# Patient Record
Sex: MALE | Race: WHITE | NOT HISPANIC OR LATINO | Employment: OTHER | URBAN - METROPOLITAN AREA
[De-identification: names, ages, dates, MRNs, and addresses within clinical notes are randomized per-mention and may not be internally consistent; named-entity substitution may affect disease eponyms.]

---

## 2017-02-17 ENCOUNTER — TELEPHONE (OUTPATIENT)
Dept: MEDICAL GROUP | Facility: MEDICAL CENTER | Age: 67
End: 2017-02-17

## 2017-02-17 NOTE — TELEPHONE ENCOUNTER
Insurance needs a prior auth on this medication but they said that Clindamycin or Erythromycin with a a prior auth. Please advise

## 2017-02-18 NOTE — TELEPHONE ENCOUNTER
MEDICATION PRIOR AUTHORIZATION NEEDED:    1. Name of Medication sulfacetamide sodium     2. Requested By (Name of Pharmacy): [facility]      3. Is insurance on file current? yes    4. What is the name & phone number of the 3rd party payor?      talked to [facility] about this medication and the suggested 2 other medications that would be covered and submitted them to Dr. Segura

## 2017-02-27 ENCOUNTER — OFFICE VISIT (OUTPATIENT)
Dept: MEDICAL GROUP | Facility: CLINIC | Age: 67
End: 2017-02-27
Payer: MEDICARE

## 2017-02-27 VITALS
OXYGEN SATURATION: 99 % | TEMPERATURE: 96 F | WEIGHT: 225 LBS | RESPIRATION RATE: 18 BRPM | HEART RATE: 89 BPM | BODY MASS INDEX: 35.31 KG/M2 | HEIGHT: 67 IN | SYSTOLIC BLOOD PRESSURE: 138 MMHG | DIASTOLIC BLOOD PRESSURE: 80 MMHG

## 2017-02-27 DIAGNOSIS — L02.92 RECURRENT BOILS: ICD-10-CM

## 2017-02-27 DIAGNOSIS — R21 SKIN RASH: ICD-10-CM

## 2017-02-27 PROCEDURE — 99213 OFFICE O/P EST LOW 20 MIN: CPT | Mod: 25 | Performed by: INTERNAL MEDICINE

## 2017-02-27 PROCEDURE — G8482 FLU IMMUNIZE ORDER/ADMIN: HCPCS | Performed by: INTERNAL MEDICINE

## 2017-02-27 PROCEDURE — 3017F COLORECTAL CA SCREEN DOC REV: CPT | Mod: 8P | Performed by: INTERNAL MEDICINE

## 2017-02-27 PROCEDURE — G8419 CALC BMI OUT NRM PARAM NOF/U: HCPCS | Performed by: INTERNAL MEDICINE

## 2017-02-27 PROCEDURE — 4040F PNEUMOC VAC/ADMIN/RCVD: CPT | Performed by: INTERNAL MEDICINE

## 2017-02-27 PROCEDURE — 4004F PT TOBACCO SCREEN RCVD TLK: CPT | Performed by: INTERNAL MEDICINE

## 2017-02-27 PROCEDURE — 1101F PT FALLS ASSESS-DOCD LE1/YR: CPT | Performed by: INTERNAL MEDICINE

## 2017-02-27 RX ORDER — SULFACETAMIDE SODIUM 100 MG/ML
LOTION TOPICAL
Qty: 118 ML | Refills: 6 | Status: SHIPPED | OUTPATIENT
Start: 2017-02-27 | End: 2018-08-27 | Stop reason: SDUPTHER

## 2017-02-27 RX ORDER — TRIAMCINOLONE ACETONIDE 40 MG/ML
40 INJECTION, SUSPENSION INTRA-ARTICULAR; INTRAMUSCULAR ONCE
Status: COMPLETED | OUTPATIENT
Start: 2017-02-27 | End: 2017-02-27

## 2017-02-27 RX ADMIN — TRIAMCINOLONE ACETONIDE 40 MG: 40 INJECTION, SUSPENSION INTRA-ARTICULAR; INTRAMUSCULAR at 09:06

## 2017-02-27 ASSESSMENT — PATIENT HEALTH QUESTIONNAIRE - PHQ9: CLINICAL INTERPRETATION OF PHQ2 SCORE: 0

## 2017-02-27 NOTE — PROGRESS NOTES
"Subjective:  Micheal is a 66 y.o. male with the following   Past Medical History   Diagnosis Date   • Gout    • GERD (gastroesophageal reflux disease)    • Depression    • Hyperlipidemia    • Tobacco use 11/26/2014      Family History   Problem Relation Age of Onset   • Cancer Mother    • Cancer Father    • Stroke Father    • Diabetes Neg Hx    • Heart Disease Neg Hx    • Hypertension Neg Hx      The patient is on the following medications:   Current outpatient prescriptions:   •  Sulfacetamide Sodium, Acne, 10 % Lotion, Apply one daily, Disp: 118 mL, Rfl: 6  •  simvastatin (ZOCOR) 40 MG Tab, TAKE 1 TABLET EVERY EVENING, Disp: 90 Tab, Rfl: 3  •  duloxetine (CYMBALTA) 60 MG Cap DR Particles delayed-release capsule, TAKE 1 CAPSULE EVERY DAY, Disp: 90 Cap, Rfl: 3  •  omeprazole (PRILOSEC) 40 MG delayed-release capsule, Take 1 capsule every day, Disp: 90 Cap, Rfl: 0  •  varenicline (CHANTIX) 1 MG tablet, Take 1 Tab by mouth 2 times a day., Disp: 60 Tab, Rfl: 3  •  albuterol 108 (90 BASE) MCG/ACT Aero Soln inhalation aerosol, Inhale 2 Puffs by mouth every 6 hours as needed for Shortness of Breath., Disp: 8.5 g, Rfl: 3  •  Misc. Devices Misc, 1 Device by Does not apply route every bedtime., Disp: 1 Device, Rfl: 0  •  triamcinolone acetonide (KENALOG) 0.5 % Cream, Use q 12 hour, Disp: 60 g, Rfl: 3    HPI; patient is here today requesting subcutaneous injections of Kenalog to recurrent skin rash, he has failed creams and ointments given by dermatologist, only therapy that has worked is local injections. Also requesting prescription of Sulfacetamide sodium  topical lotion for recurrent groin boils, per patient he has tried multiple therapies in the past without success , this is only medication that has worked, but insurance company has denied his recent request for prescription.   ROS:  See HPI    Blood pressure 138/80, pulse 89, temperature 35.6 °C (96 °F), resp. rate 18, height 1.702 m (5' 7.01\"), weight 102.059 kg (225 " lb), SpO2 99 %.on RA  Objective:  Patient is well appearing and in no acute distress.  Pharynx is clear.  Neck is soft and supple with no cervical or supraclavicular lymphadenopathy, thyromegaly or masses, no JVD.  Lungs clear to auscultation bilaterally with normal respiratory effort. Abdomen soft, non-tender on palpation,not distended. Heart regular rate and rhythm without murmur. Extremities without any clubbing, cyanosis, or edema. Left forearm and lower extremity multiple circular erythematous rash.     Assessment and Plan:  1. Recurrent erythematous rash ; has not responded to topical antifungal and steroid medications except subcutaneous Kenalog injection to the lesions.     2. Recurrent groin boils; responds to sulfacetamide 10% lotion, patient decided the only medication that works, but his medical insurance has denied it.       Patient is advised on preventive and supportive care regarding current symptoms, Kenalog 40 mg 1.0 cc injection to multiple skin lesions, refill prescription and apply for preauthorization, also discussed alternative therapies for recurrent groin infection.      Please note that this dictation was created using voice recognition software. I have worked with consultants from the vendor as well as technical experts from [facility] to optimize the interface. I have made every reasonable attempt to correct obvious errors, but I expect that there are errors of grammar and possibly content that I did not discover before finalizing the note.

## 2017-02-27 NOTE — MR AVS SNAPSHOT
"        Micheal Carlnabor   2017 7:20 AM   Office Visit   MRN: [id number]    Department:  [facility]   Dept Phone:  [phone]    Description:  Male : 1950   Provider:  Manuel Segura M.D.           Allergies as of 2017     Allergen Noted Reactions    Zyban [Bupropion] 10/21/2014   Hives, Itching    Augmentin 2016   Hives      You were diagnosed with     Recurrent boils   [[id number]]         Vital Signs     Blood Pressure Pulse Temperature Respirations Height Weight    138/80 mmHg 89 35.6 °C (96 °F) 18 1.702 m (5' 7.01\") 102.059 kg (225 lb)    Body Mass Index Oxygen Saturation Smoking Status             35.23 kg/m2 99% Current Every Day Smoker         Basic Information     Date Of Birth Sex Race Ethnicity Preferred Language    1950 Male White Non- English      Your appointments     2017  3:00 PM   Established Patient with Manuel Segura M.D.   [facility] ([facility])    [address] [place] [id number]   [phone]           You will be receiving a confirmation call a few days before your appointment from our automated call confirmation system.   Please bring to your appointment; a photo ID, copies of your insurance card, all medication bottles and any co-pay that you are responsible for.  Please allow 45-60 minutes to complete your scheduled appointment.              Problem List              ICD-10-CM Priority Class Noted - Resolved    Obesity (BMI 30-39.9) E66.9   2014 - Present    Hyperlipidemia E78.5   2014 - Present    GERD (gastroesophageal reflux disease) K21.9   2014 - Present    Depression F32.9   2014 - Present    Tobacco use Z72.0   2014 - Present    Right wrist pain M25.531   2014 - Present    NAHUM on CPAP G47.33   2016 - Present      Health Maintenance        Date Due Completion Dates    IMM DTaP/Tdap/Td Vaccine (1 - Tdap) 1968 ---    COLONOSCOPY 1999 ---    IMM " ZOSTER VACCINE 9/30/2009 ---            Current Immunizations     13-VALENT PCV PREVNAR 10/17/2016    Influenza TIV (IM) 10/1/2015    Influenza Vaccine Adult HD 10/17/2016    Influenza Vaccine Quad Inj (Preserved) 11/26/2014  3:09 PM    Pneumococcal polysaccharide vaccine (PPSV-23) 11/26/2014  3:10 PM      Below and/or attached are the medications your provider expects you to take. Review all of your home medications and newly ordered medications with your provider and/or pharmacist. Follow medication instructions as directed by your provider and/or pharmacist. Please keep your medication list with you and share with your provider. Update the information when medications are discontinued, doses are changed, or new medications (including over-the-counter products) are added; and carry medication information at all times in the event of emergency situations     Allergies:  ZYBAN - Hives,Itching     AUGMENTIN - Hives               Medications  Valid as of: February 27, 2017 -  7:52 AM    Generic Name Brand Name Tablet Size Instructions for use    Albuterol Sulfate (Aero Soln) albuterol 108 (90 BASE) MCG/ACT Inhale 2 Puffs by mouth every 6 hours as needed for Shortness of Breath.        DULoxetine HCl (Cap DR Particles) CYMBALTA 60 MG TAKE 1 CAPSULE EVERY DAY        Misc. Devices (Misc) Misc. Devices  1 Device by Does not apply route every bedtime.        Omeprazole (CAPSULE DELAYED RELEASE) PRILOSEC 40 MG Take 1 capsule every day        Simvastatin (Tab) ZOCOR 40 MG TAKE 1 TABLET EVERY EVENING        Sulfacetamide Sodium (Acne) (Lotion) Sulfacetamide Sodium (Acne) 10 % Apply one daily        Triamcinolone Acetonide (Cream) KENALOG 0.5 % Use q 12 hour        Varenicline Tartrate (Tab) CHANTIX 1 MG Take 1 Tab by mouth 2 times a day.        .                 Medicines prescribed today were sent to:     [id number]  [place], [place] - [address] AT [facility] [employer] [employer] [facility] [place]    [address]  [place] [address]    Phone: [phone] Fax: [phone]    Open 24 Hours?: No      Medication refill instructions:       If your prescription bottle indicates you have medication refills left, it is not necessary to call your provider’s office. Please contact your pharmacy and they will refill your medication.    If your prescription bottle indicates you do not have any refills left, you may request refills at any time through one of the following ways: The online [employer] system (except Urgent Care), by calling your provider’s office, or by asking your pharmacy to contact your provider’s office with a refill request. Medication refills are processed only during regular business hours and may not be available until the next business day. Your provider may request additional information or to have a follow-up visit with you prior to refilling your medication.   *Please Note: Medication refills are assigned a new Rx number when refilled electronically. Your pharmacy may indicate that no refills were authorized even though a new prescription for the same medication is available at the pharmacy. Please request the medicine by name with the pharmacy before contacting your provider for a refill.           [employer] Access Code: Activation code not generated  Current [employer] Status: Active

## 2017-03-28 RX ORDER — DULOXETIN HYDROCHLORIDE 60 MG/1
60 CAPSULE, DELAYED RELEASE ORAL DAILY
Qty: 90 CAP | Refills: 3 | Status: SHIPPED | OUTPATIENT
Start: 2017-03-28 | End: 2018-02-08 | Stop reason: SDUPTHER

## 2017-03-28 RX ORDER — SIMVASTATIN 40 MG
40 TABLET ORAL EVERY EVENING
Qty: 90 TAB | Refills: 3 | Status: SHIPPED | OUTPATIENT
Start: 2017-03-28 | End: 2017-12-29

## 2017-03-28 RX ORDER — OMEPRAZOLE 40 MG/1
40 CAPSULE, DELAYED RELEASE ORAL DAILY
Qty: 90 CAP | Refills: 3 | Status: SHIPPED | OUTPATIENT
Start: 2017-03-28 | End: 2018-02-13 | Stop reason: SDUPTHER

## 2017-03-30 ENCOUNTER — TELEPHONE (OUTPATIENT)
Dept: URGENT CARE | Facility: CLINIC | Age: 67
End: 2017-03-30

## 2017-03-30 DIAGNOSIS — E55.9 VITAMIN D DEFICIENCY: ICD-10-CM

## 2017-03-30 DIAGNOSIS — E78.5 DYSLIPIDEMIA: ICD-10-CM

## 2017-03-30 NOTE — TELEPHONE ENCOUNTER
requested fasting test has been ordered, please notify  while you are in the laboratory for blood work before your next appointment.  Thanks

## 2017-03-30 NOTE — TELEPHONE ENCOUNTER
1. Caller Name: Micheal                      Call Back Number: [phone] (home)     2. Message: Patient called requesting lab orders for a complete blood work up, prior to his appt on 4/12/17.    Please call patient when orders are ready.  Please advise, thank you.    3. Patient approves office to leave a detailed voicemail/MyChart message: yes

## 2017-04-10 ENCOUNTER — HOSPITAL ENCOUNTER (OUTPATIENT)
Dept: LAB | Facility: MEDICAL CENTER | Age: 67
End: 2017-04-10
Attending: INTERNAL MEDICINE
Payer: MEDICARE

## 2017-04-10 DIAGNOSIS — E78.5 DYSLIPIDEMIA: ICD-10-CM

## 2017-04-10 DIAGNOSIS — E55.9 VITAMIN D DEFICIENCY: ICD-10-CM

## 2017-04-10 LAB
25(OH)D3 SERPL-MCNC: 54 NG/ML (ref 30–100)
ALT SERPL-CCNC: 28 U/L (ref 2–50)
CHOLEST SERPL-MCNC: 141 MG/DL (ref 100–199)
HDLC SERPL-MCNC: 42 MG/DL
LDLC SERPL CALC-MCNC: 81 MG/DL
TRIGL SERPL-MCNC: 89 MG/DL (ref 0–149)

## 2017-04-10 PROCEDURE — 84460 ALANINE AMINO (ALT) (SGPT): CPT

## 2017-04-10 PROCEDURE — 83695 ASSAY OF LIPOPROTEIN(A): CPT

## 2017-04-10 PROCEDURE — 36415 COLL VENOUS BLD VENIPUNCTURE: CPT

## 2017-04-10 PROCEDURE — 80061 LIPID PANEL: CPT

## 2017-04-10 PROCEDURE — 82306 VITAMIN D 25 HYDROXY: CPT

## 2017-04-12 ENCOUNTER — OFFICE VISIT (OUTPATIENT)
Dept: MEDICAL GROUP | Facility: CLINIC | Age: 67
End: 2017-04-12
Payer: MEDICARE

## 2017-04-12 VITALS
BODY MASS INDEX: 34.84 KG/M2 | OXYGEN SATURATION: 100 % | TEMPERATURE: 97.7 F | DIASTOLIC BLOOD PRESSURE: 80 MMHG | HEIGHT: 67 IN | RESPIRATION RATE: 18 BRPM | SYSTOLIC BLOOD PRESSURE: 130 MMHG | WEIGHT: 222 LBS | HEART RATE: 83 BPM

## 2017-04-12 DIAGNOSIS — Z72.0 TOBACCO USE: ICD-10-CM

## 2017-04-12 DIAGNOSIS — E78.5 DYSLIPIDEMIA: ICD-10-CM

## 2017-04-12 DIAGNOSIS — H57.9 EYE PROBLEM: ICD-10-CM

## 2017-04-12 DIAGNOSIS — L98.9 SKIN LESION: ICD-10-CM

## 2017-04-12 DIAGNOSIS — F41.9 ANXIETY AND DEPRESSION: ICD-10-CM

## 2017-04-12 DIAGNOSIS — F32.A ANXIETY AND DEPRESSION: ICD-10-CM

## 2017-04-12 DIAGNOSIS — Z00.00 ANNUAL PHYSICAL EXAM: ICD-10-CM

## 2017-04-12 LAB — LPA SERPL-MCNC: 2 MG/DL

## 2017-04-12 PROCEDURE — G8432 DEP SCR NOT DOC, RNG: HCPCS | Performed by: INTERNAL MEDICINE

## 2017-04-12 PROCEDURE — 4004F PT TOBACCO SCREEN RCVD TLK: CPT | Performed by: INTERNAL MEDICINE

## 2017-04-12 PROCEDURE — G0438 PPPS, INITIAL VISIT: HCPCS | Performed by: INTERNAL MEDICINE

## 2017-04-13 NOTE — PROGRESS NOTES
Subjective: Micheal Brothers is a 66 y.o. male      Past Medical History   Diagnosis Date   • Gout    • GERD (gastroesophageal reflux disease)    • Depression    • Hyperlipidemia    • Tobacco use 11/26/2014       Allergies: Zyban and Augmentin      Current outpatient prescriptions:   •  omeprazole (PRILOSEC) 40 MG delayed-release capsule, Take 1 Cap by mouth every day., Disp: 90 Cap, Rfl: 3  •  duloxetine (CYMBALTA) 60 MG Cap DR Particles delayed-release capsule, Take 1 Cap by mouth every day., Disp: 90 Cap, Rfl: 3  •  simvastatin (ZOCOR) 40 MG Tab, Take 1 Tab by mouth every evening., Disp: 90 Tab, Rfl: 3  •  Sulfacetamide Sodium, Acne, 10 % Lotion, Apply one daily, Disp: 118 mL, Rfl: 6  •  varenicline (CHANTIX) 1 MG tablet, Take 1 Tab by mouth 2 times a day., Disp: 60 Tab, Rfl: 3  •  albuterol 108 (90 BASE) MCG/ACT Aero Soln inhalation aerosol, Inhale 2 Puffs by mouth every 6 hours as needed for Shortness of Breath., Disp: 8.5 g, Rfl: 3  •  Misc. Devices Misc, 1 Device by Does not apply route every bedtime., Disp: 1 Device, Rfl: 0  •  triamcinolone acetonide (KENALOG) 0.5 % Cream, Use q 12 hour, Disp: 60 g, Rfl: 3    Social History     Social History   • Marital Status:      Spouse Name: N/A   • Number of Children: N/A   • Years of Education: N/A     Occupational History   • Not on file.     Social History Main Topics   • Smoking status: Current Every Day Smoker -- 1.00 packs/day for 40 years     Types: Cigarettes   • Smokeless tobacco: Never Used   • Alcohol Use: 0.0 oz/week     0 Standard drinks or equivalent per week      Comment: rare   • Drug Use: No   • Sexual Activity:     Partners: Female     Other Topics Concern   • Not on file     Social History Narrative       Family History   Problem Relation Age of Onset   • Cancer Mother    • Cancer Father    • Stroke Father    • Diabetes Neg Hx    • Heart Disease Neg Hx    • Hypertension Neg Hx        HPI: Patient is here today for annual physical exam,  "requesting referral to dermatology for recurrent lower extremity skin rash, previously responded to Kenalog injections to the area of skin rash, but now continues to persist without pruritus. Patient continues to smoke      Review of Systems   Constitutional: Negative for fever, chills, weight loss, malaise/fatigue and diaphoresis.   HENT: Negative for ear pain, congestion, sore throat and neck pain.   Eyes: Negative for blurred vision, double vision and pain.   Respiratory: Negative for cough, sputum production, shortness of breath and wheezing.   Cardiovascular: Negative for palpitations, orthopnea, claudication, leg swelling and PND.   Gastrointestinal: Negative for heartburn, nausea, vomiting, abdominal pain, diarrhea, constipation and blood in stool.   Genitourinary: Negative. Negative for dysuria, urgency and frequency.   Musculoskeletal: Negative for myalgias and back pain.   Skin: Negative for itching and rash.   Neurological: Negative for dizziness, tremors, loss of consciousness, weakness and headaches.   Endo/Heme/Allergies: Negative for polydipsia. Does not bruise/bleed easily.   Psychiatric/Behavioral: Negative for depression, hallucinations and memory loss. The patient does not have insomnia.      /80 mmHg  Pulse 83  Temp(Src) 36.5 °C (97.7 °F)  Resp 18  Ht 1.702 m (5' 7.01\")  Wt 100.699 kg (222 lb)  BMI 34.76 kg/m2  SpO2 100%    Objective:   Obese and Well-appearing male. In no acute distress.   HEENT: PERRLA, EOMI, Conjunctiva WNL, TMs are clear bilaterally. Nares and pharynx is clear. Neck is soft and supple with no cervical lymphadenopathy, thyromegaly or masses, no JVD. No supraclavicular adenopathy  Lungs are clear auscultation bilaterally. Normal respiratory effort. Cardiac regular rate and rhythm without murmur.   Abdomen is protuberant. Soft with positive bowel sounds. Nontender, nondistended with no hepatosplenomegaly or masses.  No Rebound    there is normal external male " genitalia. Testes are descended bilaterally. There is no inguinal hernias. Rectal with no masses and normal tone.  Prostate enlarged, smooth and nontender.  Stool Guiac negative.   Extremities no clubbing cyanosis or edema. 2+ distal pulses. Full Range of motion x4.Back no CVA tenderness.   Neuro: alert and oriented x3. Cranial nerves are intact. No focality. No gross motor or sensory deficits. 2+ DTRs. Negative Romberg and Pronator drift. Affect is normal.      Assessment and plan:  1. Recurrent lower symmetry skin rash; previously responded to Kenalog injection to the area of skin rash, this time has not worked. Requesting referral to dermatology.      2. Increased BMI      3. Dyslipidemia;on  simvastatin 40 mg daily at bedtime.   Ref. Range 8/7/2015 08:21 5/9/2016 08:29 4/10/2017 08:52   Cholesterol,Tot Latest Ref Range: 100-199 mg/dL 128 139 141   Triglycerides Latest Ref Range: 0-149 mg/dL 85 71 89   HDL Latest Ref Range: >=40 mg/dL 37 (A) 42 42   LDL Latest Ref Range: <100 mg/dL 74 83 81   Lipoprotein A Latest Ref Range: <=29 mg/dL   2         4. Tobacco use      5. Anxiety disorder; per patient has been on Cymbalta 60 mg daily for several years, would like to get off the medication.        6. Request of referral to ophthalmologist.        Patient is advised on preventive and supportive care, diet and lifestyle modification, daily walking ,exercise and weight loss, smoking cessation. CoQ10 -200 mg daily while on statin, referral to dermatology and ophthalmology. Gradual tapering off the Cymbalta and monitor.      Please note that this dictation was created using voice recognition software. I have worked with consultants from the vendor as well as technical experts from [employer] to optimize the interface. I have made every reasonable attempt to correct obvious errors, but I expect that there are errors of grammar and possibly content that I did not discover before finalizing the note.

## 2017-10-02 ENCOUNTER — OFFICE VISIT (OUTPATIENT)
Dept: URGENT CARE | Facility: PHYSICIAN GROUP | Age: 67
End: 2017-10-02
Payer: MEDICARE

## 2017-10-02 VITALS
HEART RATE: 94 BPM | HEIGHT: 67 IN | WEIGHT: 226 LBS | TEMPERATURE: 97.8 F | BODY MASS INDEX: 35.47 KG/M2 | RESPIRATION RATE: 24 BRPM | SYSTOLIC BLOOD PRESSURE: 138 MMHG | DIASTOLIC BLOOD PRESSURE: 78 MMHG | OXYGEN SATURATION: 97 %

## 2017-10-02 DIAGNOSIS — R05.9 COUGH: ICD-10-CM

## 2017-10-02 DIAGNOSIS — J01.01 ACUTE RECURRENT MAXILLARY SINUSITIS: ICD-10-CM

## 2017-10-02 DIAGNOSIS — J44.9 CHRONIC OBSTRUCTIVE PULMONARY DISEASE, UNSPECIFIED COPD TYPE (HCC): ICD-10-CM

## 2017-10-02 PROCEDURE — 99214 OFFICE O/P EST MOD 30 MIN: CPT | Performed by: FAMILY MEDICINE

## 2017-10-02 RX ORDER — DOXYCYCLINE HYCLATE 100 MG
100 TABLET ORAL 2 TIMES DAILY
Qty: 20 TAB | Refills: 0 | Status: SHIPPED | OUTPATIENT
Start: 2017-10-02 | End: 2017-10-12

## 2017-10-02 ASSESSMENT — ENCOUNTER SYMPTOMS
SORE THROAT: 1
WEIGHT LOSS: 0
EYE REDNESS: 0
MYALGIAS: 0
EYE DISCHARGE: 0

## 2017-10-02 NOTE — PROGRESS NOTES
"Subjective:      Micheal Brothers is a 67 y.o. male who presents with Cough (with sore throat and sinus congestion x 5 days )            5 days nasal congestion, sinus pressure, intermittently productive cough without blood in sputum. No SOB/wheeze. +PMH COPD. No PMH pneumonia. +PMH sinus infection. Using multiple OTC cough/cold without relief. Improved with laying on side. No other aggravating or alleviating factors.          Review of Systems   Constitutional: Positive for malaise/fatigue. Negative for weight loss.   HENT: Positive for sore throat. Negative for ear pain.    Eyes: Negative for discharge and redness.   Cardiovascular: Negative for leg swelling.   Musculoskeletal: Negative for joint pain and myalgias.   Skin: Negative for itching and rash.     .  Medications, Allergies, and current problem list reviewed today in Epic       Objective:     /78   Pulse 94   Temp 36.6 °C (97.8 °F)   Resp (!) 24   Ht 1.702 m (5' 7\")   Wt 102.5 kg (226 lb)   SpO2 97%   BMI 35.40 kg/m²      Physical Exam   Constitutional: He appears well-developed and well-nourished. No distress.   HENT:   Tender maxillary sinus bilateral, +PND   Eyes: Conjunctivae are normal.   Neck: Neck supple.   Cardiovascular: Normal rate, regular rhythm and normal heart sounds.    No murmur heard.  Pulmonary/Chest: Effort normal and breath sounds normal. He has no wheezes.   No rhonchi   Neurological:   Speech is clear. Patient is appropriate and cooperative.     Skin: Skin is warm and dry. No rash noted.               Assessment/Plan:     1. Acute recurrent maxillary sinusitis  doxycycline (VIBRAMYCIN) 100 MG Tab   2. Cough  doxycycline (VIBRAMYCIN) 100 MG Tab   3. Chronic obstructive pulmonary disease, unspecified COPD type (CMS-HCC)  doxycycline (VIBRAMYCIN) 100 MG Tab     Differential diagnosis, natural history, supportive care, and indications for immediate follow-up discussed at length.   Nasal saline, flonase      "

## 2017-11-07 ENCOUNTER — TELEPHONE (OUTPATIENT)
Dept: MEDICAL GROUP | Facility: PHYSICIAN GROUP | Age: 67
End: 2017-11-07

## 2017-11-07 NOTE — TELEPHONE ENCOUNTER
I called [employer] @ [phone] and they explained I would need to complete the PA appeal paperwork.

## 2017-11-07 NOTE — TELEPHONE ENCOUNTER
Appeal faxed to [employer] and confirmation received.  Appeal scanned to media.  Patient informed we will contact him with the insurance's final decision.

## 2017-11-10 NOTE — TELEPHONE ENCOUNTER
Pt rescheduled for later this month. Would you like to have his do any lab work?  Thank you Dr. Segura.

## 2017-11-11 NOTE — TELEPHONE ENCOUNTER
I spoke with patient and he explained he recently discontinued his statin.  Patient aware that this would take some time to show a difference in lab results and typically insurance does not cover testing more than once per year.   Patient will discuss need for labs during upcoming visit and possibly do them prior to appointment after this one.

## 2017-11-14 NOTE — TELEPHONE ENCOUNTER
I received the appeal back and they have approved the appeal from 1/1/18-12/31/18.    Phone Number Called: [phone] (home)     Message: LVM informing patient auth appeal has been approved from 1/1/18-12/31/18.  Appeal approval scanned to media.     Left Message for patient to call back: no

## 2017-11-27 ENCOUNTER — OFFICE VISIT (OUTPATIENT)
Dept: MEDICAL GROUP | Facility: PHYSICIAN GROUP | Age: 67
End: 2017-11-27
Payer: MEDICARE

## 2017-11-27 VITALS
BODY MASS INDEX: 35.41 KG/M2 | WEIGHT: 225.6 LBS | DIASTOLIC BLOOD PRESSURE: 78 MMHG | SYSTOLIC BLOOD PRESSURE: 122 MMHG | OXYGEN SATURATION: 98 % | RESPIRATION RATE: 16 BRPM | HEIGHT: 67 IN | TEMPERATURE: 97.7 F | HEART RATE: 92 BPM

## 2017-11-27 DIAGNOSIS — R21 SKIN RASH: ICD-10-CM

## 2017-11-27 DIAGNOSIS — B96.89 SKIN INFECTION, BACTERIAL: ICD-10-CM

## 2017-11-27 DIAGNOSIS — H61.91 SKIN LESION OF RIGHT EAR: ICD-10-CM

## 2017-11-27 DIAGNOSIS — E66.9 OBESITY (BMI 30-39.9): ICD-10-CM

## 2017-11-27 DIAGNOSIS — L08.9 SKIN INFECTION, BACTERIAL: ICD-10-CM

## 2017-11-27 DIAGNOSIS — Z72.0 TOBACCO USE: ICD-10-CM

## 2017-11-27 PROCEDURE — 99214 OFFICE O/P EST MOD 30 MIN: CPT | Performed by: INTERNAL MEDICINE

## 2017-11-27 RX ORDER — TRIAMCINOLONE ACETONIDE 40 MG/ML
40 INJECTION, SUSPENSION INTRA-ARTICULAR; INTRAMUSCULAR ONCE
Status: COMPLETED | OUTPATIENT
Start: 2017-11-27 | End: 2017-11-27

## 2017-11-27 RX ORDER — SULFACETAMIDE SODIUM 100 MG/G
OINTMENT OPHTHALMIC
Qty: 1 TUBE | Refills: 6 | Status: SHIPPED | OUTPATIENT
Start: 2017-11-27 | End: 2018-05-17

## 2017-11-27 RX ADMIN — TRIAMCINOLONE ACETONIDE 40 MG: 40 INJECTION, SUSPENSION INTRA-ARTICULAR; INTRAMUSCULAR at 07:43

## 2017-11-27 NOTE — PROGRESS NOTES
Subjective:  Micheal is a 67 y.o. male with the following   Past Medical History:   Diagnosis Date   • Depression    • GERD (gastroesophageal reflux disease)    • Gout    • Hyperlipidemia    • Tobacco use 11/26/2014      Family History   Problem Relation Age of Onset   • Cancer Mother    • Cancer Father    • Stroke Father    • Diabetes Neg Hx    • Heart Disease Neg Hx    • Hypertension Neg Hx      The patient is on the following medications:   Current Outpatient Prescriptions:   •  sulfacetamide (SULAMYD) 10 % Ointment, Apply once daily., Disp: 1 Tube, Rfl: 6  •  omeprazole (PRILOSEC) 40 MG delayed-release capsule, Take 1 Cap by mouth every day., Disp: 90 Cap, Rfl: 3  •  duloxetine (CYMBALTA) 60 MG Cap DR Particles delayed-release capsule, Take 1 Cap by mouth every day., Disp: 90 Cap, Rfl: 3  •  Sulfacetamide Sodium, Acne, 10 % Lotion, Apply one daily, Disp: 118 mL, Rfl: 6  •  albuterol 108 (90 BASE) MCG/ACT Aero Soln inhalation aerosol, Inhale 2 Puffs by mouth every 6 hours as needed for Shortness of Breath., Disp: 8.5 g, Rfl: 3  •  Misc. Devices Misc, 1 Device by Does not apply route every bedtime., Disp: 1 Device, Rfl: 0  •  triamcinolone acetonide (KENALOG) 0.5 % Cream, Use q 12 hour, Disp: 60 g, Rfl: 3  •  Phenylephrine-DM-GG-APAP (MUCINEX FAST-MAX COLD FLU PO), Take  by mouth., Disp: , Rfl:   •  simvastatin (ZOCOR) 40 MG Tab, Take 1 Tab by mouth every evening., Disp: 90 Tab, Rfl: 3  •  varenicline (CHANTIX) 1 MG tablet, Take 1 Tab by mouth 2 times a day., Disp: 60 Tab, Rfl: 3    Current Facility-Administered Medications:   •  triamcinolone acetonide (KENALOG-40) injection 40 mg, 40 mg, Intramuscular, Once, Manuel Segura M.D.    HPI; Patient is here today regarding recurrent upper and lower extremity skin rash that is resistant to topical steroids, usually requires interlesional injections of Kenalog every 4-6 months, also requesting referral to dermatology for persistent right earlobe tender skin lesion,  "refractory to medical therapy. Patient continues with tobacco use, occational morning cough denies having wheezing or shortness of breath . Patient is also requesting refill of sulfacetamide sodium ointment for recurrent chronic recurrent groin skin infection, stating that his insurance no longer covering the lotion, patient denies any fever, chills ,  open wounds or discharge .       ROS: All other systems reviewed and they are negative.    Blood pressure 122/78, pulse 92, temperature 36.5 °C (97.7 °F), resp. rate 16, height 1.702 m (5' 7\"), weight 102.3 kg (225 lb 9.6 oz), SpO2 98 %.on RA        Objective:      Patient is well appearing and in no acute distress.  Eyes; pupils are equally reactive to light and accommodation. Ears right earlobe keratotic  skin lesion, ear canals are clear, no tympanic membranes bulging. Pharynx is clear.  Neck is soft and supple, nontender,no thyromegaly or mass.  lymphatic;  no cervical or supraclavicular lymphadenopathy.  Respiratory;  Lungs clear to auscultation bilaterally with normal respiratory effort. Gastrointestinal; abdomen is soft, non-tender on palpation,not distended. Cardiovascular ; Heart regular rate and rhythm without murmur, no JVD.  Extremities without any clubbing, cyanosis, or edema. Neuro - psychiatric ;  alert and oriented to time, location and person, motor and sensory intact. Skin; right upper extremity and left lower extremity circular erythematous skin rash .  Musculoskeletal; no tenderness on palpation, no joint swelling or erythema    Assessment ;   1. Recurrent upper and lower extremity circular erythematous skin rash; refractory to topical antifungal and Kenalog therapy, usually responds to interlesional Kenalog injection. Per patient right lower extremity skin lesions have completely resolved after injections.     2. Persistent right earlobe skin lesion.      3. Tobacco abuse       4. Chronic recurrent groin skin infection; it has responded " sulfacetamide  Sodium lotion, shows no longer covering it requesting ointment as a preferred medication..       5. Obesity    Plan; Patient is advised on preventive and supportive care regarding skin rash, areas were antiseptically prepared, given epidermal Kenalog injections, procedure was well-tolerated.. Referral to dermatology for evaluation of persistent earlobe skin lesion. Also advised on tobacco cessation, diet, exercise and weight loss.. Refilled medication.     Please note that this dictation was created using voice recognition software. I have worked with consultants from the vendor as well as technical experts from [employer] to optimize the interface. I have made every reasonable attempt to correct obvious errors, but I expect that there are errors of grammar and possibly content that I did not discover before finalizing the note.

## 2017-12-27 ENCOUNTER — TELEPHONE (OUTPATIENT)
Dept: MEDICAL GROUP | Facility: PHYSICIAN GROUP | Age: 67
End: 2017-12-27

## 2017-12-27 NOTE — TELEPHONE ENCOUNTER
Phone Number Called: [phone] (home)     Message: Pt called and stated he was needing to order new supplies for his CPAP, per Medicare he needs an office note stating that he does need these supplies as he uses them and it helps pt. Pt was wanting to know if he needs to make an appt as he had just seen you last month.     Left Message for patient to call back: N\A

## 2017-12-29 ENCOUNTER — OFFICE VISIT (OUTPATIENT)
Dept: MEDICAL GROUP | Facility: PHYSICIAN GROUP | Age: 67
End: 2017-12-29
Payer: MEDICARE

## 2017-12-29 VITALS
SYSTOLIC BLOOD PRESSURE: 122 MMHG | WEIGHT: 230 LBS | DIASTOLIC BLOOD PRESSURE: 88 MMHG | HEIGHT: 67 IN | HEART RATE: 78 BPM | BODY MASS INDEX: 36.1 KG/M2 | TEMPERATURE: 97.7 F | OXYGEN SATURATION: 98 % | RESPIRATION RATE: 14 BRPM

## 2017-12-29 DIAGNOSIS — E66.9 OBESITY (BMI 30-39.9): ICD-10-CM

## 2017-12-29 DIAGNOSIS — Z72.0 TOBACCO USE: ICD-10-CM

## 2017-12-29 DIAGNOSIS — G47.33 OSA ON CPAP: ICD-10-CM

## 2017-12-29 PROCEDURE — 99213 OFFICE O/P EST LOW 20 MIN: CPT | Performed by: INTERNAL MEDICINE

## 2017-12-29 ASSESSMENT — PAIN SCALES - GENERAL: PAINLEVEL: NO PAIN

## 2017-12-29 NOTE — PROGRESS NOTES
Subjective:  Micheal is a 67 y.o. male with the following   Past Medical History:   Diagnosis Date   • Depression    • GERD (gastroesophageal reflux disease)    • Gout    • Hyperlipidemia    • Tobacco use 11/26/2014      Family History   Problem Relation Age of Onset   • Cancer Mother    • Cancer Father    • Stroke Father    • Diabetes Neg Hx    • Heart Disease Neg Hx    • Hypertension Neg Hx      The patient is on the following medications:   Current Outpatient Prescriptions:   •  sulfacetamide (SULAMYD) 10 % Ointment, Apply once daily., Disp: 1 Tube, Rfl: 6  •  varenicline (CHANTIX) 1 MG tablet, Take 1 Tab by mouth 2 times a day., Disp: 60 Tab, Rfl: 3  •  Phenylephrine-DM-GG-APAP (MUCINEX FAST-MAX COLD FLU PO), Take  by mouth., Disp: , Rfl:   •  omeprazole (PRILOSEC) 40 MG delayed-release capsule, Take 1 Cap by mouth every day., Disp: 90 Cap, Rfl: 3  •  duloxetine (CYMBALTA) 60 MG Cap DR Particles delayed-release capsule, Take 1 Cap by mouth every day., Disp: 90 Cap, Rfl: 3  •  Sulfacetamide Sodium, Acne, 10 % Lotion, Apply one daily, Disp: 118 mL, Rfl: 6  •  albuterol 108 (90 BASE) MCG/ACT Aero Soln inhalation aerosol, Inhale 2 Puffs by mouth every 6 hours as needed for Shortness of Breath., Disp: 8.5 g, Rfl: 3  •  Misc. Devices Misc, 1 Device by Does not apply route every bedtime., Disp: 1 Device, Rfl: 0  •  triamcinolone acetonide (KENALOG) 0.5 % Cream, Use q 12 hour, Disp: 60 g, Rfl: 3    HPI; Patient is here today regarding obstructive sleep apnea, he was told that would require new visit to the PCP to document the need for continuation of CPAP. Per patient he has been stable on CPAP given for obstructive sleep apnea, denies having snoring, daytime sleepiness or fatigue. Patient continues to smoke and noncompliant with diet and exercise , has been gaining weight denies having shortness of breath or chest pain., .        ROS: All other systems reviewed and they are negative.    Blood pressure 122/88, pulse  "78, temperature 36.5 °C (97.7 °F), resp. rate 14, height 1.702 m (5' 7\"), weight 104.3 kg (230 lb), SpO2 98 %.on RA        Objective:      Patient is well appearing and in no acute distress.  Eyes; pupils are equally reactive to light and accommodation. Ears are clear, no tympanic membranes bulging. Pharynx is clear.  Neck is soft and supple, nontender,no thyromegaly or mass.  lymphatic;  no cervical or supraclavicular lymphadenopathy.  Respiratory;  Lungs clear to auscultation bilaterally with normal respiratory effort. Gastrointestinal; abdomen is soft, non-tender on palpation,not distended. Cardiovascular ; Heart regular rate and rhythm without murmur, no JVD.  Extremities without any clubbing, cyanosis, or edema. Neuro - psychiatric ;  alert and oriented to time, location and person, motor and sensory intact. Skin; no rash or erythema. Musculoskeletal; no tenderness on palpation, no joint swelling or erythema. Central obesity    Assessment ;     1. Obstructive sleep apnea; clinically stable on nocturnal CPAP, denies having daytime sleepiness, snoring or fatigue.     2. Increased BMI    3. Tobacco use           Plan; Patient is advised on preventive and supportive care, diet and lifestyle modification, daily walking ,exercise and weight loss, smoking cessation. Continue with CPAP for sleep apnea.     Please note that this dictation was created using voice recognition software. I have worked with consultants from the vendor as well as technical experts from [facility] [employer] to optimize the interface. I have made every reasonable attempt to correct obvious errors, but I expect that there are errors of grammar and possibly content that I did not discover before finalizing the note.  "

## 2018-01-11 ENCOUNTER — HOSPITAL ENCOUNTER (OUTPATIENT)
Facility: MEDICAL CENTER | Age: 68
End: 2018-01-11
Attending: DERMATOLOGY
Payer: MEDICARE

## 2018-01-11 ENCOUNTER — OFFICE VISIT (OUTPATIENT)
Dept: DERMATOLOGY | Facility: IMAGING CENTER | Age: 68
End: 2018-01-11
Payer: MEDICARE

## 2018-01-11 VITALS — BODY MASS INDEX: 34.37 KG/M2 | HEIGHT: 67 IN | WEIGHT: 219 LBS

## 2018-01-11 DIAGNOSIS — H61.001 CHONDRODERMATITIS NODULARIS CHRONICA HELICIS, RIGHT: ICD-10-CM

## 2018-01-11 DIAGNOSIS — L57.8 FAVRE AND RACOUCHOT SYNDROME: ICD-10-CM

## 2018-01-11 DIAGNOSIS — L70.0 FAVRE AND RACOUCHOT SYNDROME: ICD-10-CM

## 2018-01-11 DIAGNOSIS — R20.9 DISTURBANCE OF SKIN SENSATION: ICD-10-CM

## 2018-01-11 DIAGNOSIS — L73.2 HIDRADENITIS: ICD-10-CM

## 2018-01-11 DIAGNOSIS — L30.9 ECZEMA, UNSPECIFIED TYPE: ICD-10-CM

## 2018-01-11 DIAGNOSIS — L91.8 ACROCHORDON: ICD-10-CM

## 2018-01-11 PROCEDURE — 88305 TISSUE EXAM BY PATHOLOGIST: CPT

## 2018-01-11 PROCEDURE — 99202 OFFICE O/P NEW SF 15 MIN: CPT | Mod: 25 | Performed by: DERMATOLOGY

## 2018-01-11 PROCEDURE — 11200 RMVL SKIN TAGS UP TO&INC 15: CPT | Performed by: DERMATOLOGY

## 2018-01-11 PROCEDURE — 11100 PR BIOPSY OF SKIN LESION: CPT | Mod: 59 | Performed by: DERMATOLOGY

## 2018-01-11 RX ORDER — TRIAMCINOLONE ACETONIDE 1 MG/G
1 OINTMENT TOPICAL 2 TIMES DAILY
Qty: 45 G | Refills: 1 | Status: SHIPPED | OUTPATIENT
Start: 2018-01-11 | End: 2018-05-17

## 2018-01-11 ASSESSMENT — ENCOUNTER SYMPTOMS
CHILLS: 0
WEIGHT LOSS: 0
FEVER: 0

## 2018-01-11 NOTE — PROGRESS NOTES
"Dermatology New Patient Visit    Chief Complaint   Patient presents with   • Skin Lesion       Subjective:     HPI:   Micheal Brothers is a 67 y.o. male presenting for    Lesion on the right ear helix  Present for years   Recently became more bothersome  Painful when he lies down on the right side  Has been treated with liquid nitrogen in the past, no improvement    Skin tags, neck, under arms  Has several, but recently 2-3 of them have become itchy and occasionally painful  One on his neck gets caught on his clothes, under right arm, become red, inflamed, and bothersome with arm movement  No treatments    Boils in the groin  Present for years  Has tried several RX creams (cannot remember the names)  Currently using Sodium Sulfacetamide 10% lotion daily - helps    Dry skin with rare \"red rashes\"  Red patches are not very itchy, but very bothersome  Was treated with kenalog injection from prior dermatologist in [place] (intralesional) - helped  Not regularly moisturizing    Acne on the forehead  Present for years  Bothersome in appearance  No treaments    History of skin cancer: No  History of biopsies:No  History of blistering/severe sunburns:No  Family history of skin cancer:No  Family history of atypical moles:No          Past Medical History:   Diagnosis Date   • Depression    • GERD (gastroesophageal reflux disease)    • Gout    • Hyperlipidemia    • Tobacco use 11/26/2014       Current Outpatient Prescriptions on File Prior to Visit   Medication Sig Dispense Refill   • sulfacetamide (SULAMYD) 10 % Ointment Apply once daily. 1 Tube 6   • Phenylephrine-DM-GG-APAP (MUCINEX FAST-MAX COLD FLU PO) Take  by mouth.     • omeprazole (PRILOSEC) 40 MG delayed-release capsule Take 1 Cap by mouth every day. 90 Cap 3   • duloxetine (CYMBALTA) 60 MG Cap DR Particles delayed-release capsule Take 1 Cap by mouth every day. 90 Cap 3   • Sulfacetamide Sodium, Acne, 10 % Lotion Apply one daily 118 mL 6   • varenicline (CHANTIX) 1 MG " "tablet Take 1 Tab by mouth 2 times a day. 60 Tab 3   • albuterol 108 (90 BASE) MCG/ACT Aero Soln inhalation aerosol Inhale 2 Puffs by mouth every 6 hours as needed for Shortness of Breath. 8.5 g 3   • Misc. Devices Misc 1 Device by Does not apply route every bedtime. 1 Device 0   • triamcinolone acetonide (KENALOG) 0.5 % Cream Use q 12 hour 60 g 3     No current facility-administered medications on file prior to visit.        Allergies   Allergen Reactions   • Zyban [Bupropion] Hives and Itching   • Augmentin Hives       Family History   Problem Relation Age of Onset   • Cancer Mother    • Cancer Father    • Stroke Father    • Diabetes Neg Hx    • Heart Disease Neg Hx    • Hypertension Neg Hx        Social History     Social History   • Marital status:      Spouse name: N/A   • Number of children: N/A   • Years of education: N/A     Occupational History   • Not on file.     Social History Main Topics   • Smoking status: Current Every Day Smoker     Packs/day: 0.50     Years: 40.00     Types: Cigarettes   • Smokeless tobacco: Never Used   • Alcohol use 0.0 oz/week      Comment: rare   • Drug use: No   • Sexual activity: Yes     Partners: Female     Other Topics Concern   • Not on file     Social History Narrative   • No narrative on file       Review of Systems   Constitutional: Negative for chills, fever and weight loss.   Skin: Positive for itching. Negative for rash.   All other systems reviewed and are negative.       Objective:     A focused cutaneous exam was completed including: hair, ears, face, eyelids, conjunctiva, lips, neck, chest,left and right upper extremities (including hands/digits and fingernails) with the following pertinent findings listed below. Remaining above-listed examined areas within normal limits / negative for rashes or lesions.    Height 1.702 m (5' 7\"), weight 99.3 kg (219 lb).    Physical Exam   Constitutional: He is oriented to person, place, and time and well-developed, " well-nourished, and in no distress.   HENT:   Head: Normocephalic and atraumatic.       Right Ear: External ear normal.   Left Ear: External ear normal.   Nose: Nose normal.   Eyes: Conjunctivae are normal.   Neck: Normal range of motion.   Pulmonary/Chest: Effort normal.   Neurological: He is alert and oriented to person, place, and time.   Skin: Skin is warm and dry.        Psychiatric: Mood and affect normal.   Vitals reviewed.      DATA: none applicable to review    Assessment and Plan:     1. Chondrodermatitis nodularis chronica helicis, right - no response to liquid nitrogen  - educated patient about diagnosis, management options, and expectations of treatment  Procedure Note   Procedure: Biopsy by shave technique  Location: as noted above  Size: as noted in exam  Preoperative diagnosis: as above  Risks, benefits and alternatives of procedure discussed and written informed consent obtained. Time out completed. Area of biopsy prepped with alcohol. Anesthesia with 1% lidocaine with epinephrine administered with 30 gauge needle. Shave biopsy of the site performed. Hemostasis achieved with pressure and aluminum chloride. Vaseline applied to wound with bandage. Patient tolerated procedure well and there were no complications. The specimen was sent to the pathology lab by the staff. Wound care was discussed.  - PATHOLOGY SPECIMEN; Future    2. Hidradenitis, mild - groin, under control today  - continue Sodium Sulfacetamide 10% lotion daily  - recommended grisi sulfur soap    3. Eczema, unspecified type  - start triamcinolone 0.1% ointment to affected active areas of the body twice daily. Patient instructed to avoid face, axilla, and groin area. Side effects discussed, including skin thinning, appearance of stretch marks (striae), easy bruising, telangiectasias, and possible increased hair growth   - extensively discussed importance of regular moisturization to the affected area during flares, and also for  maintenance therapy liberally, several times a day, to protect the skin barrier. OTC moisturizers recommended    4. Favre and Racouchot syndrome  - Benign-appearing nature of lesions discussed. Advised to return to clinic for any new or concerning changes.  - discussed importance of regular sun protection/sunscreen use, SPF 30 or greater with broad spectrum coverage, need for reapplication every  minutes    5. Acrochordons, +Disturbance of skin sensation; neck, right axilla  SIMPLE REMOVAL NOTE:  Discussed risks and benefits of removal of lesion, including scar, discoloration, minimal risk of infection. Patient verbally agreed. Areas cleaned with alcohol, iris scissors were use to remove the lesions (all <2-3 mm) on the above noted areas. Patient tolerated procedure well, no complications. Aftercare discussed.       Followup: Return for 3-4 months, f/u.    Brittni Bravo M.D.

## 2018-01-15 ENCOUNTER — TELEPHONE (OUTPATIENT)
Dept: DERMATOLOGY | Facility: IMAGING CENTER | Age: 68
End: 2018-01-15

## 2018-02-08 RX ORDER — DULOXETIN HYDROCHLORIDE 60 MG/1
60 CAPSULE, DELAYED RELEASE ORAL DAILY
Qty: 90 CAP | Refills: 0 | Status: SHIPPED | OUTPATIENT
Start: 2018-02-08 | End: 2018-02-13 | Stop reason: SDUPTHER

## 2018-02-13 RX ORDER — DULOXETIN HYDROCHLORIDE 60 MG/1
60 CAPSULE, DELAYED RELEASE ORAL DAILY
Qty: 90 CAP | Refills: 0 | Status: SHIPPED | OUTPATIENT
Start: 2018-02-13 | End: 2018-05-18 | Stop reason: SDUPTHER

## 2018-02-13 RX ORDER — OMEPRAZOLE 40 MG/1
40 CAPSULE, DELAYED RELEASE ORAL DAILY
Qty: 90 CAP | Refills: 0 | Status: SHIPPED | OUTPATIENT
Start: 2018-02-13 | End: 2018-05-09 | Stop reason: SDUPTHER

## 2018-04-12 ENCOUNTER — OFFICE VISIT (OUTPATIENT)
Dept: DERMATOLOGY | Facility: IMAGING CENTER | Age: 68
End: 2018-04-12
Payer: MEDICARE

## 2018-04-12 DIAGNOSIS — L98.9 DISORDER OF THE SKIN AND SUBCUTANEOUS TISSUE, UNSPECIFIED: ICD-10-CM

## 2018-04-12 DIAGNOSIS — H61.001 CHONDRODERMATITIS NODULARIS CHRONICA HELICIS, RIGHT: ICD-10-CM

## 2018-04-12 PROCEDURE — 99212 OFFICE O/P EST SF 10 MIN: CPT | Performed by: DERMATOLOGY

## 2018-04-12 ASSESSMENT — ENCOUNTER SYMPTOMS
FEVER: 0
CHILLS: 0
WEIGHT LOSS: 0

## 2018-04-12 NOTE — PROGRESS NOTES
Dermatology Return Patient Visit    Chief Complaint   Patient presents with   • Follow-Up       Subjective:     HPI:   Micheal Brothers is a 67 y.o. male presenting for    Follow-up, Chondrodermatitis nodularis chronica helicis, right - s/p bx/removal last week  Well-healed  No more pain  Still has small residual lesion, but not bothersome    Skin tags, neck, under arms - s/p removal last visit, well healed, but still itchy  Does not want any more removed today    Boils in the groin (not discussed)  Present for years  Has tried several RX creams previously (cannot remember the names)  Still using Sodium Sulfacetamide 10% lotion daily - helps    Previously suspected eczema - not treating  Has not used triamcinolone 0.1% ointment  Was treated with kenalog injection from prior dermatologist in [place] (intralesional) - (helped) on the legs (states they were big, red plaques); these have not recurred in years  Has ?slight activity on the right arm, minimal on left lower leg  Not itchy, not bothersome  Red patches are not very itchy, but very bothersome  Not regularly moisturizing    Favre and Racouchot syndrome - forehead (not discussed)  Present for years  Bothersome in appearance  No treaments    History of skin cancer: No  History of biopsies:No  History of blistering/severe sunburns:No  Family history of skin cancer:No  Family history of atypical moles:No          Past Medical History:   Diagnosis Date   • Depression    • GERD (gastroesophageal reflux disease)    • Gout    • Hyperlipidemia    • Tobacco use 11/26/2014       Current Outpatient Prescriptions on File Prior to Visit   Medication Sig Dispense Refill   • omeprazole (PRILOSEC) 40 MG delayed-release capsule Take 1 Cap by mouth every day. 90 Cap 0   • DULoxetine (CYMBALTA) 60 MG Cap DR Particles delayed-release capsule Take 1 Cap by mouth every day. 90 Cap 0   • Sulfacetamide Sodium 10 % Lotion 1 Application by Apply externally route every day. 1 Bottle 3   •  triamcinolone acetonide (KENALOG) 0.1 % Ointment Apply 1 Application to affected area(s) 2 times a day. 45 g 1   • sulfacetamide (SULAMYD) 10 % Ointment Apply once daily. 1 Tube 6   • Phenylephrine-DM-GG-APAP (MUCINEX FAST-MAX COLD FLU PO) Take  by mouth.     • Sulfacetamide Sodium, Acne, 10 % Lotion Apply one daily 118 mL 6   • varenicline (CHANTIX) 1 MG tablet Take 1 Tab by mouth 2 times a day. 60 Tab 3   • albuterol 108 (90 BASE) MCG/ACT Aero Soln inhalation aerosol Inhale 2 Puffs by mouth every 6 hours as needed for Shortness of Breath. 8.5 g 3   • Misc. Devices Misc 1 Device by Does not apply route every bedtime. 1 Device 0   • triamcinolone acetonide (KENALOG) 0.5 % Cream Use q 12 hour 60 g 3     No current facility-administered medications on file prior to visit.        Allergies   Allergen Reactions   • Zyban [Bupropion] Hives and Itching   • Augmentin Hives       Family History   Problem Relation Age of Onset   • Cancer Mother    • Cancer Father    • Stroke Father    • Diabetes Neg Hx    • Heart Disease Neg Hx    • Hypertension Neg Hx        Social History     Social History   • Marital status:      Spouse name: N/A   • Number of children: N/A   • Years of education: N/A     Occupational History   • Not on file.     Social History Main Topics   • Smoking status: Current Every Day Smoker     Packs/day: 0.50     Years: 40.00     Types: Cigarettes   • Smokeless tobacco: Never Used   • Alcohol use 0.0 oz/week      Comment: rare   • Drug use: No   • Sexual activity: Yes     Partners: Female     Other Topics Concern   • Not on file     Social History Narrative   • No narrative on file       Review of Systems   Constitutional: Negative for chills, fever and weight loss.   Skin: Positive for itching. Negative for rash.   All other systems reviewed and are negative.       Objective:     A focused cutaneous exam was completed including: hair, ears, face, eyelids, conjunctiva, lips, neck, chest,left and right  upper extremities (including hands/digits and fingernails) with the following pertinent findings listed below. Remaining above-listed examined areas within normal limits / negative for rashes or lesions.    There were no vitals taken for this visit.    Physical Exam   Constitutional: He is oriented to person, place, and time and well-developed, well-nourished, and in no distress.   HENT:   Head: Normocephalic and atraumatic.       Right Ear: External ear normal.   Left Ear: External ear normal.   Nose: Nose normal.   Eyes: Conjunctivae are normal.   Neck: Normal range of motion.   Pulmonary/Chest: Effort normal.   Neurological: He is alert and oriented to person, place, and time.   Skin: Skin is warm and dry.        Psychiatric: Mood and affect normal.       DATA: none applicable to review    Assessment and Plan:     1. Chondrodermatitis nodularis chronica helicis, right - improved s/p BX  - will monitor for now    2. Disorder of the skin and subcutaneous tissue, unspecified   Comment: Ddx eczema vs ?necrobiosis lipoidica - inactive today   - discussed importance of moisturizers  - rtc asap if flares/can discuss further treatment options    Following issues not discussed:  Hidradenitis, mild - groin, under control today  Favre and Racouchot syndrome  Acrochordons - neck, right axilla    Followup: Return in about 3 months (around 7/12/2018), or if symptoms worsen or fail to improve.    Brittni Bravo M.D.

## 2018-04-17 ENCOUNTER — APPOINTMENT (OUTPATIENT)
Dept: DERMATOLOGY | Facility: IMAGING CENTER | Age: 68
End: 2018-04-17

## 2018-05-09 RX ORDER — OMEPRAZOLE 40 MG/1
40 CAPSULE, DELAYED RELEASE ORAL DAILY
Qty: 90 CAP | Refills: 0 | Status: SHIPPED | OUTPATIENT
Start: 2018-05-09 | End: 2018-05-18 | Stop reason: SDUPTHER

## 2018-05-18 ENCOUNTER — OFFICE VISIT (OUTPATIENT)
Dept: MEDICAL GROUP | Facility: MEDICAL CENTER | Age: 68
End: 2018-05-18
Payer: MEDICARE

## 2018-05-18 VITALS
TEMPERATURE: 98.3 F | HEART RATE: 94 BPM | OXYGEN SATURATION: 96 % | BODY MASS INDEX: 34.06 KG/M2 | HEIGHT: 67 IN | DIASTOLIC BLOOD PRESSURE: 72 MMHG | WEIGHT: 217 LBS | SYSTOLIC BLOOD PRESSURE: 128 MMHG

## 2018-05-18 DIAGNOSIS — Z80.0 FAMILY HISTORY OF COLON CANCER: ICD-10-CM

## 2018-05-18 DIAGNOSIS — E66.9 OBESITY (BMI 30-39.9): ICD-10-CM

## 2018-05-18 DIAGNOSIS — Z12.5 SCREENING PSA (PROSTATE SPECIFIC ANTIGEN): ICD-10-CM

## 2018-05-18 DIAGNOSIS — F33.41 RECURRENT MAJOR DEPRESSIVE DISORDER, IN PARTIAL REMISSION (HCC): ICD-10-CM

## 2018-05-18 DIAGNOSIS — Z12.11 SCREEN FOR COLON CANCER: ICD-10-CM

## 2018-05-18 DIAGNOSIS — G47.33 OSA ON CPAP: ICD-10-CM

## 2018-05-18 DIAGNOSIS — E78.5 DYSLIPIDEMIA: ICD-10-CM

## 2018-05-18 DIAGNOSIS — K21.9 GASTROESOPHAGEAL REFLUX DISEASE WITHOUT ESOPHAGITIS: ICD-10-CM

## 2018-05-18 PROCEDURE — 99214 OFFICE O/P EST MOD 30 MIN: CPT | Performed by: INTERNAL MEDICINE

## 2018-05-18 RX ORDER — OMEPRAZOLE 40 MG/1
40 CAPSULE, DELAYED RELEASE ORAL DAILY
Qty: 90 CAP | Refills: 0 | Status: SHIPPED | OUTPATIENT
Start: 2018-05-18 | End: 2018-05-18 | Stop reason: SDUPTHER

## 2018-05-18 RX ORDER — OMEPRAZOLE 40 MG/1
40 CAPSULE, DELAYED RELEASE ORAL DAILY
Qty: 90 CAP | Refills: 3 | Status: SHIPPED | OUTPATIENT
Start: 2018-05-18 | End: 2019-07-02 | Stop reason: SDUPTHER

## 2018-05-18 RX ORDER — DULOXETIN HYDROCHLORIDE 60 MG/1
60 CAPSULE, DELAYED RELEASE ORAL DAILY
Qty: 90 CAP | Refills: 3 | Status: SHIPPED | OUTPATIENT
Start: 2018-05-18 | End: 2018-08-17 | Stop reason: SDUPTHER

## 2018-05-18 RX ORDER — DULOXETIN HYDROCHLORIDE 60 MG/1
60 CAPSULE, DELAYED RELEASE ORAL DAILY
Qty: 90 CAP | Refills: 0 | Status: SHIPPED | OUTPATIENT
Start: 2018-05-18 | End: 2018-05-18 | Stop reason: SDUPTHER

## 2018-05-18 ASSESSMENT — PAIN SCALES - GENERAL: PAINLEVEL: NO PAIN

## 2018-05-18 ASSESSMENT — PATIENT HEALTH QUESTIONNAIRE - PHQ9
CLINICAL INTERPRETATION OF PHQ2 SCORE: 2
5. POOR APPETITE OR OVEREATING: 0 - NOT AT ALL
SUM OF ALL RESPONSES TO PHQ QUESTIONS 1-9: 4

## 2018-05-18 NOTE — PROGRESS NOTES
"CC: Establishing care dyslipidemia, depression.    HPI:   Micheal presents today with the following.    1. Dyslipidemia  Was stopped on his statin approximately 8 months ago has not had repeat blood work.  He tolerated his medications well however his previous doctor was not a fan of statins.    2. NAHUM on CPAP  Does have a history of sleep apnea reports compliance to CPAP.    3. Recurrent major depressive disorder, in partial remission (HCC)  Reports his mood has suffered over the last month.  He has been on Cymbalta for several years.  He denies any suicidal ideation but does report he is a bit more short tempered than usual.    4. Obesity (BMI 30-39.9)  Persistently elevated he try to work with diet and exercise has come down slightly since last visit.      Patient Active Problem List    Diagnosis Date Noted   • NAHUM on CPAP 08/26/2016   • Obesity (BMI 30-39.9) 11/26/2014   • Dyslipidemia 11/26/2014   • GERD (gastroesophageal reflux disease) 11/26/2014   • Tobacco use 11/26/2014       Current Outpatient Prescriptions   Medication Sig Dispense Refill   • DULoxetine (CYMBALTA) 60 MG Cap DR Particles delayed-release capsule Take 1 Cap by mouth every day. 90 Cap 3   • omeprazole (PRILOSEC) 40 MG delayed-release capsule Take 1 Cap by mouth every day. 90 Cap 3   • Sulfacetamide Sodium, Acne, 10 % Lotion Apply one daily 118 mL 6   • albuterol 108 (90 BASE) MCG/ACT Aero Soln inhalation aerosol Inhale 2 Puffs by mouth every 6 hours as needed for Shortness of Breath. 8.5 g 3   • triamcinolone acetonide (KENALOG) 0.5 % Cream Use q 12 hour 60 g 3     No current facility-administered medications for this visit.          Allergies as of 05/18/2018 - Reviewed 05/18/2018   Allergen Reaction Noted   • Zyban [bupropion] Hives and Itching 10/21/2014   • Augmentin Hives 03/06/2016        ROS: Denies Chest pain, SOB, LE edema.    /72   Pulse 94   Temp 36.8 °C (98.3 °F)   Ht 1.702 m (5' 7\")   Wt 98.4 kg (217 lb)   SpO2 96%  "  BMI 33.99 kg/m²     Physical Exam:  Gen:         Alert and oriented, No apparent distress.  Neck:        No Lymphadenopathy or Bruits.  Lungs:     Clear to auscultation bilaterally  CV:          Regular rate and rhythm. No murmurs, rubs or gallops.               Ext:          No clubbing, cyanosis, edema.      Assessment and Plan.   67 y.o. male with the following issues.    1. Dyslipidemia  Rechecking cholesterol we will follow-up with abnormalities  - COMP METABOLIC PANEL; Future  - LIPID PROFILE; Future    2. NAHUM on CPAP  Continue CPAP    3. Recurrent major depressive disorder, in partial remission (HCC)  Refilled his Cymbalta have referred to psychology for mood not improving consider adding Wellbutrin  - REFERRAL TO PSYCHOLOGY  - DULoxetine (CYMBALTA) 60 MG Cap DR Particles delayed-release capsule; Take 1 Cap by mouth every day.  Dispense: 90 Cap; Refill: 3    4. Obesity (BMI 30-39.9)  Patient's body mass index is 33.99 kg/m². Exercise and nutrition counseling were performed at this visit.  Set goal of 15lbs in next 3 months.      5. Gastroesophageal reflux disease without esophagitis    - omeprazole (PRILOSEC) 40 MG delayed-release capsule; Take 1 Cap by mouth every day.  Dispense: 90 Cap; Refill: 3    6. Screening PSA (prostate specific antigen)    - PROSTATE SPECIFIC AG SCREENING; Future    7. Screen for colon cancer      8. Family history of colon cancer  - REFERRAL TO GASTROENTEROLOGY

## 2018-05-21 ENCOUNTER — HOSPITAL ENCOUNTER (OUTPATIENT)
Dept: LAB | Facility: MEDICAL CENTER | Age: 68
End: 2018-05-21
Attending: INTERNAL MEDICINE
Payer: MEDICARE

## 2018-05-21 DIAGNOSIS — Z12.5 SCREENING PSA (PROSTATE SPECIFIC ANTIGEN): ICD-10-CM

## 2018-05-21 DIAGNOSIS — E78.5 DYSLIPIDEMIA: ICD-10-CM

## 2018-05-21 LAB
ALBUMIN SERPL BCP-MCNC: 4.1 G/DL (ref 3.2–4.9)
ALBUMIN/GLOB SERPL: 1.3 G/DL
ALP SERPL-CCNC: 66 U/L (ref 30–99)
ALT SERPL-CCNC: 21 U/L (ref 2–50)
ANION GAP SERPL CALC-SCNC: 8 MMOL/L (ref 0–11.9)
AST SERPL-CCNC: 19 U/L (ref 12–45)
BILIRUB SERPL-MCNC: 0.6 MG/DL (ref 0.1–1.5)
BUN SERPL-MCNC: 19 MG/DL (ref 8–22)
CALCIUM SERPL-MCNC: 9.5 MG/DL (ref 8.5–10.5)
CHLORIDE SERPL-SCNC: 104 MMOL/L (ref 96–112)
CHOLEST SERPL-MCNC: 170 MG/DL (ref 100–199)
CO2 SERPL-SCNC: 23 MMOL/L (ref 20–33)
CREAT SERPL-MCNC: 1.07 MG/DL (ref 0.5–1.4)
GLOBULIN SER CALC-MCNC: 3.1 G/DL (ref 1.9–3.5)
GLUCOSE SERPL-MCNC: 115 MG/DL (ref 65–99)
HDLC SERPL-MCNC: 35 MG/DL
LDLC SERPL CALC-MCNC: 120 MG/DL
POTASSIUM SERPL-SCNC: 4.4 MMOL/L (ref 3.6–5.5)
PROT SERPL-MCNC: 7.2 G/DL (ref 6–8.2)
PSA SERPL-MCNC: 0.34 NG/ML (ref 0–4)
SODIUM SERPL-SCNC: 135 MMOL/L (ref 135–145)
TRIGL SERPL-MCNC: 75 MG/DL (ref 0–149)

## 2018-05-21 PROCEDURE — 80053 COMPREHEN METABOLIC PANEL: CPT

## 2018-05-21 PROCEDURE — 84153 ASSAY OF PSA TOTAL: CPT | Mod: GA

## 2018-05-21 PROCEDURE — 36415 COLL VENOUS BLD VENIPUNCTURE: CPT

## 2018-05-21 PROCEDURE — 80061 LIPID PANEL: CPT

## 2018-06-18 ENCOUNTER — OFFICE VISIT (OUTPATIENT)
Dept: BEHAVIORAL HEALTH | Facility: PHYSICIAN GROUP | Age: 68
End: 2018-06-18
Payer: MEDICARE

## 2018-06-18 DIAGNOSIS — F41.0 PANIC DISORDER: ICD-10-CM

## 2018-06-18 DIAGNOSIS — Z72.0 TOBACCO USE: ICD-10-CM

## 2018-06-18 PROCEDURE — 90791 PSYCH DIAGNOSTIC EVALUATION: CPT | Performed by: PSYCHOLOGIST

## 2018-06-18 NOTE — BH THERAPY
[facility]  INITIAL ASSESSMENT    Name: Micheal Brothers  MRN: [id number]  : 1950  Age: 67 y.o.  Date of assessment: 2018  PCP: Bereket Milan M.D.  Persons in attendance: Patient  Total session time: 50 minutes      CHIEF COMPLAINT AND HISTORY OF PRESENTING PROBLEM:  (as stated by Patient):  Micheal Brothers is a 67 y.o., White male referred for assessment by Bereket Milan M.D..  Primary presenting issue includes   Chief Complaint   Patient presents with   • Panic Attack   The patient ID/Chief Complaint:  The patient is a 67 year old male, , .  The patient was referred by PCP Dr. Milan and voluntarily presented for an individual intake to address history of panic disorder with current treatment with duloxetine since  with desire to discontinue the medication because of side effects.  Reviewed limits of confidentiality and [facility] policies; patient expressed understanding and agreed to voluntarily proceed with evaluation and treatment.     Review of Symptoms:  Depression and other mood disorders   Increase in sleep No  Decrease in sleep No  Interest (anhedonia) No  Guilt feeling No  Worthless No  Hopelessness No   Regret Not Reported/Not Observed  Energy deficit No  Concentration deficit No  Appetite deficit No  Psychomotor   Retardation No   Agitation No  Suicidality No  Distractibility No  Indiscretions No  Grandiosity No  Flight of ideas No  Activity level Increase No  Sleep deficit (decreased need for 3 days) No  Talkativeness No    Anxiety Symptoms   Breathing Difficulties No  Shallow Breathing No  Chest Pain No  Chest Pressure/ Chest Tightness No  Heart Pounding/Heart Palpitations No  Hyperventilation No  Sweating No  Muscle Tension/ Sore Muscles No  Concentration Problems No  Depersonalization/ Derealization No  Difficulty Speaking No  Digestion Issues No  Dizziness No  Headaches No  Lightheadedness No  Fear No  Feeling Ill  No  Worrying No  Nausea No  Feeling Overwhelmed No  Feeling Shaky No  Low Energy No  Shaking No    Restlessness No   Easily fatigued No    Sleep Disturbance Denies   Difficulty falling asleep No   Difficulty staying asleep No   Restless Yes   Unsatisfying sleep Yes   Nightmares No   Sleepwalking No   Restless legs No  Sleep Apnea Yes diagnosed and currently being treated    Substance abuse Denies    Obsessive Symptoms No  Compulsive Symptoms No    Body Dysmorphic Symptoms No    Somatic Symptoms No    Illness Anxiety No    Neurocognitive Disorder  Disturbance in attention No  Disturbance developed Not Reported/ Observed  Additional Disturbance None    Psychotic disorders Not Reported/ Observed   Delusions No  Hallucination No  Disorganized Speech No  Grossly Disorganized Behavior No  Negative Symptoms No    Relevant History:  The patient reports that he’s been treated with Cymbalta since 2003. He reports having a history of receiving psychotherapy at anxiety clinic in [facility] 1 to 2 times per month for over a year.  At the present time, the patient reports some side effects associated with medication and would like to discontinue the medication he was encouraged to consult with his primary care provider. The patient was also educated about mastering anxiety and panic in about a specific protocol of psychotherapy if  he chooses to discontinue his medication.    FAMILY/SOCIAL HISTORY  The patient is been  for 47 years he has three adult children who he has no contact with.   Current living situation/household members: with wife no contact with 3 adult children  Current family/social stressors: None  Quality/quantity of current family and/or social support: good  Does patient/parent report a family history of behavioral health issues, diagnoses, or treatment? No  Family History   Problem Relation Age of Onset   • Cancer Mother    • Cancer Father    • Stroke Father    • Diabetes Neg Hx    • Heart Disease  Neg Hx    • Hypertension Neg Hx         BEHAVIORAL HEALTH TREATMENT HISTORY    The patient denies past psychiatric hospitalization. Current prescribed psychotropic medication. The patient denies history of suicidal ideation and/or self-harm  MEDICAL HISTORY  Primary care behavioral health screenings: Patient Health Questionaire     The patient started using a CPAP in  has not been evaluated since the initial evaluation.  Past medical/surgical history:   Past Medical History:   Diagnosis Date   • Depression    • GERD (gastroesophageal reflux disease)    • Gout    • Hyperlipidemia    • Panic disorder    • Tobacco use 2014      Past Surgical History:   Procedure Laterality Date   • APPENDECTOMY     • ARTHROSCOPY, KNEE     • CARPAL TUNNEL ENDOSCOPIC      bilateral   • SHOULDER DECOMPRESSION ARTHROSCOPIC      right        Medication Allergies:  Zyban [bupropion] and Augmentin     Does patient/parent report any history of or current developmental concerns? No  Does patient/parent report nutritional concerns? No  Does patient/parent report change in appetite or weight loss/gain? No  Does patient/parent report history of eating disorder symptoms? No  Does patient/parent report dental problem? No  Does patient/parent report physical pain? Yes   Indicate if pain is acute or chronic, and location: lower back, knee, shoulder   Pain scale ratin     Does patient/parent report functional impact of medical, developmental, or pain issues?   yes    EDUCATIONAL/LEARNING HISTORY  Is patient currently enrolled in a school/educational program?   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 He denies any specific trauma is associated with this work in the fire service.  Is the patient currently  employed? No  Does the patient/parent report adequate financial resources? No  Does patient identify impact of presenting issue on work functioning? No  Work or income-related stressors:  none     HISTORY:  Does patient report current or past enlistment? Yes    [If yes, complete below items]  Does patient report history of exposure to combat? No  Does patient report history of  sexual trauma? No  Does patient report other -related stressors? No    SPIRITUAL/CULTURAL/IDENTITY:    Does the patient identify any spiritual/cultural/identity factors as relevant to the presenting issue? No    LEGAL HISTORY  Has the patient ever been involved with juvenile, adult, or family legal systems? No   [If yes, trigger section below:]  Does patient report ever being a victim of a crime?  No  Does patient report involvement in any current legal issues?  No  Does patient report ever being arrested or committing a crime? No  Does patient report any current agency (parole/probation/CPS/) involvement? No    ABUSE/NEGLECT/TRAUMA SCREENING  Does patient report feeling “unsafe” in his/her home, or afraid of anyone? Yes  Does patient report any history of physical, sexual, or emotional abuse? No  Does parent or significant other report any of the above? No  Is there evidence of neglect by self? No  Is there evidence of neglect by a caregiver? No  Does the patient/parent report any history of CPS/APS/police involvement related to suspected abuse/neglect or domestic violence? No  Does the patient/parent report any other history of potentially traumatic life events? No  Based on the information provided during the current assessment, is a mandated report of suspected abuse/neglect being made?  No     SAFETY ASSESSMENT - SELF  Risk Assessment:     The patient denied any suicide-related ideation and/or behaviors and intent/plan, denied thoughts about death and dying both in session and during the period  since last appointment, or past 2 weeks.    Risk Level: Not Currently at Clinically Significant Risk  Hospitalization is not deemed necessary at this time as the patient does not present a clear or imminent danger to self or others. No indication for pursuing higher level of care. Outpatient management is currently most appropriate and least restrictive level of care.      Current Suicide Risk: Low  Crisis Safety Plan completed and copy given to patient: No    SAFETY ASSESSMENT - OTHERS  Does paor past symptoms of aggressive behavior or risk to others? No  Does parent/significant othtient acknowledge current or past symptoms of aggressive behavior or risk to others? No  Does parent/significant other report patient has current or past symptoms of aggressive behavior or risk to others? No    Recent change in frequency/specificity/intensity of thoughts or threats to harm others? No  Current access to firearms/other identified means of harm? Yes  If yes, willing to restrict access to weapons/means of harm? No  Protective factors present: Well-developed sense of empathy    Current Homicide Risk:  Not applicable  Crisis Safety Plan completed and copy given to patient? No  Based on information provided during the current assessment, is a mandated “duty to warn” being exercised? No    SUBSTANCE USE/ADDICTION HISTORY  [] Not applicable - patient 10 years of age or younger    Is there a family history of substance use/addiction? No  Does patient acknowledge or parent/significant other report use of/dependence on substances? No  Last time patient used alcohol: few weeks ago  Within the past week? No  Last time patient used marijuana: denies  Within the past month? No  Any other street drugs ever tried even once? No  Any use of prescription medications/pills without a prescription, or for reasons others than originally prescribed?  No  Any other addictive behavior reported (gambling, shopping, sex)? No     Drug  History:  Amphetamine:  Amphetamine frequency: Never used      Cannibis:  Cannabis frequency: Never used      Cocaine:  Cocaine frequency: Never used      Ecstasy:  Ecstasy frequency: Never used      Hallucinogen:  Hallucinogen frequency: Never used      Inhalant:   Inhalant frequency: Never used      Opiate:  Cannabis frequency: Never used      Other:      Sedative:   Sedative frequency: Never used    What consequences does the patient associate with any of the above substance use and or addictive behaviors? None    [x] Patient denies use of any substance/addictive behaviors    STRENGTHS/ASSETS  Strengths Identified by interviewer: History of effective treatment  Strengths Identified by patient: Hard worker    MENTAL STATUS/OBSERVATIONS  Patient did not present in acute distress. Patient was appropriately groomed. Patient was alert and oriented x4. Eye contact was appropriate. No abnormalities in attention or concentration were noted. No abnormalities of movement present; psychomotor activity was normal. Speech was fluent and regular in rhythm, rate, volume, and tone. Thought processes were linear, logical, and goal-directed. There was no evidence of thought disorder. No auditory or visual hallucinations and denies hypnagogic (i.e., the drowsy state preceding sleep) and hypnopompic (i.e., the semiconscious state preceding awakening).Long and short term memory appeared to be intact. Insight, judgment, and impulse control were deemed to be within normal limits.  Reported mood was “neutral.” Affect was full-ranging and appropriate to thought content and conversation.  Patient denied past and current suicidal and homicidal ideation in plan, intent, and preparatory behavior.     RESULTS OF SCREENING MEASURES:  Psychometric Test Results:     BHM-20  Global Mental Health Within Normal Limits  Well Being Scale  Within Normal Limits  Symptoms Scale   Within Normal Limits  Anxiety Subscale  Within Normal Limits  Depression  Subscale  Within Normal Limits  Alcohol/Drug Subscale Within Normal Limits  Bipolar Subscale  Within Normal Limits  Eating Disorder Subscale Within Normal Limits  Harm to other Subscale Within Normal Limits  Suicide Monitoring Scale No Indication of Risk  Life Functioning Scale Within Normal Limits    CLINICAL FORMULATION: Patient had a long chronic history of panic disorder prior to starting medication and the time panic disorder he reported using alcohol. At the present time, he’s interested in discontinuing the medication  but has not had a panic attack since starting the Cymbalta and 2003.      DIAGNOSTIC IMPRESSION(S):  1. Tobacco use    2. Panic disorder          IDENTIFIED NEEDS/PLAN:  [If any of these marked, trigger DISPOSITION list]  Mood/anxiety  Refer to [facility]: Outpatient Therapy    Does patient express agreement with the above plan? Yes     Referral appointment(s) scheduled? No       1) The patient will return to the clinic 3 weeks.  2) Referrals/Consults:  N/A  3) Barriers to Learning:  No  4) Readiness to Learn:  Yes  5) Cultural Concerns:  No  6) Patient voiced understanding of, and agreement with, plan and goals as annotated above.  7) Declare these services are medically necessary and appropriate to the patient’s diagnosis and needs  8) The point of contact at the [facility] regarding this evaluation is Dr. Jaramillo, Psychologist.    Myron Jaramillo III, Ed.D.

## 2018-07-10 ENCOUNTER — OFFICE VISIT (OUTPATIENT)
Dept: BEHAVIORAL HEALTH | Facility: PHYSICIAN GROUP | Age: 68
End: 2018-07-10
Payer: MEDICARE

## 2018-07-10 DIAGNOSIS — F41.0 PANIC DISORDER: ICD-10-CM

## 2018-07-10 PROCEDURE — 90834 PSYTX W PT 45 MINUTES: CPT | Performed by: PSYCHOLOGIST

## 2018-07-10 NOTE — PATIENT INSTRUCTIONS
[employer] at the [facility] is a member-driven organization offering short-term educational experiences for older adults in [facility]. [facility] seeks to foster intellectual stimulation, new interests and personal development through academic pursuits, and to provide a community in which to gather, get acquainted and socialize. Most courses and events are held at these locations.  [employer] at the [facility] is a diverse community of independent thinkers who develop curriculum, teach classes, organize events, administer programs, establish friendships, guide excursions and much more. [facility] offers a distinctive array of courses and activities for seasoned adults 50 and older interested in learning for the vladimir of learning.  We hope you will embrace [facility]'s commitment to lifelong learning and continue to help us grow!  Complete the Online Membership Registration Form (click on the green button) or download and fill out a membership application. For more information, call the OL office at [phone], or email [email or web address].  Give the gift of learning. Giving an [employer] membership to a friend or relative provides a host of free courses and activities to any older adult interested in the vladimir of learning.    Member Benefits  All new and renewing members have access to as many [employer] events, discussion groups and classes as they wish to attend throughout the year. The membership year runs from September 1-August 31, with an annual fee of $55. Some classes may require a small additional fee as noted in class schedules. Scholarships are available for qualified applicants.  Your [employer] membership offers engaging activities and classes, and an invitation to bring a friend. Guests are welcome to sample one free class before choosing to become an [employer] member.  Membership benefits include:  · Scores of high-quality classes taught by University faculty, community leaders and  KIT members  · A dynamic community in which to explore new ideas and engage in stimulating discussions  · Special events, field trips and a host of other enriching activities  · Engaging short-term courses and discussion groups with no homework or exams required    How to find KIT  Our new location is [address], two blocks east of [facility]. The location offers many advantages:  · Centrally located in the [facility], easily accessed from [address]  · Plenty of FREE parking in front of the building and in a [facility] lot across the street - no parking permits required  · Single story building - fully handicapped accessible  · The [facility] bus stops near the building on [address]  You may call the KIT staff at [phone] during regular business hours (M-F 9 a.m.-4 p.m.) or send us email at [email or web address].

## 2018-07-10 NOTE — BH THERAPY
[facility]  Therapy Progress Note    Patient Name: Micheal Brothers  Patient MRN: [id number]  Today's Date: 7/10/2018     Type of session:Individual psychotherapy  Length of session: 50 minutes  Persons in attendance:Patient    Subjective/New Info: The patient ID/Chief Complaint:  The patient is a 67 year old male, , .  The patient was referred by PCP Dr. Milan and voluntarily presented for an individual intake to address history of panic disorder with current treatment with duloxetine since 2007 with desire to discontinue the medication because of side effects.  Reviewed limits of confidentiality and [facility] policies; patient expressed understanding and agreed to voluntarily proceed with evaluation and treatment.    Interval History:   Session Focus:Patient reports some mild anxiety symptoms when dealing with his wife when she doesn’t follow the way he thinks thing should be done he was educated about panic attacks and asked to complete daily mood records and records of any panic attacks he experiences.  Hey she would like to discontinue his 13 year use of Cymbalta. 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    Therapeutic Intervention: Cognitive Behavioral Therapy    Planned Intervention: Encouraged patient to complete records and panic attacks sheets prior to consulting with his primary care provider about discontinuation of medication. Will continue to provide psychoeducation about panic disorder.     Objective/Observations:  Patient did not present in acute distress. Patient was appropriately groomed. Patient was alert and oriented x4. Eye contact was appropriate. No abnormalities in attention or concentration were noted. No abnormalities of movement present; psychomotor activity was normal. Speech was fluent and regular in rhythm, rate, volume, and tone. Thought processes were linear, logical, and  goal-directed. Long and short term memory appeared to be intact. Insight, judgment, and impulse control were deemed to be within normal limits.  Reported mood was “anxious.” Affect was full-ranging and appropriate to thought content and conversation.  Patient denied past and current suicidal and homicidal ideation in plan, intent, and preparatory behavior.    Psychometric Test Results:       BHM-20  Global Mental Health  Within Normal Limits  Well Being Scale  Within Normal Limits  Symptoms Scale  Within Normal Limits  Anxiety Subscale  Within Normal Limits  Depression Subscale  Within Normal Limits  Alcohol/Drug Subscale Within Normal Limits  Bipolar Subscale  Within Normal Limits  Eating Disorder Subscale Within Normal Limits  Harm to other Subscale Within Normal Limits  Suicide Monitoring Scale No Indication of Risk  Life Functioning Scale   Within Normal Limits      Diagnoses:   1. Panic disorder         Current risk:   SUICIDE: Low   Homicide: Not applicable   Self-harm: Not applicable   Relapse: Not applicable   Other:    Safety Plan reviewed? No   If evidence of imminent risk is present, intervention/plan:       Treatment Goal(s)/Objective(s) addressed:   Anxiety   Goal: Develop strategies to reduce symptoms, or   Reduce anxiety and improve coping skills   Be free of panic episodes (100%)   Report feeling more positive about self and abilities during therapy sessions   Develop strategies for thought distraction when fixating on the future      Progress toward Treatment Goals: No change    Plan:    - Next appointment scheduled:  7/31/2018      1) The patient will return to the clinic 2-3 weeks.  2) Crisis Response Plan:  Reviewed emergency resources with the patient and the patient expressed understanding including:  If feeling suicidal, patient will call or present to the [facility] during duty hours or present to closest ED ([facility] or [facility],  call 911 or crisis hotline ([phone]-UIUQ) after duty hours.  3) Referrals/Consults:  N/A  4) Barriers to Learning:  No  5) Readiness to Learn:  Yes  6) Cultural Concerns:  No  7) Patient voiced understanding of, and agreement with, plan and goals as annotated above.  8) Declare these services are medically necessary and appropriate to the patient’s diagnosis and needs  9) The point of contact at the [facility] Health Clinic regarding this evaluation is Dr. Jaramillo, Psychologist.      Myron Jaramillo III, Ed.D.  7/10/2018

## 2018-07-12 ENCOUNTER — OFFICE VISIT (OUTPATIENT)
Dept: DERMATOLOGY | Facility: IMAGING CENTER | Age: 68
End: 2018-07-12
Payer: MEDICARE

## 2018-07-12 DIAGNOSIS — L92.0 GRANULOMA ANNULARE: ICD-10-CM

## 2018-07-12 PROCEDURE — 11900 INJECT SKIN LESIONS </W 7: CPT | Performed by: DERMATOLOGY

## 2018-07-12 ASSESSMENT — ENCOUNTER SYMPTOMS
WEIGHT LOSS: 0
CHILLS: 0
FEVER: 0

## 2018-07-12 NOTE — PROGRESS NOTES
Dermatology Return Patient Visit    Chief Complaint   Patient presents with   • Follow-Up       Subjective:     HPI:   Micheal Brothers is a 67 y.o. 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    Folliculitis in the groin (not discussed)  Present for years  Has tried several RX creams previously (cannot remember the names)  Still using Sodium Sulfacetamide 10% lotion daily - helps    Favre and Racouchot syndrome - forehead (not discussed)  Present for years  Bothersome in appearance  No treaments    History of skin cancer: No  History of biopsies:No  History of blistering/severe sunburns:No  Family history of skin cancer:No  Family history of atypical moles:No          Past Medical History:   Diagnosis Date   • Depression    • GERD (gastroesophageal reflux disease)    • Gout    • Hyperlipidemia    • Panic disorder    • Tobacco use 11/26/2014       Current Outpatient Prescriptions on File Prior to Visit   Medication Sig Dispense Refill   • DULoxetine (CYMBALTA) 60 MG Cap DR Particles delayed-release capsule Take 1 Cap by mouth every day. 90 Cap 3   • omeprazole (PRILOSEC) 40 MG delayed-release capsule Take 1 Cap by mouth every day. 90 Cap 3   • Sulfacetamide Sodium, Acne, 10 % Lotion Apply one daily 118 mL 6   • albuterol 108 (90 BASE) MCG/ACT Aero Soln inhalation aerosol Inhale 2 Puffs by mouth every 6 hours as needed for Shortness of Breath. 8.5 g 3   • triamcinolone acetonide (KENALOG) 0.5 % Cream Use q 12 hour 60 g 3     No current facility-administered medications on file prior to visit.        Allergies   Allergen Reactions   • Zyban [Bupropion] Hives and Itching   • Augmentin Hives       Family History   Problem Relation Age of Onset   • Cancer Mother    • Cancer Father    • Stroke Father    • Diabetes Neg Hx    • Heart Disease Neg Hx    •  Hypertension Neg Hx        Social History     Social History   • Marital status:      Spouse name: N/A   • Number of children: N/A   • Years of education: N/A     Occupational History   • Not on file.     Social History Main Topics   • Smoking status: Current Every Day Smoker     Packs/day: 0.50     Years: 40.00     Types: Cigarettes   • Smokeless tobacco: Never Used   • Alcohol use 0.0 oz/week      Comment: rare   • Drug use: No   • Sexual activity: Yes     Partners: Female     Other Topics Concern   • Not on file     Social History Narrative   • No narrative on file       Review of Systems   Constitutional: Negative for chills, fever and weight loss.   Skin: Positive for itching. Negative for rash.   All other systems reviewed and are negative.       Objective:     A focused cutaneous exam was completed including: hair, ears, face, eyelids, conjunctiva, lips, neck, chest,left and right upper extremities (including hands/digits and fingernails) with the following pertinent findings listed below. Remaining above-listed examined areas within normal limits / negative for rashes or lesions.    There were no vitals taken for this visit.    Physical Exam   Constitutional: He is oriented to person, place, and time and well-developed, well-nourished, and in no distress.   HENT:   Head: Normocephalic and atraumatic.   Right Ear: External ear normal.   Left Ear: External ear normal.   Nose: Nose normal.   Eyes: Conjunctivae are normal.   Neck: Normal range of motion.   Pulmonary/Chest: Effort normal.   Neurological: He is alert and oriented to person, place, and time.   Skin: Skin is warm and dry.        Psychiatric: Mood and affect normal.       DATA: none applicable to review    Assessment and Plan:     1. Granuloma annulare  - discussed continued management options, and expectations of treatment  INTRALESIONAL KENALOG:  Discussed risks and benefits of intralesional kenalog injections, including skin atrophy,  discoloration, minimal risk of infection. Patient verbally agreed. ILK 5mg/cc x 1cc total injected into the lesions on the right forearm, b/l lower legs. Patient tolerated procedure well, no complications. Aftercare discussed.       Following issues not discussed:  Hidradenitis, mild - groin, under control today  [facility]  Favre and Racouchot syndrome  Acrochordons - neck, right axilla    Followup: Return in about 3 months (around 10/12/2018), or if symptoms worsen or fail to improve.    Brittni Bravo M.D.

## 2018-07-25 NOTE — TELEPHONE ENCOUNTER
Left voice message and requested refill(s): PATIENT    Medications:ATORVASTATIN 80 MG DAILY (FIRST TIME FILLING THRU DR. ODEN)  Amount: 90 day supply  Pharmacy to be sent to: [facility]  Call back number: [phone]     1. Caller Name: Micheal            Call Back Number: [phone]      Patient approves a detailed voicemail message: yes    Micheal called stating he made an appointment.  He was wanting labs ordered.  He stated he was taken off his cholesterol mediation.    Please order whichever labs you feel appropriate for patient.    He would like a call once labs are ordered.

## 2018-07-31 ENCOUNTER — OFFICE VISIT (OUTPATIENT)
Dept: BEHAVIORAL HEALTH | Facility: PHYSICIAN GROUP | Age: 68
End: 2018-07-31
Payer: MEDICARE

## 2018-07-31 DIAGNOSIS — F41.0 PANIC DISORDER: ICD-10-CM

## 2018-07-31 PROCEDURE — 90834 PSYTX W PT 45 MINUTES: CPT | Performed by: PSYCHOLOGIST

## 2018-07-31 NOTE — BH THERAPY
[facility]  Therapy Progress Note    Patient Name: Micheal Brothers  Patient MRN: [id number]  Today's Date: 7/31/2018     Type of session:Individual psychotherapy  Length of session: 50 minutes  Persons in attendance:Patient    Subjective/New Info: The patient ID/Chief Complaint:  The patient is a 67 year old male, , .  The patient was referred by PCP Dr. Milan and voluntarily presented for an individual intake to address history of panic disorder with current treatment with duloxetine since 2007 with desire to discontinue the medication because of side effects.  Reviewed limits of confidentiality and [facility] policies; patient expressed understanding and agreed to voluntarily proceed with evaluation and treatment.    Interval History:   Session Focus:  Patient reports the use of psycho educational and self-monitoring materials about panic significantly reduced intensity and frequency a panic symptoms.  Patient would like to continue exploring ways to optimize functioning specifically decreasing complex with his wife. Educated patient about behavioral activation.     Therapeutic Intervention: Cognitive Behavioral Therapy    Planned Intervention: Continue to explore automatic thoughts that lead to anxiety and depressive symptoms especially in his interactions with his wife and others.     Objective/Observations:  Patient did not present in acute distress. Patient was appropriately groomed. Patient was alert and oriented x4. Eye contact was appropriate. No abnormalities in attention or concentration were noted. No abnormalities of movement present; psychomotor activity was normal. Speech was fluent and regular in rhythm, rate, volume, and tone. Thought processes were linear, logical, and goal-directed. Long and short term memory appeared to be intact. Insight, judgment, and impulse control were deemed to be within normal limits.  Reported mood was “neutral.” Affect  was full-ranging and appropriate to thought content and conversation.  Patient denied past and current suicidal and homicidal ideation in plan, intent, and preparatory behavior.    Psychometric Test Results:       BHM-20  Global Mental Health  Within Normal Limits  Well Being Scale  Within Normal Limits  Symptoms Scale  Within Normal Limits  Anxiety Subscale  Within Normal Limits  Depression Subscale  Within Normal Limits  Alcohol/Drug Subscale Within Normal Limits  Bipolar Subscale  Within Normal Limits  Eating Disorder Subscale Within Normal Limits  Harm to other Subscale Within Normal Limits  Suicide Monitoring Scale No Indication of Risk  Life Functioning Scale   Within Normal Limits      Diagnoses:   1. Panic disorder         Current risk:   SUICIDE: Low   Homicide: Not applicable   Self-harm: Not applicable   Relapse: Not applicable   Other:    Safety Plan reviewed? No   If evidence of imminent risk is present, intervention/plan:       Treatment Goal(s)/Objective(s) addressed:   Anxiety   Goal: Develop strategies to reduce symptoms, or   Reduce anxiety and improve coping skills   Be free of panic episodes (100%)   Report feeling more positive about self and abilities during therapy sessions   Develop strategies for thought distraction when fixating on the future      Progress toward Treatment Goals: Significant improvement    Plan:    - Next appointment scheduled:     1) The patient will return to the clinic 2-3 weeks.  2) Referrals/Consults:  N/A  3) Barriers to Learning:  No  4) Readiness to Learn:  Yes  5) Cultural Concerns:  No  6) Patient voiced understanding of, and agreement with, plan and goals as annotated above.  7) Declare these services are medically necessary and appropriate to the patient’s diagnosis and needs  8) The point of contact at the [facility] regarding this evaluation is Dr. Jaramillo, Psychologist.      Myron Jaramillo III, Ed.D.  7/31/2018

## 2018-08-15 ENCOUNTER — OFFICE VISIT (OUTPATIENT)
Dept: BEHAVIORAL HEALTH | Facility: PHYSICIAN GROUP | Age: 68
End: 2018-08-15
Payer: MEDICARE

## 2018-08-15 DIAGNOSIS — F41.0 PANIC DISORDER: ICD-10-CM

## 2018-08-15 PROCEDURE — 90834 PSYTX W PT 45 MINUTES: CPT | Performed by: PSYCHOLOGIST

## 2018-08-17 DIAGNOSIS — F33.41 RECURRENT MAJOR DEPRESSIVE DISORDER, IN PARTIAL REMISSION (HCC): ICD-10-CM

## 2018-08-17 RX ORDER — DULOXETIN HYDROCHLORIDE 20 MG/1
60 CAPSULE, DELAYED RELEASE ORAL DAILY
Qty: 90 CAP | Refills: 0 | Status: SHIPPED | OUTPATIENT
Start: 2018-08-17 | End: 2018-10-15

## 2018-08-17 NOTE — TELEPHONE ENCOUNTER
Patient is requesting cymbalta 20 mg 3 a day so when he drops the dose he already has the medication. He has an appt 8/28 to discuss and follow up

## 2018-08-21 NOTE — BH THERAPY
[facility]  Therapy Progress Note    Patient Name: Micheal Brothers  Patient MRN: [id number]  Today's Date: 8/15/2018     Type of session:Individual psychotherapy  Length of session: 50 minutes  Persons in attendance:Patient    Subjective/New Info: The patient ID/Chief Complaint:  The patient is a 67 year old male, , .  The patient was referred by PCP Dr. Milan and voluntarily presented for an individual intake to address history of panic disorder with current treatment with duloxetine since 2007 with desire to discontinue the medication because of side effects.  Reviewed limits of confidentiality and [facility] policies; patient expressed understanding and agreed to voluntarily proceed with evaluation and treatment.    Interval History:   Session Focus:  Patient reports the use of psycho educational and self-monitoring materials about panic significantly reduced intensity and frequency a panic symptoms.  Patient would like to continue exploring ways to optimize functioning specifically decreasing complex with his wife. Educated patient about behavioral activation. Patient continues to report only minor concerns related to panic would like to discontinue the use of Cymbalta consulted with the patient’s primary care provider and also educated patient about the process of discontinuation and the possibility of the returning of mild to moderate panic symptoms. He is also encouraged to continue using the psychoeducation it’s been provided related to panic in order to address those symptoms if they return. Patient also reports continued increase in behavioral activation.     Therapeutic Intervention: Cognitive Behavioral Therapy    Planned Intervention: Continue to explore automatic thoughts that lead to anxiety and depressive symptoms especially in his interactions with his wife and others.     Objective/Observations:  Patient did not present in acute distress. Patient  was appropriately groomed. Patient was alert and oriented x4. Eye contact was appropriate. No abnormalities in attention or concentration were noted. No abnormalities of movement present; psychomotor activity was normal. Speech was fluent and regular in rhythm, rate, volume, and tone. Thought processes were linear, logical, and goal-directed. Long and short term memory appeared to be intact. Insight, judgment, and impulse control were deemed to be within normal limits.  Reported mood was “happy.” Affect was full-ranging and appropriate to thought content and conversation.  Patient denied past and current suicidal and homicidal ideation in plan, intent, and preparatory behavior.    Psychometric Test Results:       BHM-20  Global Mental Health  Within Normal Limits  Well Being Scale  Within Normal Limits  Symptoms Scale  Within Normal Limits  Anxiety Subscale  Mild Distress  Depression Subscale  Within Normal Limits  Alcohol/Drug Subscale Within Normal Limits  Bipolar Subscale  Within Normal Limits  Eating Disorder Subscale Within Normal Limits  Harm to other Subscale Within Normal Limits  Suicide Monitoring Scale No Indication of Risk  Life Functioning Scale   Within Normal Limits      Diagnoses:   1. Panic disorder         Current risk:   SUICIDE: Low   Homicide: Not applicable   Self-harm: Not applicable   Relapse: Not applicable   Other:    Safety Plan reviewed? No   If evidence of imminent risk is present, intervention/plan:       Treatment Goal(s)/Objective(s) addressed:   Anxiety   Goal: Develop strategies to reduce symptoms, or   Reduce anxiety and improve coping skills   Be free of panic episodes (100%)   Report feeling more positive about self and abilities during therapy sessions   Develop strategies for thought distraction when fixating on the future      Progress toward Treatment Goals: Significant improvement    Plan:    - Next appointment scheduled:     1) The patient will return to the clinic 3-4  weeks.  2) Referrals/Consults:  N/A  3) Barriers to Learning:  No  4) Readiness to Learn:  Yes  5) Cultural Concerns:  No  6) Patient voiced understanding of, and agreement with, plan and goals as annotated above.  7) Declare these services are medically necessary and appropriate to the patient’s diagnosis and needs  8) The point of contact at the [facility] regarding this evaluation is Dr. Jaramillo, Psychologist.      Myron Jaramillo III, Ed.D.  8/15/2018

## 2018-08-27 RX ORDER — SULFACETAMIDE SODIUM 100 MG/ML
LOTION TOPICAL
Qty: 118 ML | Refills: 6 | Status: SHIPPED | OUTPATIENT
Start: 2018-08-27 | End: 2019-04-01 | Stop reason: SDUPTHER

## 2018-08-28 ENCOUNTER — OFFICE VISIT (OUTPATIENT)
Dept: MEDICAL GROUP | Facility: MEDICAL CENTER | Age: 68
End: 2018-08-28
Payer: MEDICARE

## 2018-08-28 VITALS
RESPIRATION RATE: 16 BRPM | HEIGHT: 67 IN | OXYGEN SATURATION: 97 % | BODY MASS INDEX: 34.84 KG/M2 | TEMPERATURE: 97.6 F | SYSTOLIC BLOOD PRESSURE: 134 MMHG | HEART RATE: 85 BPM | WEIGHT: 222 LBS | DIASTOLIC BLOOD PRESSURE: 82 MMHG

## 2018-08-28 DIAGNOSIS — E66.9 OBESITY (BMI 35.0-39.9 WITHOUT COMORBIDITY): ICD-10-CM

## 2018-08-28 DIAGNOSIS — G47.33 OSA ON CPAP: ICD-10-CM

## 2018-08-28 DIAGNOSIS — E78.5 DYSLIPIDEMIA: ICD-10-CM

## 2018-08-28 DIAGNOSIS — R73.02 IGT (IMPAIRED GLUCOSE TOLERANCE): ICD-10-CM

## 2018-08-28 DIAGNOSIS — K21.9 GASTROESOPHAGEAL REFLUX DISEASE WITHOUT ESOPHAGITIS: ICD-10-CM

## 2018-08-28 DIAGNOSIS — F41.0 PANIC DISORDER: ICD-10-CM

## 2018-08-28 DIAGNOSIS — Z00.00 MEDICARE ANNUAL WELLNESS VISIT, SUBSEQUENT: ICD-10-CM

## 2018-08-28 LAB
HBA1C MFR BLD: 6.3 % (ref ?–5.8)
INT CON NEG: NORMAL
INT CON POS: NORMAL

## 2018-08-28 PROCEDURE — 99213 OFFICE O/P EST LOW 20 MIN: CPT | Mod: 25 | Performed by: INTERNAL MEDICINE

## 2018-08-28 PROCEDURE — G0439 PPPS, SUBSEQ VISIT: HCPCS | Performed by: INTERNAL MEDICINE

## 2018-08-28 PROCEDURE — 83036 HEMOGLOBIN GLYCOSYLATED A1C: CPT | Performed by: INTERNAL MEDICINE

## 2018-08-28 RX ORDER — DULOXETIN HYDROCHLORIDE 20 MG/1
40 CAPSULE, DELAYED RELEASE ORAL DAILY
Qty: 60 CAP | Refills: 0 | Status: SHIPPED | OUTPATIENT
Start: 2018-08-28 | End: 2018-09-28 | Stop reason: SDUPTHER

## 2018-08-28 ASSESSMENT — ACTIVITIES OF DAILY LIVING (ADL): BATHING_REQUIRES_ASSISTANCE: 0

## 2018-08-28 ASSESSMENT — PATIENT HEALTH QUESTIONNAIRE - PHQ9: CLINICAL INTERPRETATION OF PHQ2 SCORE: 0

## 2018-08-28 ASSESSMENT — ENCOUNTER SYMPTOMS: GENERAL WELL-BEING: EXCELLENT

## 2018-08-28 NOTE — PROGRESS NOTES
CC: Follow-up panic disorder and elevated blood sugar.  Due for wellness exam.    HPI:   Micheal presents today with the following.      1. Medicare annual wellness visit, subsequent  Screenings performed below information given on advanced directives.    2. Panic disorder  Has been into psychology is doing quite well with his anxiety would like to come off medications.  He has been on Cymbalta for years and would like refills for lower dose we can wean off.    3. IGT (impaired glucose tolerance)  Blood work did reveal a fasting blood sugar 114 denies any personal history of diabetes but does have some distant family members with the disease.  Denies excessive thirst or urination.  A1c checked in office today at 6.3.        Information for advance directives given to patient or instructed to bring in advance directives into to office to put in chart.      Depression Screening    Little interest or pleasure in doing things?  0 - not at all  Feeling down, depressed , or hopeless? 0 - not at all  Patient Health Questionnaire Score: 0     If depressive symptoms identified deferred to follow up visit unless specifically addressed in assessment and plan.    Interpretation of PHQ-9 Total Score   Score Severity   1-4 No Depression   5-9 Mild Depression   10-14 Moderate Depression   15-19 Moderately Severe Depression   20-27 Severe Depression    Screening for Cognitive Impairment    Three Minute Recall (leader, season, table) 3/3    Arturo clock face with all 12 numbers and set the hands to show 10 past 11.  Yes 5/5  Cognitive concerns identified deferred for follow up unless specifically addressed in assessment and plan.    Fall Risk Assessment    Has the patient had two or more falls in the last year or any fall with injury in the last year?  No    Safety Assessment    Throw rugs on floor.  Yes  Handrails on all stairs.  Yes  Good lighting in all hallways.  Yes  Difficulty hearing.  No  Patient counseled about all safety  risks that were identified.    Functional Assessment ADLs    Are there any barriers preventing you from cooking for yourself or meeting nutritional needs?  No.    Are there any barriers preventing you from driving safely or obtaining transportation?  No.    Are there any barriers preventing you from using a telephone or calling for help?  No.    Are there any barriers preventing you from shopping?  No.    Are there any barriers preventing you from taking care of your own finances?  No.    Are there any barriers preventing you from managing your medications?  No.    Are there any barriers preventing you from showering, bathing or dressing yourself?  No.    Are you currently engaging in any exercise or physical activity?  Yes.     What is your perception of your health?  Excellent.      Health Maintenance Summary                Annual Wellness Visit Overdue 1950     IMM INFLUENZA Next Due 9/1/2018      Done 11/14/2017 Imm Admin: Influenza Vaccine Adult HD     Patient has more history with this topic...    IMM DTaP/Tdap/Td Vaccine Postponed 11/27/2018 Originally 9/30/1969. Patient Refused    IMM ZOSTER VACCINES Postponed 9/17/2025 Originally 9/30/1999. Insurance/Financial    COLONOSCOPY Next Due 5/15/2023      Done 5/15/2013           Patient Care Team:  Bereket Milan M.D. as PCP - General (Internal Medicine)  [facility] as Respiratory            Health Care Screening: Age-appropriate preventive services that Medicare covers were discussed today and ordered as indicated and agreed upon by the patient, and as recommended by USPTF and ACIP.     Patient Active Problem List    Diagnosis Date Noted   • Obesity (BMI 35.0-39.9 without comorbidity) ([facility]) 08/28/2018   • Panic disorder 06/18/2018   • NAHUM on CPAP 08/26/2016   • Obesity (BMI 30-39.9) 11/26/2014   • Dyslipidemia 11/26/2014   • GERD (gastroesophageal reflux disease) 11/26/2014   • Tobacco use 11/26/2014       Current Outpatient Prescriptions   Medication  "Sig Dispense Refill   • DULoxetine (CYMBALTA) 20 MG Cap DR Particles Take 2 Caps by mouth every day. 60 Cap 0   • Sulfacetamide Sodium, Acne, 10 % Lotion Apply one daily 118 mL 6   • DULoxetine (CYMBALTA) 20 MG Cap DR Particles Take 3 Caps by mouth every day. 90 Cap 0   • omeprazole (PRILOSEC) 40 MG delayed-release capsule Take 1 Cap by mouth every day. 90 Cap 3   • albuterol 108 (90 BASE) MCG/ACT Aero Soln inhalation aerosol Inhale 2 Puffs by mouth every 6 hours as needed for Shortness of Breath. 8.5 g 3   • triamcinolone acetonide (KENALOG) 0.5 % Cream Use q 12 hour 60 g 3     No current facility-administered medications for this visit.        Family History   Problem Relation Age of Onset   • Cancer Mother    • Cancer Father    • Stroke Father    • Diabetes Neg Hx    • Heart Disease Neg Hx    • Hypertension Neg Hx        Social History     Social History   • Marital status:      Spouse name: N/A   • Number of children: N/A   • Years of education: N/A     Occupational History   • Not on file.     Social History Main Topics   • Smoking status: Current Every Day Smoker     Packs/day: 0.50     Years: 40.00     Types: Cigarettes   • Smokeless tobacco: Never Used   • Alcohol use 0.0 oz/week      Comment: rare   • Drug use: No   • Sexual activity: Yes     Partners: Female     Other Topics Concern   • Not on file     Social History Narrative   • No narrative on file       Past Surgical History:   Procedure Laterality Date   • APPENDECTOMY     • ARTHROSCOPY, KNEE     • CARPAL TUNNEL ENDOSCOPIC      bilateral   • SHOULDER DECOMPRESSION ARTHROSCOPIC      right       Allergies as of 08/28/2018 - Reviewed 08/28/2018   Allergen Reaction Noted   • Zyban [bupropion] Hives and Itching 10/21/2014   • Augmentin Hives 03/06/2016        ROS: Denies Chest pain, SOB, LE edema.    /82   Pulse 85   Temp 36.4 °C (97.6 °F)   Resp 16   Ht 1.702 m (5' 7\")   Wt 100.7 kg (222 lb)   SpO2 97%   BMI 34.77 kg/m²     Physical " Exam:  Gen:         Alert and oriented, No apparent distress.  Neck:        No Lymphadenopathy or Bruits.  Lungs:     Clear to auscultation bilaterally  CV:          Regular rate and rhythm. No murmurs, rubs or gallops.  Abd:         Soft non tender, non distended. Normal active bowel sounds.  No  Hepatosplenomegaly, No pulsatile masses.                   Ext:          No clubbing, cyanosis, edema.      Assessment and Plan.   67 y.o. male with the following issues.    1. Medicare annual wellness visit, subsequent  Discussed healthy lifestyle habits as well as screening regimens.  - Annual Wellness Visit - Includes PPPS Subsequent ()    2. Panic disorder  Clinically doing well have given lower dose medications to slowly titrate off medication over the next month.  - DULoxetine (CYMBALTA) 20 MG Cap DR Particles; Take 2 Caps by mouth every day.  Dispense: 60 Cap; Refill: 0    3. IGT (impaired glucose tolerance)  Discussion about diet exercise in terms of preventing diabetes will recheck in 6 months.  - POCT  A1C    4. Obesity (BMI 35.0-39.9 without comorbidity)  Patient's body mass index is 34.77 kg/m². Exercise and nutrition counseling were performed at this visit.  Set goal of 15lbs in next 3 months.  - Patient identified as having weight management issue.  Appropriate orders and counseling given.  - Annual Wellness Visit - Includes PPPS Subsequent ()    5. Dyslipidemia  HDL still low again discussed diet exercise recheck next lab draw    - Annual Wellness Visit - Includes PPPS Subsequent ()    6. NAHUM on CPAP  Continue sleep apnea treatment referred to pulmonology.  - Annual Wellness Visit - Includes PPPS Subsequent ()  - REFERRAL TO PULMONOLOGY    7. Gastroesophageal reflux disease without esophagitis  Stable no change in therapy  - Annual Wellness Visit - Includes PPPS Subsequent ()        Referrals offered: Community-based lifestyle interventions to reduce health risks and promote  self-management and wellness, fall prevention, nutrition, physical activity, tobacco-use cessation, weight loss, and mental health services as per orders if indicated.    Discussion today about general wellness and lifestyle habits:    · Prevent falls and reduce trip hazards; Cautioned about securing or removing rugs.  · Have a working fire alarm and carbon monoxide detector;   · Engage in regular physical activity and social activities

## 2018-09-18 ENCOUNTER — OFFICE VISIT (OUTPATIENT)
Dept: BEHAVIORAL HEALTH | Facility: PHYSICIAN GROUP | Age: 68
End: 2018-09-18
Payer: MEDICARE

## 2018-09-18 DIAGNOSIS — F41.0 PANIC DISORDER: ICD-10-CM

## 2018-09-18 PROCEDURE — 90834 PSYTX W PT 45 MINUTES: CPT | Performed by: PSYCHOLOGIST

## 2018-09-18 NOTE — BH THERAPY
[facility]  Therapy Progress Note    Patient Name: Micheal Brothers  Patient MRN: [id number]  Today's Date: 9/18/2018     Type of session:Individual psychotherapy  Length of session: 50 minutes  Persons in attendance:Patient    Subjective/New Info: The patient ID/Chief Complaint:  The patient is a 67 year old male, , .  The patient was referred by PCP Dr. Milan and voluntarily presented for an individual intake to address history of panic disorder with current treatment with duloxetine since 2007 with desire to discontinue the medication because of side effects.  Reviewed limits of confidentiality and [facility] policies; patient expressed understanding and agreed to voluntarily proceed with evaluation and treatment.    Interval History:   Session Focus:   Patient reports that he has decreased  his Cymbalta to 40 mg with no anxiety or depressive symptoms he is consulting with his primary care provider about decreasing to 20 mg next week patient continue to be educated about  The possible return of anxiety symptoms. Patient also reports some difficulty with sleeping he was provided educational information about sleep hygiene.     Therapeutic Intervention: Cognitive Behavioral Therapy    Planned Intervention: prevention    Objective/Observations:  Patient did not present in acute distress. Patient was appropriately groomed. Patient was alert and oriented x4. Eye contact was appropriate. No abnormalities in attention or concentration were noted. No abnormalities of movement present; psychomotor activity was normal. Speech was fluent and regular in rhythm, rate, volume, and tone. Thought processes were linear, logical, and goal-directed. Long and short term memory appeared to be intact. Insight, judgment, and impulse control were deemed to be within normal limits.  Reported mood was “happy.” Affect was full-ranging and appropriate to thought content and conversation.   Patient denied past and current suicidal and homicidal ideation in plan, intent, and preparatory behavior.    Psychometric Test Results:       BHM-20  Global Mental Health  Within Normal Limits  Well Being Scale  Within Normal Limits  Symptoms Scale  Within Normal Limits  Anxiety Subscale  Within Normal Limits  Depression Subscale  Within Normal Limits  Alcohol/Drug Subscale Within Normal Limits  Bipolar Subscale  Within Normal Limits  Eating Disorder Subscale Within Normal Limits  Harm to other Subscale Within Normal Limits  Suicide Monitoring Scale No Indication of Risk  Life Functioning Scale   Within Normal Limits      Diagnoses:   1. Panic disorder         Current risk:   SUICIDE: Low   Homicide: Not applicable   Self-harm: Not applicable   Relapse: Not applicable   Other:    Safety Plan reviewed? No   If evidence of imminent risk is present, intervention/plan:       Treatment Goal(s)/Objective(s) addressed:   Anxiety   Goal: Develop strategies to reduce symptoms, or   Reduce anxiety and improve coping skills   Be free of panic episodes (100%)   Report feeling more positive about self and abilities during therapy sessions   Develop strategies for thought distraction when fixating on the future      Progress toward Treatment Goals: Significant improvement    Plan:    - Next appointment scheduled:     1) The patient will return to the clinic 3-4 weeks.  2) Referrals/Consults:  N/A  3) Barriers to Learning:  No  4) Readiness to Learn:  Yes  5) Cultural Concerns:  No  6) Patient voiced understanding of, and agreement with, plan and goals as annotated above.  7) Declare these services are medically necessary and appropriate to the patient’s diagnosis and needs  8) The point of contact at the [facility] regarding this evaluation is Dr. Jaramillo, Psychologist.      Myron Jaramillo III, Ed.D.  9/18/2018

## 2018-09-28 DIAGNOSIS — F41.0 PANIC DISORDER: ICD-10-CM

## 2018-09-29 RX ORDER — DULOXETIN HYDROCHLORIDE 20 MG/1
CAPSULE, DELAYED RELEASE ORAL
Qty: 90 CAP | Refills: 3 | Status: SHIPPED | OUTPATIENT
Start: 2018-09-29 | End: 2018-10-15

## 2018-10-15 ENCOUNTER — OFFICE VISIT (OUTPATIENT)
Dept: MEDICAL GROUP | Facility: MEDICAL CENTER | Age: 68
End: 2018-10-15
Payer: MEDICARE

## 2018-10-15 VITALS
OXYGEN SATURATION: 98 % | DIASTOLIC BLOOD PRESSURE: 78 MMHG | HEIGHT: 67 IN | TEMPERATURE: 97.1 F | BODY MASS INDEX: 35.19 KG/M2 | WEIGHT: 224.21 LBS | SYSTOLIC BLOOD PRESSURE: 134 MMHG | HEART RATE: 76 BPM

## 2018-10-15 DIAGNOSIS — F41.0 PANIC DISORDER: ICD-10-CM

## 2018-10-15 DIAGNOSIS — Z23 NEED FOR VACCINATION: ICD-10-CM

## 2018-10-15 DIAGNOSIS — M25.511 ACUTE PAIN OF RIGHT SHOULDER: ICD-10-CM

## 2018-10-15 PROCEDURE — 90662 IIV NO PRSV INCREASED AG IM: CPT | Performed by: INTERNAL MEDICINE

## 2018-10-15 PROCEDURE — 99214 OFFICE O/P EST MOD 30 MIN: CPT | Mod: 25 | Performed by: INTERNAL MEDICINE

## 2018-10-15 PROCEDURE — G0008 ADMIN INFLUENZA VIRUS VAC: HCPCS | Performed by: INTERNAL MEDICINE

## 2018-10-15 RX ORDER — DULOXETIN HYDROCHLORIDE 20 MG/1
20 CAPSULE, DELAYED RELEASE ORAL DAILY
Qty: 90 CAP | Refills: 3 | Status: SHIPPED | OUTPATIENT
Start: 2018-10-15 | End: 2018-10-29

## 2018-10-15 RX ORDER — HYDROXYZINE HYDROCHLORIDE 25 MG/1
25 TABLET, FILM COATED ORAL 3 TIMES DAILY PRN
Qty: 30 TAB | Refills: 0 | Status: SHIPPED | OUTPATIENT
Start: 2018-10-15 | End: 2021-01-20

## 2018-10-15 NOTE — PROGRESS NOTES
CC: Panic disorder, shoulder pain.    HPI:   Micheal presents today with the following.    1. Panic disorder  Presents after recently coming off of Cymbalta 60 mg slow titration off.  After being off of it for 2-3 weeks he reports significant increase in agitation as well as mood irritability.  He did not have any major side effects besides odd dreams.  He is here to discuss alternatives to treatment.  Denies any major depressive symptoms    2. Acute pain of right shoulder  Does complain of right shoulder pain.  He reports he was jerked backwards when he was carrying a door in the wind.  Reports pain is 4 out of 10 intensity mobility is significantly improved over the last 2 days and is now that full range of motion.    3. Need for vaccination        Patient Active Problem List    Diagnosis Date Noted   • Obesity (BMI 35.0-39.9 without comorbidity) ([facility]) 08/28/2018   • Panic disorder 06/18/2018   • NAHUM on CPAP 08/26/2016   • Obesity (BMI 30-39.9) 11/26/2014   • Dyslipidemia 11/26/2014   • GERD (gastroesophageal reflux disease) 11/26/2014   • Tobacco use 11/26/2014       Current Outpatient Prescriptions   Medication Sig Dispense Refill   • DULoxetine (CYMBALTA) 20 MG Cap DR Particles Take 1 Cap by mouth every day. 90 Cap 3   • hydrOXYzine HCl (ATARAX) 25 MG Tab Take 1 Tab by mouth 3 times a day as needed for Anxiety. 30 Tab 0   • Sulfacetamide Sodium, Acne, 10 % Lotion Apply one daily 118 mL 6   • omeprazole (PRILOSEC) 40 MG delayed-release capsule Take 1 Cap by mouth every day. 90 Cap 3   • albuterol 108 (90 BASE) MCG/ACT Aero Soln inhalation aerosol Inhale 2 Puffs by mouth every 6 hours as needed for Shortness of Breath. 8.5 g 3   • triamcinolone acetonide (KENALOG) 0.5 % Cream Use q 12 hour 60 g 3     No current facility-administered medications for this visit.          Allergies as of 10/15/2018 - Reviewed 10/15/2018   Allergen Reaction Noted   • Zyban [bupropion] Hives and Itching 10/21/2014   • Augmentin Hives  "03/06/2016        ROS: Denies Chest pain, SOB, LE edema.    /78 (BP Location: Left arm, Patient Position: Sitting, BP Cuff Size: Adult)   Pulse 76   Temp 36.2 °C (97.1 °F) (Temporal)   Ht 1.702 m (5' 7\")   Wt 101.7 kg (224 lb 3.3 oz)   SpO2 98%   BMI 35.12 kg/m²     Physical Exam:  Gen:         Alert and oriented, No apparent distress.  Neck:        No Lymphadenopathy or Bruits.  Lungs:     Clear to auscultation bilaterally  CV:          Regular rate and rhythm. No murmurs, rubs or gallops.               Ext:          No clubbing, cyanosis, edema.      Assessment and Plan.   68 y.o. male with the following issues.    1. Panic disorder  Given that he tolerated Cymbalta so well with minimal side effect starting back on lower dose.  Have given a prescription for Atarax to have on hand for panic attacks.  Discussion about going up to 40 mg if he is tolerating it well and would wish to do so we will send to the pharmacy.  - DULoxetine (CYMBALTA) 20 MG Cap DR Particles; Take 1 Cap by mouth every day.  Dispense: 90 Cap; Refill: 3  - hydrOXYzine HCl (ATARAX) 25 MG Tab; Take 1 Tab by mouth 3 times a day as needed for Anxiety.  Dispense: 30 Tab; Refill: 0    2. Acute pain of right shoulder  Full range of motion no obvious deformity is significantly improving just over 2 days continue rest ice and anti-inflammatories if not improving consider physical therapy.  He will email office for a referral.    3. Need for vaccination    - INFLUENZA VACCINE, HIGH DOSE (65+ ONLY)      "

## 2018-10-16 ENCOUNTER — OFFICE VISIT (OUTPATIENT)
Dept: DERMATOLOGY | Facility: IMAGING CENTER | Age: 68
End: 2018-10-16
Payer: MEDICARE

## 2018-10-16 DIAGNOSIS — L30.8 OTHER ECZEMA: ICD-10-CM

## 2018-10-16 DIAGNOSIS — L92.0 GRANULOMA ANNULARE: ICD-10-CM

## 2018-10-16 PROCEDURE — 99213 OFFICE O/P EST LOW 20 MIN: CPT | Mod: 25 | Performed by: DERMATOLOGY

## 2018-10-16 PROCEDURE — 11900 INJECT SKIN LESIONS </W 7: CPT | Performed by: DERMATOLOGY

## 2018-10-16 ASSESSMENT — ENCOUNTER SYMPTOMS
FEVER: 0
CHILLS: 0
WEIGHT LOSS: 0

## 2018-10-16 NOTE — PROGRESS NOTES
Dermatology Return Patient Visit    Chief Complaint   Patient presents with   • Rash       Subjective:     HPI:   Micheal Brothers is a 67 y.o. male presenting for    Follow up  granuloma annulare  Areas on the leg improved, area on the right arm still present  Has a new lesion as well  Minimal itching  Would like additional kenalog injection  Hx: previously dx [place] (2012, [place]) now active again, right arm, b/l lower legs    HPI:rash on neck   Onset: few weeks   Red, bumpy, itchy  Aggravating factors: none  Alleviating factors: none  New creams/topicals:  None   New medications (up to last 6 months): none   New travel: no  Other exposures: no  Treatments:  No   Has spent time outside, but does not believe more than usualy      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       Past Medical History:   Diagnosis Date   • Depression    • GERD (gastroesophageal reflux disease)    • Gout    • Hyperlipidemia    • Panic disorder    • Tobacco use 11/26/2014       Current Outpatient Prescriptions on File Prior to Visit   Medication Sig Dispense Refill   • DULoxetine (CYMBALTA) 20 MG Cap DR Particles Take 1 Cap by mouth every day. 90 Cap 3   • hydrOXYzine HCl (ATARAX) 25 MG Tab Take 1 Tab by mouth 3 times a day as needed for Anxiety. 30 Tab 0   • Sulfacetamide Sodium, Acne, 10 % Lotion Apply one daily 118 mL 6   • omeprazole (PRILOSEC) 40 MG delayed-release capsule Take 1 Cap by mouth every day. 90 Cap 3   • albuterol 108 (90 BASE) MCG/ACT  Aero Soln inhalation aerosol Inhale 2 Puffs by mouth every 6 hours as needed for Shortness of Breath. 8.5 g 3   • triamcinolone acetonide (KENALOG) 0.5 % Cream Use q 12 hour 60 g 3     No current facility-administered medications on file prior to visit.        Allergies   Allergen Reactions   • Zyban [Bupropion] Hives and Itching   • Augmentin Hives       Family History   Problem Relation Age of Onset   • Cancer Mother    • Cancer Father    • Stroke Father    • Diabetes Neg Hx    • Heart Disease Neg Hx    • Hypertension Neg Hx        Social History     Social History   • Marital status:      Spouse name: N/A   • Number of children: N/A   • Years of education: N/A     Occupational History   • Not on file.     Social History Main Topics   • Smoking status: Current Every Day Smoker     Packs/day: 0.75     Years: 53.00     Types: Cigarettes   • Smokeless tobacco: Never Used      Comment: started smoking at age 15   • Alcohol use 0.0 oz/week      Comment: rare   • Drug use: No   • Sexual activity: Yes     Partners: Female     Other Topics Concern   • Not on file     Social History Narrative   • No narrative on file       Review of Systems   Constitutional: Negative for chills, fever and weight loss.   Skin: Positive for itching and rash.   All other systems reviewed and are negative.       Objective:     A focused cutaneous exam was completed including: hair, ears, face, eyelids, conjunctiva, lips, neck, chest,left and right upper extremities (including hands/digits and fingernails) left and right lower extremities with the following pertinent findings listed below. Remaining above-listed examined areas within normal limits / negative for rashes or lesions.    There were no vitals taken for this visit.    Physical Exam   Constitutional: He is oriented to person, place, and time and well-developed, well-nourished, and in no distress.   HENT:   Head: Normocephalic and atraumatic.       Right Ear: External ear  normal.   Left Ear: External ear normal.   Nose: Nose normal.   Eyes: Conjunctivae are normal.   Neck: Normal range of motion.   Pulmonary/Chest: Effort normal.   Neurological: He is alert and oriented to person, place, and time.   Skin: Skin is warm and dry.        Psychiatric: Mood and affect normal.       DATA: none applicable to review    Assessment and Plan:     1. Eczema  - discussed continued management options, and expectations of treatment  INTRALESIONAL KENALOG:  Discussed risks and benefits of intralesional kenalog injections, including skin atrophy, discoloration, minimal risk of infection. Patient verbally agreed. ILK 5mg/cc x 1cc total injected into the lesions on the right forearm, b/l lower legs. Patient tolerated procedure well, no complications. Aftercare discussed.     2. Granuloma annulare - mild flaring today  - instructed to start lidex 0.05% cream to affected area of the neck until improved. Patient instructed to avoid face, axilla, and groin area. Side effects discussed, including skin thinning, appearance of stretch marks (striae), easy bruising, telangiectasias, and possible increased hair growth     Following issues not discussed:  Hidradenitis, mild - groin, under control today  [facility]  Favre and Racouchot syndrome  Acrochordons - neck, right axilla    Followup: Return in about 3 months (around 1/16/2019) for carlos.    Brittni Bravo M.D.

## 2018-10-24 ENCOUNTER — OFFICE VISIT (OUTPATIENT)
Dept: BEHAVIORAL HEALTH | Facility: CLINIC | Age: 68
End: 2018-10-24
Payer: MEDICARE

## 2018-10-24 DIAGNOSIS — F41.0 PANIC DISORDER: ICD-10-CM

## 2018-10-24 PROCEDURE — 90834 PSYTX W PT 45 MINUTES: CPT | Performed by: PSYCHOLOGIST

## 2018-10-24 NOTE — BH THERAPY
[facility]  Therapy Progress Note    Patient Name: Micheal Brothers  Patient MRN: [id number]  Today's Date: 10/24/2018     Type of session:Individual psychotherapy  Length of session: 50 minutes  Persons in attendance:Patient    Subjective/New Info: The patient ID/Chief Complaint:  The patient is a 67 year old male, , .  The patient was referred by PCP Dr. Milan and voluntarily presented for an individual intake to address history of panic disorder with current treatment with duloxetine since 2007 with desire to discontinue the medication because of side effects.  Reviewed limits of confidentiality and [facility] policies; patient expressed understanding and agreed to voluntarily proceed with evaluation and treatment.    Interval History:   Session Focus: The patient's estimated global assessment of functioning appeared reasonably good with only short lived and expectable reactions to everyday stressful events. The patient shows only slight difficulty in relational and/or work/school functioning. The patient's affective and emotional state appeared generally positive.  Patient was able to discontinue the use of Cymbalta for approximately a week but after that he started developing irritability symptoms that negatively impacted his relationship he consulted with his primary care provider Dr. Milan and reinitiated Cymbalta at 20 mg.  Discussed with the patient alternative strategies to ultimately discontinue the Cymbalta.  However, the patient was able to identify when he was not taking the Cymbalta he experienced more aches and pains he was educated about how Cymbalta can sometimes be used for treating pain symptoms.  The patient will follow up with his primary care provider to discuss pharmacological options.  Patient will return in 3 months or sooner for booster sessions as necessary.    Therapeutic Intervention: Cognitive Behavioral Therapy    Planned  Intervention: prevention/booster sessions as needed    Objective/Observations:  Patient did not present in acute distress. Patient was appropriately groomed. Patient was alert and oriented x4. Eye contact was appropriate. No abnormalities in attention or concentration were noted. No abnormalities of movement present; psychomotor activity was normal. Speech was fluent and regular in rhythm, rate, volume, and tone. Thought processes were linear, logical, and goal-directed. Long and short term memory appeared to be intact. Insight, judgment, and impulse control were deemed to be within normal limits.  Reported mood was “happy.” Affect was full-ranging and appropriate to thought content and conversation.  Patient denied past and current suicidal and homicidal ideation in plan, intent, and preparatory behavior.    Psychometric Test Results:       BHM-20  Global Mental Health  Within Normal Limits  Well Being Scale  Within Normal Limits  Symptoms Scale  Within Normal Limits  Anxiety Subscale  Within Normal Limits  Depression Subscale  Within Normal Limits  Alcohol/Drug Subscale Within Normal Limits  Bipolar Subscale  Within Normal Limits  Eating Disorder Subscale Within Normal Limits  Harm to other Subscale Within Normal Limits  Suicide Monitoring Scale No Indication of Risk  Life Functioning Scale   Within Normal Limits      Diagnoses:   1. Panic disorder         Current risk:   SUICIDE: Low   Homicide: Not applicable   Self-harm: Not applicable   Relapse: Not applicable   Other:    Safety Plan reviewed? No   If evidence of imminent risk is present, intervention/plan:       Treatment Goal(s)/Objective(s) addressed:   Anxiety   Goal: Develop strategies to reduce symptoms, or   Reduce anxiety and improve coping skills   Be free of panic episodes (100%)   Report feeling more positive about self and abilities during therapy sessions   Develop strategies for thought distraction when fixating on the future      Progress toward  Treatment Goals: Mild decline    Plan:      1) The patient will return to the clinic 3 months.  2) Referrals/Consults:  Consulted with Dr. Milan related to his prescription of Cymbalta and alternative choices.  3) Barriers to Learning:  No  4) Readiness to Learn:  Yes  5) Cultural Concerns:  No  6) Patient voiced understanding of, and agreement with, plan and goals as annotated above.  7) Declare these services are medically necessary and appropriate to the patient’s diagnosis and needs  8) The point of contact at the [facility] regarding this evaluation is Dr. Jaramillo, Psychologist.      Myron Jaramillo III, Ed.D.  10/24/2018

## 2018-10-31 ENCOUNTER — OFFICE VISIT (OUTPATIENT)
Dept: DERMATOLOGY | Facility: IMAGING CENTER | Age: 68
End: 2018-10-31
Payer: MEDICARE

## 2018-10-31 DIAGNOSIS — L92.0 GRANULOMA ANNULARE: ICD-10-CM

## 2018-10-31 PROCEDURE — 99213 OFFICE O/P EST LOW 20 MIN: CPT | Mod: 25 | Performed by: DERMATOLOGY

## 2018-10-31 PROCEDURE — 11900 INJECT SKIN LESIONS </W 7: CPT | Performed by: DERMATOLOGY

## 2018-10-31 RX ORDER — BETAMETHASONE DIPROPIONATE 0.05 %
OINTMENT (GRAM) TOPICAL
Qty: 45 G | Refills: 1 | Status: SHIPPED | OUTPATIENT
Start: 2018-10-31 | End: 2019-02-12

## 2018-10-31 ASSESSMENT — ENCOUNTER SYMPTOMS
FEVER: 0
WEIGHT LOSS: 0
CHILLS: 0

## 2018-10-31 NOTE — PROGRESS NOTES
Dermatology Return Patient Visit    Chief Complaint   Patient presents with   • Rash       Subjective:     HPI:   Micheal Brothers is a 67 y.o. male presenting for    Here for rash on hands, GA flare    HPI: rash back of hands  Onset: about 2 weeks ago (right after he left my office)  Symptoms: red semi-swollen large patches, denies itching and pain.  Aggravating factors: none  Alleviating factors: none  New creams/topicals: no  New medications (up to last 6 months): no  New travel: no  Other exposures: no  Treatments: Pt tried hydrocortisone cream, not much help.    History of skin cancer: No  History of biopsies:No  History of blistering/severe sunburns:No  Family history of skin cancer:No  Family history of atypical moles:No        Rash   Pertinent negatives include no fever.       Past Medical History:   Diagnosis Date   • Depression    • GERD (gastroesophageal reflux disease)    • Gout    • Hyperlipidemia    • Panic disorder    • Tobacco use 11/26/2014       Current Outpatient Prescriptions on File Prior to Visit   Medication Sig Dispense Refill   • DULoxetine 40 MG Cap DR Particles Take 40 mg by mouth every day. 90 Cap 3   • fluocinonide (LIDEX) 0.05 % Cream Apply 1 Application to affected area(s) 2 times a day. 30 g 1   • hydrOXYzine HCl (ATARAX) 25 MG Tab Take 1 Tab by mouth 3 times a day as needed for Anxiety. 30 Tab 0   • Sulfacetamide Sodium, Acne, 10 % Lotion Apply one daily 118 mL 6   • omeprazole (PRILOSEC) 40 MG delayed-release capsule Take 1 Cap by mouth every day. 90 Cap 3   • albuterol 108 (90 BASE) MCG/ACT Aero Soln inhalation aerosol Inhale 2 Puffs by mouth every 6 hours as needed for Shortness of Breath. 8.5 g 3     No current facility-administered medications on file prior to visit.        Allergies   Allergen Reactions   • Zyban [Bupropion] Hives and Itching   • Augmentin Hives       Family History   Problem Relation Age of Onset   • Cancer Mother    • Cancer Father    • Stroke Father    •  Diabetes Neg Hx    • Heart Disease Neg Hx    • Hypertension Neg Hx        Social History     Social History   • Marital status:      Spouse name: N/A   • Number of children: N/A   • Years of education: N/A     Occupational History   • Not on file.     Social History Main Topics   • Smoking status: Current Every Day Smoker     Packs/day: 0.75     Years: 53.00     Types: Cigarettes   • Smokeless tobacco: Never Used      Comment: started smoking at age 15   • Alcohol use 0.0 oz/week      Comment: rare   • Drug use: No   • Sexual activity: Yes     Partners: Female     Other Topics Concern   • Not on file     Social History Narrative   • No narrative on file       Review of Systems   Constitutional: Negative for chills, fever and weight loss.   Skin: Positive for rash. Negative for itching.   All other systems reviewed and are negative.       Objective:     A focused cutaneous exam was completed including: hair, ears, face, eyelids, conjunctiva, lips, left and right upper extremities (including hands/digits and fingernails) left and right lower extremities with the following pertinent findings listed below. Remaining above-listed examined areas within normal limits / negative for rashes or lesions.      Physical Exam   Constitutional: He is oriented to person, place, and time and well-developed, well-nourished, and in no distress.   HENT:   Head: Normocephalic and atraumatic.   Right Ear: External ear normal.   Left Ear: External ear normal.   Nose: Nose normal.   Eyes: Conjunctivae are normal.   Neck: Normal range of motion.   Pulmonary/Chest: Effort normal.   Neurological: He is alert and oriented to person, place, and time.   Skin: Skin is warm and dry.        Psychiatric: Mood and affect normal.       DATA: none applicable to review    Assessment and Plan:     1. Granuloma annulare - flaring today  - instructed to start betamethasone 0.05% ointment to affected area of the until improved. Patient instructed to  avoid face, axilla, and groin area. Side effects discussed, including skin thinning, appearance of stretch marks (striae), easy bruising, telangiectasias, and possible increased hair growth   INTRALESIONAL KENALOG:  Discussed risks and benefits of intralesional kenalog injections, including skin atrophy, discoloration, minimal risk of infection. Patient verbally agreed. ILK 10mg/cc x 0.6cc total injected into the areas on the dorsal hands, right forearm. Patient tolerated procedure well, no complications. Aftercare discussed.     Following issues not discussed:  Hidradenitis, mild - groin, under control today  [facility]  Favre and Racouchot syndrome  Acrochordons - neck, right axilla    Followup: No Follow-up on file.    Brittni Bravo M.D.

## 2018-11-07 RX ORDER — DULOXETIN HYDROCHLORIDE 20 MG/1
40 CAPSULE, DELAYED RELEASE ORAL DAILY
Qty: 180 CAP | Refills: 3 | Status: SHIPPED | OUTPATIENT
Start: 2018-11-07 | End: 2019-04-01

## 2018-11-19 ENCOUNTER — OFFICE VISIT (OUTPATIENT)
Dept: DERMATOLOGY | Facility: IMAGING CENTER | Age: 68
End: 2018-11-19
Payer: MEDICARE

## 2018-11-19 DIAGNOSIS — L92.0 GA (GRANULOMA ANNULARE): ICD-10-CM

## 2018-11-19 PROCEDURE — 11900 INJECT SKIN LESIONS </W 7: CPT | Performed by: DERMATOLOGY

## 2018-11-19 ASSESSMENT — ENCOUNTER SYMPTOMS
CHILLS: 0
WEIGHT LOSS: 0
FEVER: 0

## 2018-11-19 NOTE — PROGRESS NOTES
Dermatology Return Patient Visit    Chief Complaint   Patient presents with   • Follow-Up       Subjective:     HPI:   Micheal Brothers is a 67 y.o. male presenting for    Follow up Kenalog injection for GA. patient is responding well to Tx, but still has residual activity  Minimal itching  No treatments besides injections     See prior notes for additional history    History of skin cancer: No  History of biopsies:No  History of blistering/severe sunburns:No  Family history of skin cancer:No  Family history of atypical moles:No        Rash   Pertinent negatives include no fever.       Past Medical History:   Diagnosis Date   • Depression    • GERD (gastroesophageal reflux disease)    • Gout    • Hyperlipidemia    • Panic disorder    • Tobacco use 11/26/2014       Current Outpatient Prescriptions on File Prior to Visit   Medication Sig Dispense Refill   • DULoxetine (CYMBALTA) 20 MG Cap DR Particles Take 2 Caps by mouth every day. 180 Cap 3   • betamethasone dipropionate (DIPROLENE) 0.05 % Ointment Twice daily PRN 45 g 1   • fluocinonide (LIDEX) 0.05 % Cream Apply 1 Application to affected area(s) 2 times a day. 30 g 1   • hydrOXYzine HCl (ATARAX) 25 MG Tab Take 1 Tab by mouth 3 times a day as needed for Anxiety. 30 Tab 0   • Sulfacetamide Sodium, Acne, 10 % Lotion Apply one daily 118 mL 6   • omeprazole (PRILOSEC) 40 MG delayed-release capsule Take 1 Cap by mouth every day. 90 Cap 3   • albuterol 108 (90 BASE) MCG/ACT Aero Soln inhalation aerosol Inhale 2 Puffs by mouth every 6 hours as needed for Shortness of Breath. 8.5 g 3     No current facility-administered medications on file prior to visit.        Allergies   Allergen Reactions   • Zyban [Bupropion] Hives and Itching   • Augmentin Hives       Family History   Problem Relation Age of Onset   • Cancer Mother    • Cancer Father    • Stroke Father    • Diabetes Neg Hx    • Heart Disease Neg Hx    • Hypertension Neg Hx        Social History     Social History      • Marital status:      Spouse name: N/A   • Number of children: N/A   • Years of education: N/A     Occupational History   • Not on file.     Social History Main Topics   • Smoking status: Current Every Day Smoker     Packs/day: 0.75     Years: 53.00     Types: Cigarettes   • Smokeless tobacco: Never Used      Comment: started smoking at age 15   • Alcohol use 0.0 oz/week      Comment: rare   • Drug use: No   • Sexual activity: Yes     Partners: Female     Other Topics Concern   • Not on file     Social History Narrative   • No narrative on file       Review of Systems   Constitutional: Negative for chills, fever and weight loss.   Skin: Positive for itching and rash.   All other systems reviewed and are negative.       Objective:     A focused cutaneous exam was completed including: hair, ears, face, eyelids, conjunctiva, lips, left and right upper extremities (including hands/digits and fingernails) left and right lower extremities with the following pertinent findings listed below. Remaining above-listed examined areas within normal limits / negative for rashes or lesions.      Physical Exam   Constitutional: He is oriented to person, place, and time and well-developed, well-nourished, and in no distress.   HENT:   Head: Normocephalic and atraumatic.   Right Ear: External ear normal.   Left Ear: External ear normal.   Nose: Nose normal.   Eyes: Conjunctivae are normal.   Neck: Normal range of motion.   Pulmonary/Chest: Effort normal.   Neurological: He is alert and oriented to person, place, and time.   Skin: Skin is warm and dry.        Psychiatric: Mood and affect normal.       DATA: none applicable to review    Assessment and Plan:     1. 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 Patient instructed to avoid face, axilla, and groin area. Side effects discussed, including skin thinning, appearance of stretch marks  (striae), easy bruising, telangiectasias, and possible increased hair growth   INTRALESIONAL KENALOG:  Discussed risks and benefits of intralesional kenalog injections, including skin atrophy, discoloration, minimal risk of infection. Patient verbally agreed. ILK 5mg/cc x 0.7cc total injected into the areas on the dorsal hands, right forearm. Patient tolerated procedure well, no complications. Aftercare discussed.     Following issues not discussed:  Hidradenitis, mild - groin, under control today  CNH  Favre and Racouchot syndrome  Acrochordons - neck, right axilla    Followup: Return in about 6 weeks (around 12/31/2018).    Brittni Bravo M.D.

## 2019-01-02 ENCOUNTER — SLEEP CENTER VISIT (OUTPATIENT)
Dept: SLEEP MEDICINE | Facility: MEDICAL CENTER | Age: 69
End: 2019-01-02
Payer: MEDICARE

## 2019-01-02 VITALS
HEART RATE: 91 BPM | SYSTOLIC BLOOD PRESSURE: 128 MMHG | OXYGEN SATURATION: 97 % | DIASTOLIC BLOOD PRESSURE: 80 MMHG | HEIGHT: 67 IN | WEIGHT: 220 LBS | RESPIRATION RATE: 18 BRPM | BODY MASS INDEX: 34.53 KG/M2

## 2019-01-02 DIAGNOSIS — G47.33 OSA ON CPAP: ICD-10-CM

## 2019-01-02 DIAGNOSIS — E66.9 OBESITY (BMI 30-39.9): ICD-10-CM

## 2019-01-02 DIAGNOSIS — Z72.0 TOBACCO USE: ICD-10-CM

## 2019-01-02 PROCEDURE — 99213 OFFICE O/P EST LOW 20 MIN: CPT | Performed by: INTERNAL MEDICINE

## 2019-01-02 NOTE — PROGRESS NOTES
"Chief Complaint   Patient presents with   • New Patient     NAHUM, currently on CPAP       HPI: This patient is a 68 y.o. Male who presents for sleep apnea establishment of care.  He moved from [facility], where he was diagnosed with severe NAHUM in 2008.  He provides sleep study results with in- laboratory polysomnogram showing an AHI of 85/h, with desaturations to 68%.  He has successfully been using CPAP therapy since then.  He describes CPAP as a \"night and day difference\" in his sleep quality.  Compliance card confirms 100% usage on CPAP: 12 cm of water for 8.5 hours nightly.  He wakes up feeling rested.  He denies restless legs.  His average AHI is 4.7.  He uses a Respironics nasal mask which fits comfortably.  He has been obtaining his supplies through [place] however would like to switch to a local DME.  Sleep diary was reviewed with good sleep hygiene noted.  His [place] sleepiness score is 1.  His BMI is 34.    Past Medical History:   Diagnosis Date   • Back pain    • Chickenpox    • Depression    • GERD (gastroesophageal reflux disease)    • [place] measles    • Gout    • Hyperlipidemia    • Influenza    • Panic disorder    • Sleep apnea    • Tobacco use 11/26/2014       Social History     Social History   • Marital status:      Spouse name: N/A   • Number of children: N/A   • Years of education: N/A     Occupational History   • Not on file.     Social History Main Topics   • Smoking status: Current Every Day Smoker     Packs/day: 0.75     Years: 53.00     Types: Cigarettes   • Smokeless tobacco: Never Used      Comment: started smoking at age 15   • Alcohol use 0.6 oz/week     1 Cans of beer per week      Comment: rare   • Drug use: No   • Sexual activity: Yes     Partners: Female     Other Topics Concern   • Not on file     Social History Narrative   • No narrative on file       Family History   Problem Relation Age of Onset   • Cancer Mother    • Cancer Father    • Stroke Father    • Diabetes Neg Hx  " "  • Heart Disease Neg Hx    • Hypertension Neg Hx        Current Outpatient Prescriptions on File Prior to Visit   Medication Sig Dispense Refill   • DULoxetine (CYMBALTA) 20 MG Cap DR Particles Take 2 Caps by mouth every day. 180 Cap 3   • Sulfacetamide Sodium, Acne, 10 % Lotion Apply one daily 118 mL 6   • omeprazole (PRILOSEC) 40 MG delayed-release capsule Take 1 Cap by mouth every day. 90 Cap 3   • betamethasone dipropionate (DIPROLENE) 0.05 % Ointment Twice daily PRN (Patient not taking: Reported on 1/2/2019) 45 g 1   • fluocinonide (LIDEX) 0.05 % Cream Apply 1 Application to affected area(s) 2 times a day. (Patient not taking: Reported on 1/2/2019) 30 g 1   • hydrOXYzine HCl (ATARAX) 25 MG Tab Take 1 Tab by mouth 3 times a day as needed for Anxiety. (Patient not taking: Reported on 1/2/2019) 30 Tab 0   • albuterol 108 (90 BASE) MCG/ACT Aero Soln inhalation aerosol Inhale 2 Puffs by mouth every 6 hours as needed for Shortness of Breath. 8.5 g 3     No current facility-administered medications on file prior to visit.        Allergies: Zyban [bupropion] and Augmentin    ROS:   Constitutional: Denies fevers, chills, night sweats, fatigue or weight loss  Eyes: Denies vision loss, pain, drainage, double vision  Ears, Nose, Throat: Denies earache, difficulty hearing, tinnitus, nasal congestion, hoarseness  Cardiovascular: Denies chest pain, tightness, palpitations, orthopnea or edema  Respiratory: Denies shortness of breath, cough, wheezing, hemoptysis  Sleep: Denies daytime sleepiness, snoring, apneas, insomnia, morning headaches  GI: Denies heartburn, dysphagia, nausea, abdominal pain, diarrhea or constipation  : Denies frequent urination, hematuria, discharge or painful urination  Musculoskeletal: Denies back pain, painful joints, sore muscles  Neurological: Denies weakness or headaches  Skin: No rashes    Blood pressure 128/80, pulse 91, resp. rate 18, height 1.702 m (5' 7\"), weight 99.8 kg (220 lb), SpO2 97 " %.    Physical Exam:  Appearance: Well-nourished, well-developed, in no acute distress  HEENT: Normocephalic, atraumatic, white sclera, PERRLA, oropharynx clear, Mallampati 3  Neck: No adenopathy or masses  Respiratory: no intercostal retractions or accessory muscle use  Lungs auscultation: Clear to auscultation bilaterally  Cardiovascular: Regular rate rhythm. No murmurs, rubs or gallops.  No LE edema  Abdomen: soft, nondistended  Gait: Normal  Digits: No clubbing, cyanosis  Motor: No focal deficits  Orientation: Oriented to time, person and place    Diagnosis:  1. NAHUM on CPAP  DME Mask and Supplies   2. Obesity (BMI 30-39.9)  OBESITY COUNSELING (No Charge): Patient identified as having weight management issue.  Appropriate orders and counseling given.   3. Tobacco use         Plan:  The patient has severe NAHUM, AHI: 85/h, with excellent compliance and response to CPAP: 12 cm of water, and is benefiting from treatment.  He requires establishment with a local DME for CPAP supplies.  Continue CPAP: 12 cm of water.  Replace nasal CPAP mask every 3 months.  Patient's body mass index is 34.46 kg/m². Exercise and nutrition counseling were performed at this visit.  Return in about 1 year (around 1/2/2020).

## 2019-01-07 ENCOUNTER — HOSPITAL ENCOUNTER (OUTPATIENT)
Facility: MEDICAL CENTER | Age: 69
End: 2019-01-07
Attending: DERMATOLOGY
Payer: MEDICARE

## 2019-01-07 ENCOUNTER — OFFICE VISIT (OUTPATIENT)
Dept: DERMATOLOGY | Facility: IMAGING CENTER | Age: 69
End: 2019-01-07
Payer: MEDICARE

## 2019-01-07 DIAGNOSIS — L98.9 DISEASE OF SKIN AND SUBCUTANEOUS TISSUE: ICD-10-CM

## 2019-01-07 PROCEDURE — 88346 IMFLUOR 1ST 1ANTB STAIN PX: CPT

## 2019-01-07 PROCEDURE — 11104 PUNCH BX SKIN SINGLE LESION: CPT | Performed by: DERMATOLOGY

## 2019-01-07 PROCEDURE — 88305 TISSUE EXAM BY PATHOLOGIST: CPT

## 2019-01-07 ASSESSMENT — ENCOUNTER SYMPTOMS
FEVER: 0
CHILLS: 0
WEIGHT LOSS: 0

## 2019-01-07 NOTE — PROGRESS NOTES
Dermatology Return Patient Visit    Chief Complaint   Patient presents with   • Rash       Subjective:     HPI:   Micheal Brothers is a 67 y.o. male presenting for    Follow up - rash that is spreading on the neck/shoulders  Hands have improved with kenalog injections, but over the past few weeks, new spots on the neck shoulder  No symptoms  Patient does not believe it is the same issue as on the hands  No aggravating/alleviating factors (no sun exposure, etc)  Has used betamethasone 0.05% without any improvement  Has h/o bx proven GA on the hands/legs    History of skin cancer: No  History of biopsies:No  History of blistering/severe sunburns:No  Family history of skin cancer:No  Family history of atypical moles:No        Rash   Pertinent negatives include no fever.       Past Medical History:   Diagnosis Date   • Back pain    • Chickenpox    • Depression    • GERD (gastroesophageal reflux disease)    • [place] measles    • Gout    • Hyperlipidemia    • Influenza    • Panic disorder    • Sleep apnea    • Tobacco use 11/26/2014       Current Outpatient Prescriptions on File Prior to Visit   Medication Sig Dispense Refill   • DULoxetine (CYMBALTA) 20 MG Cap DR Particles Take 2 Caps by mouth every day. 180 Cap 3   • betamethasone dipropionate (DIPROLENE) 0.05 % Ointment Twice daily PRN (Patient not taking: Reported on 1/2/2019) 45 g 1   • fluocinonide (LIDEX) 0.05 % Cream Apply 1 Application to affected area(s) 2 times a day. (Patient not taking: Reported on 1/2/2019) 30 g 1   • hydrOXYzine HCl (ATARAX) 25 MG Tab Take 1 Tab by mouth 3 times a day as needed for Anxiety. (Patient not taking: Reported on 1/2/2019) 30 Tab 0   • Sulfacetamide Sodium, Acne, 10 % Lotion Apply one daily 118 mL 6   • omeprazole (PRILOSEC) 40 MG delayed-release capsule Take 1 Cap by mouth every day. 90 Cap 3   • albuterol 108 (90 BASE) MCG/ACT Aero Soln inhalation aerosol Inhale 2 Puffs by mouth every 6 hours as needed for Shortness of Breath.  8.5 g 3     No current facility-administered medications on file prior to visit.        Allergies   Allergen Reactions   • Zyban [Bupropion] Hives and Itching   • Augmentin Hives       Family History   Problem Relation Age of Onset   • Cancer Mother    • Cancer Father    • Stroke Father    • Diabetes Neg Hx    • Heart Disease Neg Hx    • Hypertension Neg Hx        Social History     Social History   • Marital status:      Spouse name: N/A   • Number of children: N/A   • Years of education: N/A     Occupational History   • Not on file.     Social History Main Topics   • Smoking status: Current Every Day Smoker     Packs/day: 0.75     Years: 53.00     Types: Cigarettes   • Smokeless tobacco: Never Used      Comment: started smoking at age 15   • Alcohol use 0.6 oz/week     1 Cans of beer per week      Comment: rare   • Drug use: No   • Sexual activity: Yes     Partners: Female     Other Topics Concern   • Not on file     Social History Narrative   • No narrative on file       Review of Systems   Constitutional: Negative for chills, fever and weight loss.   Skin: Positive for rash. Negative for itching.   All other systems reviewed and are negative.       Objective:     A focused cutaneous exam was completed including: hair, ears, face, eyelids, conjunctiva, lips, neck, left and right upper extremities (including hands/digits and fingernails) left and right lower extremities with the following pertinent findings listed below. Remaining above-listed examined areas within normal limits / negative for rashes or lesions.      Physical Exam   Constitutional: He is oriented to person, place, and time and well-developed, well-nourished, and in no distress.   HENT:   Head: Normocephalic and atraumatic.       Right Ear: External ear normal.   Left Ear: External ear normal.   Nose: Nose normal.   Eyes: Conjunctivae are normal.   Neck: Normal range of motion.   Pulmonary/Chest: Effort normal.   Neurological: He is alert  and oriented to person, place, and time.   Skin: Skin is warm and dry.        Psychiatric: Mood and affect normal.       DATA: none applicable to review    Assessment and Plan:     1. Disease of skin and subcutaneous tissue  Comment: ddx GA vs SCLE vs sarcoid vs less likely EPS vs LP vs interstitial granulomatous dermatitis   Procedure Note   Procedure: Biopsy by punch technique  Location: right shoulder x 2 - H&E & DIF  Preoperative diagnosis:above  Risks, benefits and alternatives of procedure discussed and written informed consent obtained. Time out completed. Area of biopsy prepped with alcohol. Anesthesia with 1% lidocaine with epinephrine administered with a 30 gauge needle. 4  mm punch biopsy of site performed. Hemostasis achieved with pressure and 4.0 prolene sutures. Vaseline applied to wound with bandage. Patient tolerated procedure well, and there were no complications.  The pathology specimen was sent to the lab via the staff.  Wound care was discussed with the patient.   - Pathology Specimen; Future    Following issues not discussed:  Hidradenitis, mild - groin, under control today  CNH  Favre and Racouchot syndrome  Acrochordons - neck, right axilla    Followup: Return in about 10 days (around 1/17/2019).    Brittni Bravo M.D.

## 2019-01-08 LAB — PATHOLOGY CONSULT NOTE: NORMAL

## 2019-01-16 ENCOUNTER — OFFICE VISIT (OUTPATIENT)
Dept: DERMATOLOGY | Facility: IMAGING CENTER | Age: 69
End: 2019-01-16
Payer: MEDICARE

## 2019-01-16 DIAGNOSIS — Z51.81 MEDICATION MONITORING ENCOUNTER: ICD-10-CM

## 2019-01-16 DIAGNOSIS — L92.0 GRANULOMA ANNULARE: ICD-10-CM

## 2019-01-16 PROCEDURE — 99213 OFFICE O/P EST LOW 20 MIN: CPT | Mod: 25 | Performed by: DERMATOLOGY

## 2019-01-16 PROCEDURE — 11900 INJECT SKIN LESIONS </W 7: CPT | Performed by: DERMATOLOGY

## 2019-01-16 RX ORDER — HYDROXYCHLOROQUINE SULFATE 200 MG/1
400 TABLET, FILM COATED ORAL DAILY
Qty: 60 TAB | Refills: 2 | Status: SHIPPED | OUTPATIENT
Start: 2019-01-16 | End: 2019-02-12

## 2019-01-16 ASSESSMENT — ENCOUNTER SYMPTOMS
FEVER: 0
WEIGHT LOSS: 0
CHILLS: 0

## 2019-01-16 NOTE — PROGRESS NOTES
Received a message from Dr. Salvador that OK to start plaquenil. Needs regular follow-up -- I informed patient of this, he will schedule appointment. Start 400mg daily. Check blood work 2 weeks later.

## 2019-01-16 NOTE — PROGRESS NOTES
Dermatology Return Patient Visit    Chief Complaint   Patient presents with   • Follow-Up       Subjective:     HPI:   Micheal Brothers is a 67 y.o. male presenting for    Follow up, rash on the upper body - bx done, c/w GA  It is worsening  Areas on the hands are coming back as well  No topical treatments help the area on the head/neck (betamethasone)  kenalog injections have been helping for focal areas on the hands  Has h/o bx proven GA on the hands/legs in the past as well (years ago)    History of skin cancer: No  History of biopsies:No  History of blistering/severe sunburns:No  Family history of skin cancer:No  Family history of atypical moles:No        Rash   Pertinent negatives include no fever.       Past Medical History:   Diagnosis Date   • Back pain    • Chickenpox    • Depression    • GERD (gastroesophageal reflux disease)    • [place] measles    • Gout    • Hyperlipidemia    • Influenza    • Panic disorder    • Sleep apnea    • Tobacco use 11/26/2014       Current Outpatient Prescriptions on File Prior to Visit   Medication Sig Dispense Refill   • DULoxetine (CYMBALTA) 20 MG Cap DR Particles Take 2 Caps by mouth every day. 180 Cap 3   • betamethasone dipropionate (DIPROLENE) 0.05 % Ointment Twice daily PRN (Patient not taking: Reported on 1/2/2019) 45 g 1   • fluocinonide (LIDEX) 0.05 % Cream Apply 1 Application to affected area(s) 2 times a day. (Patient not taking: Reported on 1/2/2019) 30 g 1   • hydrOXYzine HCl (ATARAX) 25 MG Tab Take 1 Tab by mouth 3 times a day as needed for Anxiety. (Patient not taking: Reported on 1/2/2019) 30 Tab 0   • Sulfacetamide Sodium, Acne, 10 % Lotion Apply one daily 118 mL 6   • omeprazole (PRILOSEC) 40 MG delayed-release capsule Take 1 Cap by mouth every day. 90 Cap 3   • albuterol 108 (90 BASE) MCG/ACT Aero Soln inhalation aerosol Inhale 2 Puffs by mouth every 6 hours as needed for Shortness of Breath. 8.5 g 3     No current facility-administered medications on file  prior to visit.        Allergies   Allergen Reactions   • Zyban [Bupropion] Hives and Itching   • Augmentin Hives       Family History   Problem Relation Age of Onset   • Cancer Mother    • Cancer Father    • Stroke Father    • Diabetes Neg Hx    • Heart Disease Neg Hx    • Hypertension Neg Hx        Social History     Social History   • Marital status:      Spouse name: N/A   • Number of children: N/A   • Years of education: N/A     Occupational History   • Not on file.     Social History Main Topics   • Smoking status: Current Every Day Smoker     Packs/day: 0.75     Years: 53.00     Types: Cigarettes   • Smokeless tobacco: Never Used      Comment: started smoking at age 15   • Alcohol use 0.6 oz/week     1 Cans of beer per week      Comment: rare   • Drug use: No   • Sexual activity: Yes     Partners: Female     Other Topics Concern   • Not on file     Social History Narrative   • No narrative on file       Review of Systems   Constitutional: Negative for chills, fever and weight loss.   Skin: Positive for itching and rash.   All other systems reviewed and are negative.       Objective:     A focused cutaneous exam was completed including: hair, ears, face, eyelids, conjunctiva, lips, neck, left and right upper extremities (including hands/digits and fingernails) left and right lower extremities with the following pertinent findings listed below. Remaining above-listed examined areas within normal limits / negative for rashes or lesions.      Physical Exam   Constitutional: He is oriented to person, place, and time and well-developed, well-nourished, and in no distress.   HENT:   Head: Normocephalic and atraumatic.       Right Ear: External ear normal.   Left Ear: External ear normal.   Nose: Nose normal.   Eyes: Conjunctivae are normal.   Neck: Normal range of motion.   Pulmonary/Chest: Effort normal.   Neurological: He is alert and oriented to person, place, and time.   Skin: Skin is warm and dry.           Psychiatric: Mood and affect normal.       DATA: none applicable to review    Assessment and Plan:     1. Granuloma annulare - generalized  - educated patient about diagnosis, management options, and expectations of treatment  - REFERRAL TO OPHTHALMOLOGY for baseline eye exam (states he had one 1 year ago - will touch base with Dr. Salvador)  - pending results/ophtho, will plan to start plaquenil 200mg BID. Side effects, including, but not limited to, dizziness, headache, vertigo, skin rash, alopecia, dyschromia, nausea, diarrhea, abdominal pain, hepatic insufficiency, myalgias, vision problems (including permanent retinopathy), hypersensitivity discussed  - CBC/CMP in 2-3 weeks after start  INTRALESIONAL KENALOG:  Discussed risks and benefits of intralesional kenalog injections, including skin atrophy, discoloration, minimal risk of infection. Patient verbally agreed. ILK 10mg/cc x 0.8cc injected into 6 lesions on the left hand, right forearm. Patient tolerated procedure well, no complications. Aftercare discussed.     Following issues not discussed:  Hidradenitis, mild - groin, under control today  CN  Favre and Racouchot syndrome  Acrochordons - neck, right axilla    Followup: Return in about 6 weeks (around 2/27/2019).    Brittni Bravo M.D.    THIS PATIENT WAS  SEEN AND EXAMINED BY ME ON 1/16/18, AUTHORED HERE.  Brittni Bravo

## 2019-02-12 ENCOUNTER — TELEPHONE (OUTPATIENT)
Dept: DERMATOLOGY | Facility: IMAGING CENTER | Age: 69
End: 2019-02-12

## 2019-02-12 ENCOUNTER — OFFICE VISIT (OUTPATIENT)
Dept: DERMATOLOGY | Facility: IMAGING CENTER | Age: 69
End: 2019-02-12
Payer: MEDICARE

## 2019-02-12 DIAGNOSIS — Z51.81 MEDICATION MONITORING ENCOUNTER: ICD-10-CM

## 2019-02-12 DIAGNOSIS — L92.0 GRANULOMA ANNULARE: ICD-10-CM

## 2019-02-12 PROCEDURE — 11901 INJECT SKIN LESIONS >7: CPT | Performed by: DERMATOLOGY

## 2019-02-12 PROCEDURE — 99213 OFFICE O/P EST LOW 20 MIN: CPT | Mod: 25 | Performed by: DERMATOLOGY

## 2019-02-12 RX ORDER — BETAMETHASONE DIPROPIONATE 0.5 MG/G
1 CREAM TOPICAL 2 TIMES DAILY
Qty: 30 G | Refills: 2 | Status: SHIPPED | OUTPATIENT
Start: 2019-02-12 | End: 2021-01-20

## 2019-02-12 ASSESSMENT — ENCOUNTER SYMPTOMS
FEVER: 0
WEIGHT LOSS: 0
CHILLS: 0

## 2019-02-12 NOTE — PROGRESS NOTES
Dermatology Return Patient Visit    Chief Complaint   Patient presents with   • Follow-Up     [place]       Subjective:     HPI:   Micheal Brothers is a 67 y.o. male presenting for    Follow up, generalized granuloma annulare  Larger areas are affected since last visit  Has a large outbreak on the legs  Arms are very active as well  Has been using tac 0.5% cream on the shoulders, has been helping here, but not other places  Has not been using betamethasone   Minimal itching  Was stressed during traveling - weather was bad, got a bad illness/cold  Does not want to use plaquenil - concerned about vision problems    History of skin cancer: No  History of biopsies:No  History of blistering/severe sunburns:No  Family history of skin cancer:No  Family history of atypical moles:No        Rash   Pertinent negatives include no fever.       Past Medical History:   Diagnosis Date   • Back pain    • Chickenpox    • Depression    • GERD (gastroesophageal reflux disease)    • Portuguese measles    • Gout    • Hyperlipidemia    • Influenza    • Panic disorder    • Sleep apnea    • Tobacco use 11/26/2014       Current Outpatient Prescriptions on File Prior to Visit   Medication Sig Dispense Refill   • DULoxetine (CYMBALTA) 20 MG Cap DR Particles Take 2 Caps by mouth every day. 180 Cap 3   • hydrOXYzine HCl (ATARAX) 25 MG Tab Take 1 Tab by mouth 3 times a day as needed for Anxiety. (Patient not taking: Reported on 1/2/2019) 30 Tab 0   • Sulfacetamide Sodium, Acne, 10 % Lotion Apply one daily 118 mL 6   • omeprazole (PRILOSEC) 40 MG delayed-release capsule Take 1 Cap by mouth every day. 90 Cap 3   • albuterol 108 (90 BASE) MCG/ACT Aero Soln inhalation aerosol Inhale 2 Puffs by mouth every 6 hours as needed for Shortness of Breath. 8.5 g 3     No current facility-administered medications on file prior to visit.        Allergies   Allergen Reactions   • Zyban [Bupropion] Hives and Itching   • Augmentin Hives     Review of Systems      Constitutional: Negative for chills, fever and weight loss.   Skin: Positive for itching and rash.   All other systems reviewed and are negative.       Objective:     A focused cutaneous exam was completed including: hair, ears, face, eyelids, conjunctiva, lips, neck, chest, upper back, left and right upper extremities (including hands/digits and fingernails) left and right lower extremities with the following pertinent findings listed below. Remaining above-listed examined areas within normal limits / negative for rashes or lesions.      Physical Exam   Constitutional: He is oriented to person, place, and time and well-developed, well-nourished, and in no distress.   HENT:   Head: Normocephalic and atraumatic.       Right Ear: External ear normal.   Left Ear: External ear normal.   Nose: Nose normal.   Eyes: Conjunctivae are normal.   Neck: Normal range of motion.   Pulmonary/Chest: Effort normal.   Neurological: He is alert and oriented to person, place, and time.   Skin: Skin is warm and dry.        Psychiatric: Mood and affect normal.       DATA: none applicable to review    Assessment and Plan:     1. Granuloma annulare - generalized - progressing  - educated patient about diagnosis, management options, and expectations of treatment  - declines plaquenil treatment  - will try isotretinoin pending results of baseline labs; introductory packet provided for the patient to review. I extensively discussed the potential adverse effects of treatment, including, but not limited to, teratogenicity, ocular disturbances, bony abnormailities, lipid disturbances, elevated LFTs/liver inflammation, reported associations with IBD, depression, endocrine disturbances, hematologic abnormalities, and myopathy. More common side effects of dry skin/eyes/mucosa, photosensitivity, sticky skin, hair loss, fragile nails, paronychia, myalgias, fatigue, headache, and abdominal pain/nausea/diarrhea were also reviewed.  - we discussed the  need for baseline blood work, and follow-up blood work once treatment is initiated  - instructed to start betamethasone 0.05% cream to affected area of the body twice daily only for flares until improved. Patient instructed to avoid face, axilla, and groin area. Side effects discussed, including skin thinning, appearance of stretch marks (striae), easy bruising, telangiectasias, and possible increased hair growth   INTRALESIONAL KENALOG:  Discussed risks and benefits of intralesional kenalog injections, including skin atrophy, discoloration, minimal risk of infection. Patient verbally agreed. ILK 5mg/cc x 2.0cc injected into 10+ areas on the lower legs. Patient tolerated procedure well, no complications. Aftercare discussed.     2. Medication monitoring encounter  - CBC WITH DIFFERENTIAL; Future  - Comp Metabolic Panel; Future  - Lipid Profile; Future    Following issues not discussed:  Hidradenitis, mild - groin, under control today  [facility]  Favre and Racouchot syndrome  Acrochordons - neck, right axilla    Followup: Return in about 3 weeks (around 3/5/2019).    Brittni Bravo M.D.

## 2019-02-12 NOTE — TELEPHONE ENCOUNTER
Per patient wanted to use [facility]'s pharmacy instead. I called in Rx for betamethasone to his local pharmacy . I called [facility] to cancel RX and patient informed.   
18-Aug-2017 16:53

## 2019-02-27 ENCOUNTER — OFFICE VISIT (OUTPATIENT)
Dept: DERMATOLOGY | Facility: IMAGING CENTER | Age: 69
End: 2019-02-27
Payer: MEDICARE

## 2019-02-27 DIAGNOSIS — L92.0 GRANULOMA ANNULARE: ICD-10-CM

## 2019-02-27 PROCEDURE — 99213 OFFICE O/P EST LOW 20 MIN: CPT | Performed by: DERMATOLOGY

## 2019-02-27 RX ORDER — PREDNISONE 10 MG/1
TABLET ORAL
Qty: 70 TAB | Refills: 0 | Status: SHIPPED | OUTPATIENT
Start: 2019-02-27 | End: 2019-03-26

## 2019-02-27 ASSESSMENT — ENCOUNTER SYMPTOMS
WEIGHT LOSS: 0
CHILLS: 0
FEVER: 0

## 2019-02-27 NOTE — PROGRESS NOTES
Dermatology Return Patient Visit    Chief Complaint   Patient presents with   • Follow-Up     [place]       Subjective:     HPI:   Micheal Brothers is a 67 y.o. male presenting for    Follow up, generalized granuloma annulare  No improvement, some worsening on the arms  Tried betamethasone 0.05%, no results.  Kenalog injections in leg area last visit helped minimally  Wants to discuss additional treatment options    Does not want to use plaquenil - concerned about vision problems; hesitant about isotretinoin as well    History of skin cancer: No  History of biopsies:No  History of blistering/severe sunburns:No  Family history of skin cancer:No  Family history of atypical moles:No        Rash   Pertinent negatives include no fever.       Past Medical History:   Diagnosis Date   • Back pain    • Chickenpox    • Depression    • GERD (gastroesophageal reflux disease)    • [place] measles    • Gout    • Hyperlipidemia    • Influenza    • Panic disorder    • Sleep apnea    • Tobacco use 11/26/2014       Current Outpatient Prescriptions on File Prior to Visit   Medication Sig Dispense Refill   • betamethasone dipropionate (DIPROLENE) 0.05 % Cream Apply 1 Application to affected area(s) 2 times a day. To area on hands as needed 30 g 2   • DULoxetine (CYMBALTA) 20 MG Cap DR Particles Take 2 Caps by mouth every day. 180 Cap 3   • hydrOXYzine HCl (ATARAX) 25 MG Tab Take 1 Tab by mouth 3 times a day as needed for Anxiety. (Patient not taking: Reported on 1/2/2019) 30 Tab 0   • Sulfacetamide Sodium, Acne, 10 % Lotion Apply one daily 118 mL 6   • omeprazole (PRILOSEC) 40 MG delayed-release capsule Take 1 Cap by mouth every day. 90 Cap 3   • albuterol 108 (90 BASE) MCG/ACT Aero Soln inhalation aerosol Inhale 2 Puffs by mouth every 6 hours as needed for Shortness of Breath. 8.5 g 3     No current facility-administered medications on file prior to visit.        Allergies   Allergen Reactions   • Zyban [Bupropion] Hives and Itching   •  Augmentin Hives     Review of Systems   Constitutional: Negative for chills, fever and weight loss.   Skin: Positive for itching and rash.   All other systems reviewed and are negative.       Objective:     A focused cutaneous exam was completed including: hair, ears, face, eyelids, conjunctiva, lips, neck, chest, upper back, left and right upper extremities (including hands/digits and fingernails) left and right lower extremities with the following pertinent findings listed below. Remaining above-listed examined areas within normal limits / negative for rashes or lesions.      Physical Exam   Constitutional: He is oriented to person, place, and time and well-developed, well-nourished, and in no distress.   HENT:   Head: Normocephalic and atraumatic.       Right Ear: External ear normal.   Left Ear: External ear normal.   Nose: Nose normal.   Eyes: Conjunctivae are normal.   Neck: Normal range of motion.   Pulmonary/Chest: Effort normal.   Neurological: He is alert and oriented to person, place, and time.   Skin: Skin is warm and dry.        Psychiatric: Mood and affect normal.       DATA: none applicable to review    Assessment and Plan:     1. Granuloma annulare - generalized - progressing  - educated patient about diagnosis, management options, and expectations of treatment  - declines plaquenil treatment, does not want to do isotretinoin  - discussed risks benefits of trental vs prednisone vs dapsone  - patient wishes to try prednisone first  - prednisone taper 40mg daily x 1 week, 30mg x 1 week, 20mg x 1 week, 10mg x 1 week, 5 x 1 week  - common side effects of prednisone including, but not limited to, mild GI disturbance, alteration in mood/aggitation/difficulty sleeping, elevated blood pressure, increased appetite and weight gain, increased blood glucose level discussed. Additionally discussed more long-term risks including, but not limited to, osteoporosis/osteonecrosis risk, as well as cataracts/glaucoma  risk, steroid withdrawl, cushingoid changes, PUD, reactivation of infections, which are unlikely with this short treatment course.  - ca/vitD while on treatment    Following issues not discussed:  Hidradenitis, mild - groin, under control today  [facility]  Favre and Racouchot syndrome  Acrochordons - neck, right axilla    Followup: Return in about 4 weeks (around 3/27/2019).    Bell Kate, Med Ass't

## 2019-03-08 ENCOUNTER — TELEPHONE (OUTPATIENT)
Dept: DERMATOLOGY | Facility: IMAGING CENTER | Age: 69
End: 2019-03-08

## 2019-03-08 NOTE — TELEPHONE ENCOUNTER
----- Message from Micheal Brothers sent at 3/7/2019  6:09 PM PST -----  Regarding: Prescription Question  Contact: [phone]  What is the medication that we discussed that might cause retinal damage. I have an appointment with the eye DrObdulia on Wednesday and would like to discuss this with the Dr.    Thank you  Jak Brothers

## 2019-03-18 ENCOUNTER — HOSPITAL ENCOUNTER (OUTPATIENT)
Dept: LAB | Facility: MEDICAL CENTER | Age: 69
End: 2019-03-18
Attending: INTERNAL MEDICINE
Payer: MEDICARE

## 2019-03-18 DIAGNOSIS — E78.5 DYSLIPIDEMIA: ICD-10-CM

## 2019-03-18 DIAGNOSIS — R73.02 IGT (IMPAIRED GLUCOSE TOLERANCE): ICD-10-CM

## 2019-03-18 LAB
ALBUMIN SERPL BCP-MCNC: 4.3 G/DL (ref 3.2–4.9)
ALBUMIN/GLOB SERPL: 1.6 G/DL
ALP SERPL-CCNC: 43 U/L (ref 30–99)
ALT SERPL-CCNC: 26 U/L (ref 2–50)
ANION GAP SERPL CALC-SCNC: 7 MMOL/L (ref 0–11.9)
AST SERPL-CCNC: 18 U/L (ref 12–45)
BILIRUB SERPL-MCNC: 0.7 MG/DL (ref 0.1–1.5)
BUN SERPL-MCNC: 19 MG/DL (ref 8–22)
CALCIUM SERPL-MCNC: 9.3 MG/DL (ref 8.5–10.5)
CHLORIDE SERPL-SCNC: 102 MMOL/L (ref 96–112)
CHOLEST SERPL-MCNC: 193 MG/DL (ref 100–199)
CO2 SERPL-SCNC: 24 MMOL/L (ref 20–33)
CREAT SERPL-MCNC: 0.99 MG/DL (ref 0.5–1.4)
CREAT UR-MCNC: 144 MG/DL
EST. AVERAGE GLUCOSE BLD GHB EST-MCNC: 140 MG/DL
FASTING STATUS PATIENT QL REPORTED: NORMAL
GLOBULIN SER CALC-MCNC: 2.7 G/DL (ref 1.9–3.5)
GLUCOSE SERPL-MCNC: 104 MG/DL (ref 65–99)
HBA1C MFR BLD: 6.5 % (ref 0–5.6)
HDLC SERPL-MCNC: 57 MG/DL
LDLC SERPL CALC-MCNC: 110 MG/DL
MICROALBUMIN UR-MCNC: 36.4 MG/DL
MICROALBUMIN/CREAT UR: 253 MG/G (ref 0–30)
POTASSIUM SERPL-SCNC: 4 MMOL/L (ref 3.6–5.5)
PROT SERPL-MCNC: 7 G/DL (ref 6–8.2)
SODIUM SERPL-SCNC: 133 MMOL/L (ref 135–145)
TRIGL SERPL-MCNC: 129 MG/DL (ref 0–149)

## 2019-03-18 PROCEDURE — 36415 COLL VENOUS BLD VENIPUNCTURE: CPT

## 2019-03-18 PROCEDURE — 82570 ASSAY OF URINE CREATININE: CPT

## 2019-03-18 PROCEDURE — 82043 UR ALBUMIN QUANTITATIVE: CPT

## 2019-03-18 PROCEDURE — 83036 HEMOGLOBIN GLYCOSYLATED A1C: CPT | Mod: GA

## 2019-03-18 PROCEDURE — 80061 LIPID PANEL: CPT

## 2019-03-18 PROCEDURE — 80053 COMPREHEN METABOLIC PANEL: CPT

## 2019-03-26 ENCOUNTER — OFFICE VISIT (OUTPATIENT)
Dept: DERMATOLOGY | Facility: IMAGING CENTER | Age: 69
End: 2019-03-26
Payer: MEDICARE

## 2019-03-26 DIAGNOSIS — Z51.81 MEDICATION MONITORING ENCOUNTER: ICD-10-CM

## 2019-03-26 DIAGNOSIS — L92.0 GRANULOMA ANNULARE: ICD-10-CM

## 2019-03-26 PROCEDURE — 99213 OFFICE O/P EST LOW 20 MIN: CPT | Performed by: DERMATOLOGY

## 2019-03-26 RX ORDER — HYDROXYCHLOROQUINE SULFATE 200 MG/1
400 TABLET, FILM COATED ORAL DAILY
Qty: 60 TAB | Refills: 3 | Status: SHIPPED | OUTPATIENT
Start: 2019-03-26 | End: 2019-06-26

## 2019-03-26 ASSESSMENT — ENCOUNTER SYMPTOMS
CHILLS: 0
WEIGHT LOSS: 0
FEVER: 0

## 2019-03-26 NOTE — PROGRESS NOTES
Dermatology Return Patient Visit    Chief Complaint   Patient presents with   • Follow-Up     [place]       Subjective:     HPI:   Micheal Brothers is a 67 y.o. male presenting for    Follow up, generalized granuloma annulare  Completed prednisone, helped some, eruption is still present but did not get worse.  Still on upper back (right), neck, forearms, lower legs >> thighs  Minimal itching  Saw ophtho since last visit - OK to start plaquenil, will f/u with them every 6 months  Did not want to take isotretinoin    History of skin cancer: No  History of biopsies:No  History of blistering/severe sunburns:No  Family history of skin cancer:No  Family history of atypical moles:No        Rash   Pertinent negatives include no fever.       Past Medical History:   Diagnosis Date   • Back pain    • Chickenpox    • Depression    • GERD (gastroesophageal reflux disease)    • [place] measles    • Gout    • Hyperlipidemia    • Influenza    • Panic disorder    • Sleep apnea    • Tobacco use 11/26/2014       Current Outpatient Prescriptions on File Prior to Visit   Medication Sig Dispense Refill   • predniSONE (DELTASONE) 10 MG Tab 4 tabs daily x 7 days, 3 tabs daily x 7 days, 2 tabs daily x 7 days, 1 tab daily x 7 days, 1/2 tab daily x 7 days 70 Tab 0   • betamethasone dipropionate (DIPROLENE) 0.05 % Cream Apply 1 Application to affected area(s) 2 times a day. To area on hands as needed 30 g 2   • DULoxetine (CYMBALTA) 20 MG Cap DR Particles Take 2 Caps by mouth every day. 180 Cap 3   • hydrOXYzine HCl (ATARAX) 25 MG Tab Take 1 Tab by mouth 3 times a day as needed for Anxiety. (Patient not taking: Reported on 1/2/2019) 30 Tab 0   • Sulfacetamide Sodium, Acne, 10 % Lotion Apply one daily 118 mL 6   • omeprazole (PRILOSEC) 40 MG delayed-release capsule Take 1 Cap by mouth every day. 90 Cap 3   • albuterol 108 (90 BASE) MCG/ACT Aero Soln inhalation aerosol Inhale 2 Puffs by mouth every 6 hours as needed for Shortness of Breath. 8.5 g 3        No current facility-administered medications on file prior to visit.        Allergies   Allergen Reactions   • Zyban [Bupropion] Hives and Itching   • Augmentin Hives     Review of Systems   Constitutional: Negative for chills, fever and weight loss.   Skin: Positive for itching and rash.   All other systems reviewed and are negative.       Objective:     A focused cutaneous exam was completed including: hair, ears, face, eyelids, conjunctiva, lips, neck, chest, upper back, left and right upper extremities (including hands/digits and fingernails) left and right lower extremities with the following pertinent findings listed below. Remaining above-listed examined areas within normal limits / negative for rashes or lesions.      Physical Exam   Constitutional: He is oriented to person, place, and time and well-developed, well-nourished, and in no distress.   HENT:   Head: Normocephalic and atraumatic.       Right Ear: External ear normal.   Left Ear: External ear normal.   Nose: Nose normal.   Eyes: Conjunctivae are normal.   Neck: Normal range of motion.   Pulmonary/Chest: Effort normal.   Neurological: He is alert and oriented to person, place, and time.   Skin: Skin is warm and dry.        Psychiatric: Mood and affect normal.       DATA: none applicable to review    Assessment and Plan:     1. Granuloma annulare - generalized - stable, but no improvement  - educated patient about diagnosis, management options, and expectations of treatment  - Start plaquenil 400mg daily. Side effects, including, but not limited to, dizziness, headache, vertigo, skin rash, alopecia, dyschromia, nausea, diarrhea, abdominal pain, hepatic insufficiency, myalgias, vision problems (including permanent retinopathy), hypersensitivity discussed  - consider adding nicotinamide/minocycline in 2-3 months if no improvement  - discussed risks benefits of trental vs dapsone in future if above does not help/worsens despite treatment  - f/u  ophtho as instructed    2. Medication monitoring encounter  Check labs in 1 month  - CBC WITH DIFFERENTIAL; Future    Following issues not discussed:  Hidradenitis, mild - groin, under control today  [facility]  Favre and Racouchot syndrome  Acrochordons - neck, right axilla    Followup: Return in about 2 months (around 5/26/2019).    Brittni Bravo M.D.

## 2019-03-28 ENCOUNTER — OFFICE VISIT (OUTPATIENT)
Dept: BEHAVIORAL HEALTH | Facility: CLINIC | Age: 69
End: 2019-03-28
Payer: MEDICARE

## 2019-03-28 DIAGNOSIS — F41.0 PANIC DISORDER: ICD-10-CM

## 2019-03-28 PROCEDURE — 90834 PSYTX W PT 45 MINUTES: CPT | Performed by: PSYCHOLOGIST

## 2019-04-01 ENCOUNTER — OFFICE VISIT (OUTPATIENT)
Dept: MEDICAL GROUP | Facility: MEDICAL CENTER | Age: 69
End: 2019-04-01
Payer: MEDICARE

## 2019-04-01 VITALS
TEMPERATURE: 97.4 F | SYSTOLIC BLOOD PRESSURE: 130 MMHG | BODY MASS INDEX: 34.37 KG/M2 | HEART RATE: 80 BPM | WEIGHT: 219 LBS | HEIGHT: 67 IN | DIASTOLIC BLOOD PRESSURE: 82 MMHG | OXYGEN SATURATION: 94 %

## 2019-04-01 DIAGNOSIS — R73.02 IGT (IMPAIRED GLUCOSE TOLERANCE): ICD-10-CM

## 2019-04-01 DIAGNOSIS — E78.5 DYSLIPIDEMIA: ICD-10-CM

## 2019-04-01 DIAGNOSIS — F41.0 PANIC DISORDER: ICD-10-CM

## 2019-04-01 DIAGNOSIS — E66.9 OBESITY (BMI 35.0-39.9 WITHOUT COMORBIDITY): ICD-10-CM

## 2019-04-01 PROCEDURE — 99214 OFFICE O/P EST MOD 30 MIN: CPT | Performed by: INTERNAL MEDICINE

## 2019-04-01 RX ORDER — SULFACETAMIDE SODIUM 100 MG/ML
LOTION TOPICAL
Qty: 118 ML | Refills: 6 | OUTPATIENT
Start: 2019-04-01

## 2019-04-01 RX ORDER — DULOXETIN HYDROCHLORIDE 60 MG/1
60 CAPSULE, DELAYED RELEASE ORAL DAILY
Qty: 90 CAP | Refills: 3 | Status: SHIPPED | OUTPATIENT
Start: 2019-04-01 | End: 2020-04-22

## 2019-04-01 RX ORDER — SULFACETAMIDE SODIUM 100 MG/ML
LOTION TOPICAL
Qty: 118 ML | Refills: 6 | Status: SHIPPED | OUTPATIENT
Start: 2019-04-01 | End: 2021-06-24 | Stop reason: SDUPTHER

## 2019-04-01 RX ORDER — METFORMIN HYDROCHLORIDE 500 MG/1
500 TABLET, EXTENDED RELEASE ORAL 2 TIMES DAILY
Qty: 180 TAB | Refills: 3 | Status: SHIPPED | OUTPATIENT
Start: 2019-04-01 | End: 2019-05-01

## 2019-04-01 NOTE — BH THERAPY
[facility]  Therapy Progress Note    Patient Name: Micheal Brothers  Patient MRN: [id number]  Today's Date: 3/28/2018     Type of session:Individual psychotherapy  Length of session: 50 minutes  Persons in attendance:Patient    Subjective/New Info: The patient ID/Chief Complaint:  The patient is a 68 year old male, , .  The patient was referred by PCP Dr. Milan and voluntarily presented for an individual intake to address history of panic disorder with current treatment with duloxetine since 2007 with desire to discontinue the medication because of side effects.  Reviewed limits of confidentiality and [facility] policies; patient expressed understanding and agreed to voluntarily proceed with evaluation and treatment.    Interval History:   Session Focus: The patient's estimated global assessment of functioning appeared reasonably good with only short lived and expectable reactions to everyday stressful events.  The patient reported as a result of reemergence of anxiety symptoms his primary care provider reinitiated Cymbalta.  Overall the patient has been able to manage his anxiety and has not experienced a panic attack over the course of last few months but noticed an increase in anxiety symptoms.  The patient also recognized that decreased activity over the course of the winter I have also facilitated some of his increased anxiety symptoms he was encouraged to return to behavioral activation with improved weather.    Therapeutic Intervention: Cognitive Behavioral Therapy    Planned Intervention: Continue to monitor anxiety symptoms and reeducate if necessary related to panic attacks.    Objective/Observations:  Patient did not present in acute distress. Patient was appropriately groomed. Patient was alert and oriented x4. Eye contact was appropriate. No abnormalities in attention or concentration were noted. No abnormalities of movement present; psychomotor  activity was normal. Speech was fluent and regular in rhythm, rate, volume, and tone. Thought processes were linear, logical, and goal-directed. Long and short term memory appeared to be intact. Insight, judgment, and impulse control were deemed to be within normal limits.  Reported mood was “happy.” Affect was full-ranging and appropriate to thought content and conversation.  Patient denied past and current suicidal and homicidal ideation in plan, intent, and preparatory behavior.    Psychometric Test Results:       BHM-20  Global Mental Health  Within Normal Limits  Well Being Scale  Within Normal Limits  Symptoms Scale  Within Normal Limits  Anxiety Subscale  Mild Distress  Depression Subscale  Within Normal Limits  Alcohol/Drug Subscale Within Normal Limits  Bipolar Subscale  Within Normal Limits  Eating Disorder Subscale Within Normal Limits  Harm to other Subscale Within Normal Limits  Suicide Monitoring Scale No Indication of Risk  Life Functioning Scale   Within Normal Limits      Diagnoses:   1. Panic disorder         Current risk:   SUICIDE: Low   Homicide: Not applicable   Self-harm: Not applicable   Relapse: Not applicable   Other:    Safety Plan reviewed? No   If evidence of imminent risk is present, intervention/plan:       Treatment Goal(s)/Objective(s) addressed:   Anxiety   Goal: Develop strategies to reduce symptoms, or   Reduce anxiety and improve coping skills   Be free of panic episodes (100%)   Report feeling more positive about self and abilities during therapy sessions   Develop strategies for thought distraction when fixating on the future      Progress toward Treatment Goals: Mild decline    Plan:      1) The patient will return to the clinic 1-2 months.  2) Referrals/Consults:  none  3) Barriers to Learning:  No  4) Readiness to Learn:  Yes  5) Cultural Concerns:  No  6) Patient voiced understanding of, and agreement with, plan and goals as annotated above.  7) Declare these services are  medically necessary and appropriate to the patient’s diagnosis and needs  8) The point of contact at the [facility] regarding this evaluation is Dr. Jaramillo, Psychologist.      Myron Jaramillo III, Ed.D.  3/28/2018

## 2019-04-01 NOTE — PROGRESS NOTES
CC: Follow-up blood sugars, anxiety, dyslipidemia.    HPI:   Micheal presents today with the following.    1. IGT (impaired glucose tolerance)  Presents after having blood work A1c has gone up from 6.3-6.5 never truly been diabetic.  He was started on Plaquenil in the interim.  His weight has gone down slightly he is currently not physically active.    2. Panic disorder  Reports mood is doing better on Cymbalta needing refills    3. Dyslipidemia  Maintained on diet cholesterol is actually improved from.    4. Obesity (BMI 35.0-39.9 without comorbidity) ([facility])  Weight persistently elevated he is trying to work through diet and exercise      Patient Active Problem List    Diagnosis Date Noted   • IGT (impaired glucose tolerance) 04/01/2019   • Obesity (BMI 35.0-39.9 without comorbidity) ([facility]) 08/28/2018   • Panic disorder 06/18/2018   • NAHUM on CPAP 08/26/2016   • Obesity (BMI 30-39.9) 11/26/2014   • Dyslipidemia 11/26/2014   • GERD (gastroesophageal reflux disease) 11/26/2014   • Tobacco use 11/26/2014       Current Outpatient Prescriptions   Medication Sig Dispense Refill   • DULoxetine (CYMBALTA) 60 MG Cap DR Particles delayed-release capsule Take 1 Cap by mouth every day. 90 Cap 3   • metFORMIN ER (GLUCOPHAGE XR) 500 MG TABLET SR 24 HR Take 1 Tab by mouth 2 times a day. 180 Tab 3   • hydroxychloroquine (PLAQUENIL) 200 MG Tab Take 2 Tabs by mouth every day. 60 Tab 3   • betamethasone dipropionate (DIPROLENE) 0.05 % Cream Apply 1 Application to affected area(s) 2 times a day. To area on hands as needed 30 g 2   • hydrOXYzine HCl (ATARAX) 25 MG Tab Take 1 Tab by mouth 3 times a day as needed for Anxiety. 30 Tab 0   • Sulfacetamide Sodium, Acne, 10 % Lotion Apply one daily 118 mL 6   • omeprazole (PRILOSEC) 40 MG delayed-release capsule Take 1 Cap by mouth every day. 90 Cap 3   • albuterol 108 (90 BASE) MCG/ACT Aero Soln inhalation aerosol Inhale 2 Puffs by mouth every 6 hours as needed for Shortness of Breath. 8.5 g 3  "    No current facility-administered medications for this visit.          Allergies as of 04/01/2019 - Reviewed 04/01/2019   Allergen Reaction Noted   • Zyban [bupropion] Hives and Itching 10/21/2014   • Augmentin Hives 03/06/2016        ROS: Denies Chest pain, SOB, LE edema.    /82 (BP Location: Left arm, Patient Position: Sitting)   Pulse 80   Temp 36.3 °C (97.4 °F)   Ht 1.702 m (5' 7\")   Wt 99.3 kg (219 lb)   SpO2 94%   BMI 34.30 kg/m²     Physical Exam:  Gen:         Alert and oriented, No apparent distress.  Neck:        No Lymphadenopathy or Bruits.  Lungs:     Clear to auscultation bilaterally  CV:          Regular rate and rhythm. No murmurs, rubs or gallops.               Ext:          No clubbing, cyanosis, edema.      Assessment and Plan.   68 y.o. male with the following issues.    1. IGT (impaired glucose tolerance)  Given the increased number he is willing to start medication placing on metformin twice daily recheck in 6 months.  Cautioned about side effects medication  - metFORMIN ER (GLUCOPHAGE XR) 500 MG TABLET SR 24 HR; Take 1 Tab by mouth 2 times a day.  Dispense: 180 Tab; Refill: 3    2. Panic disorder  Stable refilled medication  - DULoxetine (CYMBALTA) 60 MG Cap DR Particles delayed-release capsule; Take 1 Cap by mouth every day.  Dispense: 90 Cap; Refill: 3    3. Dyslipidemia  Lipids currently well controlled. Discussed continued diet and exercise recheck 6 months to 1 year.    4. Obesity (BMI 35.0-39.9 without comorbidity) ([facility])  Discussion about diet exercise weight loss strategies      "

## 2019-04-26 ENCOUNTER — HOSPITAL ENCOUNTER (OUTPATIENT)
Dept: LAB | Facility: MEDICAL CENTER | Age: 69
End: 2019-04-26
Attending: DERMATOLOGY
Payer: MEDICARE

## 2019-04-26 DIAGNOSIS — Z51.81 MEDICATION MONITORING ENCOUNTER: ICD-10-CM

## 2019-04-26 DIAGNOSIS — L92.0 GRANULOMA ANNULARE: ICD-10-CM

## 2019-04-26 LAB
ALBUMIN SERPL BCP-MCNC: 4.1 G/DL (ref 3.2–4.9)
ALBUMIN/GLOB SERPL: 1.5 G/DL
ALP SERPL-CCNC: 61 U/L (ref 30–99)
ALT SERPL-CCNC: 18 U/L (ref 2–50)
ANION GAP SERPL CALC-SCNC: 7 MMOL/L (ref 0–11.9)
AST SERPL-CCNC: 16 U/L (ref 12–45)
BASOPHILS # BLD AUTO: 1.8 % (ref 0–1.8)
BASOPHILS # BLD: 0.12 K/UL (ref 0–0.12)
BILIRUB SERPL-MCNC: 0.4 MG/DL (ref 0.1–1.5)
BUN SERPL-MCNC: 19 MG/DL (ref 8–22)
CALCIUM SERPL-MCNC: 9.5 MG/DL (ref 8.5–10.5)
CHLORIDE SERPL-SCNC: 105 MMOL/L (ref 96–112)
CHOLEST SERPL-MCNC: 172 MG/DL (ref 100–199)
CO2 SERPL-SCNC: 24 MMOL/L (ref 20–33)
CREAT SERPL-MCNC: 1.11 MG/DL (ref 0.5–1.4)
EOSINOPHIL # BLD AUTO: 0.29 K/UL (ref 0–0.51)
EOSINOPHIL NFR BLD: 4.3 % (ref 0–6.9)
ERYTHROCYTE [DISTWIDTH] IN BLOOD BY AUTOMATED COUNT: 48.9 FL (ref 35.9–50)
FASTING STATUS PATIENT QL REPORTED: NORMAL
GLOBULIN SER CALC-MCNC: 2.7 G/DL (ref 1.9–3.5)
GLUCOSE SERPL-MCNC: 104 MG/DL (ref 65–99)
HCT VFR BLD AUTO: 41 % (ref 42–52)
HDLC SERPL-MCNC: 36 MG/DL
HGB BLD-MCNC: 13.2 G/DL (ref 14–18)
IMM GRANULOCYTES # BLD AUTO: 0.02 K/UL (ref 0–0.11)
IMM GRANULOCYTES NFR BLD AUTO: 0.3 % (ref 0–0.9)
LDLC SERPL CALC-MCNC: 118 MG/DL
LYMPHOCYTES # BLD AUTO: 1.61 K/UL (ref 1–4.8)
LYMPHOCYTES NFR BLD: 23.9 % (ref 22–41)
MCH RBC QN AUTO: 28.1 PG (ref 27–33)
MCHC RBC AUTO-ENTMCNC: 32.2 G/DL (ref 33.7–35.3)
MCV RBC AUTO: 87.2 FL (ref 81.4–97.8)
MONOCYTES # BLD AUTO: 0.6 K/UL (ref 0–0.85)
MONOCYTES NFR BLD AUTO: 8.9 % (ref 0–13.4)
NEUTROPHILS # BLD AUTO: 4.1 K/UL (ref 1.82–7.42)
NEUTROPHILS NFR BLD: 60.8 % (ref 44–72)
NRBC # BLD AUTO: 0 K/UL
NRBC BLD-RTO: 0 /100 WBC
PLATELET # BLD AUTO: 142 K/UL (ref 164–446)
PMV BLD AUTO: 10.6 FL (ref 9–12.9)
POTASSIUM SERPL-SCNC: 4.1 MMOL/L (ref 3.6–5.5)
PROT SERPL-MCNC: 6.8 G/DL (ref 6–8.2)
RBC # BLD AUTO: 4.7 M/UL (ref 4.7–6.1)
SODIUM SERPL-SCNC: 136 MMOL/L (ref 135–145)
TRIGL SERPL-MCNC: 90 MG/DL (ref 0–149)
WBC # BLD AUTO: 6.7 K/UL (ref 4.8–10.8)

## 2019-04-26 PROCEDURE — 80053 COMPREHEN METABOLIC PANEL: CPT

## 2019-04-26 PROCEDURE — 80061 LIPID PANEL: CPT | Mod: GA

## 2019-04-26 PROCEDURE — 85025 COMPLETE CBC W/AUTO DIFF WBC: CPT

## 2019-04-26 PROCEDURE — 36415 COLL VENOUS BLD VENIPUNCTURE: CPT

## 2019-05-01 ENCOUNTER — OFFICE VISIT (OUTPATIENT)
Dept: MEDICAL GROUP | Facility: MEDICAL CENTER | Age: 69
End: 2019-05-01
Payer: MEDICARE

## 2019-05-01 VITALS
DIASTOLIC BLOOD PRESSURE: 72 MMHG | BODY MASS INDEX: 34.06 KG/M2 | WEIGHT: 217 LBS | OXYGEN SATURATION: 97 % | TEMPERATURE: 98.3 F | HEART RATE: 96 BPM | HEIGHT: 67 IN | SYSTOLIC BLOOD PRESSURE: 128 MMHG

## 2019-05-01 DIAGNOSIS — R73.02 IGT (IMPAIRED GLUCOSE TOLERANCE): ICD-10-CM

## 2019-05-01 DIAGNOSIS — E66.9 OBESITY (BMI 30-39.9): ICD-10-CM

## 2019-05-01 DIAGNOSIS — J44.9 CHRONIC OBSTRUCTIVE PULMONARY DISEASE, UNSPECIFIED COPD TYPE (HCC): ICD-10-CM

## 2019-05-01 PROCEDURE — 99214 OFFICE O/P EST MOD 30 MIN: CPT | Performed by: INTERNAL MEDICINE

## 2019-05-02 NOTE — PROGRESS NOTES
CC: *Diabetes, COPD, obesity.      HPI:   Micheal presents today with the following.    1. Chronic obstructive pulmonary disease, unspecified COPD type ([facility])  During exam he was noticed to have some slight rhonchi and a smoking history he now reveals that he has had pulmonary function test and seeing the record he did have some mild COPD.  He continues to smoke and is not currently interested in quitting.    2. IGT (impaired glucose tolerance)  Last visit he was started on metformin 500 twice daily developed muscle aches and diarrhea.  Stopped medication significantly improved.  Technically his A1c was 6.5 and diabetic previously being called impaired glucose tolerance.  He did having significant improvement in diarrhea as well as muscle aches when he quit.    3. Obesity (BMI 30-39.9)  Weight persistently elevated he states he is able to lose weight when he puts his mind to it.      Patient Active Problem List    Diagnosis Date Noted   • Chronic obstructive pulmonary disease ([facility]) 05/01/2019   • IGT (impaired glucose tolerance) 04/01/2019   • Obesity (BMI 35.0-39.9 without comorbidity) ([facility]) 08/28/2018   • Panic disorder 06/18/2018   • NAHUM on CPAP 08/26/2016   • Obesity (BMI 30-39.9) 11/26/2014   • Dyslipidemia 11/26/2014   • GERD (gastroesophageal reflux disease) 11/26/2014   • Tobacco use 11/26/2014       Current Outpatient Prescriptions   Medication Sig Dispense Refill   • DULoxetine (CYMBALTA) 60 MG Cap DR Particles delayed-release capsule Take 1 Cap by mouth every day. 90 Cap 3   • Sulfacetamide Sodium, Acne, 10 % Lotion Apply one daily 118 mL 6   • hydroxychloroquine (PLAQUENIL) 200 MG Tab Take 2 Tabs by mouth every day. 60 Tab 3   • betamethasone dipropionate (DIPROLENE) 0.05 % Cream Apply 1 Application to affected area(s) 2 times a day. To area on hands as needed 30 g 2   • hydrOXYzine HCl (ATARAX) 25 MG Tab Take 1 Tab by mouth 3 times a day as needed for Anxiety. 30 Tab 0   • omeprazole (PRILOSEC) 40 MG  "delayed-release capsule Take 1 Cap by mouth every day. 90 Cap 3   • albuterol 108 (90 BASE) MCG/ACT Aero Soln inhalation aerosol Inhale 2 Puffs by mouth every 6 hours as needed for Shortness of Breath. 8.5 g 3     No current facility-administered medications for this visit.          Allergies as of 05/01/2019 - Reviewed 05/01/2019   Allergen Reaction Noted   • Zyban [bupropion] Hives and Itching 10/21/2014   • Augmentin Hives 03/06/2016   • Metformin  05/01/2019        ROS: Denies Chest pain, SOB, LE edema.    /72 (BP Location: Left arm, Patient Position: Sitting)   Pulse 96   Temp 36.8 °C (98.3 °F)   Ht 1.702 m (5' 7\")   Wt 98.4 kg (217 lb)   SpO2 97%   BMI 33.99 kg/m²     Physical Exam:  Gen:         Alert and oriented, No apparent distress.  Neck:        No Lymphadenopathy or Bruits.  Lungs:     Clear to auscultation bilaterally  CV:          Regular rate and rhythm. No murmurs, rubs or gallops.               Ext:          No clubbing, cyanosis, edema.      Assessment and Plan.   68 y.o. male with the following issues.    1. Chronic obstructive pulmonary disease, unspecified COPD type ([facility])  Discussion today about smoking cessation he is not interested in help with quitting.    2. IGT (impaired glucose tolerance)  Stopping metformin and listing and is an allergy stressed diet and exercise in terms of preventing diabetes in the future we will recheck in 6 months.    3. Obesity (BMI 30-39.9)  Patient's body mass index is 33.99 kg/m². Exercise and nutrition counseling were performed at this visit.  Set goal of 15lbs in next 3 months.        "

## 2019-05-04 ENCOUNTER — OFFICE VISIT (OUTPATIENT)
Dept: URGENT CARE | Facility: PHYSICIAN GROUP | Age: 69
End: 2019-05-04
Payer: MEDICARE

## 2019-05-04 VITALS
HEIGHT: 67 IN | BODY MASS INDEX: 33.43 KG/M2 | RESPIRATION RATE: 16 BRPM | DIASTOLIC BLOOD PRESSURE: 84 MMHG | OXYGEN SATURATION: 98 % | HEART RATE: 77 BPM | WEIGHT: 213 LBS | SYSTOLIC BLOOD PRESSURE: 128 MMHG | TEMPERATURE: 97.8 F

## 2019-05-04 DIAGNOSIS — R35.0 URINARY FREQUENCY: Primary | ICD-10-CM

## 2019-05-04 LAB
APPEARANCE UR: NORMAL
BILIRUB UR STRIP-MCNC: NORMAL MG/DL
COLOR UR AUTO: NORMAL
GLUCOSE UR STRIP.AUTO-MCNC: NORMAL MG/DL
KETONES UR STRIP.AUTO-MCNC: 1.02 MG/DL
LEUKOCYTE ESTERASE UR QL STRIP.AUTO: NORMAL
NITRITE UR QL STRIP.AUTO: NORMAL
PH UR STRIP.AUTO: 6 [PH] (ref 5–8)
PROT UR QL STRIP: 100 MG/DL
RBC UR QL AUTO: NORMAL
SP GR UR STRIP.AUTO: NORMAL
UROBILINOGEN UR STRIP-MCNC: 0.2 MG/DL

## 2019-05-04 PROCEDURE — 81002 URINALYSIS NONAUTO W/O SCOPE: CPT | Performed by: NURSE PRACTITIONER

## 2019-05-04 PROCEDURE — 99213 OFFICE O/P EST LOW 20 MIN: CPT | Performed by: NURSE PRACTITIONER

## 2019-05-04 ASSESSMENT — ENCOUNTER SYMPTOMS
VOMITING: 0
CHILLS: 0
FLANK PAIN: 0
CONSTIPATION: 0
FEVER: 0
ABDOMINAL PAIN: 0
RESPIRATORY NEGATIVE: 1
DIARRHEA: 0
GASTROINTESTINAL NEGATIVE: 1
NEUROLOGICAL NEGATIVE: 1
BACK PAIN: 0
CONSTITUTIONAL NEGATIVE: 1
NAUSEA: 0
MUSCULOSKELETAL NEGATIVE: 1
CARDIOVASCULAR NEGATIVE: 1
MYALGIAS: 0

## 2019-05-04 NOTE — PROGRESS NOTES
Subjective:     Micheal Brothers is a 68 y.o. male who presents for Urinary Frequency (x 1 day )       Urinary Frequency   This is a new problem. The problem has been waxing and waning. Associated symptoms include urinary symptoms (frequency). Pertinent negatives include no abdominal pain, chills, fever, myalgias, nausea or vomiting.     Patient reporting urinary frequency of large volumes of urine which occurred 6 days ago. It resolved for a few days and started up again yesterday. Denies dysuria, urgency, frequency, flank pain, fever, chills, body aches, abdominal pain, hematuria, or other symptoms. Reports that he also stopped metformin 6 days ago which he was started on about 1 month prior for prediabetes. Reports drinking 2 cups of coffee in the morning. She reports that he has been increasing his fluid intake.    PMH:  has a past medical history of Back pain; Chickenpox; Depression; GERD (gastroesophageal reflux disease); [place] measles; Gout; Hyperlipidemia; Influenza; Panic disorder; Sleep apnea; and Tobacco use (11/26/2014).    MEDS:   Current Outpatient Prescriptions:   •  DULoxetine (CYMBALTA) 60 MG Cap DR Particles delayed-release capsule, Take 1 Cap by mouth every day., Disp: 90 Cap, Rfl: 3  •  Sulfacetamide Sodium, Acne, 10 % Lotion, Apply one daily, Disp: 118 mL, Rfl: 6  •  hydroxychloroquine (PLAQUENIL) 200 MG Tab, Take 2 Tabs by mouth every day., Disp: 60 Tab, Rfl: 3  •  betamethasone dipropionate (DIPROLENE) 0.05 % Cream, Apply 1 Application to affected area(s) 2 times a day. To area on hands as needed, Disp: 30 g, Rfl: 2  •  hydrOXYzine HCl (ATARAX) 25 MG Tab, Take 1 Tab by mouth 3 times a day as needed for Anxiety., Disp: 30 Tab, Rfl: 0  •  omeprazole (PRILOSEC) 40 MG delayed-release capsule, Take 1 Cap by mouth every day., Disp: 90 Cap, Rfl: 3  •  albuterol 108 (90 BASE) MCG/ACT Aero Soln inhalation aerosol, Inhale 2 Puffs by mouth every 6 hours as needed for Shortness of Breath. (Patient not  "taking: Reported on 5/4/2019), Disp: 8.5 g, Rfl: 3    ALLERGIES:   Allergies   Allergen Reactions   • Zyban [Bupropion] Hives and Itching   • Augmentin Hives   • Metformin      SURGHX:   Past Surgical History:   Procedure Laterality Date   • APPENDECTOMY     • ARTHROSCOPY, KNEE     • CARPAL TUNNEL ENDOSCOPIC      bilateral   • CARPAL TUNNEL RELEASE     • SHOULDER DECOMPRESSION ARTHROSCOPIC      right   • TONSILLECTOMY       SOCHX:  reports that he has been smoking Cigarettes.  He has a 39.75 pack-year smoking history. He has never used smokeless tobacco. He reports that he drinks about 0.6 oz of alcohol per week . He reports that he does not use drugs.     FH: Reviewed with patient, not pertinent to this visit.     Review of Systems   Constitutional: Negative.  Negative for chills, fever and malaise/fatigue.   Respiratory: Negative.    Cardiovascular: Negative.    Gastrointestinal: Negative.  Negative for abdominal pain, constipation, diarrhea, nausea and vomiting.   Genitourinary: Positive for frequency. Negative for dysuria, flank pain, hematuria and urgency.   Musculoskeletal: Negative.  Negative for back pain and myalgias.   Neurological: Negative.    All other systems reviewed and are negative.    Objective:     /84   Pulse 77   Temp 36.6 °C (97.8 °F)   Resp 16   Ht 1.702 m (5' 7\")   Wt 96.6 kg (213 lb)   SpO2 98%   BMI 33.36 kg/m²     Physical Exam   Constitutional: He is oriented to person, place, and time. He appears well-developed and well-nourished. He is cooperative.  Non-toxic appearance. No distress.   HENT:   Right Ear: External ear normal.   Left Ear: External ear normal.   Nose: Nose normal.   Mouth/Throat: Mucous membranes are normal.   Eyes: Conjunctivae and EOM are normal.   Neck: Normal range of motion.   Cardiovascular: Normal rate, regular rhythm, normal heart sounds and normal pulses.    Pulmonary/Chest: Effort normal and breath sounds normal. No respiratory distress. He has no " decreased breath sounds.   Abdominal: Soft. Bowel sounds are normal. There is no tenderness. There is no CVA tenderness.   Musculoskeletal: Normal range of motion. He exhibits no deformity.   Neurological: He is alert and oriented to person, place, and time. He has normal strength. No sensory deficit.   Skin: Skin is warm, dry and intact. Capillary refill takes less than 2 seconds.   Psychiatric: He has a normal mood and affect. His behavior is normal.   Vitals reviewed.    UA:    Component Results     Component Value Ref Range & Units Status   POC Color DARK YELLOW  Negative Final   POC Appearance TURBID  Negative Final   POC Leukocyte Esterase NEG  Negative Final   POC Nitrites NEG  Negative Final   POC Urobiligen 0.2  Negative (0.2) mg/dL Final   POC Protein 100  Negative mg/dL Final   POC Urine PH 6.0  5.0 - 8.0 Final   POC Blood NEG  Negative Final   POC Specific Gravity NEG  <1.005 - >1.030 Final   POC Ketones 1.020  Negative mg/dL Final   POC Bilirubin NEG  Negative mg/dL Final   POC Glucose NEG  Negative mg/dL Final        Assessment/Plan:     1. Urinary frequency  - POCT Urinalysis [CAN22039]    UA unremarkable, no nitrites, blood, or LE. VS stable, afebrile, NAD. Differentials reviewed with patient. Reports of frequency with no other urinary symptoms. Patient with history of prediabetes, recently started and stopped Metformin, drinks 2 cups of coffee daily, and has been increasing fluid intake. Advised follow up with PCP. Discussed supportive measures including increasing fluids and rest as well as OTC symptom management including acetaminophen and/or ibuprofen PRN pain and/or fever.     Patient advised to: Return for 1) Symptoms don't improve or worsen, or go to ER, 2) Follow up with primary care in 7-10 days.    Differential diagnosis, natural history, supportive care, and indications for immediate follow-up discussed. All questions answered. Patient agrees with the plan of care.

## 2019-05-04 NOTE — PATIENT INSTRUCTIONS
Urinary Frequency  The number of times a normal person urinates depends upon how much liquid they take in and how much liquid they are losing. If the temperature is hot and there is high humidity, then the person will sweat more and usually breathe a little more frequently. These factors decrease the amount of frequency of urination that would be considered normal.  The amount you drink is easily determined, but the amount of fluid lost is sometimes more difficult to calculate.   Fluid is lost in two ways:  · Sensible fluid loss is usually measured by the amount of urine that you get rid of. Losses of fluid can also occur with diarrhea.  · Insensible fluid loss is more difficult to measure. It is caused by evaporation. Insensible loss of fluid occurs through breathing and sweating. It usually ranges from a little less than a quart to a little more than a quart of fluid a day.  In normal temperatures and activity levels, the average person may urinate 4 to 7 times in a 24-hour period. Needing to urinate more often than that could indicate a problem. If one urinates 4 to 7 times in 24 hours and has large volumes each time, that could indicate a different problem from one who urinates 4 to 7 times a day and has small volumes. The time of urinating is also important. Most urinating should be done during the waking hours. Getting up at night to urinate frequently can indicate some problems.  CAUSES   The bladder is the organ in your lower abdomen that holds urine. Like a balloon, it swells some as it fills up. Your nerves sense this and tell you it is time to head for the bathroom. There are a number of reasons that you might feel the need to urinate more often than usual. They include:  · Urinary tract infection. This is usually associated with other signs such as burning when you urinate.  · In men, problems with the prostate (a walnut-size gland that is located near the tube that carries urine out of your body). There  are two reasons why the prostate can cause an increased frequency of urination:  ¨ An enlarged prostate that does not let the bladder empty well. If the bladder only half empties when you urinate, then it only has half the capacity to fill before you have to urinate again.  ¨ The nerves in the bladder become more hypersensitive with an increased size of the prostate even if the bladder empties completely.  · Pregnancy.  · Obesity. Excess weight is more likely to cause a problem for women than for men.  · Bladder stones or other bladder problems.  · Caffeine.  · Alcohol.  · Medications. For example, drugs that help the body get rid of extra fluid (diuretics) increase urine production. Some other medicines must be taken with lots of fluids.  · Muscle or nerve weakness. This might be the result of a spinal cord injury, a stroke, multiple sclerosis, or Parkinson disease.  · Long-standing diabetes can decrease the sensation of the bladder. This loss of sensation makes it harder to sense the bladder needs to be emptied. Over a period of years, the bladder is stretched out by constant overfilling. This weakens the bladder muscles so that the bladder does not empty well and has less capacity to fill with new urine.  · Interstitial cystitis (also called painful bladder syndrome). This condition develops because the tissues that line the inside of the bladder are inflamed (inflammation is the body's way of reacting to injury or infection). It causes pain and frequent urination. It occurs in women more often than in men.  DIAGNOSIS   · To decide what might be causing your urinary frequency, your health care provider will probably:  ¨ Ask about symptoms you have noticed.  ¨ Ask about your overall health. This will include questions about any medications you are taking.  ¨ Do a physical examination.  · Order some tests. These might include:  ¨ A blood test to check for diabetes or other health issues that could be contributing  to the problem.  ¨ Urine testing. This could measure the flow of urine and the pressure on the bladder.  ¨ A test of your neurological system (the brain, spinal cord, and nerves). This is the system that senses the need to urinate.  ¨ A bladder test to check whether it is emptying completely when you urinate.  ¨ Cystoscopy. This test uses a thin tube with a tiny camera on it. It offers a look inside your urethra and bladder to see if there are problems.  ¨ Imaging tests. You might be given a contrast dye and then asked to urinate. X-rays are taken to see how your bladder is working.  TREATMENT   It is important for you to be evaluated to determine if the amount or frequency that you have is unusual or abnormal. If it is found to be abnormal, the cause should be determined and this can usually be found out easily. Depending upon the cause, treatment could include medication, stimulation of the nerves, or surgery.  There are not too many things that you can do as an individual to change your urinary frequency. It is important that you balance the amount of fluid intake needed to compensate for your activity and the temperature. Medical problems will be diagnosed and taken care of by your physician. There is no particular bladder training such as Kegel exercises that you can do to help urinary frequency. This is an exercise that is usually recommended for people who have leaking of urine when they laugh, cough, or sneeze.  HOME CARE INSTRUCTIONS   · Take any medications your health care provider prescribed or suggested. Follow the directions carefully.  · Practice any lifestyle changes that are recommended. These might include:  ¨ Drinking less fluid or drinking at different times of the day. If you need to urinate often during the night, for example, you may need to stop drinking fluids early in the evening.  ¨ Cutting down on caffeine or alcohol. They both can make you need to urinate more often than normal. Caffeine  is found in coffee, tea, and sodas.  ¨ Losing weight, if that is recommended.  · Keep a journal or a log. You might be asked to record how much you drink and when and where you feel the need to urinate. This will also help evaluate how well the treatment provided by your physician is working.  SEEK MEDICAL CARE IF:   · Your need to urinate often gets worse.  · You feel increased pain or irritation when you urinate.  · You notice blood in your urine.  · You have questions about any medications that your health care provider recommended.  · You notice blood, pus, or swelling at the site of any test or treatment procedure.  · You develop a fever of more than 100.5°F (38.1°C).  SEEK IMMEDIATE MEDICAL CARE IF:   You develop a fever of more than 102.0°F (38.9°C).  This information is not intended to replace advice given to you by your health care provider. Make sure you discuss any questions you have with your health care provider.  Document Released: 10/14/2010 Document Revised: 01/08/2016 Document Reviewed: 07/13/2016  [employer] Interactive Patient Education © 2017 [employer]

## 2019-05-23 ENCOUNTER — OFFICE VISIT (OUTPATIENT)
Dept: DERMATOLOGY | Facility: IMAGING CENTER | Age: 69
End: 2019-05-23
Payer: MEDICARE

## 2019-05-23 DIAGNOSIS — L92.0 GRANULOMA ANNULARE: ICD-10-CM

## 2019-05-23 PROCEDURE — 99213 OFFICE O/P EST LOW 20 MIN: CPT | Performed by: DERMATOLOGY

## 2019-05-23 ASSESSMENT — ENCOUNTER SYMPTOMS
WEIGHT LOSS: 0
CHILLS: 0
FEVER: 0

## 2019-05-23 NOTE — PROGRESS NOTES
Dermatology Return Patient Visit    Chief Complaint   Patient presents with   • Follow-Up       Subjective:     HPI:   Micheal Brothers is a 67 y.o. male presenting for    Follow up, generalized granuloma annulare, on Plaquenil 400mg daily x 2 months  Pt feels that shoulders are improving but arms and legs are getting slightly worse  No itching/symptoms  No n/v/d, dizziness, headache, vertigo, no vision changes/problems, rashes; ?possible hair thinning  Has been instructed to follow up w kathryn every 6 months   Did not want to take isotretinoin  Dr. Milan working on blood sugar control -- could not tolerate metformin, losing weight (states ~20lb in last 1-2 months)  Taking new vitamins ?B vitamin not in chart    History of skin cancer: No  History of biopsies:No  History of blistering/severe sunburns:No  Family history of skin cancer:No  Family history of atypical moles:No        Rash   Pertinent negatives include no fever.       Past Medical History:   Diagnosis Date   • Back pain    • Chickenpox    • Depression    • GERD (gastroesophageal reflux disease)    • [place] measles    • Gout    • Hyperlipidemia    • Influenza    • Panic disorder    • Sleep apnea    • Tobacco use 11/26/2014       Current Outpatient Prescriptions on File Prior to Visit   Medication Sig Dispense Refill   • DULoxetine (CYMBALTA) 60 MG Cap DR Particles delayed-release capsule Take 1 Cap by mouth every day. 90 Cap 3   • Sulfacetamide Sodium, Acne, 10 % Lotion Apply one daily 118 mL 6   • hydroxychloroquine (PLAQUENIL) 200 MG Tab Take 2 Tabs by mouth every day. 60 Tab 3   • betamethasone dipropionate (DIPROLENE) 0.05 % Cream Apply 1 Application to affected area(s) 2 times a day. To area on hands as needed 30 g 2   • hydrOXYzine HCl (ATARAX) 25 MG Tab Take 1 Tab by mouth 3 times a day as needed for Anxiety. 30 Tab 0   • omeprazole (PRILOSEC) 40 MG delayed-release capsule Take 1 Cap by mouth every day. 90 Cap 3   • albuterol 108 (90 BASE)  MCG/ACT Aero Soln inhalation aerosol Inhale 2 Puffs by mouth every 6 hours as needed for Shortness of Breath. (Patient not taking: Reported on 5/4/2019) 8.5 g 3     No current facility-administered medications on file prior to visit.        Allergies   Allergen Reactions   • Zyban [Bupropion] Hives and Itching   • Augmentin Hives   • Metformin      Review of Systems   Constitutional: Negative for chills, fever and weight loss.   Skin: Positive for rash. Negative for itching.   All other systems reviewed and are negative.       Objective:     A focused cutaneous exam was completed including: hair, ears, face, eyelids, conjunctiva, lips, neck, chest, upper back, left and right upper extremities (including hands/digits and fingernails) left and right lower extremities with the following pertinent findings listed below. Remaining above-listed examined areas within normal limits / negative for rashes or lesions.      Physical Exam   Constitutional: He is oriented to person, place, and time and well-developed, well-nourished, and in no distress.   HENT:   Head: Normocephalic and atraumatic.       Right Ear: External ear normal.   Left Ear: External ear normal.   Nose: Nose normal.   Eyes: Conjunctivae are normal.   Neck: Normal range of motion.   Pulmonary/Chest: Effort normal.   Neurological: He is alert and oriented to person, place, and time.   Skin: Skin is warm and dry.        Psychiatric: Mood and affect normal.       DATA: labs 4/26 reviewed, hgb 14.6 --> 13.2    Assessment and Plan:     1. Granuloma annulare - generalized - some areas slightly improved, some areas with increased activity  - cont plaquenil 400mg daily for at least 1 more month. Side effects, including, but not limited to, dizziness, headache, vertigo, skin rash, alopecia, dyschromia, nausea, diarrhea, abdominal pain, hepatic insufficiency, myalgias, vision problems (including permanent retinopathy), hypersensitivity discussed  - consider adding  nicotinamide/minocycline next visit if no improvement  - discussed risks benefits of trental vs dapsone in future if above does not help/worsens despite treatment  - declines ILK currently  - f/u ophtho as instructed    Following issues not discussed:  Hidradenitis, mild - groin, under control today  CNH  Favre and Racouchot syndrome  Acrochordons - neck, right axilla    Followup: Return in about 4 weeks (around 6/20/2019).    Brittni Bravo M.D.

## 2019-05-29 ENCOUNTER — APPOINTMENT (OUTPATIENT)
Dept: DERMATOLOGY | Facility: IMAGING CENTER | Age: 69
End: 2019-05-29

## 2019-06-26 ENCOUNTER — OFFICE VISIT (OUTPATIENT)
Dept: DERMATOLOGY | Facility: IMAGING CENTER | Age: 69
End: 2019-06-26
Payer: MEDICARE

## 2019-06-26 DIAGNOSIS — L92.0 GRANULOMA ANNULARE: ICD-10-CM

## 2019-06-26 PROCEDURE — 11901 INJECT SKIN LESIONS >7: CPT | Performed by: DERMATOLOGY

## 2019-06-26 PROCEDURE — 99213 OFFICE O/P EST LOW 20 MIN: CPT | Mod: 25 | Performed by: DERMATOLOGY

## 2019-06-26 RX ORDER — MINOCYCLINE HYDROCHLORIDE 100 MG/1
100 CAPSULE ORAL 2 TIMES DAILY
Qty: 180 CAP | Refills: 1 | Status: SHIPPED | OUTPATIENT
Start: 2019-06-26 | End: 2019-08-27

## 2019-06-26 ASSESSMENT — ENCOUNTER SYMPTOMS
WEIGHT LOSS: 0
FEVER: 0
CHILLS: 0

## 2019-06-26 NOTE — PROGRESS NOTES
Dermatology Return Patient Visit    Chief Complaint   Patient presents with   • Follow-Up     granuloma annulare       Subjective:     HPI:   Micheal Brothers is a 67 y.o. male presenting for    Follow up, generalized granuloma annulare, on Plaquenil 400mg daily x 3 months  Pt feels that shoulders area's on shoulders are somewhat improvede but arms and legs are getting worse  No itching/symptoms  No n/v/d, dizziness, headache, vertigo, no vision changes/problems, rashes; ?possible hair thinning  Has been instructed to follow up w ophtho every 6 months while on plaquenil  Does not want to take isotretinoin  Dr. Milan working on blood sugar control -- could not tolerate metformin, still losing weight  States still taking vitamins not sure which ?B vitamin - not in chart    History of skin cancer: No  History of biopsies:No  History of blistering/severe sunburns:No  Family history of skin cancer:No  Family history of atypical moles:No        Rash   Pertinent negatives include no fever.       Past Medical History:   Diagnosis Date   • Back pain    • Chickenpox    • Depression    • GERD (gastroesophageal reflux disease)    • [place] measles    • Gout    • Hyperlipidemia    • Influenza    • Panic disorder    • Sleep apnea    • Tobacco use 11/26/2014       Current Outpatient Prescriptions on File Prior to Visit   Medication Sig Dispense Refill   • DULoxetine (CYMBALTA) 60 MG Cap DR Particles delayed-release capsule Take 1 Cap by mouth every day. 90 Cap 3   • Sulfacetamide Sodium, Acne, 10 % Lotion Apply one daily 118 mL 6   • hydroxychloroquine (PLAQUENIL) 200 MG Tab Take 2 Tabs by mouth every day. 60 Tab 3   • betamethasone dipropionate (DIPROLENE) 0.05 % Cream Apply 1 Application to affected area(s) 2 times a day. To area on hands as needed 30 g 2   • hydrOXYzine HCl (ATARAX) 25 MG Tab Take 1 Tab by mouth 3 times a day as needed for Anxiety. 30 Tab 0   • omeprazole (PRILOSEC) 40 MG delayed-release capsule Take 1 Cap by  mouth every day. 90 Cap 3   • albuterol 108 (90 BASE) MCG/ACT Aero Soln inhalation aerosol Inhale 2 Puffs by mouth every 6 hours as needed for Shortness of Breath. (Patient not taking: Reported on 5/4/2019) 8.5 g 3     No current facility-administered medications on file prior to visit.        Allergies   Allergen Reactions   • Zyban [Bupropion] Hives and Itching   • Augmentin Hives   • Metformin      Review of Systems   Constitutional: Negative for chills, fever and weight loss.   Skin: Positive for rash. Negative for itching.   All other systems reviewed and are negative.       Objective:     A focused cutaneous exam was completed including: hair, ears, face, eyelids, conjunctiva, lips, neck, chest, upper back, left and right upper extremities (including hands/digits and fingernails) left and right lower extremities with the following pertinent findings listed below. Remaining above-listed examined areas within normal limits / negative for rashes or lesions.      Physical Exam   Constitutional: He is oriented to person, place, and time and well-developed, well-nourished, and in no distress.   HENT:   Head: Normocephalic and atraumatic.       Right Ear: External ear normal.   Left Ear: External ear normal.   Nose: Nose normal.   Eyes: Conjunctivae are normal.   Neck: Normal range of motion.   Pulmonary/Chest: Effort normal.   Neurological: He is alert and oriented to person, place, and time.   Skin: Skin is warm and dry.        Psychiatric: Mood and affect normal.       DATA: none new    Assessment and Plan:     1. Granuloma annulare - generalized - some areas improved, several areas with increased activity  - d/c plaquenil   - start minocycline 100mg BID; s/e including, but not limited to, dizziness, fatigue, arthralgias, autoimmune hepatitis, DRESS, drug induced SCLE, hypersensitivity reaction, hyperpigmentation, discussed  Recommended patient to start nicotinamide 500mg TID (increase dose from daily if already  taking)  - discussed risks benefits of trental vs dapsone in future if above does not help/worsens despite treatment  INTRALESIONAL KENALOG:  Discussed risks and benefits of intralesional kenalog injections, including skin atrophy, discoloration, minimal risk of infection. Patient verbally agreed. ILK 5mg/cc x 2.5cc total injected into the areas (>10) on the lower legs. Patient tolerated procedure well, no complications. Aftercare discussed.     Following issues not discussed:  Hidradenitis, mild - groin, under control today  CN  Favre and Racouchot syndrome  Acrochordons - neck, right axilla    Followup: Return in about 2 months (around 8/26/2019).    Brittni Bravo M.D.

## 2019-07-02 DIAGNOSIS — K21.9 GASTROESOPHAGEAL REFLUX DISEASE WITHOUT ESOPHAGITIS: ICD-10-CM

## 2019-07-03 RX ORDER — OMEPRAZOLE 40 MG/1
CAPSULE, DELAYED RELEASE ORAL
Qty: 90 CAP | Refills: 3 | Status: SHIPPED | OUTPATIENT
Start: 2019-07-03 | End: 2020-04-22

## 2019-08-27 ENCOUNTER — OFFICE VISIT (OUTPATIENT)
Dept: DERMATOLOGY | Facility: IMAGING CENTER | Age: 69
End: 2019-08-27
Payer: MEDICARE

## 2019-08-27 DIAGNOSIS — L92.0 GRANULOMA ANNULARE: ICD-10-CM

## 2019-08-27 PROCEDURE — 99212 OFFICE O/P EST SF 10 MIN: CPT | Performed by: DERMATOLOGY

## 2019-08-27 RX ORDER — PENTOXIFYLLINE 400 MG/1
400 TABLET, EXTENDED RELEASE ORAL 2 TIMES DAILY
Qty: 120 TAB | Refills: 0 | Status: SHIPPED | OUTPATIENT
Start: 2019-08-27 | End: 2019-10-21 | Stop reason: SDUPTHER

## 2019-08-27 ASSESSMENT — ENCOUNTER SYMPTOMS
FEVER: 0
WEIGHT LOSS: 0
CHILLS: 0

## 2019-08-27 NOTE — PROGRESS NOTES
Dermatology Return Patient Visit    Chief Complaint   Patient presents with   • Follow-Up   • Rash       Subjective:     HPI:   Micheal Brothers is a 67 y.o. male presenting for    Follow up GA, generalized  No improvement  Taking minocycline & nicotinamide  Arms getting worse  Legs are about the same maybe slightly better  Minimal itching    History of skin cancer: No  History of biopsies:No  History of blistering/severe sunburns:No  Family history of skin cancer:No  Family history of atypical moles:No      Rash   Pertinent negatives include no fever.       Past Medical History:   Diagnosis Date   • Back pain    • Chickenpox    • Depression    • GERD (gastroesophageal reflux disease)    • [place] measles    • Gout    • Hyperlipidemia    • Influenza    • Panic disorder    • Sleep apnea    • Tobacco use 11/26/2014       Current Outpatient Medications on File Prior to Visit   Medication Sig Dispense Refill   • minocycline (MINOCIN) 100 MG Cap Take 1 Cap by mouth 2 times a day. 1 capsule twice daily for 6 weeks, then decrease to 1 capsule daily 180 Cap 1   • Sulfacetamide Sodium, Acne, 10 % Lotion Apply one daily 118 mL 6   • betamethasone dipropionate (DIPROLENE) 0.05 % Cream Apply 1 Application to affected area(s) 2 times a day. To area on hands as needed 30 g 2   • omeprazole (PRILOSEC) 40 MG delayed-release capsule TAKE 1 CAPSULE BY MOUTH  EVERY DAY 90 Cap 3   • DULoxetine (CYMBALTA) 60 MG Cap DR Particles delayed-release capsule Take 1 Cap by mouth every day. 90 Cap 3   • hydrOXYzine HCl (ATARAX) 25 MG Tab Take 1 Tab by mouth 3 times a day as needed for Anxiety. 30 Tab 0   • albuterol 108 (90 BASE) MCG/ACT Aero Soln inhalation aerosol Inhale 2 Puffs by mouth every 6 hours as needed for Shortness of Breath. (Patient not taking: Reported on 5/4/2019) 8.5 g 3     No current facility-administered medications on file prior to visit.        Allergies   Allergen Reactions   • Zyban [Bupropion] Hives and Itching   •  Augmentin Hives   • Metformin      Review of Systems   Constitutional: Negative for chills, fever and weight loss.   Skin: Positive for rash. Negative for itching.   All other systems reviewed and are negative.       Objective:     A focused cutaneous exam was completed including: hair, ears, face, eyelids, conjunctiva, lips, neck, chest, upper back, left and right upper extremities (including hands/digits and fingernails) left and right lower extremities with the following pertinent findings listed below. Remaining above-listed examined areas within normal limits / negative for rashes or lesions.      Physical Exam   Constitutional: He is oriented to person, place, and time and well-developed, well-nourished, and in no distress.   HENT:   Head: Normocephalic and atraumatic.       Right Ear: External ear normal.   Left Ear: External ear normal.   Nose: Nose normal.   Eyes: Conjunctivae are normal.   Neck: Normal range of motion.   Pulmonary/Chest: Effort normal.   Neurological: He is alert and oriented to person, place, and time.   Skin: Skin is warm and dry.        Psychiatric: Mood and affect normal.       DATA: none new    Assessment and Plan:     1. Granuloma annulare - generalized - several areas with increased activity  - d/c plaquenil   - d/c minocycline   - start trental 400mg BID; s/e increased bleeding, hypersensitivity discussed  - hold off on kenalog injections today    Following issues not discussed:  Hidradenitis, mild - groin, under control today  CN  Favre and Racouchot syndrome  Acrochordons - neck, right axilla    Followup: Return in about 2 months (around 10/27/2019).    Brittni Bravo M.D.

## 2019-09-23 PROBLEM — E66.9 OBESITY (BMI 35.0-39.9 WITHOUT COMORBIDITY): Status: RESOLVED | Noted: 2018-08-28 | Resolved: 2019-09-23

## 2019-09-24 ENCOUNTER — OFFICE VISIT (OUTPATIENT)
Dept: MEDICAL GROUP | Facility: MEDICAL CENTER | Age: 69
End: 2019-09-24
Payer: MEDICARE

## 2019-09-24 VITALS
WEIGHT: 199 LBS | HEIGHT: 67 IN | HEART RATE: 77 BPM | SYSTOLIC BLOOD PRESSURE: 114 MMHG | BODY MASS INDEX: 31.23 KG/M2 | DIASTOLIC BLOOD PRESSURE: 72 MMHG | OXYGEN SATURATION: 99 % | TEMPERATURE: 97.7 F

## 2019-09-24 DIAGNOSIS — E66.9 OBESITY (BMI 30-39.9): ICD-10-CM

## 2019-09-24 DIAGNOSIS — G47.33 OSA ON CPAP: ICD-10-CM

## 2019-09-24 DIAGNOSIS — J44.9 CHRONIC OBSTRUCTIVE PULMONARY DISEASE, UNSPECIFIED COPD TYPE (HCC): ICD-10-CM

## 2019-09-24 DIAGNOSIS — Z00.00 MEDICARE ANNUAL WELLNESS VISIT, SUBSEQUENT: ICD-10-CM

## 2019-09-24 DIAGNOSIS — E78.5 DYSLIPIDEMIA: ICD-10-CM

## 2019-09-24 DIAGNOSIS — K21.9 GASTROESOPHAGEAL REFLUX DISEASE WITHOUT ESOPHAGITIS: ICD-10-CM

## 2019-09-24 DIAGNOSIS — Z12.5 SCREENING PSA (PROSTATE SPECIFIC ANTIGEN): ICD-10-CM

## 2019-09-24 DIAGNOSIS — F41.0 PANIC DISORDER: ICD-10-CM

## 2019-09-24 DIAGNOSIS — Z11.59 NEED FOR HEPATITIS C SCREENING TEST: ICD-10-CM

## 2019-09-24 DIAGNOSIS — Z23 NEED FOR VACCINATION: ICD-10-CM

## 2019-09-24 DIAGNOSIS — R73.02 IGT (IMPAIRED GLUCOSE TOLERANCE): ICD-10-CM

## 2019-09-24 PROCEDURE — G0439 PPPS, SUBSEQ VISIT: HCPCS | Performed by: INTERNAL MEDICINE

## 2019-09-24 PROCEDURE — G0008 ADMIN INFLUENZA VIRUS VAC: HCPCS | Performed by: INTERNAL MEDICINE

## 2019-09-24 PROCEDURE — 99213 OFFICE O/P EST LOW 20 MIN: CPT | Mod: 25 | Performed by: INTERNAL MEDICINE

## 2019-09-24 PROCEDURE — 90662 IIV NO PRSV INCREASED AG IM: CPT | Performed by: INTERNAL MEDICINE

## 2019-09-24 RX ORDER — ATORVASTATIN CALCIUM 20 MG/1
20 TABLET, FILM COATED ORAL DAILY
Qty: 90 TAB | Refills: 3 | Status: SHIPPED | OUTPATIENT
Start: 2019-09-24 | End: 2020-07-30

## 2019-09-24 ASSESSMENT — ACTIVITIES OF DAILY LIVING (ADL): BATHING_REQUIRES_ASSISTANCE: 0

## 2019-09-24 ASSESSMENT — ENCOUNTER SYMPTOMS: GENERAL WELL-BEING: EXCELLENT

## 2019-09-24 ASSESSMENT — PATIENT HEALTH QUESTIONNAIRE - PHQ9: CLINICAL INTERPRETATION OF PHQ2 SCORE: 0

## 2019-09-24 NOTE — PROGRESS NOTES
CC: Follow-up blood sugars, dyslipidemia, due for wellness examination                                                                                                                                      HPI:   Micheal presents today with the following.    1. Medicare annual wellness visit, subsequent  Screenings performed below information given on advanced directives    2. IGT (impaired glucose tolerance)  Presents last A1c was 6.5 discussions about diet exercise and weight loss he is done an excellent job losing 15 pounds.  He continues to cut out carbohydrates.  He is due for blood work    3. Dyslipidemia  Previously on a statin tolerating well previous doctor took him off of it HDL is somewhat low.  10-year risk of heart attack is 18% he is willing and wanting to reduce risk.    Depression Screening    Little interest or pleasure in doing things?  0 - not at all  Feeling down, depressed , or hopeless? 0 - not at all  Patient Health Questionnaire Score: 0     If depressive symptoms identified deferred to follow up visit unless specifically addressed in assessment and plan.    Interpretation of PHQ-9 Total Score   Score Severity   1-4 No Depression   5-9 Mild Depression   10-14 Moderate Depression   15-19 Moderately Severe Depression   20-27 Severe Depression    Screening for Cognitive Impairment    Three Minute Recall (village, kitchen, baby) 2/3    Arturo clock face with all 12 numbers and set the hands to show 10 past 10.  Yes 4/5  Cognitive concerns identified deferred for follow up unless specifically addressed in assessment and plan.    Fall Risk Assessment    Has the patient had two or more falls in the last year or any fall with injury in the last year?  No    Safety Assessment    Throw rugs on floor.  Yes  Handrails on all stairs.  Yes  Good lighting in all hallways.  Yes  Difficulty hearing.  No  Patient counseled about all safety risks that were identified.    Functional Assessment ADLs    Are  there any barriers preventing you from cooking for yourself or meeting nutritional needs?  No.    Are there any barriers preventing you from driving safely or obtaining transportation?  No.    Are there any barriers preventing you from using a telephone or calling for help?  No.    Are there any barriers preventing you from shopping?  No.    Are there any barriers preventing you from taking care of your own finances?  No.    Are there any barriers preventing you from managing your medications?  No.    Are there any barriers preventing you from showering, bathing or dressing yourself?  No.    Are you currently engaging in any exercise or physical activity?  No.     What is your perception of your health?  Excellent.      Health Maintenance Summary                HEPATITIS C SCREENING Overdue 1950     PFT SCREENING-FEV1 AND FEV/FVC RATIO / SPIROMETRY SHOULD BE PERFORMED ANNUALLY Overdue 9/30/1967     IMM ZOSTER VACCINES Postponed 9/17/2025 Originally 9/30/1999. Insurance/Financial    IMM DTaP/Tdap/Td Vaccine Postponed 4/21/2039 Originally 9/30/1968. Insurance/Financial    Annual Wellness Visit Next Due 9/24/2020      Done 9/24/2019 Visit Dx: Medicare annual wellness visit, subsequent     Patient has more history with this topic...    COLONOSCOPY Next Due 5/15/2023      Done 5/15/2013           Patient Care Team:  Bereket Milan M.D. as PCP - General (Internal Medicine)  [facility] as Res[facility]      Patient Active Problem List    Diagnosis Date Noted   • Chronic obstructive pulmonary disease (HCC) 05/01/2019   • IGT (impaired glucose tolerance) 04/01/2019   • Panic disorder 06/18/2018   • NAHUM on CPAP 08/26/2016   • Obesity (BMI 30-39.9) 11/26/2014   • Dyslipidemia 11/26/2014   • GERD (gastroesophageal reflux disease) 11/26/2014   • Tobacco use 11/26/2014       Current Outpatient Medications   Medication Sig Dispense Refill   • atorvastatin (LIPITOR) 20 MG Tab Take 1 Tab by mouth every day. 90 Tab 3   •  "pentoxifylline CR (TRENTAL) 400 MG CR tablet Take 1 Tab by mouth 2 Times a Day. 120 Tab 0   • omeprazole (PRILOSEC) 40 MG delayed-release capsule TAKE 1 CAPSULE BY MOUTH  EVERY DAY 90 Cap 3   • DULoxetine (CYMBALTA) 60 MG Cap DR Particles delayed-release capsule Take 1 Cap by mouth every day. 90 Cap 3   • Sulfacetamide Sodium, Acne, 10 % Lotion Apply one daily 118 mL 6   • betamethasone dipropionate (DIPROLENE) 0.05 % Cream Apply 1 Application to affected area(s) 2 times a day. To area on hands as needed 30 g 2   • hydrOXYzine HCl (ATARAX) 25 MG Tab Take 1 Tab by mouth 3 times a day as needed for Anxiety. 30 Tab 0   • albuterol 108 (90 BASE) MCG/ACT Aero Soln inhalation aerosol Inhale 2 Puffs by mouth every 6 hours as needed for Shortness of Breath. (Patient not taking: Reported on 5/4/2019) 8.5 g 3     No current facility-administered medications for this visit.          Allergies as of 09/24/2019 - Reviewed 09/24/2019   Allergen Reaction Noted   • Zyban [bupropion] Hives and Itching 10/21/2014   • Augmentin Hives 03/06/2016   • Metformin  05/01/2019        ROS: Denies Chest pain, SOB, LE edema.    /72 (BP Location: Left arm, Patient Position: Sitting)   Pulse 77   Temp 36.5 °C (97.7 °F)   Ht 1.702 m (5' 7\")   Wt 90.3 kg (199 lb)   SpO2 99%   BMI 31.17 kg/m²     Physical Exam:  Gen:         Alert and oriented, No apparent distress.  Neck:        No Lymphadenopathy or Bruits.  Lungs:     Clear to auscultation bilaterally  CV:          Regular rate and rhythm. No murmurs, rubs or gallops.               Ext:          No clubbing, cyanosis, edema.      Assessment and Plan.   68 y.o. male with the following issues.    1. Medicare annual wellness visit, subsequent  Discussed healthy lifestyle habits as well as screening regimens.  - Subsequent Annual Wellness Visit - Includes PPPS ()    2. IGT (impaired glucose tolerance)  Clinically doing well with weight we will recheck blood work in 6 months encourage " continued weight loss  - HEMOGLOBIN A1C; Future    3. Dyslipidemia  Starting on statin Lipitor 20 mg cautioned about side effects  - Comp Metabolic Panel; Future  - Lipid Profile; Future    4. Chronic obstructive pulmonary disease, unspecified COPD type (HCC)  Stable no change to therapy    5. NAHUM on CPAP  New current treatment  - Subsequent Annual Wellness Visit - Includes PPPS ()    6. Obesity (BMI 30-39.9)  Session about further weight loss  - Subsequent Annual Wellness Visit - Includes PPPS ()    7. Panic disorder  Stable  - Subsequent Annual Wellness Visit - Includes PPPS ()    8. Gastroesophageal reflux disease without esophagitis  Symptoms improving with weight loss  - Subsequent Annual Wellness Visit - Includes PPPS ()    9. Screening PSA (prostate specific antigen)    - Subsequent Annual Wellness Visit - Includes PPPS ()  - PROSTATE SPECIFIC AG SCREENING; Future    10. Need for hepatitis C screening test    - Subsequent Annual Wellness Visit - Includes PPPS ()  - HEP C VIRUS ANTIBODY; Future    11. Need for vaccination  - Subsequent Annual Wellness Visit - Includes PPPS ()  - INFLUENZA VACCINE, HIGH DOSE (65+ ONLY)

## 2019-10-21 ENCOUNTER — TELEPHONE (OUTPATIENT)
Dept: DERMATOLOGY | Facility: IMAGING CENTER | Age: 69
End: 2019-10-21

## 2019-10-23 RX ORDER — PENTOXIFYLLINE 400 MG/1
TABLET, EXTENDED RELEASE ORAL
Qty: 60 TAB | Refills: 2 | Status: SHIPPED | OUTPATIENT
Start: 2019-10-23 | End: 2020-01-02 | Stop reason: SDUPTHER

## 2019-10-29 ENCOUNTER — OFFICE VISIT (OUTPATIENT)
Dept: DERMATOLOGY | Facility: IMAGING CENTER | Age: 69
End: 2019-10-29
Payer: MEDICARE

## 2019-10-29 DIAGNOSIS — L92.0 GRANULOMA ANNULARE: ICD-10-CM

## 2019-10-29 DIAGNOSIS — L72.3 INFLAMED EPIDERMOID CYST OF SKIN: ICD-10-CM

## 2019-10-29 DIAGNOSIS — R20.8 SKIN PAIN: ICD-10-CM

## 2019-10-29 PROCEDURE — 99212 OFFICE O/P EST SF 10 MIN: CPT | Mod: 25 | Performed by: DERMATOLOGY

## 2019-10-29 PROCEDURE — 11900 INJECT SKIN LESIONS </W 7: CPT | Performed by: DERMATOLOGY

## 2019-10-29 ASSESSMENT — ENCOUNTER SYMPTOMS
WEIGHT LOSS: 0
CHILLS: 0
FEVER: 0

## 2019-10-29 NOTE — PROGRESS NOTES
Dermatology Return Patient Visit    Chief Complaint   Patient presents with   • Follow-Up   • Rash       Subjective:     HPI:   Micheal Brothers is a 67 y.o. male presenting for    Follow up today for generalized GA  Trunk, legs almost clear, arms no better or worse  Taking Trental 400mg BID (started 8/27), using Betamethasone ointment  Prior rx: prednisone, hydroxychloroquine, minocycline & nicotinamide  Still minimal to no itching    HPI/location: spot on the upper back  Time present: year  Painful lesion: Yes  Itching lesion: Yes  Enlarging lesion: Yes  Anything make it better or worse? Draining, but keeps coming back    History of skin cancer: No  History of biopsies:No  History of blistering/severe sunburns:No  Family history of skin cancer:No  Family history of atypical moles:No      Rash   Pertinent negatives include no fever.       Past Medical History:   Diagnosis Date   • Back pain    • Chickenpox    • Depression    • GERD (gastroesophageal reflux disease)    • Citizen of [place] measles    • Gout    • Hyperlipidemia    • Influenza    • Panic disorder    • Sleep apnea    • Tobacco use 11/26/2014       Current Outpatient Medications on File Prior to Visit   Medication Sig Dispense Refill   • pentoxifylline CR (TRENTAL) 400 MG CR tablet TAKE 1 TABLET BY MOUTH TWICE DAILY 60 Tab 2   • atorvastatin (LIPITOR) 20 MG Tab Take 1 Tab by mouth every day. 90 Tab 3   • omeprazole (PRILOSEC) 40 MG delayed-release capsule TAKE 1 CAPSULE BY MOUTH  EVERY DAY 90 Cap 3   • DULoxetine (CYMBALTA) 60 MG Cap DR Particles delayed-release capsule Take 1 Cap by mouth every day. 90 Cap 3   • Sulfacetamide Sodium, Acne, 10 % Lotion Apply one daily 118 mL 6   • betamethasone dipropionate (DIPROLENE) 0.05 % Cream Apply 1 Application to affected area(s) 2 times a day. To area on hands as needed 30 g 2   • hydrOXYzine HCl (ATARAX) 25 MG Tab Take 1 Tab by mouth 3 times a day as needed for Anxiety. 30 Tab 0   • albuterol 108 (90 BASE) MCG/ACT Aero  Soln inhalation aerosol Inhale 2 Puffs by mouth every 6 hours as needed for Shortness of Breath. (Patient not taking: Reported on 5/4/2019) 8.5 g 3     No current facility-administered medications on file prior to visit.        Allergies   Allergen Reactions   • Zyban [Bupropion] Hives and Itching   • Augmentin Hives   • Metformin      Review of Systems   Constitutional: Negative for chills, fever and weight loss.   Skin: Positive for rash. Negative for itching.        Objective:     A focused cutaneous exam was completed including: hair, ears, face, eyelids, conjunctiva, lips, neck, chest, upper back, left and right upper extremities (including hands/digits and fingernails) left and right lower extremities with the following pertinent findings listed below. Remaining above-listed examined areas within normal limits / negative for rashes or lesions.      Physical Exam   Constitutional: He is oriented to person, place, and time and well-developed, well-nourished, and in no distress.   HENT:   Head: Normocephalic and atraumatic.       Right Ear: External ear normal.   Left Ear: External ear normal.   Nose: Nose normal.   Eyes: Conjunctivae are normal.   Neck: Normal range of motion.   Pulmonary/Chest: Effort normal.   Neurological: He is alert and oriented to person, place, and time.   Skin: Skin is warm and dry.        Psychiatric: Mood and affect normal.       DATA: none new    Assessment and Plan:     1. Granuloma annulare - generalized - several areas with increased activity  - cont trental 400mg BID; s/e increased bleeding, hypersensitivity discussed  - ilk today for arms, see below  - can cont topical betamethasone prn  - side effects of topical steroids discussed, including minimal systemic absorption, skin thinning, appearance of stretch marks (striae), easy bruising, telangiectasias, and possible increased hair growth     INTRALESIONAL KENALOG:  Discussed risks and benefits of intralesional kenalog  injections, including skin atrophy, discoloration, minimal risk of infection. Patient verbally agreed. ILK 5mg/cc x 3cc total injected into the lesions on the forearms/upper arm. Patient tolerated procedure well, no complications. Aftercare discussed.     2. Inflamed epidermoid cyst of skin, +pain  - patient to make appointment for procedure per his schedule    Following issues not discussed:  Hidradenitis, mild - groin, under control today  CNH  Favre and Racouchot syndrome  Acrochordons - neck, right axilla    Followup: Return for cyst removal.    Brittni Bravo M.D.

## 2019-11-06 ENCOUNTER — SLEEP CENTER VISIT (OUTPATIENT)
Dept: SLEEP MEDICINE | Facility: MEDICAL CENTER | Age: 69
End: 2019-11-06
Payer: MEDICARE

## 2019-11-06 VITALS
HEART RATE: 88 BPM | SYSTOLIC BLOOD PRESSURE: 134 MMHG | BODY MASS INDEX: 31.86 KG/M2 | HEIGHT: 67 IN | OXYGEN SATURATION: 96 % | WEIGHT: 203 LBS | DIASTOLIC BLOOD PRESSURE: 72 MMHG

## 2019-11-06 DIAGNOSIS — G47.33 OSA (OBSTRUCTIVE SLEEP APNEA): ICD-10-CM

## 2019-11-06 PROCEDURE — 99214 OFFICE O/P EST MOD 30 MIN: CPT | Performed by: NURSE PRACTITIONER

## 2019-11-06 NOTE — PROGRESS NOTES
CC:  Here for f/u sleep issues as listed below    HPI:   Micheal presents today for follow up obstructive sleep apnea.   Past medical history of retired , COPD, panic disorder, dyslipidemia, current smoker.  He is not interested in cutting back or quitting at this time.  His wife also smokes and she may be cutting back in the near future which may help him.  He understands the benefits of smoking cessation.    PSG from 2008 from outside source indicated an AHI of 85 and low oxygenation of 68%.  Currently he is being treated with CPAP @ [id number].  Compliance download from the dates 10/7/2019 - 11/5/2019 indicates he is wearing the device 100% for an avg of 8 hours and 48 minutes per night with a reduced AHI of 1.1.    He does tolerate pressure and mask well.  He wakes up refreshed and is less tired throughout the day. He naps, because he is retired. They deny morning H/A. He sleeps better overall. He will continue to clean supplies weekly and change them as insurance allows. [place] score is 2. BMI is 31.  Lost weight with low carb diet.       Patient Active Problem List    Diagnosis Date Noted   • Chronic obstructive pulmonary disease (HCC) 05/01/2019   • IGT (impaired glucose tolerance) 04/01/2019   • Panic disorder 06/18/2018   • NAHUM on CPAP 08/26/2016   • Obesity (BMI 30-39.9) 11/26/2014   • Dyslipidemia 11/26/2014   • GERD (gastroesophageal reflux disease) 11/26/2014   • Tobacco use 11/26/2014       Past Medical History:   Diagnosis Date   • Back pain    • Chickenpox    • Depression    • GERD (gastroesophageal reflux disease)    • Syriac measles    • Gout    • Hyperlipidemia    • Influenza    • Panic disorder    • Sleep apnea    • Tobacco use 11/26/2014       Past Surgical History:   Procedure Laterality Date   • APPENDECTOMY     • ARTHROSCOPY, KNEE     • CARPAL TUNNEL ENDOSCOPIC      bilateral   • CARPAL TUNNEL RELEASE     • SHOULDER DECOMPRESSION ARTHROSCOPIC      right   • TONSILLECTOMY          Family History   Problem Relation Age of Onset   • Cancer Mother    • Cancer Father    • Stroke Father    • Diabetes Neg Hx    • Heart Disease Neg Hx    • Hypertension Neg Hx        Social History     Socioeconomic History   • Marital status:      Spouse name: Not on file   • Number of children: Not on file   • Years of education: Not on file   • Highest education level: Not on file   Occupational History   • Not on file   Social Needs   • Financial resource strain: Not on file   • Food insecurity:     Worry: Not on file     Inability: Not on file   • Transportation needs:     Medical: Not on file     Non-medical: Not on file   Tobacco Use   • Smoking status: Current Every Day Smoker     Packs/day: 0.75     Years: 53.00     Pack years: 39.75     Types: Cigarettes   • Smokeless tobacco: Never Used   • Tobacco comment: started smoking at age 15   Substance and Sexual Activity   • Alcohol use: Yes     Alcohol/week: 0.6 oz     Types: 1 Cans of beer per week     Comment: rare   • Drug use: No   • Sexual activity: Yes     Partners: Female   Lifestyle   • Physical activity:     Days per week: Not on file     Minutes per session: Not on file   • Stress: Not on file   Relationships   • Social connections:     Talks on phone: Not on file     Gets together: Not on file     Attends Latter day service: Not on file     Active member of club or organization: Not on file     Attends meetings of clubs or organizations: Not on file     Relationship status: Not on file   • Intimate partner violence:     Fear of current or ex partner: Not on file     Emotionally abused: Not on file     Physically abused: Not on file     Forced sexual activity: Not on file   Other Topics Concern   • Not on file   Social History Narrative   • Not on file       Current Outpatient Medications   Medication Sig Dispense Refill   • pentoxifylline CR (TRENTAL) 400 MG CR tablet TAKE 1 TABLET BY MOUTH TWICE DAILY 60 Tab 2   • atorvastatin (LIPITOR)  "20 MG Tab Take 1 Tab by mouth every day. 90 Tab 3   • omeprazole (PRILOSEC) 40 MG delayed-release capsule TAKE 1 CAPSULE BY MOUTH  EVERY DAY 90 Cap 3   • DULoxetine (CYMBALTA) 60 MG Cap DR Particles delayed-release capsule Take 1 Cap by mouth every day. 90 Cap 3   • Sulfacetamide Sodium, Acne, 10 % Lotion Apply one daily 118 mL 6   • betamethasone dipropionate (DIPROLENE) 0.05 % Cream Apply 1 Application to affected area(s) 2 times a day. To area on hands as needed 30 g 2   • hydrOXYzine HCl (ATARAX) 25 MG Tab Take 1 Tab by mouth 3 times a day as needed for Anxiety. 30 Tab 0   • albuterol 108 (90 BASE) MCG/ACT Aero Soln inhalation aerosol Inhale 2 Puffs by mouth every 6 hours as needed for Shortness of Breath. (Patient not taking: Reported on 5/4/2019) 8.5 g 3     No current facility-administered medications for this visit.           Allergies: Zyban [bupropion]; Augmentin; and Metformin      ROS   Gen: Denies fever, chills, unintentional weight loss, fatigue  Resp:Denies Dyspnea  CV: Denies chest pain, chest tightness  Sleep:Denies morning headache, insomnia, daytime somnolence, snoring, gasping for air, apnea  Neuro: Denies frequent headaches, weakness, dizziness  See HPI.  All other systems reviewed and negative        Vital signs for this encounter:  Vitals:    11/06/19 1538   Height: 1.702 m (5' 7\")   Weight: 92.1 kg (203 lb)   Weight % change since last entry.: 0 %   BP: 134/72   Pulse: 88   BMI (Calculated): 31.79                   Physical Exam:   Gen:         Alert and oriented, No apparent distress.   Neck:        No Lymphadenopathy.  Lungs:     Clear to auscultation bilaterally.    CV:          Regular rate and rhythm. No murmurs, rubs or gallops.   Abd:         Soft non tender, non distended.            Ext:          No clubbing, cyanosis, edema.    Assessment   1. NAHUM (obstructive sleep apnea)  DME Mask and Supplies   2. BMI 31.0-31.9,adult  OBESITY COUNSELING (No Charge): Patient identified as having " weight management issue.  Appropriate orders and counseling given.       Patient is clinically stable and will proceed with following plan.  He would like to look into CPAP and More to obtain his supplies in the future.    PLAN:   Patient Instructions   1) Continue CPAP at [id number]  2) Clean mask and supplies weekly and change them as insurance allows  3) Light conditioning encouraged  4) Encourage  smoking cessation   4) Vaccines: Up to date with Prevnar 13, Pneumovax 23, flu  5) Return in about 1 year (around 11/6/2020) for follow up with [facility], if not sooner, Compliance.

## 2019-11-07 NOTE — PATIENT INSTRUCTIONS
1) Continue CPAP at [id number]  2) Clean mask and supplies weekly and change them as insurance allows  3) Light conditioning encouraged  4) Encourage  smoking cessation   4) Vaccines: Up to date with Prevnar 13, Pneumovax 23, flu  5) Return in about 1 year (around 11/6/2020) for follow up with [facility], if not sooner, Compliance.

## 2019-11-13 ENCOUNTER — OFFICE VISIT (OUTPATIENT)
Dept: DERMATOLOGY | Facility: IMAGING CENTER | Age: 69
End: 2019-11-13
Payer: MEDICARE

## 2019-11-13 VITALS
HEIGHT: 67 IN | TEMPERATURE: 98.6 F | DIASTOLIC BLOOD PRESSURE: 60 MMHG | BODY MASS INDEX: 30.92 KG/M2 | SYSTOLIC BLOOD PRESSURE: 112 MMHG | WEIGHT: 197 LBS

## 2019-11-13 DIAGNOSIS — R20.8 SKIN PAIN: ICD-10-CM

## 2019-11-13 DIAGNOSIS — L72.3 INFLAMED EPIDERMOID CYST OF SKIN: ICD-10-CM

## 2019-11-13 PROCEDURE — 11403 EXC TR-EXT B9+MARG 2.1-3CM: CPT | Performed by: DERMATOLOGY

## 2019-11-13 PROCEDURE — 12032 INTMD RPR S/A/T/EXT 2.6-7.5: CPT | Performed by: DERMATOLOGY

## 2019-11-15 ENCOUNTER — TELEPHONE (OUTPATIENT)
Dept: DERMATOLOGY | Facility: IMAGING CENTER | Age: 69
End: 2019-11-15

## 2019-11-16 NOTE — TELEPHONE ENCOUNTER
Pt and his wife called asking for wound care instructions, all questions answered and they understood.

## 2019-11-25 ENCOUNTER — NON-PROVIDER VISIT (OUTPATIENT)
Dept: DERMATOLOGY | Facility: IMAGING CENTER | Age: 69
End: 2019-11-25
Payer: MEDICARE

## 2019-11-25 NOTE — PROGRESS NOTES
Micheal Brothers is a 69 y.o. male here for a Non-Provider Visit for Suture Removal.    Sutures were placed by Dr. Bravo on date: 11/13/19  Skin is healed: Yes  Provider notified if skin is not healed, or if there is redness, heat, pain, or drainage from incision: N\A  Sutures removed.   Mastisol and steristips are placed: Yes    Advised to use emollient (vaseline, aquaphor, etc.) as needed, avoid peroxide and antibiotic ointment to reduce irritation.     Path report has been reviewed by provider.  Path report has reviewed with patient.

## 2020-01-02 ENCOUNTER — OFFICE VISIT (OUTPATIENT)
Dept: DERMATOLOGY | Facility: IMAGING CENTER | Age: 70
End: 2020-01-02
Payer: MEDICARE

## 2020-01-02 DIAGNOSIS — L92.0 GA (GRANULOMA ANNULARE): ICD-10-CM

## 2020-01-02 DIAGNOSIS — D18.01 CHERRY ANGIOMA: ICD-10-CM

## 2020-01-02 DIAGNOSIS — L81.4 LENTIGINES: ICD-10-CM

## 2020-01-02 DIAGNOSIS — L82.1 SEBORRHEIC KERATOSES: ICD-10-CM

## 2020-01-02 DIAGNOSIS — Z12.83 SKIN CANCER SCREENING: ICD-10-CM

## 2020-01-02 PROCEDURE — 99214 OFFICE O/P EST MOD 30 MIN: CPT | Performed by: DERMATOLOGY

## 2020-01-02 RX ORDER — PENTOXIFYLLINE 400 MG/1
TABLET, EXTENDED RELEASE ORAL
Qty: 60 TAB | Refills: 2 | Status: SHIPPED | OUTPATIENT
Start: 2020-01-02 | End: 2020-04-13

## 2020-01-02 ASSESSMENT — ENCOUNTER SYMPTOMS
CHILLS: 0
WEIGHT LOSS: 0
FEVER: 0

## 2020-01-02 NOTE — PROGRESS NOTES
Dermatology New Patient Visit    Chief Complaint   Patient presents with   • Establish Care       Subjective:     HPI:   Micheal Brothers is a 67 y.o. male presenting for    Follow up today for generalized GA and RONY.  Denies new, growing, changing, itching or bleeding skin lesions today.  GA well controlled on Trental per pt. Trunk, legs almost clear, arms stable, denies new lesions. Denies itching or symptoms.  Taking Trental 400mg BID (started 8/27), using Betamethasone ointment  Prior rx: prednisone, hydroxychloroquine, minocycline & nicotinamide    History of skin cancer: No  History of biopsies:No  History of blistering/severe sunburns:No  Family history of skin cancer:No  Family history of atypical moles:No      Rash   Pertinent negatives include no fever.       Past Medical History:   Diagnosis Date   • Back pain    • Chickenpox    • Depression    • GERD (gastroesophageal reflux disease)    • Amharic measles    • Gout    • Hyperlipidemia    • Influenza    • Panic disorder    • Sleep apnea    • Tobacco use 11/26/2014       Current Outpatient Medications on File Prior to Visit   Medication Sig Dispense Refill   • pentoxifylline CR (TRENTAL) 400 MG CR tablet TAKE 1 TABLET BY MOUTH TWICE DAILY 60 Tab 2   • atorvastatin (LIPITOR) 20 MG Tab Take 1 Tab by mouth every day. 90 Tab 3   • omeprazole (PRILOSEC) 40 MG delayed-release capsule TAKE 1 CAPSULE BY MOUTH  EVERY DAY 90 Cap 3   • DULoxetine (CYMBALTA) 60 MG Cap DR Particles delayed-release capsule Take 1 Cap by mouth every day. 90 Cap 3   • Sulfacetamide Sodium, Acne, 10 % Lotion Apply one daily 118 mL 6   • betamethasone dipropionate (DIPROLENE) 0.05 % Cream Apply 1 Application to affected area(s) 2 times a day. To area on hands as needed 30 g 2   • hydrOXYzine HCl (ATARAX) 25 MG Tab Take 1 Tab by mouth 3 times a day as needed for Anxiety. 30 Tab 0   • albuterol 108 (90 BASE) MCG/ACT Aero Soln inhalation aerosol Inhale 2 Puffs by mouth every 6 hours as needed  for Shortness of Breath. (Patient not taking: Reported on 5/4/2019) 8.5 g 3     No current facility-administered medications on file prior to visit.        Allergies   Allergen Reactions   • Zyban [Bupropion] Hives and Itching   • Augmentin Hives   • Metformin      Review of Systems   Constitutional: Negative for chills, fever and weight loss.   Skin: Positive for rash. Negative for itching.        Objective:     Constitutional: Well-developed, well-nourished, and in no distress.   HENT:   Head: normocephalic and atraumatic.  Right Ear: External ear normal.   Left Ear: External ear normal.   Nose: Nose normal.   Mouth/Throat: Oropharynx is clear and moist.   Eyes: Conjunctivae and lids are normal.   Neck: Normal range of motion. Neck supple.   Pulmonary/Chest: Effort normal.   Neurological: Alert and oriented.    A total body skin exam was performed including the scalp, hair, face, eyelids, nose, lips, oral cavity, ears, neck, back, buttocks, chest, abdomen, bilateral upper and lower extremities including hands, feet, digits and nails, excluding genital exam (declined by patient). Notable findings on exam today listed below. Remaining above-listed examined areas within normal limits / negative for rashes or lesions.    -Bilateral arms >>> Legs with annular papules and plaques with no scale/overlying surface change  -sun exposed skin of trunk and b/l upper and lower extremities with scattered clinically benign light brown reticulated macules all of which were morphologically similar and none of which were suspicious for skin cancer today on exam  -trunk and extremities with cherry red macules and papules  -trunk and extremities with scattered brown-grey, waxy, hyperkeratotic papules and plaques  -upper back: with well healed scar(s) and no evidence of recurrence on inspection or palpation    DATA: none new    Assessment and Plan:       1. GA (granuloma annulare) - stable today  - cont trental 400mg BID; s/e increased  bleeding, hypersensitivity discussed  - will hold off on ILK at this time, pt to rtc for flaring or new lesions and will tx with ILK  - can cont topical betamethasone prn  - side effects of topical steroids discussed, including minimal systemic absorption, skin thinning, appearance of stretch marks (striae), easy bruising, telangiectasias, and possible increased hair growth     2. Lentigines  - Discussed benign nature of lesions, related to sun exposure  - Discussed sun protection    3. Seborrheic keratoses  -nature of the diagnosis was discussed in detail  -clinically benign today on exam, reassurance was given  -continue to monitor for any changes, pt to call if any changes occur    4. Cherry angioma  -nature of the diagnosis was discussed in detail  -clinically benign today on exam, reassurance was given  -continue to monitor for any changes, pt to call if any changes occur    5. Skin cancer screening  Skin cancer education  - discussed importance of sun protective clothing, eyewear  - discussed importance of daily use of broad spectrum sunscreen with SPF 30 or greater, as well as need for reapplication ~every 2 hours when exposed to UVR  - discussed importance of regular self-exams, ideally once per month, every 12 months exams in clinic  - ABCDE's of melanoma discussed  - patient to bring any new or concerning lesions to my attention      Followup: As needed for GA flaring or 1 year for RONY, as needed for any new or changing skin lesions.    Vani Cho M.D.

## 2020-03-12 ENCOUNTER — OFFICE VISIT (OUTPATIENT)
Dept: DERMATOLOGY | Facility: IMAGING CENTER | Age: 70
End: 2020-03-12
Payer: MEDICARE

## 2020-03-12 DIAGNOSIS — L91.8 SKIN TAGS, MULTIPLE ACQUIRED: ICD-10-CM

## 2020-03-12 DIAGNOSIS — L92.0 GA (GRANULOMA ANNULARE): ICD-10-CM

## 2020-03-12 PROCEDURE — 11900 INJECT SKIN LESIONS </W 7: CPT | Performed by: DERMATOLOGY

## 2020-03-12 NOTE — PROGRESS NOTES
HPI: Patient returns for follow up for management of GA  that is not better but not worse, wants ILK injections..  Last visit: 1/2/2020  Duration of rash: previously dx GA (2012, [place])  Symptoms: itching  Current treatment: Taking Trental 400mg BID (started 8/27), using Betamethasone ointment  Prior rx: prednisone, hydroxychloroquine, minocycline & nicotinamide      Dermatology  Intralesional Kenalog Injection Operative Report     Preoperative Diagnosis:  generalized granuloma annulare  Postoperative Diagnosis:  Same  Surgeon: Vani Cho MD  Location: bilateral arms  Indication: pruritus     Procedure: Informed consent was obtained.  The procedure, risks and complications including scar, bleeding, discoloration, atrophy, infection, recurrence were explained in detail and understood by the patient.  The area(s) above was infiltrated with 5mg/ml of kenalog. Total amount injected: 2.5 ml. The patient tolerated the procedure well.       Estimated Blood Loss: Minimal  Complications: None    Return To Clinic: as needed for ILK      NO CHARGE - COSMETIC VISIT PAID CASH  Cosmetic Skin Tag Removal  Operative Note    Provider: Vani Cho MD  Diagnosis: Skin tags and SK's  Indication: Cosmetic  Locations: neck and right chest    Exam: neck with pedunculated skin colored and hyperpigmented soft papules and right chest with stuck on waxy brown papule    CRYOTHERAPY:  Risks (including, but not limited to: hypo or hyperpigmentation, redness, blister, blood blister, recurrence, need for further treatment, infection, scar) and benefits of cryotherapy discussed. Patient verbally agreed to proceed with treatment. 2 cryotherapy freeze thaw cycles of 10 seconds were applied to 10 lesion on neck and right chest with cryac. Patient tolerated procedure well. Aftercare instructions given.    Total number of lesions treated: 10    Return to clinic as scheduled.    Vani Cho M.D.

## 2020-03-13 ENCOUNTER — TELEPHONE (OUTPATIENT)
Dept: PULMONOLOGY | Facility: HOSPICE | Age: 70
End: 2020-03-13

## 2020-03-13 NOTE — TELEPHONE ENCOUNTER
Caller: Jak    Phone Number: [phone] (home)     Message: Pt called and was requesting his SS results to be faxed to [facility].  Pt said they lost his SS results. Notified pt I will refax his SS results to them.      Faxed SS results to [facility] fax#[phone]

## 2020-04-13 RX ORDER — PENTOXIFYLLINE 400 MG/1
TABLET, EXTENDED RELEASE ORAL
Qty: 60 TAB | Refills: 2 | Status: SHIPPED | OUTPATIENT
Start: 2020-04-13 | End: 2020-08-04

## 2020-04-13 NOTE — TELEPHONE ENCOUNTER
Received request via: Pharmacy    Was the patient seen in the last year in this department? Yes    Does the patient have an active prescription (recently filled or refills available) for medication(s) requested? YES

## 2020-04-22 DIAGNOSIS — F41.0 PANIC DISORDER: ICD-10-CM

## 2020-04-22 DIAGNOSIS — K21.9 GASTROESOPHAGEAL REFLUX DISEASE WITHOUT ESOPHAGITIS: ICD-10-CM

## 2020-04-22 RX ORDER — DULOXETIN HYDROCHLORIDE 60 MG/1
CAPSULE, DELAYED RELEASE ORAL
Qty: 90 CAP | Refills: 3 | Status: SHIPPED | OUTPATIENT
Start: 2020-04-22 | End: 2021-03-29 | Stop reason: SDUPTHER

## 2020-04-22 RX ORDER — OMEPRAZOLE 40 MG/1
CAPSULE, DELAYED RELEASE ORAL
Qty: 90 CAP | Refills: 3 | Status: SHIPPED | OUTPATIENT
Start: 2020-04-22 | End: 2021-03-29 | Stop reason: SDUPTHER

## 2020-07-29 ENCOUNTER — OFFICE VISIT (OUTPATIENT)
Dept: DERMATOLOGY | Facility: IMAGING CENTER | Age: 70
End: 2020-07-29
Payer: MEDICARE

## 2020-07-29 DIAGNOSIS — E78.5 DYSLIPIDEMIA: ICD-10-CM

## 2020-07-29 DIAGNOSIS — L92.0 GA (GRANULOMA ANNULARE): ICD-10-CM

## 2020-07-29 PROCEDURE — 11900 INJECT SKIN LESIONS </W 7: CPT | Performed by: DERMATOLOGY

## 2020-07-29 NOTE — PROGRESS NOTES
DERMATOLOGY FOLLOW UP    Follow up GA , some improvement but not clear. Recently had flare up lower legs. Was using cortisone 10 OTC and benadryl .   Discuss possibly more kenalog injections.   Duration of rash: previously dx GA (2012, [place])  Symptoms: itching  Current treatment: Taking Trental 400mg BID (started 8/27), using hydrocortisone OTC  Prior rx: prednisone, hydroxychloroquine, minocycline & nicotinamide      Dermatology  Intralesional Kenalog Injection Operative Report     Preoperative Diagnosis:  generalized granuloma annulare  Postoperative Diagnosis:  Same  Surgeon: Vani Cho MD  Location: bilateral arms and bilateral lower legs  Indication: pruritus     Procedure: Informed consent was obtained.  The procedure, risks and complications including scar, bleeding, discoloration, atrophy, infection, recurrence were explained in detail and understood by the patient.  The area(s) above was infiltrated with 5mg/ml of kenalog. Total amount injected: 2.75 ml. The patient tolerated the procedure well.       Estimated Blood Loss: Minimal  Complications: None    Return To Clinic: as needed for ILK    Vani Cho M.D.

## 2020-07-30 RX ORDER — ATORVASTATIN CALCIUM 20 MG/1
20 TABLET, FILM COATED ORAL DAILY
Qty: 90 TAB | Refills: 3 | Status: SHIPPED | OUTPATIENT
Start: 2020-07-30 | End: 2021-03-29 | Stop reason: SDUPTHER

## 2020-08-04 RX ORDER — PENTOXIFYLLINE 400 MG/1
TABLET, EXTENDED RELEASE ORAL
Qty: 60 TAB | Refills: 2 | Status: SHIPPED | OUTPATIENT
Start: 2020-08-04 | End: 2020-11-17

## 2020-11-13 ENCOUNTER — SLEEP CENTER VISIT (OUTPATIENT)
Dept: SLEEP MEDICINE | Facility: MEDICAL CENTER | Age: 70
End: 2020-11-13
Payer: MEDICARE

## 2020-11-13 VITALS
DIASTOLIC BLOOD PRESSURE: 80 MMHG | HEIGHT: 67 IN | RESPIRATION RATE: 16 BRPM | SYSTOLIC BLOOD PRESSURE: 118 MMHG | HEART RATE: 92 BPM | BODY MASS INDEX: 33.74 KG/M2 | WEIGHT: 215 LBS | OXYGEN SATURATION: 97 %

## 2020-11-13 DIAGNOSIS — E66.9 OBESITY (BMI 30-39.9): ICD-10-CM

## 2020-11-13 DIAGNOSIS — G47.33 OSA ON CPAP: ICD-10-CM

## 2020-11-13 PROCEDURE — 99213 OFFICE O/P EST LOW 20 MIN: CPT | Performed by: INTERNAL MEDICINE

## 2020-11-13 ASSESSMENT — FIBROSIS 4 INDEX: FIB4 SCORE: 1.885618083164126731

## 2020-11-13 NOTE — PROGRESS NOTES
Chief Complaint   Patient presents with   • Apnea     Last Seen 11/06/19       HPI: This patient is a 70 y.o. male who returns for annual follow-up of obstructive sleep apnea. PSG 2008 showed severe NAHUM with AHI: 85/h and he has been on CPAP: 12 cm of water since then.  Compliance card confirms 100% CPAP usage for 9 hours nightly.  Average AHI is 1.5.  He replaces his face mask every 6 months.  He sleeps well and admits he wears CPAP even with  naps.  Weight has been stable.      Past Medical History:   Diagnosis Date   • Back pain    • Chickenpox    • Depression    • GERD (gastroesophageal reflux disease)    • [place] measles    • Gout    • Hyperlipidemia    • Influenza    • Panic disorder    • Sleep apnea    • Tobacco use 11/26/2014       Social History     Socioeconomic History   • Marital status:      Spouse name: Not on file   • Number of children: Not on file   • Years of education: Not on file   • Highest education level: Not on file   Occupational History   • Not on file   Social Needs   • Financial resource strain: Not on file   • Food insecurity     Worry: Not on file     Inability: Not on file   • Transportation needs     Medical: Not on file     Non-medical: Not on file   Tobacco Use   • Smoking status: Current Every Day Smoker     Packs/day: 0.75     Years: 53.00     Pack years: 39.75     Types: Cigarettes   • Smokeless tobacco: Never Used   • Tobacco comment: started smoking at age 15   Substance and Sexual Activity   • Alcohol use: Yes     Alcohol/week: 0.6 oz     Types: 1 Cans of beer per week     Comment: rare   • Drug use: No   • Sexual activity: Yes     Partners: Female   Lifestyle   • Physical activity     Days per week: Not on file     Minutes per session: Not on file   • Stress: Not on file   Relationships   • Social connections     Talks on phone: Not on file     Gets together: Not on file     Attends Christian service: Not on file     Active member of club or organization: Not on file      Attends meetings of clubs or organizations: Not on file     Relationship status: Not on file   • Intimate partner violence     Fear of current or ex partner: Not on file     Emotionally abused: Not on file     Physically abused: Not on file     Forced sexual activity: Not on file   Other Topics Concern   • Not on file   Social History Narrative   • Not on file       Family History   Problem Relation Age of Onset   • Cancer Mother    • Cancer Father    • Stroke Father    • Diabetes Neg Hx    • Heart Disease Neg Hx    • Hypertension Neg Hx        Current Outpatient Medications on File Prior to Visit   Medication Sig Dispense Refill   • pentoxifylline CR (TRENTAL) 400 MG CR tablet TAKE 1 TABLET BY MOUTH TWICE DAILY 60 Tab 2   • atorvastatin (LIPITOR) 20 MG Tab Take 1 Tab by mouth every day. 90 Tab 3   • DULoxetine (CYMBALTA) 60 MG Cap DR Particles delayed-release capsule TAKE 1 CAPSULE BY MOUTH  EVERY DAY 90 Cap 3   • omeprazole (PRILOSEC) 40 MG delayed-release capsule TAKE 1 CAPSULE BY MOUTH  EVERY DAY 90 Cap 3   • Sulfacetamide Sodium, Acne, 10 % Lotion Apply one daily 118 mL 6   • betamethasone dipropionate (DIPROLENE) 0.05 % Cream Apply 1 Application to affected area(s) 2 times a day. To area on hands as needed 30 g 2   • hydrOXYzine HCl (ATARAX) 25 MG Tab Take 1 Tab by mouth 3 times a day as needed for Anxiety. 30 Tab 0   • albuterol 108 (90 BASE) MCG/ACT Aero Soln inhalation aerosol Inhale 2 Puffs by mouth every 6 hours as needed for Shortness of Breath. (Patient not taking: Reported on 5/4/2019) 8.5 g 3     No current facility-administered medications on file prior to visit.        Allergies: Zyban [bupropion], Augmentin, and Metformin    ROS:   Constitutional: Denies fevers, chills, night sweats, fatigue or weight loss  Eyes: Denies vision loss, pain, drainage, double vision  Ears, Nose, Throat: Denies earache, difficulty hearing, tinnitus, nasal congestion, hoarseness  Cardiovascular: Denies chest pain,  "tightness, palpitations, orthopnea or edema  Respiratory: Denies shortness of breath, cough, wheezing, hemoptysis  Sleep: Denies daytime sleepiness, snoring, apneas, insomnia, morning headaches  GI: Denies heartburn, dysphagia, nausea, abdominal pain, diarrhea or constipation  : Denies frequent urination, hematuria, discharge or painful urination  Musculoskeletal: Denies back pain, painful joints, sore muscles  Neurological: Denies weakness or headaches  Skin: No rashes    /80 (BP Location: Right arm, Patient Position: Sitting, BP Cuff Size: Adult)   Pulse 92   Resp 16   Ht 1.702 m (5' 7\")   Wt 97.5 kg (215 lb)   SpO2 97%     Physical Exam:  Appearance: Well-nourished, well-developed, in no acute distress  HEENT: Normocephalic, atraumatic, white sclera, PERRLA, Mallampati 4  Neck: No masses  Respiratory: no intercostal retractions or accessory muscle use  Lungs auscultation: no wheezing  Cardiovascular:No LE edema  Abdomen: Obess  Gait: Normal  Digits: No clubbing, cyanosis  Motor: No tremors  Orientation: Oriented to time, person and place    Diagnosis:  1. NAHUM on CPAP  DME Mask and Supplies   2. Obesity (BMI 30-39.9)         Plan:  Jak shows excellent compliance and response to CPAP therapy and is benefiting from treatment.  His AHI is normal on CPAP.    Continue CPAP: 12 cm of water.    Replace CPAP mask every 3 months.  Prescription submitted to DME for CPAP supplies for the following year.  Return in about 1 year (around 11/13/2021).      "

## 2020-11-17 RX ORDER — PENTOXIFYLLINE 400 MG/1
TABLET, EXTENDED RELEASE ORAL
Qty: 60 TAB | Refills: 2 | Status: SHIPPED | OUTPATIENT
Start: 2020-11-17 | End: 2021-01-20

## 2021-01-14 NOTE — TELEPHONE ENCOUNTER
PA that was submitted to [facility] on 11/6/17 for sulfacetamide lotion has been denied as the FDA does not cover this medication for use of a rash.  Appears patient was referred to Dermatology in April.  I will call [facility] to see how much cash price will be for patient and discuss options to speak with Dermatology about alternative if needed.    Pulmonary [facility]                             Pulmonary Consult/Progress Note :                Reason for Consultation:MERI   CC : SOB   HPI:   Marissa Thompson is a 42y.o. year old with MERI and on BiPAP 18/13 presented with worsening SOB and leg swelling and he is known to smoke and has about 30 PPY smoking history     Mr Moon Marroquin presented to [facility] ED on 09/26/2020 with complaints of dysnpea and edema. n, he has been having worsening RINALDI, orthopnea, and PND over a month. He states that he \"is thirsty all the time\" and has been increasing his fluid intake. He states that he has not been taking his diuretic much over the past month     He Complains of edema to BLE over the past month as well. States that he has gained more than 30 pounds in one month. Upon arrival to the ED his VS were -/112-95% on RA. EKG ST. WBC 15.5. H&H 12.7/40.4. BUN/SCr 17/101. Troponin <0.01. ProBNP 103. CXR was unremarkable. He received NTP 1/2\", Lasix 40mg IV, and IV Hydralazine 10mg. He was admitted to a telemetry monitored unit. Pulmonology was consulted.        Today Visit  He states that his SOB has improved   He is on Bumex 2 mg dialy  States that there is no swelling   Has sleep titration in October and new setting ordered       PAST MEDICAL HISTORY:     Past Medical History:   Diagnosis Date    Asthma     CHF (congestive heart failure) ([address]     Depression     Diabetes mellitus (HCC)     Dyspnea     HFrEF (heart failure with reduced ejection fraction) ([address] 10/02/2020    9/30/2020- echo- LVEF 60-65%, mild LVH, mild MR, LVDD: 5.1, RVDD: 3.6    Hyperlipidemia     Hypertension     Morbid obesity due to excess calories ([address]     Obstructive sleep apnea     cpap    Osteoarthritis     Panic attacks        PAST SURGICAL HISTORY:   Past Surgical History:   Procedure Laterality Date    ANKLE SURGERY Right 1998    six screws placed, lateral aspect    PROSTATE BIOPSY N/A 2020    TRANSRECTAL ULTRASOUND GUIDED PROSTATE BIOPSY---FORTEC performed by Luis F Griffin DO at [facility] HISTORY:   Family History   Problem Relation Age of Onset    Diabetes Maternal Grandmother        SOCIAL HISTORY:   Social History     Socioeconomic History    Marital status: Single     Spouse name: Not on file    Number of children: Not on file    Years of education: Not on file    Highest education level: Not on file   Occupational History    Not on file   Social Needs    Financial resource strain: Not on file    Food insecurity     Worry: Not on file     Inability: Not on file    Transportation needs     Medical: Not on file     Non-medical: Not on file   Tobacco Use    Smoking status: Former Smoker     Packs/day: 0.50     Years: 22.00     Pack years: 11.00     Types: Cigarettes     Quit date: 3/1/2020     Years since quittin.8    Smokeless tobacco: Never Used   Substance and Sexual Activity    Alcohol use: Not Currently    Drug use: Not Currently     Types: Marijuana     Comment: medical marijuana    Sexual activity: Not on file   Lifestyle    Physical activity     Days per week: Not on file     Minutes per session: Not on file    Stress: Not on file   Relationships    Social connections     Talks on phone: Not on file     Gets together: Not on file     Attends Congregation service: Not on file     Active member of club or organization: Not on file     Attends meetings of clubs or organizations: Not on file     Relationship status: Not on file    Intimate partner violence     Fear of current or ex partner: Not on file     Emotionally abused: Not on file     Physically abused: Not on file     Forced sexual activity: Not on file   Other Topics Concern    Not on file   Social History Narrative    Not on file     Social History     Tobacco Use   Smoking Status Former Smoker    Packs/day: 0.50    Years: 22.00    Pack years: 11.00    Types: Cigarettes    Quit date: 3/1/2020    Years since quittin.8   Smokeless Tobacco Never Used     Social History     Substance and Sexual Activity   Alcohol Use Not Currently     Social History     Substance and Sexual Activity   Drug Use Not Currently    Types: Marijuana    Comment: medical marijuana           HOME MEDICATIONS:  Prior to Admission medications    Medication Sig Start Date End Date Taking? Authorizing Provider   metoprolol succinate (TOPROL XL) 100 MG extended release tablet Take 1 tablet by mouth 2 times daily 12/15/20   Jg Chu APRN - CNP   bumetanide Kelton Kwame) 2 MG tablet Take 1 tablet by mouth daily 10/14/20   Jg Chu APRN - CNP   clonazePAM (KLONOPIN) 0.5 MG tablet Take 1 tablet by mouth daily as needed for Anxiety for up to 3 days.  10/2/20 10/14/20  Avril Gibson,    sertraline (ZOLOFT) 50 MG tablet Take 1 tablet by mouth daily 10/2/20   Eileen Gibson,    traZODone (DESYREL) 50 MG tablet Take 1 tablet by mouth nightly 10/2/20 11/1/20  Avril Gibson,    Liraglutide (VICTOZA) 18 MG/3ML SOPN SC injection Inject 0.6 mg into the skin daily 10/2/20   Avril Gibson,    Dextromethorphan-guaiFENesin (MUCINEX DM MAXIMUM STRENGTH)  MG TB12 Take 1 tablet by mouth every 12 hours as needed (cough and congestion) 10/2/20   Avril Gibson DO   topiramate (TOPAMAX) 50 MG tablet Take 50 mg by mouth 2 times daily    Historical Provider, MD   pantoprazole (PROTONIX) 40 MG tablet Take 40 mg by mouth daily    Historical Provider, MD   Cholecalciferol (VITAMIN D3) 1.25 MG (00088 UT) CAPS Take 1 capsule by mouth once a week Patient takes on     Historical Provider, MD   predniSONE (DELTASONE) 20 MG tablet Take 20 mg by mouth 2 times daily    Historical Provider, MD   cyanocobalamin 1000 MCG tablet Take 1,000 mcg by mouth daily    Historical Provider, MD   ondansetron (ZOFRAN) 4 MG tablet Take 4 mg by mouth every 8 hours as needed for Nausea or Vomiting    Historical Provider, MD Melatonin 10 MG TABS Take 10 mg by mouth as needed    Historical Provider, MD   ferrous sulfate (IRON 325) 325 (65 Fe) MG tablet Take 325 mg by mouth daily     Historical Provider, MD   gabapentin (NEURONTIN) 600 MG tablet Take 800 mg by mouth 3 times daily. Historical Provider, MD   acetaminophen (TYLENOL) 325 MG tablet Take 650 mg by mouth every 6 hours as needed for Pain    Historical Provider, MD   aspirin 81 MG chewable tablet Take 1 tablet by mouth daily 8/28/18   Tavo Mancia MD   spironolactone (ALDACTONE) 25 MG tablet Take 25 mg by mouth 2 times daily    Historical Provider, MD   methocarbamol (ROBAXIN) 500 MG tablet Take 500 mg by mouth 2 times daily     Historical Provider, MD   albuterol sulfate (PROAIR RESPICLICK) 293 (90 Base) MCG/ACT aerosol powder inhalation 2 puffs every 4 hours as needed for Wheezing or Shortness of Breath    Historical Provider, MD       CURRENT MEDICATIONS:  No current facility-administered medications for this visit. IV MEDICATIONS:      ALLERGIES:  Allergies   Allergen Reactions    Food Hives     Eleni       REVIEW OF SYSTEMS:  General ROS:  No weight loss ,no fatigue     ENT ROS:   No Sore throat ,no lymphoadenopathy,no nasal stuffiness     Hematological and Lymphatic ROS:   No ecchymosis ,no tendency to bleed  Respiratory ROS:   SOB   Cardiovascular ROS:   No CP,No Palpitation   Gastrointestinal ROS:   No Gi bleed,no nausea or vomiting      - Musculoskeletal ROS:      - no joint swelling ,no joint pain   Neurological ROS:     -no weakness or numbness    Dermatological ROS:   No skin rash ,no urticaria     PHYSICAL EXAMINATION:     VITAL SIGNS:  There were no vitals taken for this visit.   Wt Readings from Last 3 Encounters:   12/11/20 (!) 514 lb (233.1 kg)   12/08/20 (!) 522 lb (236.8 kg)   12/07/20 (!) 519 lb (235.4 kg)     Temp Readings from Last 3 Encounters:   10/02/20 96.9 °F (36.1 °C)   06/19/20 97.2 °F (36.2 °C)   01/20/20 97.7 °F (36.5 °C)     TMAX:  BP Readings from Last 3 Encounters:   12/11/20 123/79   12/08/20 135/87   12/07/20 117/68     Pulse Readings from Last 3 Encounters:   12/11/20 92   12/08/20 94   12/07/20 89           INTAKE/OUTPUTS:  No intake/output data recorded. No intake or output data in the 24 hours ending 01/14/21 1128    This is virtual visit  LABS/IMAGING:    CBC:  Lab Results   Component Value Date    WBC 13.7 (H) 09/30/2020    HGB 12.4 (L) 09/30/2020    HCT 39.1 09/30/2020    MCV 85.6 09/30/2020     09/30/2020    LYMPHOPCT 25.9 09/26/2020    RBC 4.57 09/30/2020    MCH 27.1 09/30/2020    MCHC 31.7 (L) 09/30/2020    RDW 16.4 (H) 09/30/2020    NEUTOPHILPCT 66.1 09/26/2020    MONOPCT 5.6 09/26/2020    BASOPCT 0.3 09/26/2020    NEUTROABS 10.25 (H) 09/26/2020    LYMPHSABS 4.01 (H) 09/26/2020    MONOSABS 0.86 09/26/2020    EOSABS 0.24 09/26/2020    BASOSABS 0.04 09/26/2020       No results for input(s): WBC, HGB, HCT, MCV, PLT in the last 720 hours. BMP:   No results for input(s): NA, K, CL, CO2, PHOS, BUN, CREATININE in the last 72 hours. Invalid input(s): CA    MG:   Lab Results   Component Value Date    MG 2.0 09/28/2020     Ca/Phos:   Lab Results   Component Value Date    CALCIUM 9.2 12/11/2020     Amylase: No results found for: AMYLASE  Lipase:   Lab Results   Component Value Date    LIPASE 17 01/21/2019     LIVER PROFILE:   No results for input(s): AST, ALT, LIPASE, BILIDIR, BILITOT, ALKPHOS in the last 72 hours. Invalid input(s): AMYLASE,  ALB    PT/INR: No results for input(s): PROTIME, INR in the last 72 hours. APTT: No results for input(s): APTT in the last 72 hours.     Cardiac Enzymes:  Lab Results   Component Value Date    TROPONINI <0.01 09/26/2020                   PROBLEM LIST:  Patient Active Problem List   Diagnosis    Morbid obesity due to excess calories (HCC)    Chronic diastolic [facility] failure [facility]    Essential hypertension    Type 2 diabetes mellitus without complication, without long-term current use of insulin ([address]    Mixed hyperlipidemia    Obstructive sleep apnea    Chest pain    Chronic hypoxemic respiratory failure (HCC)    CHF NYHA class I, chronic, systolic (HCC)    Asthma               ASSESSMENT:  1.) Pulmonary edema   2. )CHF   3. )MERI   4.)Possible COPD       PLAN:  *-start  The patient should have auto bilevel with inspiratory pressure 20-25  cm of water pressure and exhalation pressure 15 to 21 cm of water  pressure. *-diuresis by cardiology   *-albuterol as needed   *-PFT as OP   *- May benefit from bariatric surgery         Thank you very much for allowing me to participate in the care of this pleasant patient , should you have any questions ,please do not hesitate to contact me      Ritika Gatica  Pulmonary&Critical Care Medicine   Director of [address] [place] [address] Director of [address] [place] [address]    Silvina Corbin    NOTE: This report was transcribed using voice recognition software. Every effort was made to ensure accuracy; however, inadvertent computerized transcription errors may be present.

## 2021-01-16 DIAGNOSIS — Z23 NEED FOR VACCINATION: ICD-10-CM

## 2021-01-20 ENCOUNTER — OFFICE VISIT (OUTPATIENT)
Dept: MEDICAL GROUP | Facility: PHYSICIAN GROUP | Age: 71
End: 2021-01-20
Payer: MEDICARE

## 2021-01-20 VITALS
RESPIRATION RATE: 18 BRPM | DIASTOLIC BLOOD PRESSURE: 65 MMHG | HEART RATE: 90 BPM | OXYGEN SATURATION: 99 % | TEMPERATURE: 98.1 F | WEIGHT: 221 LBS | SYSTOLIC BLOOD PRESSURE: 138 MMHG | BODY MASS INDEX: 34.69 KG/M2 | HEIGHT: 67 IN

## 2021-01-20 DIAGNOSIS — Z12.11 COLON CANCER SCREENING: ICD-10-CM

## 2021-01-20 DIAGNOSIS — F32.A ANXIETY AND DEPRESSION: Chronic | ICD-10-CM

## 2021-01-20 DIAGNOSIS — E78.5 DYSLIPIDEMIA: Chronic | ICD-10-CM

## 2021-01-20 DIAGNOSIS — J44.9 CHRONIC OBSTRUCTIVE PULMONARY DISEASE, UNSPECIFIED COPD TYPE (HCC): Chronic | ICD-10-CM

## 2021-01-20 DIAGNOSIS — Z13.6 SCREENING FOR CARDIOVASCULAR CONDITION: ICD-10-CM

## 2021-01-20 DIAGNOSIS — F41.9 ANXIETY AND DEPRESSION: Chronic | ICD-10-CM

## 2021-01-20 DIAGNOSIS — Z13.0 SCREENING FOR DEFICIENCY ANEMIA: ICD-10-CM

## 2021-01-20 DIAGNOSIS — Z13.29 SCREENING FOR ENDOCRINE DISORDER: ICD-10-CM

## 2021-01-20 DIAGNOSIS — R73.02 IGT (IMPAIRED GLUCOSE TOLERANCE): Chronic | ICD-10-CM

## 2021-01-20 DIAGNOSIS — K63.5 POLYP OF COLON, UNSPECIFIED PART OF COLON, UNSPECIFIED TYPE: ICD-10-CM

## 2021-01-20 DIAGNOSIS — K21.9 GASTROESOPHAGEAL REFLUX DISEASE WITHOUT ESOPHAGITIS: Chronic | ICD-10-CM

## 2021-01-20 DIAGNOSIS — Z72.0 TOBACCO USE: Chronic | ICD-10-CM

## 2021-01-20 PROCEDURE — 99214 OFFICE O/P EST MOD 30 MIN: CPT | Performed by: INTERNAL MEDICINE

## 2021-01-20 RX ORDER — TIOTROPIUM BROMIDE INHALATION SPRAY 3.12 UG/1
2 SPRAY, METERED RESPIRATORY (INHALATION) DAILY
Qty: 2 EACH | Refills: 5 | Status: SHIPPED | OUTPATIENT
Start: 2021-01-20 | End: 2021-01-25

## 2021-01-20 RX ORDER — ALBUTEROL SULFATE 90 UG/1
2 AEROSOL, METERED RESPIRATORY (INHALATION) EVERY 6 HOURS PRN
Qty: 8.5 G | Refills: 3 | Status: SHIPPED | OUTPATIENT
Start: 2021-01-20 | End: 2023-03-07 | Stop reason: SDUPTHER

## 2021-01-20 ASSESSMENT — PATIENT HEALTH QUESTIONNAIRE - PHQ9: CLINICAL INTERPRETATION OF PHQ2 SCORE: 0

## 2021-01-20 ASSESSMENT — FIBROSIS 4 INDEX: FIB4 SCORE: 1.885618083164126731

## 2021-01-20 NOTE — ASSESSMENT & PLAN NOTE
This is a chronic condition.  The patient is due for follow-up colonoscopy is requesting a referral which was submitted today.

## 2021-01-20 NOTE — ASSESSMENT & PLAN NOTE
Is a chronic and stable condition.  The patient is presently taking duloxetine for several years.  No significant side effects reported.  His symptoms are well controlled per patient report.

## 2021-01-20 NOTE — ASSESSMENT & PLAN NOTE
This is a chronic condition.  The patient still smokes cigarettes.  He is requesting refill for albuterol.  Patient denies fever chills significant cough.  Intermittent wheezing noted.

## 2021-01-20 NOTE — PROGRESS NOTES
CC: Establish care  Follow-up hyperlipidemia/lab tests      HPI: This is a 70 y.o. pt.  Pt's medical history is notable for:     Anxiety and depression  Is a chronic and stable condition.  The patient is presently taking duloxetine for several years.  No significant side effects reported.  His symptoms are well controlled per patient report.      Chronic obstructive pulmonary disease (HCC)  This is a chronic condition.  The patient still smokes cigarettes.  He is requesting refill for albuterol.  Patient denies fever chills significant cough.  Intermittent wheezing noted.    Dyslipidemia  Chronic condition patient is now taking atorvastatin.  Is due for blood test    GERD (gastroesophageal reflux disease)  Chronic stable condition patient presently taking omeprazole.  He denies nausea vomiting dysphagia or unexplained weight loss.    IGT (impaired glucose tolerance)  This is a chronic condition patient is currently on diet therapy lab tests ordered for follow-up    Tobacco use  Strongly advised the patient to quit smoking.  Patient is not ready.    Colonic polyp  This is a chronic condition.  The patient is due for follow-up colonoscopy is requesting a referral which was submitted today.          REVIEW OF SYSTEMS:     Constitutional:  no fever / chills   Neurologic: no headaches, no numbness/tingling  Eyes: no changes in vision  ENT: no sore throat, no hearing loss  CV:  no chest pain, no palpitations  Pulmonary: no SOB, no cough        Allergies: Zyban [bupropion], Augmentin, and Metformin    Current Outpatient Medications Ordered in Epic   Medication Sig Dispense Refill   • albuterol 108 (90 Base) MCG/ACT Aero Soln inhalation aerosol Inhale 2 Puffs every 6 hours as needed for Shortness of Breath. 8.5 g 3   • Tiotropium Bromide Monohydrate (SPIRIVA RESPIMAT) 2.5 MCG/ACT Aero Soln Inhale 2 Puffs every day. 2 Each 5   • atorvastatin (LIPITOR) 20 MG Tab Take 1 Tab by mouth every day. 90 Tab 3   • DULoxetine  (CYMBALTA) 60 MG Cap DR Particles delayed-release capsule TAKE 1 CAPSULE BY MOUTH  EVERY DAY 90 Cap 3   • omeprazole (PRILOSEC) 40 MG delayed-release capsule TAKE 1 CAPSULE BY MOUTH  EVERY DAY 90 Cap 3   • Sulfacetamide Sodium, Acne, 10 % Lotion Apply one daily 118 mL 6     No current Epic-ordered facility-administered medications on file.        Past Medical History:   Diagnosis Date   • Back pain    • Chickenpox    • Depression    • GERD (gastroesophageal reflux disease)    • Citizen of the [place] measles    • Gout    • Hyperlipidemia    • Influenza    • Panic disorder    • Sleep apnea    • Tobacco use 11/26/2014        Past Surgical History:   Procedure Laterality Date   • APPENDECTOMY     • ARTHROSCOPY, KNEE     • CARPAL TUNNEL ENDOSCOPIC      bilateral   • CARPAL TUNNEL RELEASE     • SHOULDER DECOMPRESSION ARTHROSCOPIC      right   • TONSILLECTOMY          Family History   Problem Relation Age of Onset   • Cancer Mother    • Cancer Father    • Stroke Father    • Diabetes Neg Hx    • Heart Disease Neg Hx    • Hypertension Neg Hx         Social History     Tobacco Use   Smoking Status Current Every Day Smoker   • Packs/day: 0.75   • Years: 53.00   • Pack years: 39.75   • Types: Cigarettes   Smokeless Tobacco Never Used   Tobacco Comment    started smoking at age 15          Social History     Substance and Sexual Activity   Alcohol Use Yes   • Alcohol/week: 0.6 oz   • Types: 1 Cans of beer per week    Comment: rare         ------------------------------------------------------------------------------     PHYSICAL EXAM:   Vitals:    01/20/21 0929   BP: 138/65   Pulse: 90   Resp: 18   Temp: 36.7 °C (98.1 °F)   SpO2: 99%      Body mass index is 34.61 kg/m².         Constitutional: no acute distress  Neck: supple, no JVD  CV: heart RRR  Resp: normal effort, minimal expiratory wheezing noted  GI: abdomen soft, no obvious mass, no tenderness  Neuro: CN 2-12 grossly  intact        -----------------------------------------------------------------------------    ASSESSMENT:   1. Anxiety and depression     2. Polyp of colon, unspecified part of colon, unspecified type  REFERRAL TO GASTROENTEROLOGY   3. Colon cancer screening     4. Chronic obstructive pulmonary disease, unspecified COPD type (HCC)     5. Dyslipidemia  ALANINE AMINO-TRANS    Lipid Profile   6. Gastroesophageal reflux disease without esophagitis     7. Screening for cardiovascular condition  Basic Metabolic Panel   8. Screening for endocrine disorder  TSH    MICROALBUMIN CREAT RATIO URINE   9. Screening for deficiency anemia  CBC WITH DIFFERENTIAL   10. IGT (impaired glucose tolerance)     11. Tobacco use             MEDICAL DECISION MAKING: DISCUSSION / STATUS / PLAN:    Advised the patient to continue with current medications  Recommend patient to start Spiriva Respimat 2 puff daily.  Continue to use albuterol as needed for rescue treatment.  Strongly advised the patient is very important to quit smoking.  Med list reviewed and updated.  Patient reported history of colonic polyps.  Referral was sent to GI.  Lab tests ordered.  Advised the patient to call for the results after a few days.  Recommend low-fat low-cholesterol low-carb diet.  The patient will try to lose some weight.     Return in about 6 months (around 7/20/2021).       PATIENT EDUCATION:  -If any problems should arise, patient was advised to contact our office or go to ER to be evaluated.      Please note that this dictation was created using voice recognition software. I have made every reasonable attempt to correct obvious errors, but it is possible there are errors of grammar and possibly content that I did not discover before finalizing the note.

## 2021-01-21 ENCOUNTER — HOSPITAL ENCOUNTER (OUTPATIENT)
Dept: LAB | Facility: MEDICAL CENTER | Age: 71
End: 2021-01-21
Attending: INTERNAL MEDICINE
Payer: MEDICARE

## 2021-01-21 DIAGNOSIS — Z13.6 SCREENING FOR CARDIOVASCULAR CONDITION: ICD-10-CM

## 2021-01-21 DIAGNOSIS — Z13.29 SCREENING FOR ENDOCRINE DISORDER: ICD-10-CM

## 2021-01-21 DIAGNOSIS — Z13.0 SCREENING FOR DEFICIENCY ANEMIA: ICD-10-CM

## 2021-01-21 DIAGNOSIS — E78.5 DYSLIPIDEMIA: Chronic | ICD-10-CM

## 2021-01-21 LAB
ALT SERPL-CCNC: 22 U/L (ref 2–50)
ANION GAP SERPL CALC-SCNC: 8 MMOL/L (ref 7–16)
BASOPHILS # BLD AUTO: 1.3 % (ref 0–1.8)
BASOPHILS # BLD: 0.09 K/UL (ref 0–0.12)
BUN SERPL-MCNC: 19 MG/DL (ref 8–22)
CALCIUM SERPL-MCNC: 9.2 MG/DL (ref 8.5–10.5)
CHLORIDE SERPL-SCNC: 104 MMOL/L (ref 96–112)
CHOLEST SERPL-MCNC: 114 MG/DL (ref 100–199)
CO2 SERPL-SCNC: 22 MMOL/L (ref 20–33)
CREAT SERPL-MCNC: 1.19 MG/DL (ref 0.5–1.4)
CREAT UR-MCNC: 249.26 MG/DL
EOSINOPHIL # BLD AUTO: 0.14 K/UL (ref 0–0.51)
EOSINOPHIL NFR BLD: 2.1 % (ref 0–6.9)
ERYTHROCYTE [DISTWIDTH] IN BLOOD BY AUTOMATED COUNT: 45.4 FL (ref 35.9–50)
FASTING STATUS PATIENT QL REPORTED: NORMAL
GLUCOSE SERPL-MCNC: 96 MG/DL (ref 65–99)
HCT VFR BLD AUTO: 42.1 % (ref 42–52)
HDLC SERPL-MCNC: 36 MG/DL
HGB BLD-MCNC: 13.6 G/DL (ref 14–18)
IMM GRANULOCYTES # BLD AUTO: 0.05 K/UL (ref 0–0.11)
IMM GRANULOCYTES NFR BLD AUTO: 0.7 % (ref 0–0.9)
LDLC SERPL CALC-MCNC: 65 MG/DL
LYMPHOCYTES # BLD AUTO: 2.16 K/UL (ref 1–4.8)
LYMPHOCYTES NFR BLD: 32 % (ref 22–41)
MCH RBC QN AUTO: 27.6 PG (ref 27–33)
MCHC RBC AUTO-ENTMCNC: 32.3 G/DL (ref 33.7–35.3)
MCV RBC AUTO: 85.4 FL (ref 81.4–97.8)
MICROALBUMIN UR-MCNC: 61.6 MG/DL
MICROALBUMIN/CREAT UR: 247 MG/G (ref 0–30)
MONOCYTES # BLD AUTO: 0.6 K/UL (ref 0–0.85)
MONOCYTES NFR BLD AUTO: 8.9 % (ref 0–13.4)
NEUTROPHILS # BLD AUTO: 3.7 K/UL (ref 1.82–7.42)
NEUTROPHILS NFR BLD: 55 % (ref 44–72)
NRBC # BLD AUTO: 0.02 K/UL
NRBC BLD-RTO: 0.3 /100 WBC
PLATELET # BLD AUTO: 154 K/UL (ref 164–446)
PMV BLD AUTO: 12 FL (ref 9–12.9)
POTASSIUM SERPL-SCNC: 4.6 MMOL/L (ref 3.6–5.5)
RBC # BLD AUTO: 4.93 M/UL (ref 4.7–6.1)
SODIUM SERPL-SCNC: 134 MMOL/L (ref 135–145)
TRIGL SERPL-MCNC: 63 MG/DL (ref 0–149)
TSH SERPL DL<=0.005 MIU/L-ACNC: 1.9 UIU/ML (ref 0.38–5.33)
WBC # BLD AUTO: 6.7 K/UL (ref 4.8–10.8)

## 2021-01-21 PROCEDURE — 82570 ASSAY OF URINE CREATININE: CPT

## 2021-01-21 PROCEDURE — 85025 COMPLETE CBC W/AUTO DIFF WBC: CPT

## 2021-01-21 PROCEDURE — 80061 LIPID PANEL: CPT

## 2021-01-21 PROCEDURE — 84460 ALANINE AMINO (ALT) (SGPT): CPT

## 2021-01-21 PROCEDURE — 82043 UR ALBUMIN QUANTITATIVE: CPT

## 2021-01-21 PROCEDURE — 36415 COLL VENOUS BLD VENIPUNCTURE: CPT

## 2021-01-21 PROCEDURE — 84443 ASSAY THYROID STIM HORMONE: CPT

## 2021-01-21 PROCEDURE — 80048 BASIC METABOLIC PNL TOTAL CA: CPT

## 2021-01-22 ENCOUNTER — TELEPHONE (OUTPATIENT)
Dept: MEDICAL GROUP | Facility: PHYSICIAN GROUP | Age: 71
End: 2021-01-22

## 2021-01-22 DIAGNOSIS — R73.09 ELEVATED GLUCOSE: Primary | ICD-10-CM

## 2021-01-22 DIAGNOSIS — R80.9 PROTEINURIA, UNSPECIFIED TYPE: ICD-10-CM

## 2021-01-22 DIAGNOSIS — D64.9 ANEMIA, UNSPECIFIED TYPE: ICD-10-CM

## 2021-01-22 DIAGNOSIS — Z13.29 SCREENING FOR ENDOCRINE DISORDER: ICD-10-CM

## 2021-01-22 NOTE — TELEPHONE ENCOUNTER
Phone Number Called: [phone]    Call outcome:I spoke to the pt regarding his recent blood work results and he was informed of Dr Aldana's message below.  Pt also wants to have A1C lab ordered so he can get it done as well.    Message:  Message  Received: Today  Message Contents   Aris Aldana M.D.  [facility]               Please call patient :     Urine test showed protein>> 24 urine protein test has been ordered for confirmation. Pls notify pt.     Labs showed slightly low red blood count] needing further investigation.     Pls call and send pt back to the lab for additional testings: Iron, Ferritin, Folate, Vit B12 and FIT   Pt to repeat cbc lab also in 3mo for f.u trends

## 2021-01-24 ENCOUNTER — PATIENT MESSAGE (OUTPATIENT)
Dept: MEDICAL GROUP | Facility: PHYSICIAN GROUP | Age: 71
End: 2021-01-24

## 2021-01-25 ENCOUNTER — HOSPITAL ENCOUNTER (OUTPATIENT)
Dept: LAB | Facility: MEDICAL CENTER | Age: 71
End: 2021-01-25
Attending: INTERNAL MEDICINE
Payer: MEDICARE

## 2021-01-25 DIAGNOSIS — Z13.29 SCREENING FOR ENDOCRINE DISORDER: ICD-10-CM

## 2021-01-25 DIAGNOSIS — D64.9 ANEMIA, UNSPECIFIED TYPE: ICD-10-CM

## 2021-01-25 LAB
ANISOCYTOSIS BLD QL SMEAR: ABNORMAL
BASOPHILS # BLD AUTO: 0.9 % (ref 0–1.8)
BASOPHILS # BLD: 0.06 K/UL (ref 0–0.12)
BURR CELLS BLD QL SMEAR: NORMAL
EOSINOPHIL # BLD AUTO: 0.06 K/UL (ref 0–0.51)
EOSINOPHIL NFR BLD: 0.9 % (ref 0–6.9)
ERYTHROCYTE [DISTWIDTH] IN BLOOD BY AUTOMATED COUNT: 44.4 FL (ref 35.9–50)
EST. AVERAGE GLUCOSE BLD GHB EST-MCNC: 134 MG/DL
FERRITIN SERPL-MCNC: 29.2 NG/ML (ref 22–322)
FOLATE SERPL-MCNC: 10.6 NG/ML
HBA1C MFR BLD: 6.3 % (ref 0–5.6)
HCT VFR BLD AUTO: 41.7 % (ref 42–52)
HGB BLD-MCNC: 13.6 G/DL (ref 14–18)
IRON SERPL-MCNC: 81 UG/DL (ref 50–180)
LYMPHOCYTES # BLD AUTO: 2.53 K/UL (ref 1–4.8)
LYMPHOCYTES NFR BLD: 37.7 % (ref 22–41)
MACROCYTES BLD QL SMEAR: ABNORMAL
MANUAL DIFF BLD: NORMAL
MCH RBC QN AUTO: 27.5 PG (ref 27–33)
MCHC RBC AUTO-ENTMCNC: 32.6 G/DL (ref 33.7–35.3)
MCV RBC AUTO: 84.2 FL (ref 81.4–97.8)
MICROCYTES BLD QL SMEAR: ABNORMAL
MONOCYTES # BLD AUTO: 0.29 K/UL (ref 0–0.85)
MONOCYTES NFR BLD AUTO: 4.4 % (ref 0–13.4)
MORPHOLOGY BLD-IMP: NORMAL
NEUTROPHILS # BLD AUTO: 3.76 K/UL (ref 1.82–7.42)
NEUTROPHILS NFR BLD: 56.1 % (ref 44–72)
NRBC # BLD AUTO: 0 K/UL
NRBC BLD-RTO: 0 /100 WBC
PLATELETS.RETICULATED NFR BLD AUTO: 15 K/UL (ref 0.6–13.1)
POLYCHROMASIA BLD QL SMEAR: NORMAL
RBC # BLD AUTO: 4.95 M/UL (ref 4.7–6.1)
RBC BLD AUTO: PRESENT
VIT B12 SERPL-MCNC: 526 PG/ML (ref 211–911)
WBC # BLD AUTO: 6.7 K/UL (ref 4.8–10.8)

## 2021-01-25 PROCEDURE — 85007 BL SMEAR W/DIFF WBC COUNT: CPT

## 2021-01-25 PROCEDURE — 82728 ASSAY OF FERRITIN: CPT

## 2021-01-25 PROCEDURE — 83540 ASSAY OF IRON: CPT

## 2021-01-25 PROCEDURE — 82607 VITAMIN B-12: CPT

## 2021-01-25 PROCEDURE — 36415 COLL VENOUS BLD VENIPUNCTURE: CPT | Mod: GA

## 2021-01-25 PROCEDURE — 85055 RETICULATED PLATELET ASSAY: CPT

## 2021-01-25 PROCEDURE — 82746 ASSAY OF FOLIC ACID SERUM: CPT

## 2021-01-25 PROCEDURE — 83036 HEMOGLOBIN GLYCOSYLATED A1C: CPT | Mod: GA

## 2021-01-25 PROCEDURE — 85027 COMPLETE CBC AUTOMATED: CPT

## 2021-01-25 RX ORDER — BUDESONIDE AND FORMOTEROL FUMARATE DIHYDRATE 160; 4.5 UG/1; UG/1
2 AEROSOL RESPIRATORY (INHALATION) 2 TIMES DAILY
Qty: 2 EACH | Refills: 6 | Status: SHIPPED | OUTPATIENT
Start: 2021-01-25 | End: 2021-05-13

## 2021-01-25 RX ORDER — ALBUTEROL SULFATE 90 UG/1
AEROSOL, METERED RESPIRATORY (INHALATION)
Qty: 25.5 G | OUTPATIENT
Start: 2021-01-25

## 2021-01-26 ENCOUNTER — TELEPHONE (OUTPATIENT)
Dept: MEDICAL GROUP | Facility: PHYSICIAN GROUP | Age: 71
End: 2021-01-26

## 2021-01-26 ENCOUNTER — HOSPITAL ENCOUNTER (OUTPATIENT)
Facility: MEDICAL CENTER | Age: 71
End: 2021-01-26
Attending: INTERNAL MEDICINE
Payer: MEDICARE

## 2021-01-26 DIAGNOSIS — D69.6 THROMBOCYTOPENIA (HCC): ICD-10-CM

## 2021-01-26 PROBLEM — R73.03 PREDIABETES: Status: ACTIVE | Noted: 2019-04-01

## 2021-01-26 PROCEDURE — 82274 ASSAY TEST FOR BLOOD FECAL: CPT

## 2021-01-26 NOTE — TELEPHONE ENCOUNTER
Spoke to pt on the phone regarding his recent lab results and current referrals.    Chitra THOMPSON RN, BSN

## 2021-01-26 NOTE — TELEPHONE ENCOUNTER
----- Message from Aris Aldana M.D. sent at 1/26/2021 10:59 AM PST -----    Please call patient : labs back    A1c high  =prediabetes  Please start/continue with low sweet low carb diet, continue with regular exercises / walking and maintain an ideal weight.    Red blood count slightly low. This was noted before . Stable    Platelet level [one of clotting products in blood] noted to be slightly low previously  This blood test showed elevated IMMATURE PLATELET FRACTION  Suggesting rapid platelet destruction in the blood>> A referral has been submitted to hEMATOLOGIST  for further evaluation.    Please call [phone] and ask for Referral Department.

## 2021-01-27 ENCOUNTER — HOSPITAL ENCOUNTER (OUTPATIENT)
Facility: MEDICAL CENTER | Age: 71
End: 2021-01-27
Attending: INTERNAL MEDICINE
Payer: MEDICARE

## 2021-01-27 ENCOUNTER — TELEPHONE (OUTPATIENT)
Dept: MEDICAL GROUP | Facility: PHYSICIAN GROUP | Age: 71
End: 2021-01-27

## 2021-01-27 ENCOUNTER — HOSPITAL ENCOUNTER (OUTPATIENT)
Dept: LAB | Facility: MEDICAL CENTER | Age: 71
End: 2021-01-27
Attending: INTERNAL MEDICINE
Payer: MEDICARE

## 2021-01-27 DIAGNOSIS — D64.9 ANEMIA, UNSPECIFIED TYPE: ICD-10-CM

## 2021-01-27 DIAGNOSIS — R80.9 PROTEINURIA, UNSPECIFIED TYPE: Chronic | ICD-10-CM

## 2021-01-27 DIAGNOSIS — R80.9 PROTEINURIA, UNSPECIFIED TYPE: ICD-10-CM

## 2021-01-27 LAB
PROT 24H UR-MCNC: 672 MG/24 HR (ref 30–150)
PROT 24H UR-MRATE: 28 MG/DL (ref 0–15)
SPECIMEN VOL UR: 2400 ML

## 2021-01-27 PROCEDURE — 84156 ASSAY OF PROTEIN URINE: CPT

## 2021-01-27 PROCEDURE — 81050 URINALYSIS VOLUME MEASURE: CPT

## 2021-01-27 NOTE — TELEPHONE ENCOUNTER
----- Message from Aris Aldana M.D. sent at 1/27/2021  3:00 PM PST -----    Please call patient :the recent 24hr urine test showed high protein level  A referral has been submitted to Nephrogy  for further evaluation.  Please call [phone] and ask for Referral Department.    Renal US also ordered.

## 2021-01-27 NOTE — TELEPHONE ENCOUNTER
Spoke to pt on the phone regarding urine protein results, pt verbalized understanding. Gave pt the number to call and schedule his renal US.    Chitra THOMPSON RN, BSN

## 2021-01-28 LAB — HEMOCCULT STL QL IA: NEGATIVE

## 2021-02-04 ENCOUNTER — TELEPHONE (OUTPATIENT)
Dept: MEDICAL GROUP | Facility: PHYSICIAN GROUP | Age: 71
End: 2021-02-04

## 2021-02-04 ENCOUNTER — HOSPITAL ENCOUNTER (OUTPATIENT)
Dept: RADIOLOGY | Facility: MEDICAL CENTER | Age: 71
End: 2021-02-04
Attending: INTERNAL MEDICINE
Payer: MEDICARE

## 2021-02-04 DIAGNOSIS — R80.9 PROTEINURIA, UNSPECIFIED TYPE: ICD-10-CM

## 2021-02-04 PROCEDURE — 76775 US EXAM ABDO BACK WALL LIM: CPT

## 2021-02-04 NOTE — TELEPHONE ENCOUNTER
Phone Number Called:[phone]  Call outcome: Spoke to patient regarding message below.from Dr Aldana regarding his U/S report. (see msg attached below)    Message:Message  Received: Today  Message Contents   Aris Aldana M.D.  [facility]               Please call patient :recent renal US showed there are multiple benign appearing kidney cysts.     Pls advise pt it is important to fteresa salazar Nephrologist [referred previously]

## 2021-02-17 ENCOUNTER — OFFICE VISIT (OUTPATIENT)
Dept: DERMATOLOGY | Facility: IMAGING CENTER | Age: 71
End: 2021-02-17
Payer: MEDICARE

## 2021-02-17 DIAGNOSIS — L92.0 GA (GRANULOMA ANNULARE): ICD-10-CM

## 2021-02-17 PROCEDURE — 11900 INJECT SKIN LESIONS </W 7: CPT | Performed by: DERMATOLOGY

## 2021-02-18 ENCOUNTER — OFFICE VISIT (OUTPATIENT)
Dept: NEPHROLOGY | Facility: MEDICAL CENTER | Age: 71
End: 2021-02-18
Payer: MEDICARE

## 2021-02-18 VITALS
BODY MASS INDEX: 34.37 KG/M2 | WEIGHT: 219 LBS | TEMPERATURE: 97.4 F | RESPIRATION RATE: 16 BRPM | DIASTOLIC BLOOD PRESSURE: 76 MMHG | SYSTOLIC BLOOD PRESSURE: 130 MMHG | HEART RATE: 88 BPM | HEIGHT: 67 IN | OXYGEN SATURATION: 99 %

## 2021-02-18 DIAGNOSIS — E11.29 MICROALBUMINURIA DUE TO TYPE 2 DIABETES MELLITUS (HCC): ICD-10-CM

## 2021-02-18 DIAGNOSIS — R80.9 PROTEINURIA, UNSPECIFIED TYPE: ICD-10-CM

## 2021-02-18 DIAGNOSIS — N18.2 CKD (CHRONIC KIDNEY DISEASE), STAGE II: ICD-10-CM

## 2021-02-18 DIAGNOSIS — R73.03 PREDIABETES: ICD-10-CM

## 2021-02-18 DIAGNOSIS — R80.9 MICROALBUMINURIA DUE TO TYPE 2 DIABETES MELLITUS (HCC): ICD-10-CM

## 2021-02-18 DIAGNOSIS — I10 ESSENTIAL HYPERTENSION: ICD-10-CM

## 2021-02-18 DIAGNOSIS — E55.9 VITAMIN D DEFICIENCY: ICD-10-CM

## 2021-02-18 PROCEDURE — 99204 OFFICE O/P NEW MOD 45 MIN: CPT | Performed by: INTERNAL MEDICINE

## 2021-02-18 RX ORDER — PENTOXIFYLLINE 400 MG/1
400 TABLET, EXTENDED RELEASE ORAL
COMMUNITY
End: 2021-08-24 | Stop reason: SDUPTHER

## 2021-02-18 ASSESSMENT — ENCOUNTER SYMPTOMS
ABDOMINAL PAIN: 0
NAUSEA: 0
SHORTNESS OF BREATH: 0
CHILLS: 0
VOMITING: 0
EYES NEGATIVE: 1
SINUS PAIN: 0
COUGH: 0
DIARRHEA: 0
WEIGHT LOSS: 0
HEMOPTYSIS: 0
PALPITATIONS: 0
ORTHOPNEA: 0
FEVER: 0
WHEEZING: 0
FLANK PAIN: 0

## 2021-02-18 ASSESSMENT — FIBROSIS 4 INDEX: FIB4 SCORE: 1.57

## 2021-02-18 NOTE — PROGRESS NOTES
DERMATOLOGY FOLLOW UP  GA, would like more Kenalog injections. Lesions resolve after injected. Continues to develop new lesions but happy with control with Kenalog injections as needed.  Duration of rash: previously dx [place] (2012, [place])  Symptoms: itching  Current treatment: Taking Trental 400mg BID (started 8/2019), using hydrocortisone OTC  Prior rx: prednisone, hydroxychloroquine, minocycline & nicotinamide      Dermatology  Intralesional Kenalog Injection Operative Report     Preoperative Diagnosis:  generalized granuloma annulare  Postoperative Diagnosis:  Same  Surgeon: Vani Cho MD  Location: bilateral arms   Indication: pruritus    Procedure: Informed consent was obtained.  The procedure, risks and complications including scar, bleeding, discoloration, atrophy, infection, recurrence were explained in detail and understood by the patient.  The area(s) above was infiltrated with 5 mg/ml of kenalog. Total amount injected: 1.25 ml. The patient tolerated the procedure well.       Estimated Blood Loss: Minimal  Complications: None    Return To Clinic: as needed for ILK    Vani Cho M.D.

## 2021-02-19 NOTE — PROGRESS NOTES
Subjective:      Micheal Brothers Jr. is a 70 y.o. male who presents with New Patient, Proteinuria, and Chronic Kidney Disease            HPI  Micheal is coming today for initial evaluation of proteinuria, CKD II  Doing well, no complaints  No dysuria/hematuria/flank pain  (+) weight gain  HTN: BP well controlled  CKD II stable at baseline  Microalbuminuria mild -to monitor  Proteinuria at 0.6 -to monitor    Review of Systems   Constitutional: Negative for chills, fever, malaise/fatigue and weight loss.   HENT: Negative for congestion, hearing loss and sinus pain.    Eyes: Negative.    Respiratory: Negative for cough, hemoptysis, shortness of breath and wheezing.    Cardiovascular: Negative for chest pain, palpitations, orthopnea and leg swelling.   Gastrointestinal: Negative for abdominal pain, diarrhea, nausea and vomiting.   Genitourinary: Negative for dysuria, flank pain, frequency, hematuria and urgency.   Skin: Negative.    All other systems reviewed and are negative.    Past Medical History:   Diagnosis Date   • Back pain    • Chickenpox    • Depression    • GERD (gastroesophageal reflux disease)    • Congolese measles    • Gout    • Hyperlipidemia    • Influenza    • Panic disorder    • Sleep apnea    • Tobacco use 11/26/2014       Family History   Problem Relation Age of Onset   • Cancer Mother    • Cancer Father    • Stroke Father    • Diabetes Neg Hx    • Heart Disease Neg Hx    • Hypertension Neg Hx        Social History     Socioeconomic History   • Marital status:      Spouse name: Not on file   • Number of children: Not on file   • Years of education: Not on file   • Highest education level: Not on file   Occupational History   • Not on file   Tobacco Use   • Smoking status: Current Every Day Smoker     Packs/day: 0.75     Years: 53.00     Pack years: 39.75     Types: Cigarettes   • Smokeless tobacco: Never Used   • Tobacco comment: started smoking at age 15   Substance and Sexual  "Activity   • Alcohol use: Yes     Alcohol/week: 0.6 oz     Types: 1 Cans of beer per week     Comment: rare   • Drug use: No   • Sexual activity: Yes     Partners: Female   Other Topics Concern   • Not on file   Social History Narrative   • Not on file     Social Determinants of Health     Financial Resource Strain:    • Difficulty of Paying Living Expenses:    Food Insecurity:    • Worried About Running Out of Food in the Last Year:    • Ran Out of Food in the Last Year:    Transportation Needs:    • Lack of Transportation (Medical):    • Lack of Transportation (Non-Medical):    Physical Activity:    • Days of Exercise per Week:    • Minutes of Exercise per Session:    Stress:    • Feeling of Stress :    Social Connections:    • Frequency of Communication with Friends and Family:    • Frequency of Social Gatherings with Friends and Family:    • Attends Christian Services:    • Active Member of Clubs or Organizations:    • Attends Club or Organization Meetings:    • Marital Status:    Intimate Partner Violence:    • Fear of Current or Ex-Partner:    • Emotionally Abused:    • Physically Abused:    • Sexually Abused:           Objective:     /76 (BP Location: Right arm, Patient Position: Sitting)   Pulse 88   Temp 36.3 °C (97.4 °F)   Resp 16   Ht 1.702 m (5' 7\")   Wt 99.3 kg (219 lb)   SpO2 99%   BMI 34.30 kg/m²      Physical Exam  Vitals reviewed.   Constitutional:       General: He is not in acute distress.     Appearance: Normal appearance. He is well-developed. He is obese. He is not diaphoretic.   HENT:      Head: Normocephalic and atraumatic.      Nose: Nose normal.      Mouth/Throat:      Mouth: Mucous membranes are moist.      Pharynx: Oropharynx is clear.   Eyes:      Extraocular Movements: Extraocular movements intact.      Conjunctiva/sclera: Conjunctivae normal.      Pupils: Pupils are equal, round, and reactive to light.   Cardiovascular:      Rate and Rhythm: Normal rate and regular rhythm. "      Pulses: Normal pulses.      Heart sounds: Normal heart sounds.   Pulmonary:      Effort: Pulmonary effort is normal. No respiratory distress.      Breath sounds: Normal breath sounds. No wheezing, rhonchi or rales.   Abdominal:      General: Bowel sounds are normal. There is no distension.      Palpations: Abdomen is soft. There is no mass.      Tenderness: There is no abdominal tenderness. There is no right CVA tenderness, left CVA tenderness or guarding.   Musculoskeletal:      Cervical back: Normal range of motion and neck supple.      Right lower leg: No edema.      Left lower leg: No edema.   Skin:     General: Skin is warm.      Findings: No erythema or rash.   Neurological:      General: No focal deficit present.      Mental Status: He is alert and oriented to person, place, and time.      Cranial Nerves: No cranial nerve deficit.      Coordination: Coordination normal.   Psychiatric:         Mood and Affect: Mood normal.         Behavior: Behavior normal.         Thought Content: Thought content normal.         Judgment: Judgment normal.               Lab results reviewed: d/w Pt  Lab Results   Component Value Date/Time    CREATININE 1.19 01/21/2021 09:13 AM    POTASSIUM 4.6 01/21/2021 09:13 AM       Assessment/Plan:          1. CKD (chronic kidney disease), stage II  - URINALYSIS; Future  - Basic Metabolic Panel; Future    Creat stable at baseline -to monitor  2. Microalbuminuria due to type 2 diabetes mellitus (HCC)      At 0.24 -to monitor  - MICROALB/CREAT RATIO, RANDOM UR    4. Essential hypertension      BP well controlled -to monitor at home    5. Proteinuria, unspecified type      At 0.6 - to monitor  - URINE PROTEIN; Future  - URINE CREATININE RANDOM; Future    6. Vitamin D deficiency      To monitor  - VITAMIN D 25-HYDROXY    Recs:  Continue current treatment  Weight loss  Stop smoking tobacco  Low salt diet  Monitor BP  F/u 3 months    Thank you for the consult

## 2021-02-25 ENCOUNTER — APPOINTMENT (OUTPATIENT)
Dept: DERMATOLOGY | Facility: IMAGING CENTER | Age: 71
End: 2021-02-25
Payer: MEDICARE

## 2021-03-29 ENCOUNTER — PATIENT MESSAGE (OUTPATIENT)
Dept: MEDICAL GROUP | Facility: PHYSICIAN GROUP | Age: 71
End: 2021-03-29

## 2021-03-29 DIAGNOSIS — E78.5 DYSLIPIDEMIA: ICD-10-CM

## 2021-03-29 DIAGNOSIS — F41.0 PANIC DISORDER: ICD-10-CM

## 2021-03-29 DIAGNOSIS — K21.9 GASTROESOPHAGEAL REFLUX DISEASE WITHOUT ESOPHAGITIS: ICD-10-CM

## 2021-03-29 NOTE — TELEPHONE ENCOUNTER
From: Micheal Brothers Jr.  To: Physician Aris Aldana  Sent: 3/29/2021 9:36 AM PDT  Subject: Prescription Question    Please renew my meds that I am taking.  Omeprazole 40 mg  Atorvastatin 20 mg  Duloxetine 60 mg    Need to fax to [facility]  (I no longer use [facility])  Include my name, member ID [id number], 3 month prescriptions.......    Please notify me when the order is sent in, I am running out of meds.    Thank you  Jak Brothers

## 2021-03-30 RX ORDER — ATORVASTATIN CALCIUM 20 MG/1
20 TABLET, FILM COATED ORAL DAILY
Qty: 90 TABLET | Refills: 1 | Status: SHIPPED | OUTPATIENT
Start: 2021-03-30 | End: 2021-06-29 | Stop reason: SDUPTHER

## 2021-03-30 RX ORDER — OMEPRAZOLE 40 MG/1
40 CAPSULE, DELAYED RELEASE ORAL
Qty: 90 CAPSULE | Refills: 1 | Status: SHIPPED | OUTPATIENT
Start: 2021-03-30 | End: 2021-06-29 | Stop reason: SDUPTHER

## 2021-03-30 RX ORDER — DULOXETIN HYDROCHLORIDE 60 MG/1
60 CAPSULE, DELAYED RELEASE ORAL
Qty: 90 CAPSULE | Refills: 1 | Status: SHIPPED | OUTPATIENT
Start: 2021-03-30 | End: 2021-06-29 | Stop reason: SDUPTHER

## 2021-03-30 NOTE — PROGRESS NOTES
Pt last seen regarding this issue 1/21. Will send 6 months of fills to pharmacy.    Lab Results   Component Value Date/Time    CHOLSTRLTOT 114 01/21/2021 09:13 AM    LDL 65 01/21/2021 09:13 AM    HDL 36 (A) 01/21/2021 09:13 AM    TRIGLYCERIDE 63 01/21/2021 09:13 AM       Lab Results   Component Value Date/Time    SODIUM 134 (L) 01/21/2021 09:13 AM    POTASSIUM 4.6 01/21/2021 09:13 AM    CHLORIDE 104 01/21/2021 09:13 AM    CO2 22 01/21/2021 09:13 AM    GLUCOSE 96 01/21/2021 09:13 AM    BUN 19 01/21/2021 09:13 AM    CREATININE 1.19 01/21/2021 09:13 AM     Lab Results   Component Value Date/Time    ALKPHOSPHAT 61 04/26/2019 09:03 AM    ASTSGOT 16 04/26/2019 09:03 AM    ALTSGPT 22 01/21/2021 09:13 AM    TBILIRUBIN 0.4 04/26/2019 09:03 AM

## 2021-04-13 ENCOUNTER — OFFICE VISIT (OUTPATIENT)
Dept: MEDICAL GROUP | Facility: PHYSICIAN GROUP | Age: 71
End: 2021-04-13
Payer: MEDICARE

## 2021-04-13 VITALS
RESPIRATION RATE: 16 BRPM | DIASTOLIC BLOOD PRESSURE: 68 MMHG | WEIGHT: 216 LBS | SYSTOLIC BLOOD PRESSURE: 138 MMHG | HEART RATE: 88 BPM | TEMPERATURE: 98.2 F | OXYGEN SATURATION: 98 % | BODY MASS INDEX: 33.9 KG/M2 | HEIGHT: 67 IN

## 2021-04-13 DIAGNOSIS — F32.A ANXIETY AND DEPRESSION: Chronic | ICD-10-CM

## 2021-04-13 DIAGNOSIS — F41.9 ANXIETY AND DEPRESSION: Chronic | ICD-10-CM

## 2021-04-13 DIAGNOSIS — R10.2 PELVIC PAIN: ICD-10-CM

## 2021-04-13 DIAGNOSIS — J44.9 CHRONIC OBSTRUCTIVE PULMONARY DISEASE, UNSPECIFIED COPD TYPE (HCC): Chronic | ICD-10-CM

## 2021-04-13 DIAGNOSIS — K42.9 UMBILICAL HERNIA WITHOUT OBSTRUCTION AND WITHOUT GANGRENE: ICD-10-CM

## 2021-04-13 PROCEDURE — 99214 OFFICE O/P EST MOD 30 MIN: CPT | Performed by: INTERNAL MEDICINE

## 2021-04-13 ASSESSMENT — PATIENT HEALTH QUESTIONNAIRE - PHQ9
4. FEELING TIRED OR HAVING LITTLE ENERGY: NOT AT ALL
1. LITTLE INTEREST OR PLEASURE IN DOING THINGS: NOT AT ALL
7. TROUBLE CONCENTRATING ON THINGS, SUCH AS READING THE NEWSPAPER OR WATCHING TELEVISION: NOT AT ALL
SUM OF ALL RESPONSES TO PHQ9 QUESTIONS 1 AND 2: 0
6. FEELING BAD ABOUT YOURSELF - OR THAT YOU ARE A FAILURE OR HAVE LET YOURSELF OR YOUR FAMILY DOWN: NOT AL ALL
2. FEELING DOWN, DEPRESSED, IRRITABLE, OR HOPELESS: NOT AT ALL
9. THOUGHTS THAT YOU WOULD BE BETTER OFF DEAD, OR OF HURTING YOURSELF: NOT AT ALL
5. POOR APPETITE OR OVEREATING: NOT AT ALL
8. MOVING OR SPEAKING SO SLOWLY THAT OTHER PEOPLE COULD HAVE NOTICED. OR THE OPPOSITE, BEING SO FIGETY OR RESTLESS THAT YOU HAVE BEEN MOVING AROUND A LOT MORE THAN USUAL: NOT AT ALL
SUM OF ALL RESPONSES TO PHQ QUESTIONS 1-9: 0
3. TROUBLE FALLING OR STAYING ASLEEP OR SLEEPING TOO MUCH: NOT AT ALL

## 2021-04-13 ASSESSMENT — FIBROSIS 4 INDEX: FIB4 SCORE: 1.57

## 2021-04-13 NOTE — ASSESSMENT & PLAN NOTE
This is a new condition symptoms occur approximately 2 weeks ago.  The pain is noted in the mid pelvic region.  No radiation of pain noted he denies fever or chills.  No history of trauma or injury recently.  Patient also denies dysuria urethral discharge.  He also denies bowel or bladder dysfunction.  No history of GI bleeding.  No change in bowel movements.  Patient stated that he took some Advil and the pain has improved.  The last episode was approximately 2 days ago.  Currently the patient asymptomatic.

## 2021-04-13 NOTE — PROGRESS NOTES
CC: Pelvic pain      HPI: This is a 70 y.o. pt.  Pt's medical history is notable for:     Anxiety and depression  This is a chronic condition for the patient presently taking Cymbalta.  The patient denies SI.  No significant side effects reported.    Chronic obstructive pulmonary disease (HCC)  Chronic condition.  The patient currently using Symbicort.  He also use albuterol as needed.  Strongly advised the patient is important to avoid smoking.    Pelvic pain  This is a new condition symptoms occur approximately 2 weeks ago.  The pain is noted in the mid pelvic region.  No radiation of pain noted he denies fever or chills.  No history of trauma or injury recently.  Patient also denies dysuria urethral discharge.  He also denies bowel or bladder dysfunction.  No history of GI bleeding.  No change in bowel movements.  Patient stated that he took some Advil and the pain has improved.  The last episode was approximately 2 days ago.  Currently the patient asymptomatic.          REVIEW OF SYSTEMS:     Constitutional:  no fever / chills   Neurologic: no headaches  Eyes: no changes in vision  ENT: no sore throat, no hearing loss  CV:  no chest pain, no palpitations  Pulmonary: no SOB, no cough          Allergies: Zyban [bupropion], Augmentin, and Metformin    Current Outpatient Medications Ordered in Epic   Medication Sig Dispense Refill   • atorvastatin (LIPITOR) 20 MG Tab Take 1 tablet by mouth every day. 90 tablet 1   • omeprazole (PRILOSEC) 40 MG delayed-release capsule Take 1 capsule by mouth every day. 90 capsule 1   • DULoxetine (CYMBALTA) 60 MG Cap DR Particles delayed-release capsule Take 1 capsule by mouth every day. 90 capsule 1   • pentoxifylline CR (TRENTAL) 400 MG CR tablet Take 400 mg by mouth 3 times a day, with meals.     • budesonide-formoterol (SYMBICORT) 160-4.5 MCG/ACT Aerosol Inhale 2 Puffs 2 Times a Day. 2 Each 6   • albuterol 108 (90 Base) MCG/ACT Aero Soln inhalation aerosol Inhale 2 Puffs every 6  hours as needed for Shortness of Breath. 8.5 g 3   • Sulfacetamide Sodium, Acne, 10 % Lotion Apply one daily 118 mL 6     No current Epic-ordered facility-administered medications on file.       Past Medical, Social, and Family history reviewed and updated in EPIC     ------------------------------------------------------------------------------     PHYSICAL EXAM:   Vitals:    04/13/21 1047   BP: 138/68   Pulse: 88   Resp: 16   Temp: 36.8 °C (98.2 °F)   SpO2: 98%      Body mass index is 33.83 kg/m².         Constitutional: no acute distress  Neck: supple, no JVD  CV: heart RRR  Resp: normal effort, no wheezing or rales.  GI: abdomen soft, umbilical hernia noted nontender reducible   Pelvic exam no tenderness noted today.  No obvious mass noted  normal male genitalia no urethral discharge no testicular mass nontender    neuro: CN 2-12 grossly intact        -----------------------------------------------------------------------------    ASSESSMENT:   1. Pelvic pain  Basic Metabolic Panel    CBC WITH DIFFERENTIAL    URINALYSIS    URINE CULTURE(NEW)    HEPATIC FUNCTION PANEL    AMYLASE    CT-ABDOMEN&PELVIS ENTEROGRAPHY   2. Anxiety and depression     3. Chronic obstructive pulmonary disease, unspecified COPD type (HCC)     4. Umbilical hernia without obstruction and without gangrene, asymptomatic            MEDICAL DECISION MAKING: DISCUSSION / STATUS / PLAN:  Recurrent pelvic pain noted recently.  Exact cause is unclear needing further investigation.  CT scan of the abdomen pelvis ordered.  Lab test requested including CBC chemistry liver panel amylase UA and urine culture  Advised the patient follow-up after the above studies are done.  Advised the patient to go to ER if symptoms recur.    Anxiety/depression stable continue with current medication    COPD chronic stable condition.  Continue current management.  Advised the patient to avoid smoking.    Umbilical hernia.  Chronic condition per patient report.   Asymptomatic.  Continue to monitor.       Return in about 3 months (around 7/13/2021).       PATIENT EDUCATION:  -If any problems should arise, patient was advised to contact our office or go to ER to be evaluated.      Please note that this dictation was created using voice recognition software. I have made every reasonable attempt to correct obvious errors, but it is possible there are errors of grammar and possibly content that I did not discover before finalizing the note.

## 2021-04-13 NOTE — ASSESSMENT & PLAN NOTE
This is a chronic condition for the patient presently taking Cymbalta.  The patient denies SI.  No significant side effects reported.

## 2021-04-13 NOTE — ASSESSMENT & PLAN NOTE
Chronic condition.  The patient currently using Symbicort.  He also use albuterol as needed.  Strongly advised the patient is important to avoid smoking.

## 2021-04-14 ENCOUNTER — HOSPITAL ENCOUNTER (OUTPATIENT)
Dept: LAB | Facility: MEDICAL CENTER | Age: 71
End: 2021-04-14
Attending: INTERNAL MEDICINE
Payer: MEDICARE

## 2021-04-14 ENCOUNTER — HOSPITAL ENCOUNTER (OUTPATIENT)
Facility: MEDICAL CENTER | Age: 71
End: 2021-04-14
Attending: INTERNAL MEDICINE
Payer: MEDICARE

## 2021-04-14 DIAGNOSIS — R10.2 PELVIC PAIN: ICD-10-CM

## 2021-04-14 DIAGNOSIS — N18.2 CKD (CHRONIC KIDNEY DISEASE), STAGE II: ICD-10-CM

## 2021-04-14 LAB
ALBUMIN SERPL BCP-MCNC: 4.2 G/DL (ref 3.2–4.9)
ALP SERPL-CCNC: 75 U/L (ref 30–99)
ALT SERPL-CCNC: 22 U/L (ref 2–50)
AMYLASE SERPL-CCNC: 50 U/L (ref 20–103)
ANION GAP SERPL CALC-SCNC: 9 MMOL/L (ref 7–16)
APPEARANCE UR: CLEAR
AST SERPL-CCNC: 18 U/L (ref 12–45)
BASOPHILS # BLD AUTO: 1.3 % (ref 0–1.8)
BASOPHILS # BLD: 0.09 K/UL (ref 0–0.12)
BILIRUB CONJ SERPL-MCNC: <0.2 MG/DL (ref 0.1–0.5)
BILIRUB INDIRECT SERPL-MCNC: NORMAL MG/DL (ref 0–1)
BILIRUB SERPL-MCNC: 0.3 MG/DL (ref 0.1–1.5)
BILIRUB UR QL STRIP.AUTO: NEGATIVE
BUN SERPL-MCNC: 12 MG/DL (ref 8–22)
CALCIUM SERPL-MCNC: 9.4 MG/DL (ref 8.5–10.5)
CHLORIDE SERPL-SCNC: 104 MMOL/L (ref 96–112)
CO2 SERPL-SCNC: 25 MMOL/L (ref 20–33)
COLOR UR: YELLOW
COMMENT 1642: NORMAL
CREAT SERPL-MCNC: 1.05 MG/DL (ref 0.5–1.4)
EOSINOPHIL # BLD AUTO: 0.3 K/UL (ref 0–0.51)
EOSINOPHIL NFR BLD: 4.2 % (ref 0–6.9)
ERYTHROCYTE [DISTWIDTH] IN BLOOD BY AUTOMATED COUNT: 51 FL (ref 35.9–50)
GLUCOSE SERPL-MCNC: 110 MG/DL (ref 65–99)
GLUCOSE UR STRIP.AUTO-MCNC: NEGATIVE MG/DL
HCT VFR BLD AUTO: 40.4 % (ref 42–52)
HGB BLD-MCNC: 13.1 G/DL (ref 14–18)
HGB RETIC QN AUTO: 29.5 PG/CELL (ref 29–35)
IMM GRANULOCYTES # BLD AUTO: 0.02 K/UL (ref 0–0.11)
IMM GRANULOCYTES NFR BLD AUTO: 0.3 % (ref 0–0.9)
IMM RETICS NFR: 20.5 % (ref 9.3–17.4)
KETONES UR STRIP.AUTO-MCNC: NEGATIVE MG/DL
LDH SERPL L TO P-CCNC: 141 U/L (ref 107–266)
LEUKOCYTE ESTERASE UR QL STRIP.AUTO: NEGATIVE
LYMPHOCYTES # BLD AUTO: 1.89 K/UL (ref 1–4.8)
LYMPHOCYTES NFR BLD: 26.5 % (ref 22–41)
MCH RBC QN AUTO: 28.2 PG (ref 27–33)
MCHC RBC AUTO-ENTMCNC: 32.4 G/DL (ref 33.7–35.3)
MCV RBC AUTO: 87.1 FL (ref 81.4–97.8)
MICRO URNS: NORMAL
MONOCYTES # BLD AUTO: 0.65 K/UL (ref 0–0.85)
MONOCYTES NFR BLD AUTO: 9.1 % (ref 0–13.4)
MORPHOLOGY BLD-IMP: NORMAL
NEUTROPHILS # BLD AUTO: 4.17 K/UL (ref 1.82–7.42)
NEUTROPHILS NFR BLD: 58.6 % (ref 44–72)
NITRITE UR QL STRIP.AUTO: NEGATIVE
NRBC # BLD AUTO: 0.03 K/UL
NRBC BLD-RTO: 0.4 /100 WBC
PH UR STRIP.AUTO: 6 [PH] (ref 5–8)
PLATELET # BLD AUTO: 147 K/UL (ref 164–446)
PMV BLD AUTO: 9.8 FL (ref 9–12.9)
POTASSIUM SERPL-SCNC: 4.7 MMOL/L (ref 3.6–5.5)
PROT SERPL-MCNC: 7.2 G/DL (ref 6–8.2)
PROT UR QL STRIP: NEGATIVE MG/DL
RBC # BLD AUTO: 4.64 M/UL (ref 4.7–6.1)
RBC UR QL AUTO: NEGATIVE
RETICS # AUTO: 0.08 M/UL (ref 0.04–0.06)
RETICS/RBC NFR: 1.6 % (ref 0.8–2.1)
SODIUM SERPL-SCNC: 138 MMOL/L (ref 135–145)
SP GR UR STRIP.AUTO: 1.01
UROBILINOGEN UR STRIP.AUTO-MCNC: 0.2 MG/DL
WBC # BLD AUTO: 7.1 K/UL (ref 4.8–10.8)

## 2021-04-14 PROCEDURE — 85025 COMPLETE CBC W/AUTO DIFF WBC: CPT

## 2021-04-14 PROCEDURE — 82728 ASSAY OF FERRITIN: CPT

## 2021-04-14 PROCEDURE — 85046 RETICYTE/HGB CONCENTRATE: CPT

## 2021-04-14 PROCEDURE — 82150 ASSAY OF AMYLASE: CPT

## 2021-04-14 PROCEDURE — 80048 BASIC METABOLIC PNL TOTAL CA: CPT

## 2021-04-14 PROCEDURE — 36415 COLL VENOUS BLD VENIPUNCTURE: CPT

## 2021-04-14 PROCEDURE — 80076 HEPATIC FUNCTION PANEL: CPT

## 2021-04-14 PROCEDURE — 81003 URINALYSIS AUTO W/O SCOPE: CPT

## 2021-04-14 PROCEDURE — 87086 URINE CULTURE/COLONY COUNT: CPT

## 2021-04-14 PROCEDURE — 82272 OCCULT BLD FECES 1-3 TESTS: CPT

## 2021-04-14 PROCEDURE — 83615 LACTATE (LD) (LDH) ENZYME: CPT

## 2021-04-15 DIAGNOSIS — R80.9 PROTEINURIA, UNSPECIFIED TYPE: Chronic | ICD-10-CM

## 2021-04-15 DIAGNOSIS — D64.9 ANEMIA, UNSPECIFIED TYPE: ICD-10-CM

## 2021-04-15 LAB — FERRITIN SERPL-MCNC: 71 NG/ML (ref 22–322)

## 2021-04-16 LAB
BACTERIA UR CULT: NORMAL
SIGNIFICANT IND 70042: NORMAL
SITE SITE: NORMAL
SOURCE SOURCE: NORMAL

## 2021-04-20 LAB — HEMOCCULT STL QL: NEGATIVE

## 2021-04-22 ENCOUNTER — HOSPITAL ENCOUNTER (OUTPATIENT)
Dept: RADIOLOGY | Facility: MEDICAL CENTER | Age: 71
End: 2021-04-22
Attending: INTERNAL MEDICINE
Payer: MEDICARE

## 2021-04-22 DIAGNOSIS — R10.2 PELVIC PAIN: ICD-10-CM

## 2021-04-22 DIAGNOSIS — R93.3 ABNORMAL FINDING ON GI TRACT IMAGING: ICD-10-CM

## 2021-04-22 DIAGNOSIS — D35.01 BILATERAL ADRENAL ADENOMAS: ICD-10-CM

## 2021-04-22 DIAGNOSIS — K40.20 BILATERAL INGUINAL HERNIA WITHOUT OBSTRUCTION OR GANGRENE, RECURRENCE NOT SPECIFIED: ICD-10-CM

## 2021-04-22 DIAGNOSIS — K42.9 UMBILICAL HERNIA WITHOUT OBSTRUCTION AND WITHOUT GANGRENE: ICD-10-CM

## 2021-04-22 DIAGNOSIS — R10.30 LOWER ABDOMINAL PAIN: ICD-10-CM

## 2021-04-22 DIAGNOSIS — D35.02 BILATERAL ADRENAL ADENOMAS: ICD-10-CM

## 2021-04-22 PROCEDURE — 700117 HCHG RX CONTRAST REV CODE 255: Performed by: INTERNAL MEDICINE

## 2021-04-22 PROCEDURE — 74177 CT ABD & PELVIS W/CONTRAST: CPT

## 2021-04-22 RX ADMIN — IOHEXOL 100 ML: 350 INJECTION, SOLUTION INTRAVENOUS at 10:50

## 2021-04-23 ENCOUNTER — TELEPHONE (OUTPATIENT)
Dept: MEDICAL GROUP | Facility: PHYSICIAN GROUP | Age: 71
End: 2021-04-23

## 2021-04-23 NOTE — TELEPHONE ENCOUNTER
----- Message from Aris Aldana M.D. sent at 4/22/2021  4:57 PM PDT -----  Correction note to replace the previous message:    Please call patient :  recent CT scan came back abnormal     1.  There is wall thickening of the first and second portion of the small intestine .There is Wall thickening of the sigmoid colon. Cause unclear needing further investigation. Calcification  noted in the gall bladder  Recommendation: A referral has been submitted to Gastroenterology  for further evaluation.  Please call [phone] and ask for Referral Department.     2. BILAT inguinal hernia and umbilical hernia noted  Recommendation: A referral has been submitted to Gen Surgery  for further evaluation.  Please call [phone] and ask for Referral Department.     3. 1 cm nodule is identified within each adrenal gland>>  Recommendation: A referral has been submitted to ENDOCRINOLOGY  for further evaluation.  Please call [phone] and ask for Referral Department.  ENDOcrinologist likely requires pt to get additional hormonal labs prior to appt>> I have ordered these

## 2021-04-28 ENCOUNTER — OFFICE VISIT (OUTPATIENT)
Dept: MEDICAL GROUP | Facility: PHYSICIAN GROUP | Age: 71
End: 2021-04-28
Payer: MEDICARE

## 2021-04-28 VITALS
BODY MASS INDEX: 33.74 KG/M2 | HEART RATE: 100 BPM | DIASTOLIC BLOOD PRESSURE: 86 MMHG | OXYGEN SATURATION: 96 % | HEIGHT: 67 IN | TEMPERATURE: 98.9 F | WEIGHT: 215 LBS | RESPIRATION RATE: 16 BRPM | SYSTOLIC BLOOD PRESSURE: 124 MMHG

## 2021-04-28 DIAGNOSIS — K42.9 UMBILICAL HERNIA WITHOUT OBSTRUCTION AND WITHOUT GANGRENE: ICD-10-CM

## 2021-04-28 DIAGNOSIS — D35.01 BILATERAL ADRENAL ADENOMAS: ICD-10-CM

## 2021-04-28 DIAGNOSIS — D35.02 BILATERAL ADRENAL ADENOMAS: ICD-10-CM

## 2021-04-28 DIAGNOSIS — K82.8 CALCIFICATION OF GALLBLADDER: ICD-10-CM

## 2021-04-28 DIAGNOSIS — R93.3 ABNORMAL FINDING ON GI TRACT IMAGING: Chronic | ICD-10-CM

## 2021-04-28 DIAGNOSIS — K40.20 BILATERAL INGUINAL HERNIA WITHOUT OBSTRUCTION OR GANGRENE, RECURRENCE NOT SPECIFIED: ICD-10-CM

## 2021-04-28 PROCEDURE — 99213 OFFICE O/P EST LOW 20 MIN: CPT | Performed by: INTERNAL MEDICINE

## 2021-04-28 ASSESSMENT — FIBROSIS 4 INDEX: FIB4 SCORE: 1.85

## 2021-04-28 NOTE — ASSESSMENT & PLAN NOTE
Patient was seen recently with recurrent abdominal pain    Recent CT scan done showed  IMPRESSION:     1.  Possible normal contraction versus abnormal wall thickening possibly caused by tumor located between the first and second portions of the duodenum.     2.  There is diverticulosis.     3.  Wall thickening in the sigmoid colon is noted which could be caused by normal contraction or spasm versus neoplasm.     4.  Otherwise no mass wall thickening or filling defect noted in the small bowel.     5.  Calcification noted in the gallbladder.     6.  Umbilical hernia contains abdominal fat.     7.  Inguinal hernias bilaterally containing abdominal fat.    The above finding discussed with the patient in detail in layman terms.  Patient presently denies fever or chills.  No history of GI bleeding.  Abdominal pain is still intermittently noted.

## 2021-04-28 NOTE — PROGRESS NOTES
CC: CT scan result      HPI: This is a 70 y.o. pt.  Pt's medical history is notable for:     Abnormal finding on GI tract imaging  Patient was seen recently with recurrent abdominal/pelvic pain  Please refer to my recent note on April 13, 2021 for details.    Recent CT scan done showed  IMPRESSION:     1.  Possible normal contraction versus abnormal wall thickening possibly caused by tumor located between the first and second portions of the duodenum.     2.  There is diverticulosis.     3.  Wall thickening in the sigmoid colon is noted which could be caused by normal contraction or spasm versus neoplasm.     4.  Otherwise no mass wall thickening or filling defect noted in the small bowel.     5.  Calcification noted in the gallbladder.     6.  Umbilical hernia contains abdominal fat.     7.  Inguinal hernias bilaterally containing abdominal fat.    The above finding discussed with the patient in detail in layman terms.  Patient presently denies fever or chills.  No history of GI bleeding.  Abdominal pain is still intermittently noted.          REVIEW OF SYSTEMS:     Constitutional:  no fever / chills   Neurologic: no headaches  Eyes: no changes in vision  ENT: no sore throat, no hearing loss  CV:  no chest pain, no palpitations  Pulmonary: no SOB, no cough          Allergies: Zyban [bupropion], Augmentin, and Metformin    Current Outpatient Medications Ordered in Epic   Medication Sig Dispense Refill   • atorvastatin (LIPITOR) 20 MG Tab Take 1 tablet by mouth every day. 90 tablet 1   • omeprazole (PRILOSEC) 40 MG delayed-release capsule Take 1 capsule by mouth every day. 90 capsule 1   • DULoxetine (CYMBALTA) 60 MG Cap DR Particles delayed-release capsule Take 1 capsule by mouth every day. 90 capsule 1   • pentoxifylline CR (TRENTAL) 400 MG CR tablet Take 400 mg by mouth 3 times a day, with meals.     • budesonide-formoterol (SYMBICORT) 160-4.5 MCG/ACT Aerosol Inhale 2 Puffs 2 Times a Day. 2 Each 6   • albuterol  108 (90 Base) MCG/ACT Aero Soln inhalation aerosol Inhale 2 Puffs every 6 hours as needed for Shortness of Breath. 8.5 g 3   • Sulfacetamide Sodium, Acne, 10 % Lotion Apply one daily 118 mL 6     No current Epic-ordered facility-administered medications on file.       Past Medical, Social, and Family history reviewed and updated in EPIC     ------------------------------------------------------------------------------     PHYSICAL EXAM:   Vitals:    04/28/21 1557   BP: 124/86   Pulse: 100   Resp: 16   Temp: 37.2 °C (98.9 °F)   SpO2: 96%      Body mass index is 33.67 kg/m².         Constitutional: no acute distress  Neck: supple, no JVD  CV: heart RRR  Resp: normal effort, no wheezing or rales.  GI: abdomen soft, umbilical hernia noted, no tenderness  Neuro: CN 2-12 grossly intact   normal male genitalia no urethral discharge.        -----------------------------------------------------------------------------    ASSESSMENT:   1. Bilateral adrenal adenomas  CORTISOL - AM    RENIN ACTIVITY AND ALDOSTERONE    CATECHOLAMINES,UR.,FREE,RANDOM   2. Abnormal finding on GI tract imaging     3. Umbilical hernia without obstruction and without gangrene     4. Bilateral inguinal hernia without obstruction or gangrene, recurrence not specified     5. Calcification of gallbladder             MEDICAL DECISION MAKING: DISCUSSION / STATUS / PLAN:    The above CT finding discussed with the patient in detail.  All questions answered.    For the bilateral adrenal adenoma.  Refer the patient to endocrinology.  Additional testing requested including cortisol level, renal and aldosterone and urine catecholamine.    Bilateral inguinal hernia/umbilical hernia  Refer the patient to general surgery.    Calcification of the gallbladder/abnormal wall thickening possibly caused by tumor located between the first and second portion of the duodenum/wall thickening in the sigmoid colon which could be due to spasm versus neoplasm?  Refer the  patient to gastroenterology for further evaluation.        -If any problems should arise, patient was advised to contact our office or go to ER to be evaluated.    Please note that this dictation was created using voice recognition software. I have made every reasonable attempt to correct obvious errors, but it is possible there are errors of grammar and possibly content that I did not discover before finalizing the note.

## 2021-04-29 ENCOUNTER — HOSPITAL ENCOUNTER (OUTPATIENT)
Dept: LAB | Facility: MEDICAL CENTER | Age: 71
End: 2021-04-29
Attending: INTERNAL MEDICINE
Payer: MEDICARE

## 2021-04-29 LAB — CORTIS SERPL-MCNC: 7.3 UG/DL (ref 0–23)

## 2021-04-29 PROCEDURE — 82384 ASSAY THREE CATECHOLAMINES: CPT

## 2021-04-29 PROCEDURE — 82533 TOTAL CORTISOL: CPT

## 2021-04-29 PROCEDURE — 36415 COLL VENOUS BLD VENIPUNCTURE: CPT

## 2021-04-29 PROCEDURE — 84244 ASSAY OF RENIN: CPT

## 2021-05-04 LAB — RENIN PLAS-CCNC: 2.6 NG/ML/HR

## 2021-05-06 LAB
CATECHOLS UR-IMP: ABNORMAL
COLLECT DURATION TIME SPEC: ABNORMAL HRS
CREAT 24H UR-MCNC: 205 MG/DL
DOPAMINE 24H UR-MRATE: ABNORMAL UG/D (ref 71–485)
DOPAMINE UR-MCNC: 205 UG/L
DOPAMINE/CREAT UR: 100 UG/G CRT (ref 0–250)
EPINEPH 24H UR-MRATE: ABNORMAL (ref 1–14)
EPINEPH UR-MCNC: 10 UG/L
EPINEPH/CREAT UR: 5 UG/G CRT (ref 0–20)
NOREPINEPH 24H UR-MRATE: ABNORMAL UG/D (ref 14–120)
NOREPINEPH UR-MCNC: 104 UG/L
NOREPINEPH/CREAT UR: 51 UG/G CRT (ref 0–45)
SPECIMEN VOL ?TM UR: ABNORMAL ML

## 2021-05-13 ENCOUNTER — TELEPHONE (OUTPATIENT)
Dept: MEDICAL GROUP | Facility: PHYSICIAN GROUP | Age: 71
End: 2021-05-13

## 2021-05-13 RX ORDER — BUDESONIDE AND FORMOTEROL FUMARATE DIHYDRATE 160; 4.5 UG/1; UG/1
2 AEROSOL RESPIRATORY (INHALATION) 2 TIMES DAILY
Qty: 2 EACH | Refills: 6 | Status: SHIPPED
Start: 2021-05-13 | End: 2021-07-19

## 2021-05-13 NOTE — TELEPHONE ENCOUNTER
[id number] - [place], [place] - [address] AT [facility] [address]  [address] [place] [address]  Phone: [phone] Fax: [phone]    Pharmacy states budesonide-formoterol fumarate 160-4.5mcg/act aerosol is not covered.  Preferred alternatives are    Symbicort 160-4.5mcg/actuation HFAA  Advair -21mcg/actuation HFAA  Breo Ellipta 100-25mcg/dose dsdv  Flovent  mcg/actuation HFAA  Trelegy Ellipta 100-62.5-25mcg dsdv  Breztri Aerosphere  Bevespi Aerosphere  Anoro Ellipta    Please advise if change is appropriate or try for prior auth     Date Of Previous Biopsy (Optional): 11/18/19

## 2021-05-13 NOTE — TELEPHONE ENCOUNTER
[facility] pharmacy called to get clarification if pt should discontinue symbicort or continue both symbicort and breo?

## 2021-05-17 NOTE — TELEPHONE ENCOUNTER
Spoke with pt he is using the symbicort instead of breo, as that is what he says works.  Pt. Also states that insurance did not cover original symbicort prescription but has now figured it out and is happy.

## 2021-05-19 ENCOUNTER — HOSPITAL ENCOUNTER (OUTPATIENT)
Dept: LAB | Facility: MEDICAL CENTER | Age: 71
End: 2021-05-19
Attending: INTERNAL MEDICINE
Payer: MEDICARE

## 2021-05-19 DIAGNOSIS — R80.9 PROTEINURIA, UNSPECIFIED TYPE: ICD-10-CM

## 2021-05-19 LAB
CREAT UR-MCNC: 124.2 MG/DL
CREAT UR-MCNC: 126.33 MG/DL
MICROALBUMIN UR-MCNC: 33.4 MG/DL
MICROALBUMIN/CREAT UR: 269 MG/G (ref 0–30)
PROT UR-MCNC: 56 MG/DL (ref 0–15)

## 2021-05-19 PROCEDURE — 84156 ASSAY OF PROTEIN URINE: CPT

## 2021-05-19 PROCEDURE — 82043 UR ALBUMIN QUANTITATIVE: CPT

## 2021-05-19 PROCEDURE — 82306 VITAMIN D 25 HYDROXY: CPT

## 2021-05-19 PROCEDURE — 82570 ASSAY OF URINE CREATININE: CPT

## 2021-05-19 PROCEDURE — 36415 COLL VENOUS BLD VENIPUNCTURE: CPT

## 2021-05-21 LAB — 25(OH)D3 SERPL-MCNC: 88 NG/ML (ref 30–80)

## 2021-05-27 ENCOUNTER — OFFICE VISIT (OUTPATIENT)
Dept: NEPHROLOGY | Facility: MEDICAL CENTER | Age: 71
End: 2021-05-27
Payer: MEDICARE

## 2021-05-27 VITALS
HEART RATE: 86 BPM | OXYGEN SATURATION: 98 % | SYSTOLIC BLOOD PRESSURE: 134 MMHG | TEMPERATURE: 97.9 F | BODY MASS INDEX: 34.08 KG/M2 | DIASTOLIC BLOOD PRESSURE: 74 MMHG | WEIGHT: 217.13 LBS | HEIGHT: 67 IN

## 2021-05-27 DIAGNOSIS — N18.2 CKD (CHRONIC KIDNEY DISEASE), STAGE II: ICD-10-CM

## 2021-05-27 DIAGNOSIS — E11.29 MICROALBUMINURIA DUE TO TYPE 2 DIABETES MELLITUS (HCC): ICD-10-CM

## 2021-05-27 DIAGNOSIS — R80.9 MICROALBUMINURIA DUE TO TYPE 2 DIABETES MELLITUS (HCC): ICD-10-CM

## 2021-05-27 DIAGNOSIS — E55.9 VITAMIN D DEFICIENCY: ICD-10-CM

## 2021-05-27 DIAGNOSIS — I10 ESSENTIAL HYPERTENSION: ICD-10-CM

## 2021-05-27 DIAGNOSIS — R80.9 PROTEINURIA, UNSPECIFIED TYPE: ICD-10-CM

## 2021-05-27 DIAGNOSIS — D64.9 ANEMIA, UNSPECIFIED TYPE: ICD-10-CM

## 2021-05-27 PROCEDURE — 99214 OFFICE O/P EST MOD 30 MIN: CPT | Performed by: INTERNAL MEDICINE

## 2021-05-27 ASSESSMENT — ENCOUNTER SYMPTOMS
WEIGHT LOSS: 0
EYES NEGATIVE: 1
VOMITING: 0
PALPITATIONS: 0
SHORTNESS OF BREATH: 0
COUGH: 0
HEMOPTYSIS: 0
FEVER: 0
DIARRHEA: 0
ORTHOPNEA: 0
ABDOMINAL PAIN: 0
WHEEZING: 0
SINUS PAIN: 0
FLANK PAIN: 0
NAUSEA: 0
CHILLS: 0

## 2021-05-27 ASSESSMENT — FIBROSIS 4 INDEX: FIB4 SCORE: 1.85

## 2021-05-27 NOTE — PROGRESS NOTES
Subjective:      Micheal Brothers Jr. is a 70 y.o. male who presents with Follow-Up and Chronic Kidney Disease            Chronic Kidney Disease  Pertinent negatives include no abdominal pain, chest pain, chills, congestion, coughing, fever, nausea or vomiting.     Micheal is coming today for f/u of proteinuria, CKD II  Doing well, no complaints  No dysuria/hematuria/flank pain  HTN: BP well controlled  CKD II stable at baseline  Microalbuminuria mild -to monitor  Proteinuria at 0.6 - improved -to monitor    Review of Systems   Constitutional: Negative for chills, fever, malaise/fatigue and weight loss.   HENT: Negative for congestion, hearing loss and sinus pain.    Eyes: Negative.    Respiratory: Negative for cough, hemoptysis, shortness of breath and wheezing.    Cardiovascular: Negative for chest pain, palpitations, orthopnea and leg swelling.   Gastrointestinal: Negative for abdominal pain, diarrhea, nausea and vomiting.   Genitourinary: Negative for dysuria, flank pain, frequency, hematuria and urgency.   Skin: Negative.    All other systems reviewed and are negative.    Past Medical History:   Diagnosis Date   • Back pain    • Chickenpox    • Depression    • GERD (gastroesophageal reflux disease)    • [place] measles    • Gout    • Hyperlipidemia    • Influenza    • Panic disorder    • Sleep apnea    • Tobacco use 11/26/2014       Family History   Problem Relation Age of Onset   • Cancer Mother    • Cancer Father    • Stroke Father    • Diabetes Neg Hx    • Heart Disease Neg Hx    • Hypertension Neg Hx        Social History     Socioeconomic History   • Marital status:      Spouse name: Not on file   • Number of children: Not on file   • Years of education: Not on file   • Highest education level: Not on file   Occupational History   • Not on file   Tobacco Use   • Smoking status: Current Every Day Smoker     Packs/day: 0.75     Years: 53.00     Pack years: 39.75     Types: Cigarettes   •  "Smokeless tobacco: Never Used   • Tobacco comment: started smoking at age 15   Vaping Use   • Vaping Use: Never used   Substance and Sexual Activity   • Alcohol use: Not Currently     Alcohol/week: 0.0 oz   • Drug use: No   • Sexual activity: Yes     Partners: Female   Other Topics Concern   • Not on file   Social History Narrative   • Not on file     Social Determinants of Health     Financial Resource Strain:    • Difficulty of Paying Living Expenses:    Food Insecurity:    • Worried About Running Out of Food in the Last Year:    • Ran Out of Food in the Last Year:    Transportation Needs:    • Lack of Transportation (Medical):    • Lack of Transportation (Non-Medical):    Physical Activity:    • Days of Exercise per Week:    • Minutes of Exercise per Session:    Stress:    • Feeling of Stress :    Social Connections:    • Frequency of Communication with Friends and Family:    • Frequency of Social Gatherings with Friends and Family:    • Attends Caodaism Services:    • Active Member of Clubs or Organizations:    • Attends Club or Organization Meetings:    • Marital Status:    Intimate Partner Violence:    • Fear of Current or Ex-Partner:    • Emotionally Abused:    • Physically Abused:    • Sexually Abused:           Objective:     /74 (BP Location: Right arm, Patient Position: Sitting, BP Cuff Size: Adult)   Pulse 86   Temp 36.6 °C (97.9 °F) (Temporal)   Ht 1.702 m (5' 7\")   Wt 98.5 kg (217 lb 2 oz)   SpO2 98%   BMI 34.01 kg/m²      Physical Exam  Constitutional:       General: He is not in acute distress.     Appearance: Normal appearance. He is well-developed. He is not diaphoretic.   HENT:      Head: Normocephalic and atraumatic.      Nose: Nose normal.      Mouth/Throat:      Mouth: Mucous membranes are moist.      Pharynx: Oropharynx is clear.   Eyes:      Extraocular Movements: Extraocular movements intact.      Conjunctiva/sclera: Conjunctivae normal.      Pupils: Pupils are equal, round, and " reactive to light.   Cardiovascular:      Rate and Rhythm: Normal rate and regular rhythm.      Pulses: Normal pulses.      Heart sounds: Normal heart sounds. No friction rub. No gallop.    Pulmonary:      Effort: Pulmonary effort is normal. No respiratory distress.      Breath sounds: Normal breath sounds. No wheezing, rhonchi or rales.   Abdominal:      General: Bowel sounds are normal. There is no distension.      Palpations: Abdomen is soft. There is no mass.      Tenderness: There is no abdominal tenderness. There is no right CVA tenderness, left CVA tenderness or guarding.   Musculoskeletal:      Cervical back: Normal range of motion and neck supple.      Right lower leg: No edema.      Left lower leg: No edema.   Skin:     General: Skin is warm.      Coloration: Skin is not jaundiced or pale.      Findings: No erythema or rash.   Neurological:      General: No focal deficit present.      Mental Status: He is alert and oriented to person, place, and time.      Cranial Nerves: No cranial nerve deficit.      Coordination: Coordination normal.   Psychiatric:         Mood and Affect: Mood normal.         Behavior: Behavior normal.         Thought Content: Thought content normal.         Judgment: Judgment normal.               Lab results reviewed: d/w Pt  Lab Results   Component Value Date/Time    CREATININE 1.05 04/14/2021 10:46 AM    POTASSIUM 4.7 04/14/2021 10:46 AM       Assessment/Plan:          1. CKD (chronic kidney disease), stage II  -   Creat stable at baseline -to monitor  2. Microalbuminuria due to type 2 diabetes mellitus (HCC)        4. Essential hypertension      BP well controlled -to monitor at home    5. Proteinuria, unspecified type      improving to 0.4    6. Vitamin D deficiency      Well controlled      To monitor      Recs:  Continue current treatment  Low salt diet  Monitor BP  Keep well hydrated  F/u 6 months

## 2021-06-21 ENCOUNTER — OFFICE VISIT (OUTPATIENT)
Dept: ENDOCRINOLOGY | Facility: MEDICAL CENTER | Age: 71
End: 2021-06-21
Attending: NURSE PRACTITIONER
Payer: MEDICARE

## 2021-06-21 VITALS
OXYGEN SATURATION: 100 % | DIASTOLIC BLOOD PRESSURE: 68 MMHG | WEIGHT: 217 LBS | SYSTOLIC BLOOD PRESSURE: 110 MMHG | BODY MASS INDEX: 34.06 KG/M2 | HEART RATE: 80 BPM | HEIGHT: 67 IN

## 2021-06-21 DIAGNOSIS — E11.9 CONTROLLED TYPE 2 DIABETES MELLITUS WITHOUT COMPLICATION, WITHOUT LONG-TERM CURRENT USE OF INSULIN (HCC): ICD-10-CM

## 2021-06-21 DIAGNOSIS — D35.02 BILATERAL ADRENAL ADENOMAS: ICD-10-CM

## 2021-06-21 DIAGNOSIS — D35.01 BILATERAL ADRENAL ADENOMAS: ICD-10-CM

## 2021-06-21 DIAGNOSIS — E78.5 HYPERLIPIDEMIA ASSOCIATED WITH TYPE 2 DIABETES MELLITUS (HCC): ICD-10-CM

## 2021-06-21 DIAGNOSIS — E55.9 VITAMIN D DEFICIENCY: ICD-10-CM

## 2021-06-21 DIAGNOSIS — E11.69 HYPERLIPIDEMIA ASSOCIATED WITH TYPE 2 DIABETES MELLITUS (HCC): ICD-10-CM

## 2021-06-21 PROCEDURE — 99214 OFFICE O/P EST MOD 30 MIN: CPT | Performed by: NURSE PRACTITIONER

## 2021-06-21 PROCEDURE — 99212 OFFICE O/P EST SF 10 MIN: CPT | Performed by: NURSE PRACTITIONER

## 2021-06-21 RX ORDER — LISINOPRIL 2.5 MG/1
2.5 TABLET ORAL DAILY
Qty: 90 TABLET | Refills: 3 | Status: SHIPPED | OUTPATIENT
Start: 2021-06-21 | End: 2022-06-06 | Stop reason: SDUPTHER

## 2021-06-21 RX ORDER — CHOLECALCIFEROL (VITAMIN D3) 125 MCG
5000 CAPSULE ORAL DAILY
COMMUNITY

## 2021-06-21 RX ORDER — LISINOPRIL 2.5 MG/1
2.5 TABLET ORAL DAILY
Qty: 30 TABLET | Refills: 11 | Status: SHIPPED | OUTPATIENT
Start: 2021-06-21 | End: 2021-06-21

## 2021-06-21 RX ORDER — DEXAMETHASONE 1 MG
1 TABLET ORAL
Qty: 2 TABLET | Refills: 0 | Status: SHIPPED | OUTPATIENT
Start: 2021-06-21 | End: 2021-06-24

## 2021-06-21 ASSESSMENT — FIBROSIS 4 INDEX: FIB4 SCORE: 1.85

## 2021-06-21 NOTE — TELEPHONE ENCOUNTER
Received request via: Pharmacy    Was the patient seen in the last year in this department? Yes    Does the patient have an active prescription (recently filled or refills available) for medication(s) requested? No      REQUESTED MEDICATION:   Requested Prescriptions     Pending Prescriptions Disp Refills   • lisinopril (PRINIVIL) 2.5 MG Tab [Pharmacy Med Name: LISINOPRIL 2.5MG TABLETS] 90 tablet      Sig: TAKE 1 TABLET BY MOUTH EVERY DAY

## 2021-06-21 NOTE — TELEPHONE ENCOUNTER
Called and informed patient that medication has been approved and can be picked up at pharmacy.

## 2021-06-21 NOTE — PROGRESS NOTES
Chief Complaint: Initial consultation for incidental finding of bilateral adrenal adenomas.    HPI:     Micheal Brothers Jr. is a very pleasant 70 y.o. retired  male here for initial consultation for adrenal adenomas.  This was an incidental finding on a CAT scan completed 04/22/2021.  PET scan was being completed for lower abdominal and pelvic pain.    Reports shows 1 cm nodule identified within each adrenal gland.    Patient denies history of hypertension.  Patient is currently not on any antihypertensive medications.  Patient denies heart palpitations and/or increased anxiousness.  Patient denies history of decreased potassium levels.  Most recent CHEM panel was reviewed and all levels were within normal limits outside fasting glucose.     Ref. Range 4/14/2021 10:46   Potassium Latest Ref Range: 3.6 - 5.5 mmol/L 4.7     Patient reports a history of type 2 diabetes that is well controlled.  Last glycohemoglobin on 01/25/2021 was 6.3%.  Patient is diet and activity controlled at this time.  Patient has never been on any antihyperglycemics.  Patient's microalbumin ratio is elevated and patient is not currently on it ACE or ARB therapy.  Blood pressure is currently 110/68.    Patient denies a history of nervousness, panic attacks or anxiety.  Not experience any excessive perspiration or headaches.    History of hyperlipidemia and currently taking Lipitor 20 mg daily.     Ref. Range 1/21/2021 09:13   Cholesterol,Tot Latest Ref Range: 100 - 199 mg/dL 114   Triglycerides Latest Ref Range: 0 - 149 mg/dL 63   HDL Latest Ref Range: >=40 mg/dL 36 (A)   LDL Latest Ref Range: <100 mg/dL 65       History of vitamin D deficiency.  Currently taking vitamin D 5000 IU daily.     Ref. Range 5/19/2021 09:36   25-Hydroxy   Vitamin D 25 Latest Ref Range: 30 - 80 ng/mL 88 (H)       Patient's medications, allergies, and social histories were reviewed and updated as appropriate.      ROS:       CONS:     No fever, no  "chills   EYES:     No diplopia, no blurry vision   CV:           No chest pain, no palpitations   PULM:     No SOB, no cough, no hemoptysis.   GI:            No nausea, no vomiting, no diarrhea, no constipation   ENDO:     No polyuria, no polydipsia, no heat intolerance, no cold intolerance     Past Medical History:  Problem List:  2021-04: Calcification of gallbladder  2021-04: Bilateral adrenal adenomas  2021-04: Abnormal finding on GI tract imaging  2021-04: Bilateral inguinal hernia without obstruction or gangrene  2021-04: Pelvic pain  2021-04: Umbilical hernia  2021-01: Thrombocytopenia ([facility])  2021-01: Anemia  2021-01: Proteinuria  2021-01: Colonic polyp  2019-05: Chronic obstructive pulmonary disease ([facility])  2019-04: Prediabetes  2018-08: Obesity (BMI 35.0-39.9 without comorbidity) ([facility])  2018-06: Anxiety and depression  2016-08: NAHUM on CPAP  2014-11: Obesity (BMI 30-39.9)  2014-11: Dyslipidemia  2014-11: GERD (gastroesophageal reflux disease)  2014-11: Depression  2014-11: Tobacco use  2014-11: Right wrist pain      Past Surgical History:  Past Surgical History:   Procedure Laterality Date   • APPENDECTOMY     • ARTHROSCOPY, KNEE     • CARPAL TUNNEL ENDOSCOPIC      bilateral   • CARPAL TUNNEL RELEASE     • SHOULDER DECOMPRESSION ARTHROSCOPIC      right   • TONSILLECTOMY          Allergies:  Zyban [bupropion], Augmentin, and Metformin     Social History:  Social History     Tobacco Use   • Smoking status: Current Every Day Smoker     Packs/day: 0.75     Years: 53.00     Pack years: 39.75     Types: Cigarettes   • Smokeless tobacco: Never Used   • Tobacco comment: started smoking at age 15   Vaping Use   • Vaping Use: Never used   Substance Use Topics   • Alcohol use: Not Currently     Alcohol/week: 0.0 oz   • Drug use: No        Family History:   family history includes Cancer in his father and mother; Stroke in his father.      PHYSICAL EXAM:   Vital signs: /68   Pulse 80   Ht 1.702 m (5' 7\")   Wt " 98.4 kg (217 lb)   SpO2 100%   BMI 33.99 kg/m²   GENERAL: Well-developed, well-nourished in no apparent distress.   EYE:  No ocular asymmetry, PERRLA  HENT: Pink, moist mucous membranes.    NECK: No thyromegaly.   CARDIOVASCULAR:  No murmurs  LUNGS: Clear breath sounds  ABDOMEN: Soft, nontender   EXTREMITIES: No clubbing, cyanosis, or edema.   NEUROLOGICAL: No gross focal motor abnormalities   LYMPH: No cervical adenopathy palpated.   SKIN: No rashes, lesions.     ASSESSMENT/PLAN:   1. Bilateral adrenal adenomas  Stable.  Requires further evaluation of adrenal hormone levels.  Patient is asymptomatic at this time. Recommend Dexamethasone suppression testing.   If hormone levels are essentially normal recommend monitoring abnormalities on an annual basis.    Future lab orders:  - CORTISOL; Future  - DHEA; Future  - RENIN ACTIVITY AND ALDOSTERONE  - ACTH; Future  -17 hydroxylase  -24-hour urine metanephrines    2. Controlled type 2 diabetes mellitus without complication, without long-term current use of insulin (HCC)  Stable.  Continue with lifestyle modifications.    3. Hyperlipidemia associated with type 2 diabetes mellitus (HCC)  Stable.  Continue taking Lipitor 20 mg daily.  4. Vitamin D deficiency  Stable.  Recommend reducing vitamin D to 5 days a week.  Continue the same dosage of 5000 IUs.    Complete lab work within the next couple of weeks.  Next follow-up appointment in 6 weeks.    Thank you kindly for allowing me to participate in the endocrine care plan for this patient.    Atiya Massey, APRN  06/21/21    CC:   Aris Aldana M.D.

## 2021-06-23 ENCOUNTER — HOSPITAL ENCOUNTER (OUTPATIENT)
Dept: LAB | Facility: MEDICAL CENTER | Age: 71
End: 2021-06-23
Attending: NURSE PRACTITIONER
Payer: MEDICARE

## 2021-06-23 DIAGNOSIS — D35.01 BILATERAL ADRENAL ADENOMAS: ICD-10-CM

## 2021-06-23 DIAGNOSIS — D35.02 BILATERAL ADRENAL ADENOMAS: ICD-10-CM

## 2021-06-23 LAB — CORTIS SERPL-MCNC: 0.8 UG/DL (ref 0–23)

## 2021-06-23 PROCEDURE — 82024 ASSAY OF ACTH: CPT

## 2021-06-23 PROCEDURE — 82626 DEHYDROEPIANDROSTERONE: CPT

## 2021-06-23 PROCEDURE — 84244 ASSAY OF RENIN: CPT

## 2021-06-23 PROCEDURE — 36415 COLL VENOUS BLD VENIPUNCTURE: CPT

## 2021-06-23 PROCEDURE — 82533 TOTAL CORTISOL: CPT

## 2021-06-24 ENCOUNTER — OFFICE VISIT (OUTPATIENT)
Dept: DERMATOLOGY | Facility: IMAGING CENTER | Age: 71
End: 2021-06-24
Payer: MEDICARE

## 2021-06-24 ENCOUNTER — PATIENT MESSAGE (OUTPATIENT)
Dept: DERMATOLOGY | Facility: IMAGING CENTER | Age: 71
End: 2021-06-24

## 2021-06-24 DIAGNOSIS — L92.0 GA (GRANULOMA ANNULARE): ICD-10-CM

## 2021-06-24 PROCEDURE — 11901 INJECT SKIN LESIONS >7: CPT | Performed by: DERMATOLOGY

## 2021-06-24 RX ORDER — CEPHALEXIN 250 MG/1
CAPSULE ORAL
COMMUNITY
Start: 2021-06-23 | End: 2021-06-24

## 2021-06-24 RX ORDER — OXYCODONE HYDROCHLORIDE 5 MG/1
TABLET ORAL
COMMUNITY
Start: 2021-06-23 | End: 2021-06-24

## 2021-06-24 NOTE — PROGRESS NOTES
CC: kenalog injection for GA    Subjective: Previously seen patient here for kenalog injections. Duration of rash: previously dx GA (2012, [place], [place])  Symptoms: itching, only one site on ant right shin  Current treatment: Trental 400 mg  Prior rx: prednisone, hydroxychloroquine, minocycline & nicotinamide     ROS: no fevers/chills. +/- itch.  No cough  DermPMH: no skin cancer/melanoma  No problem-specific Assessment & Plan notes found for this encounter.    Relevant PMH:mult med comorbidities  Social: tobacco use    PE: Gen:WDWN male in NAD.  Skin: multiple dermal papules, plaques, some annular on legs and arms.       A/P: GA:  -trx with IL TAC per patient preference  -r/b/a reviewed and consent signed prior to sites cleaned with EtOH and local trx injections  -3ml total 5mg/ml TAC injected into approx 20 sites on arms/legs  -declined bandages  -f/u 8-10 weeks, PRN    I have reviewed medications relevant to my specialty.

## 2021-06-25 LAB — ACTH PLAS-MCNC: 1.1 PG/ML (ref 7.2–63.3)

## 2021-06-25 RX ORDER — SULFACETAMIDE SODIUM 100 MG/ML
LOTION TOPICAL
Qty: 118 ML | Refills: 6 | Status: SHIPPED | OUTPATIENT
Start: 2021-06-25 | End: 2023-05-23

## 2021-06-27 LAB — RENIN PLAS-CCNC: 6.9 NG/ML/HR

## 2021-06-28 LAB — DHEA SERPL-MCNC: 0.23 NG/ML (ref 0.63–4.7)

## 2021-06-29 DIAGNOSIS — F41.0 PANIC DISORDER: ICD-10-CM

## 2021-06-29 DIAGNOSIS — E78.5 DYSLIPIDEMIA: ICD-10-CM

## 2021-06-29 DIAGNOSIS — K21.9 GASTROESOPHAGEAL REFLUX DISEASE WITHOUT ESOPHAGITIS: ICD-10-CM

## 2021-06-29 RX ORDER — ATORVASTATIN CALCIUM 20 MG/1
20 TABLET, FILM COATED ORAL DAILY
Qty: 90 TABLET | Refills: 1 | Status: SHIPPED | OUTPATIENT
Start: 2021-06-29 | End: 2021-06-29 | Stop reason: SDUPTHER

## 2021-06-29 RX ORDER — ATORVASTATIN CALCIUM 20 MG/1
20 TABLET, FILM COATED ORAL DAILY
Qty: 90 TABLET | Refills: 1 | Status: SHIPPED | OUTPATIENT
Start: 2021-06-29 | End: 2022-01-26 | Stop reason: SDUPTHER

## 2021-06-29 RX ORDER — DULOXETIN HYDROCHLORIDE 60 MG/1
60 CAPSULE, DELAYED RELEASE ORAL
Qty: 90 CAPSULE | Refills: 1 | Status: SHIPPED | OUTPATIENT
Start: 2021-06-29 | End: 2022-02-24 | Stop reason: SDUPTHER

## 2021-06-29 RX ORDER — OMEPRAZOLE 40 MG/1
40 CAPSULE, DELAYED RELEASE ORAL
Qty: 90 CAPSULE | Refills: 1 | Status: SHIPPED | OUTPATIENT
Start: 2021-06-29 | End: 2022-02-24 | Stop reason: SDUPTHER

## 2021-07-01 RX ORDER — CLINDAMYCIN HYDROCHLORIDE 300 MG/1
CAPSULE ORAL
COMMUNITY
Start: 2021-06-26 | End: 2022-02-24

## 2021-07-14 ENCOUNTER — PATIENT MESSAGE (OUTPATIENT)
Dept: SLEEP MEDICINE | Facility: MEDICAL CENTER | Age: 71
End: 2021-07-14

## 2021-07-14 DIAGNOSIS — G47.33 OSA (OBSTRUCTIVE SLEEP APNEA): ICD-10-CM

## 2021-07-14 NOTE — TELEPHONE ENCOUNTER
From: Micheal Brothers Jr.  To: Physician Thea Huang  Sent: 7/14/2021 11:43 AM PDT  Subject: Prescription Question    Dr Huang  I am in need of a new C Pap machine because my machine keeps having an error message that the motor life has exceeded.  Please send a new prescription for a new machine and supplies to [place], [place]. Fax number is [phone].  This machine is well over 5 years old.  Please have someone contact me when the prescription has been sent.    Thank you so much    Jak Brothers

## 2021-07-19 RX ORDER — BUDESONIDE AND FORMOTEROL FUMARATE DIHYDRATE 160; 4.5 UG/1; UG/1
AEROSOL RESPIRATORY (INHALATION)
Qty: 3 EACH | Refills: 0 | Status: SHIPPED | OUTPATIENT
Start: 2021-07-19 | End: 2022-02-23

## 2021-08-04 ENCOUNTER — OFFICE VISIT (OUTPATIENT)
Dept: ENDOCRINOLOGY | Facility: MEDICAL CENTER | Age: 71
End: 2021-08-04
Attending: NURSE PRACTITIONER
Payer: MEDICARE

## 2021-08-04 VITALS
SYSTOLIC BLOOD PRESSURE: 140 MMHG | WEIGHT: 219.3 LBS | HEART RATE: 105 BPM | BODY MASS INDEX: 34.42 KG/M2 | DIASTOLIC BLOOD PRESSURE: 80 MMHG | HEIGHT: 67 IN

## 2021-08-04 DIAGNOSIS — D35.02 BILATERAL ADRENAL ADENOMAS: ICD-10-CM

## 2021-08-04 DIAGNOSIS — E11.9 CONTROLLED TYPE 2 DIABETES MELLITUS WITHOUT COMPLICATION, WITHOUT LONG-TERM CURRENT USE OF INSULIN (HCC): ICD-10-CM

## 2021-08-04 DIAGNOSIS — D35.01 BILATERAL ADRENAL ADENOMAS: ICD-10-CM

## 2021-08-04 PROCEDURE — 99212 OFFICE O/P EST SF 10 MIN: CPT | Performed by: NURSE PRACTITIONER

## 2021-08-04 PROCEDURE — 99214 OFFICE O/P EST MOD 30 MIN: CPT | Performed by: NURSE PRACTITIONER

## 2021-08-04 PROCEDURE — 99211 OFF/OP EST MAY X REQ PHY/QHP: CPT | Performed by: NURSE PRACTITIONER

## 2021-08-04 PROCEDURE — 83036 HEMOGLOBIN GLYCOSYLATED A1C: CPT | Performed by: NURSE PRACTITIONER

## 2021-08-04 ASSESSMENT — FIBROSIS 4 INDEX: FIB4 SCORE: 1.85

## 2021-08-04 NOTE — PROGRESS NOTES
Chief Complaint: Follow-up evaluation for incidental finding of bilateral adrenal adenomas.    HPI:     Micheal Brothers Jr. is a very pleasant 70 y.o. retired  male here for for continued evaluation & treatment of the followin.  Bilateral Adrenal adenomas  This was a incidental finding on a CAT scan completed 2021.  PET scan was being completed for lower abdominal and pelvic pain.    Reports shows 1 cm nodule identified within each adrenal gland.    Patient denies history of hypertension.  Patient is currently not on any antihypertensive medications.  Patient denies heart palpitations and/or increased anxiousness.  Patient denies history of decreased potassium levels.    Patient completed the dexamethasone suppression test on 2021.  In reference to those findings the adrenal glands responded sufficiently.     Ref. Range 2021 08:10   Cortisol Latest Ref Range: 0.0 - 23.0 ug/dL 0.8   Acth Latest Ref Range: 7.2 - 63.3 pg/mL 1.1 (L)   Dhea Latest Ref Range: 0.630 - 4.700 ng/mL 0.229 (L)       2.  Type 2 diabetes that is well controlled.    Point-of-care A1c 2021: 6.2%   Patient is diet and activity controlled at this time.  Patient has never been on any antihyperglycemics.    Blood pressure is currently 140/80.  Currently taking lisinopril 2.5 mg daily, which was started after his initial consultation with this provider.  Currently being evaluated and treated by Dr. Cintron, Nephrology.      Ref. Range 2021 09:36   Total Protein, Urine Latest Ref Range: 0.0 - 15.0 mg/dL 56.0 (H)   Micro Alb Creat Ratio Latest Ref Range: 0 - 30 mg/g 269 (H)   Creatinine, Urine Latest Units: mg/dL 124.20       3.  Hyperlipidemia   currently taking Lipitor 20 mg daily.  Patient denies myalgias and muscle cramps.     Ref. Range 2021 09:13   Cholesterol,Tot Latest Ref Range: 100 - 199 mg/dL 114   Triglycerides Latest Ref Range: 0 - 149 mg/dL 63   HDL Latest Ref Range: >=40 mg/dL 36  (A)   LDL Latest Ref Range: <100 mg/dL 65       4.  vitamin D deficiency    Currently taking vitamin D 5000 IU daily.     Ref. Range 5/19/2021 09:36   25-Hydroxy   Vitamin D 25 Latest Ref Range: 30 - 80 ng/mL 88 (H)       Patient's medications, allergies, and social histories were reviewed and updated as appropriate.      ROS:       CONS:     No fever, no chills   EYES:     No diplopia, no blurry vision   CV:           No chest pain, no palpitations   PULM:     No SOB, no cough, no hemoptysis.   GI:            No nausea, no vomiting, no diarrhea, no constipation   ENDO:     No polyuria, no polydipsia, no heat intolerance, no cold intolerance     Past Medical History:  Problem List:  2021-04: Calcification of gallbladder  2021-04: Bilateral adrenal adenomas  2021-04: Abnormal finding on GI tract imaging  2021-04: Bilateral inguinal hernia without obstruction or gangrene  2021-04: Pelvic pain  2021-04: Umbilical hernia  2021-01: Thrombocytopenia ([facility])  2021-01: Anemia  2021-01: Proteinuria  2021-01: Colonic polyp  2019-05: Chronic obstructive pulmonary disease ([facility])  2019-04: Prediabetes  2018-08: Obesity (BMI 35.0-39.9 without comorbidity) ([facility])  2018-06: Anxiety and depression  2016-08: NAHUM on CPAP  2014-11: Obesity (BMI 30-39.9)  2014-11: Dyslipidemia  2014-11: GERD (gastroesophageal reflux disease)  2014-11: Depression  2014-11: Tobacco use  2014-11: Right wrist pain      Past Surgical History:  Past Surgical History:   Procedure Laterality Date   • APPENDECTOMY     • ARTHROSCOPY, KNEE     • CARPAL TUNNEL ENDOSCOPIC      bilateral   • CARPAL TUNNEL RELEASE     • SHOULDER DECOMPRESSION ARTHROSCOPIC      right   • TONSILLECTOMY          Allergies:  Zyban [bupropion], Augmentin, and Metformin     Social History:  Social History     Tobacco Use   • Smoking status: Current Every Day Smoker     Packs/day: 1.00     Years: 53.00     Pack years: 53.00     Types: Cigarettes   • Smokeless tobacco: Never Used   • Tobacco  "comment: started smoking at age 15   Vaping Use   • Vaping Use: Never used   Substance Use Topics   • Alcohol use: Not Currently     Alcohol/week: 0.0 oz   • Drug use: No        Family History:   family history includes Cancer in his father and mother; Stroke in his father.      PHYSICAL EXAM:   Vital signs: /80 (BP Location: Left arm, Patient Position: Sitting, BP Cuff Size: Adult)   Pulse (!) 105   Ht 1.702 m (5' 7\")   Wt 99.5 kg (219 lb 4.8 oz)   BMI 34.35 kg/m²   GENERAL: Well-developed, well-nourished in no apparent distress.   EYE:  No ocular asymmetry, PERRLA  HENT: Pink, moist mucous membranes.    NECK: No thyromegaly.   CARDIOVASCULAR:  No murmurs  LUNGS: Clear breath sounds  ABDOMEN: Soft, nontender   EXTREMITIES: No clubbing, cyanosis, or edema.   NEUROLOGICAL: No gross focal motor abnormalities   LYMPH: No cervical adenopathy palpated.   SKIN: No rashes, lesions.     ASSESSMENT/PLAN:   1. Bilateral adrenal adenomas  Stable.  Dexamethasone suppression testing within normal limits.  Adrenal glands responded sufficiently.  Recommend continued monitoring of adrenal adenomas annually and follow-up imaging within 1 to 2 years.    2. Controlled type 2 diabetes mellitus without complication, without long-term current use of insulin (HCC)  Stable.  Continue with lifestyle modifications.  Continue lisinopril 2.5 mg daily.    3. Hyperlipidemia associated with type 2 diabetes mellitus (HCC)  Stable.  Continue taking Lipitor 20 mg daily.    4. Vitamin D deficiency  Stable.  Recommend reducing vitamin D to 5 days a week.  Continue the same dosage of 5000 IUs.    Complete lab work 1 to 2 weeks prior to next appointment.  Next follow-up appointment in 6 months.    Thank you kindly for allowing me to participate in the endocrine care plan for this patient.    Atiya Massey, APRN  08/04/2021    CC:   Aris Aldana M.D.  "

## 2021-08-05 LAB
HBA1C MFR BLD: 6.2 % (ref 0–5.6)
INT CON NEG: ABNORMAL
INT CON POS: ABNORMAL

## 2021-08-19 ENCOUNTER — TELEPHONE (OUTPATIENT)
Dept: DERMATOLOGY | Facility: IMAGING CENTER | Age: 71
End: 2021-08-19

## 2021-08-24 RX ORDER — PENTOXIFYLLINE 400 MG/1
400 TABLET, EXTENDED RELEASE ORAL
Qty: 90 TABLET | Refills: 0 | Status: SHIPPED | OUTPATIENT
Start: 2021-08-24 | End: 2021-09-24 | Stop reason: SDUPTHER

## 2021-08-24 RX ORDER — PENTOXIFYLLINE 400 MG/1
400 TABLET, EXTENDED RELEASE ORAL
Qty: 30 TABLET | Refills: 0 | Status: SHIPPED | OUTPATIENT
Start: 2021-08-24 | End: 2021-08-24 | Stop reason: SDUPTHER

## 2021-08-24 NOTE — TELEPHONE ENCOUNTER
Refill done to local pharmacy for 30 day supply. Further refills can be addressed at f/u with Dr. Castellano on 9/30/2021

## 2021-08-24 NOTE — TELEPHONE ENCOUNTER
30 day supply done. Pt has appt with Dr. Castellano on 9/30/2021 and further refills can be discussed at that time.

## 2021-09-08 ENCOUNTER — OFFICE VISIT (OUTPATIENT)
Dept: URGENT CARE | Facility: PHYSICIAN GROUP | Age: 71
End: 2021-09-08
Payer: MEDICARE

## 2021-09-08 VITALS
SYSTOLIC BLOOD PRESSURE: 134 MMHG | WEIGHT: 215 LBS | OXYGEN SATURATION: 98 % | HEART RATE: 107 BPM | DIASTOLIC BLOOD PRESSURE: 68 MMHG | RESPIRATION RATE: 14 BRPM | BODY MASS INDEX: 33.74 KG/M2 | HEIGHT: 67 IN | TEMPERATURE: 97.6 F

## 2021-09-08 DIAGNOSIS — L30.4 INTERTRIGO: ICD-10-CM

## 2021-09-08 PROCEDURE — 99213 OFFICE O/P EST LOW 20 MIN: CPT | Performed by: NURSE PRACTITIONER

## 2021-09-08 RX ORDER — NYSTATIN 100000 U/G
1 CREAM TOPICAL 2 TIMES DAILY
Qty: 30 G | Refills: 0 | Status: SHIPPED | OUTPATIENT
Start: 2021-09-08 | End: 2021-09-18

## 2021-09-08 ASSESSMENT — FIBROSIS 4 INDEX: FIB4 SCORE: 1.85

## 2021-09-08 NOTE — PROGRESS NOTES
Subjective     Jak Brothers Jr. is a 70 y.o. male who presents with Groin Pain ([id number] )    Past Medical History:   Diagnosis Date   • Back pain    • Chickenpox    • Depression    • GERD (gastroesophageal reflux disease)    • [place] measles    • Gout    • Hyperlipidemia    • Influenza    • Panic disorder    • Sleep apnea    • Tobacco use 11/26/2014     Social History     Socioeconomic History   • Marital status:      Spouse name: Not on file   • Number of children: Not on file   • Years of education: Not on file   • Highest education level: Not on file   Occupational History   • Not on file   Tobacco Use   • Smoking status: Current Every Day Smoker     Packs/day: 1.00     Years: 53.00     Pack years: 53.00     Types: Cigarettes   • Smokeless tobacco: Never Used   • Tobacco comment: started smoking at age 15   Vaping Use   • Vaping Use: Never used   Substance and Sexual Activity   • Alcohol use: Not Currently     Alcohol/week: 0.0 oz   • Drug use: No   • Sexual activity: Yes     Partners: Female   Other Topics Concern   • Not on file   Social History Narrative   • Not on file     Social Determinants of Health     Financial Resource Strain:    • Difficulty of Paying Living Expenses:    Food Insecurity:    • Worried About Running Out of Food in the Last Year:    • Ran Out of Food in the Last Year:    Transportation Needs:    • Lack of Transportation (Medical):    • Lack of Transportation (Non-Medical):    Physical Activity:    • Days of Exercise per Week:    • Minutes of Exercise per Session:    Stress:    • Feeling of Stress :    Social Connections:    • Frequency of Communication with Friends and Family:    • Frequency of Social Gatherings with Friends and Family:    • Attends Yazdanism Services:    • Active Member of Clubs or Organizations:    • Attends Club or Organization Meetings:    • Marital Status:    Intimate Partner Violence:    • Fear of Current or Ex-Partner:    • Emotionally Abused:   "  • Physically Abused:    • Sexually Abused:      Family History   Problem Relation Age of Onset   • Cancer Mother    • Cancer Father    • Stroke Father    • Diabetes Neg Hx    • Heart Disease Neg Hx    • Hypertension Neg Hx        Allergies: Zyban [bupropion], Augmentin, and Metformin    Patient is a 70-year-old male who presents today with complaint of burning and itching and rash in the groin area bilaterally over the last week.  Patient states he has had this before and usually uses an antifungal medication with good relief.  Patient states it is difficult for him to keep the area completely dry.        Groin Pain  This is a recurrent problem. The current episode started in the past 7 days. The problem occurs constantly. The problem has been unchanged. Associated symptoms include a rash. Exacerbated by: Heat. He has tried nothing for the symptoms. The treatment provided no relief.       Review of Systems   Skin: Positive for itching and rash.   All other systems reviewed and are negative.             Objective     /68   Pulse (!) 107   Temp 36.4 °C (97.6 °F) (Temporal)   Resp 14   Ht 1.702 m (5' 7\")   Wt 97.5 kg (215 lb)   SpO2 98%   BMI 33.67 kg/m²      Physical Exam  Skin:            Comments: There is a red irritated and inflamed rash to the skin fold area on both sides of the groin.                             Assessment & Plan   Intertrigo    Topical nystatin  Keep area dry  Return to urgent care for any further questions or concerns     There are no diagnoses linked to this encounter.              "

## 2021-09-14 ENCOUNTER — OFFICE VISIT (OUTPATIENT)
Dept: MEDICAL GROUP | Facility: PHYSICIAN GROUP | Age: 71
End: 2021-09-14
Payer: MEDICARE

## 2021-09-14 VITALS
BODY MASS INDEX: 34.75 KG/M2 | RESPIRATION RATE: 16 BRPM | HEIGHT: 67 IN | WEIGHT: 221.4 LBS | OXYGEN SATURATION: 99 % | HEART RATE: 80 BPM | DIASTOLIC BLOOD PRESSURE: 70 MMHG | TEMPERATURE: 97.4 F | SYSTOLIC BLOOD PRESSURE: 118 MMHG

## 2021-09-14 DIAGNOSIS — F32.A ANXIETY AND DEPRESSION: Chronic | ICD-10-CM

## 2021-09-14 DIAGNOSIS — J44.9 CHRONIC OBSTRUCTIVE PULMONARY DISEASE, UNSPECIFIED COPD TYPE (HCC): ICD-10-CM

## 2021-09-14 DIAGNOSIS — E78.5 DYSLIPIDEMIA: Chronic | ICD-10-CM

## 2021-09-14 DIAGNOSIS — F41.9 ANXIETY AND DEPRESSION: Chronic | ICD-10-CM

## 2021-09-14 DIAGNOSIS — D35.01 BILATERAL ADRENAL ADENOMAS: Chronic | ICD-10-CM

## 2021-09-14 DIAGNOSIS — D35.02 BILATERAL ADRENAL ADENOMAS: Chronic | ICD-10-CM

## 2021-09-14 DIAGNOSIS — R73.03 PREDIABETES: ICD-10-CM

## 2021-09-14 PROCEDURE — G0439 PPPS, SUBSEQ VISIT: HCPCS | Performed by: INTERNAL MEDICINE

## 2021-09-14 ASSESSMENT — PATIENT HEALTH QUESTIONNAIRE - PHQ9: CLINICAL INTERPRETATION OF PHQ2 SCORE: 0

## 2021-09-14 ASSESSMENT — ENCOUNTER SYMPTOMS: GENERAL WELL-BEING: GOOD

## 2021-09-14 ASSESSMENT — FIBROSIS 4 INDEX: FIB4 SCORE: 1.85

## 2021-09-14 ASSESSMENT — ACTIVITIES OF DAILY LIVING (ADL): BATHING_REQUIRES_ASSISTANCE: 0

## 2021-09-14 NOTE — PROGRESS NOTES
Chief Complaint   Patient presents with   • Follow-Up         HPI:  Jak is a 70 y.o. here for Medicare Annual Wellness Visit        Patient Active Problem List    Diagnosis Date Noted   • Calcification of gallbladder 04/28/2021   • Bilateral adrenal adenomas 04/22/2021   • Abnormal finding on GI tract imaging 04/22/2021   • Bilateral inguinal hernia without obstruction or gangrene 04/22/2021   • Pelvic pain 04/13/2021   • Umbilical hernia 04/13/2021   • Thrombocytopenia (HCC) 01/26/2021   • Anemia 01/22/2021   • Proteinuria 01/22/2021   • Colonic polyp 01/20/2021   • Chronic obstructive pulmonary disease (HCC) 05/01/2019   • Prediabetes 04/01/2019   • Anxiety and depression 06/18/2018   • NAHUM on CPAP 08/26/2016   • Obesity (BMI 30-39.9) 11/26/2014   • Dyslipidemia 11/26/2014   • GERD (gastroesophageal reflux disease) 11/26/2014   • Tobacco use 11/26/2014       Current Outpatient Medications   Medication Sig Dispense Refill   • albuterol 108 (90 Base) MCG/ACT Aero Soln inhalation aerosol Inhale 2 Puffs every 6 hours as needed for Shortness of Breath. 8.5 g 3   • nystatin (MYCOSTATIN) 167226 UNIT/GM Cream topical cream Apply 1 g topically 2 times a day for 10 days. 30 g 0   • pentoxifylline CR (TRENTAL) 400 MG CR tablet Take 1 Tablet by mouth 3 times a day with meals. 90 Tablet 0   • budesonide-formoterol (SYMBICORT) 160-4.5 MCG/ACT Aerosol INHALE 2 PUFFS BY MOUTH TWICE DAILY 3 Each 0   • clindamycin (CLEOCIN) 300 MG Cap TAKE 1 CAPSULE BY MOUTH EVERY 8 HOURS FOR 10 DAYS     • omeprazole (PRILOSEC) 40 MG delayed-release capsule Take 1 capsule by mouth every day. 90 capsule 1   • DULoxetine (CYMBALTA) 60 MG Cap DR Particles delayed-release capsule Take 1 capsule by mouth every day. 90 capsule 1   • atorvastatin (LIPITOR) 20 MG Tab Take 1 tablet by mouth every day. 90 tablet 1   • Sulfacetamide Sodium, Acne, 10 % Lotion Apply one daily 118 mL 6   • Cholecalciferol (VITAMIN D) 125 MCG (5000 UT) Cap Take 5,000 Units by  mouth every day.     • lisinopril (PRINIVIL) 2.5 MG Tab TAKE 1 TABLET BY MOUTH EVERY DAY 90 tablet 3     No current facility-administered medications for this visit.        Patient is taking medications as noted in medication list.  Current supplements as per medication list.     Allergies: Zyban [bupropion], Augmentin, and Metformin    Current social contact/activities: Yard work, solitaire     Is patient current with immunizations? No, due for FLU. Patient is interested in receiving NONE today.    He  reports that he has been smoking cigarettes. He has a 53.00 pack-year smoking history. He has never used smokeless tobacco. He reports previous alcohol use. He reports that he does not use drugs.  Ready to quit: Not Answered  Counseling given: Not Answered  Comment: started smoking at age 15        DPA/Advanced directive: Patient does not have an Advanced Directive.  A packet and workshop information was given on Advanced Directives.    ROS:    Gait: Uses no assistive device   Ostomy: No   Other tubes: No   Amputations: No   Chronic oxygen use No   Last eye exam Maybe January  Wears hearing aids: No   : Denies any urinary leakage during the last 6 months      Screening:        Depression Screening    Little interest or pleasure in doing things?  0 - not at all  Feeling down, depressed, or hopeless? 0 - not at all  Patient Health Questionnaire Score: 0    If depressive symptoms identified deferred to follow up visit unless specifically addressed in assessment and plan.    Interpretation of PHQ-9 Total Score   Score Severity   1-4 No Depression   5-9 Mild Depression   10-14 Moderate Depression   15-19 Moderately Severe Depression   20-27 Severe Depression    Screening for Cognitive Impairment    Three Minute Recall (captain, garden, picture)  0/3 Patient refuses to do  Arturo clock face with all 12 numbers and set the hands to show 5 past 8.  No Patient refuses to do  If cognitive concerns identified, deferred for  follow up unless specifically addressed in assessment and plan.    Fall Risk Assessment    Has the patient had two or more falls in the last year or any fall with injury in the last year?  No  If fall risk identified, deferred for follow up unless specifically addressed in assessment and plan.    Safety Assessment    Throw rugs on floor.  Yes  Handrails on all stairs.  No  Good lighting in all hallways.  Yes  Difficulty hearing.  No  Patient counseled about all safety risks that were identified.    Functional Assessment ADLs    Are there any barriers preventing you from cooking for yourself or meeting nutritional needs?  No.    Are there any barriers preventing you from driving safely or obtaining transportation?  No.    Are there any barriers preventing you from using a telephone or calling for help?  No.    Are there any barriers preventing you from shopping?  No.    Are there any barriers preventing you from taking care of your own finances?  No.    Are there any barriers preventing you from managing your medications?  No.    Are there any barriers preventing you from showering, bathing or dressing yourself?  No.    Are you currently engaging in any exercise or physical activity?  No.     What is your perception of your health?  Good.    Health Maintenance Summary                HEPATITIS C SCREENING Overdue 1950     DIABETES MONOFILAMENT / LE EXAM Overdue 3/30/1951     Annual Pulmonary Function Test / Spirometry Overdue 10/13/2017      Done 10/13/2016 PFT DICTATED RESULTS    RETINAL SCREENING Overdue 5/10/2018      Done 5/10/2017 REFERRAL FOR RETINAL SCREENING EXAM     Patient has more history with this topic...    Annual Wellness Visit Overdue 9/24/2020      Done 9/24/2019 Visit Dx: Medicare annual wellness visit, subsequent     Patient has more history with this topic...    IMM INFLUENZA Overdue 9/1/2021      Done 10/15/2020 Imm Admin: Influenza Vaccine Quad Inj (Pf)     Patient has more history with this  topic...    IMM ZOSTER VACCINES Postponed 9/17/2025 Originally 9/30/1999. Insurance/Financial    IMM DTaP/Tdap/Td Vaccine Postponed 4/21/2039 Originally 9/30/1968. Insurance/Financial    FASTING LIPID PROFILE Next Due 1/21/2022      Done 1/21/2021 LIPID PROFILE     Patient has more history with this topic...    A1C SCREENING Next Due 2/5/2022      Done 8/5/2021 POCT A1C     Patient has more history with this topic...    SERUM CREATININE Next Due 4/14/2022      Done 4/14/2021 BASIC METABOLIC PANEL     Patient has more history with this topic...    URINE ACR / MICROALBUMIN Next Due 5/19/2022      Done 5/19/2021 URINE PROTEIN     Patient has more history with this topic...    COLORECTAL CANCER SCREENING Next Due 2/20/2024           Patient Care Team:  Aris Aldana M.D. as PCP - General (Internal Medicine)  [facility] as Respiratory Therapist    Social History     Tobacco Use   • Smoking status: Current Every Day Smoker     Packs/day: 1.00     Years: 53.00     Pack years: 53.00     Types: Cigarettes   • Smokeless tobacco: Never Used   • Tobacco comment: started smoking at age 15   Vaping Use   • Vaping Use: Never used   Substance Use Topics   • Alcohol use: Not Currently     Alcohol/week: 0.0 oz   • Drug use: No     Family History   Problem Relation Age of Onset   • Cancer Mother    • Cancer Father    • Stroke Father    • Diabetes Neg Hx    • Heart Disease Neg Hx    • Hypertension Neg Hx      He  has a past medical history of Back pain, Chickenpox, Depression, GERD (gastroesophageal reflux disease), Czech measles, Gout, Hyperlipidemia, Influenza, Panic disorder, Sleep apnea, and Tobacco use (11/26/2014).   Past Surgical History:   Procedure Laterality Date   • APPENDECTOMY     • ARTHROSCOPY, KNEE     • CARPAL TUNNEL ENDOSCOPIC      bilateral   • CARPAL TUNNEL RELEASE     • SHOULDER DECOMPRESSION ARTHROSCOPIC      right   • TONSILLECTOMY             Exam:     /70 (BP Location: Left arm, Patient Position:  "Sitting, BP Cuff Size: Large adult)   Pulse 80   Temp 36.3 °C (97.4 °F) (Temporal)   Resp 16   Ht 1.702 m (5' 7\")   Wt 100 kg (221 lb 6.4 oz)   SpO2 99%  Body mass index is 34.68 kg/m².    Hearing good.    Dentition good  Alert, oriented in no acute distress.  Eye contact is good, speech goal directed, affect calm  yes    Assessment and Plan. The following treatment and monitoring plan is recommended:      Anxiety and depression   chronic and stable condition.  The patient is being treated with duloxetine.  Patient denies SI.  Cont current management. No indication for  referral    Bilateral adrenal adenomas  Chronic condition   the patient followed now by endocrinology service.    Chronic obstructive pulmonary disease (HCC)  Chronic and stable condition   the patient presently taking Symbicort daily and uses albuterol as needed.    Strongly rec PFT: pt refused.    Dyslipidemia  Chronic condition.  The patient is being treated with atorvastatin.  Lab tests ordered for follow-up.            Health Care Screening recommendations as per orders if indicated.  Referrals offered: PT/OT/Nutrition counseling/Behavioral Health/Smoking cessation as per orders if indicated.    Discussion today about general wellness and lifestyle habits:    · Prevent falls and reduce trip hazards; Cautioned about securing or removing rugs.  · Have a working fire alarm and carbon monoxide detector;   · Engage in regular physical activity and social activities       Follow-up: No follow-ups on file.    "

## 2021-09-14 NOTE — ASSESSMENT & PLAN NOTE
Chronic condition.  The patient is being treated with atorvastatin.  Lab tests ordered for follow-up.

## 2021-09-14 NOTE — ASSESSMENT & PLAN NOTE
Chronic and stable condition per the patient presently taking Symbicort daily and uses albuterol as needed.  Currently the patient asymptomatic.

## 2021-09-24 RX ORDER — PENTOXIFYLLINE 400 MG/1
400 TABLET, EXTENDED RELEASE ORAL
Qty: 90 TABLET | Refills: 0 | Status: SHIPPED | OUTPATIENT
Start: 2021-09-24 | End: 2022-02-24 | Stop reason: CLARIF

## 2021-09-24 NOTE — TELEPHONE ENCOUNTER
Received request via: Pharmacy    Was the patient seen in the last year in this department? Yes    Does the patient have an active prescription (recently filled or refills available) for medication(s) requested? No revills available    Pentoxifyline

## 2021-09-30 ENCOUNTER — OFFICE VISIT (OUTPATIENT)
Dept: DERMATOLOGY | Facility: IMAGING CENTER | Age: 71
End: 2021-09-30
Payer: MEDICARE

## 2021-09-30 DIAGNOSIS — L82.1 SEBORRHEIC KERATOSIS: ICD-10-CM

## 2021-09-30 DIAGNOSIS — L57.0 ACTINIC KERATOSIS: ICD-10-CM

## 2021-09-30 DIAGNOSIS — L92.0 GA (GRANULOMA ANNULARE): ICD-10-CM

## 2021-09-30 PROCEDURE — 99213 OFFICE O/P EST LOW 20 MIN: CPT | Mod: 25 | Performed by: DERMATOLOGY

## 2021-09-30 PROCEDURE — 17000 DESTRUCT PREMALG LESION: CPT | Performed by: DERMATOLOGY

## 2021-09-30 NOTE — PROGRESS NOTES
CC: kenalog injection for GA and skin lesions    Subjective: Previously seen patient here for followup after kenalog injections. On arms and legs.    New problem  HPI/location: rt cheek scabs   Time present: 2-3 weeks   Painful lesion: No  Itching lesion: No  Enlarging lesion: No  Anything make it better or worse? Shaving    HPI/location: neck feels bumps   Time present: 2-3 weeks   Painful lesion: No  Itching lesion: No  Enlarging lesion: No  Anything make it better or worse? Tried neosporin doesn't help     Initial hx.   Duration of rash: previously dx GA (2012, [place], [place])  Symptoms: itching, only one site on ant right shin  Current treatment: Trental 400 mg  Prior rx: prednisone, hydroxychloroquine, minocycline & nicotinamide     ROS: no fevers/chills. +/- itch.  No cough  DermPMH: no skin cancer/melanoma  No problem-specific Assessment & Plan notes found for this encounter.    Relevant PMH:mult med comorbidities  Social: tobacco use    PE: Gen:WDWN male in NAD.  Skin: thin HK papule on right facial cheek. 2 annular, partial dermal plaques on left arm. Few scattered erythematous macules on right arm. Scattered thin waxy papules on neck/pericollar skin       A/P: GA: chronic stable  -no IL TAC trx today per patient preference  -cont trental as prev rx: 400mg PO TID; advised re: pharmacy issues    AKs:  -counseled on diagnosis and treatment, including risks/benefits/alternatives of cyrotherapy  -LN2 25 sec X 2 cycles X 1lesions  -f/u if persists at 1 month    SKs, pericollar:  -reviewed dx/tx    F/u 3 months/PRN    I have reviewed medications relevant to my specialty.

## 2021-10-05 ENCOUNTER — PATIENT MESSAGE (OUTPATIENT)
Dept: SLEEP MEDICINE | Facility: MEDICAL CENTER | Age: 71
End: 2021-10-05

## 2021-10-05 DIAGNOSIS — G47.33 OSA (OBSTRUCTIVE SLEEP APNEA): ICD-10-CM

## 2021-10-08 NOTE — TELEPHONE ENCOUNTER
From: Micheal Brothers Jr.  To: Physician Thea Huang  Sent: 10/5/2021 1:30 PM PDT  Subject: C pap machine    Could you send another prescription to [facility] for a new c pap machine because my machine is over 5 years old and it shows an error message. [facility] is telling me the the last prescription is outdated and they need a new one.  Any questions please call me at [phone]  Thank you  Jak Brothers

## 2021-10-09 NOTE — PATIENT COMMUNICATION
Order with last office notes dated 2/24/21 and demos was faxed to [facility] in [place] as requested. Patient will keep his pending appointment to come see us 11/19/21. Faxed confirmation scanned into patient's records. Also all was e-mailed to patient. Request Completed.

## 2021-10-28 DIAGNOSIS — E11.29 MICROALBUMINURIA DUE TO TYPE 2 DIABETES MELLITUS (HCC): ICD-10-CM

## 2021-10-28 DIAGNOSIS — I10 ESSENTIAL HYPERTENSION: ICD-10-CM

## 2021-10-28 DIAGNOSIS — E55.9 VITAMIN D DEFICIENCY: ICD-10-CM

## 2021-10-28 DIAGNOSIS — R80.9 PROTEINURIA, UNSPECIFIED TYPE: ICD-10-CM

## 2021-10-28 DIAGNOSIS — D64.9 ANEMIA, UNSPECIFIED TYPE: ICD-10-CM

## 2021-10-28 DIAGNOSIS — N18.2 CKD (CHRONIC KIDNEY DISEASE), STAGE II: ICD-10-CM

## 2021-10-28 DIAGNOSIS — R80.9 MICROALBUMINURIA DUE TO TYPE 2 DIABETES MELLITUS (HCC): ICD-10-CM

## 2021-11-11 ENCOUNTER — HOSPITAL ENCOUNTER (OUTPATIENT)
Dept: LAB | Facility: MEDICAL CENTER | Age: 71
End: 2021-11-11
Attending: INTERNAL MEDICINE
Payer: MEDICARE

## 2021-11-11 DIAGNOSIS — D64.9 ANEMIA, UNSPECIFIED TYPE: ICD-10-CM

## 2021-11-11 DIAGNOSIS — R80.9 MICROALBUMINURIA DUE TO TYPE 2 DIABETES MELLITUS (HCC): ICD-10-CM

## 2021-11-11 DIAGNOSIS — N18.2 CKD (CHRONIC KIDNEY DISEASE), STAGE II: ICD-10-CM

## 2021-11-11 DIAGNOSIS — E11.29 MICROALBUMINURIA DUE TO TYPE 2 DIABETES MELLITUS (HCC): ICD-10-CM

## 2021-11-11 DIAGNOSIS — R80.9 PROTEINURIA, UNSPECIFIED TYPE: ICD-10-CM

## 2021-11-11 LAB
ANION GAP SERPL CALC-SCNC: 9 MMOL/L (ref 7–16)
APPEARANCE UR: CLEAR
BACTERIA #/AREA URNS HPF: NEGATIVE /HPF
BILIRUB UR QL STRIP.AUTO: NEGATIVE
BUN SERPL-MCNC: 15 MG/DL (ref 8–22)
CALCIUM SERPL-MCNC: 9.2 MG/DL (ref 8.5–10.5)
CHLORIDE SERPL-SCNC: 105 MMOL/L (ref 96–112)
CO2 SERPL-SCNC: 23 MMOL/L (ref 20–33)
COLOR UR: ABNORMAL
CREAT SERPL-MCNC: 1.08 MG/DL (ref 0.5–1.4)
EPI CELLS #/AREA URNS HPF: NEGATIVE /HPF
ERYTHROCYTE [DISTWIDTH] IN BLOOD BY AUTOMATED COUNT: 49.1 FL (ref 35.9–50)
GLUCOSE SERPL-MCNC: 109 MG/DL (ref 65–99)
GLUCOSE UR STRIP.AUTO-MCNC: NEGATIVE MG/DL
HCT VFR BLD AUTO: 40.1 % (ref 42–52)
HGB BLD-MCNC: 13.1 G/DL (ref 14–18)
HYALINE CASTS #/AREA URNS LPF: ABNORMAL /LPF
KETONES UR STRIP.AUTO-MCNC: ABNORMAL MG/DL
LEUKOCYTE ESTERASE UR QL STRIP.AUTO: NEGATIVE
MCH RBC QN AUTO: 28.9 PG (ref 27–33)
MCHC RBC AUTO-ENTMCNC: 32.7 G/DL (ref 33.7–35.3)
MCV RBC AUTO: 88.5 FL (ref 81.4–97.8)
MICRO URNS: ABNORMAL
MORPHOLOGY BLD-IMP: NORMAL
NITRITE UR QL STRIP.AUTO: NEGATIVE
PH UR STRIP.AUTO: 6 [PH] (ref 5–8)
PLATELET # BLD AUTO: 152 K/UL (ref 164–446)
PMV BLD AUTO: 10.4 FL (ref 9–12.9)
POTASSIUM SERPL-SCNC: 4.6 MMOL/L (ref 3.6–5.5)
PROT UR QL STRIP: 100 MG/DL
RBC # BLD AUTO: 4.53 M/UL (ref 4.7–6.1)
RBC # URNS HPF: ABNORMAL /HPF
RBC UR QL AUTO: NEGATIVE
SODIUM SERPL-SCNC: 137 MMOL/L (ref 135–145)
SP GR UR STRIP.AUTO: 1.02
UROBILINOGEN UR STRIP.AUTO-MCNC: 0.2 MG/DL
WBC # BLD AUTO: 7.1 K/UL (ref 4.8–10.8)
WBC #/AREA URNS HPF: ABNORMAL /HPF

## 2021-11-11 PROCEDURE — 82043 UR ALBUMIN QUANTITATIVE: CPT

## 2021-11-11 PROCEDURE — 36415 COLL VENOUS BLD VENIPUNCTURE: CPT

## 2021-11-11 PROCEDURE — 81001 URINALYSIS AUTO W/SCOPE: CPT

## 2021-11-11 PROCEDURE — 82570 ASSAY OF URINE CREATININE: CPT

## 2021-11-11 PROCEDURE — 85027 COMPLETE CBC AUTOMATED: CPT

## 2021-11-11 PROCEDURE — 80048 BASIC METABOLIC PNL TOTAL CA: CPT

## 2021-11-11 PROCEDURE — 82306 VITAMIN D 25 HYDROXY: CPT

## 2021-11-11 PROCEDURE — 84156 ASSAY OF PROTEIN URINE: CPT

## 2021-11-12 LAB
CREAT UR-MCNC: 166.48 MG/DL
CREAT UR-MCNC: 167.94 MG/DL
MICROALBUMIN UR-MCNC: 30.2 MG/DL
MICROALBUMIN/CREAT UR: 181 MG/G (ref 0–30)
PROT UR-MCNC: 49 MG/DL (ref 0–15)

## 2021-11-14 LAB — 25(OH)D3 SERPL-MCNC: 66 NG/ML (ref 30–80)

## 2021-11-19 ENCOUNTER — OFFICE VISIT (OUTPATIENT)
Dept: SLEEP MEDICINE | Facility: MEDICAL CENTER | Age: 71
End: 2021-11-19
Payer: MEDICARE

## 2021-11-19 VITALS
WEIGHT: 215 LBS | HEIGHT: 67 IN | SYSTOLIC BLOOD PRESSURE: 112 MMHG | OXYGEN SATURATION: 99 % | BODY MASS INDEX: 33.74 KG/M2 | TEMPERATURE: 98 F | DIASTOLIC BLOOD PRESSURE: 70 MMHG | RESPIRATION RATE: 16 BRPM | HEART RATE: 72 BPM

## 2021-11-19 DIAGNOSIS — G47.33 OSA ON CPAP: Primary | ICD-10-CM

## 2021-11-19 DIAGNOSIS — F51.04 CHRONIC INSOMNIA: ICD-10-CM

## 2021-11-19 PROBLEM — K22.70 BARRETT'S ESOPHAGUS: Status: ACTIVE | Noted: 2021-11-19

## 2021-11-19 PROCEDURE — 99214 OFFICE O/P EST MOD 30 MIN: CPT | Performed by: STUDENT IN AN ORGANIZED HEALTH CARE EDUCATION/TRAINING PROGRAM

## 2021-11-19 ASSESSMENT — FIBROSIS 4 INDEX: FIB4 SCORE: 1.82

## 2021-11-19 ASSESSMENT — PAIN SCALES - GENERAL: PAINLEVEL: NO PAIN

## 2021-11-19 NOTE — PROGRESS NOTES
[facility] Follow-up Visit    CC: Follow-up for NAHUM management      HPI:  Micheal Brothers Jr. is a 71 y.o.male  with GERD, dyslipidemia, anxiety with depression, obesity, and obstructive sleep apnea on CPAP.  Presents today for follow-up for NAHUM management.    He continues to use his machine nightly.  States he cannot sleep without his machine.  With using his machine he feels more rested and refreshed in the morning.  His machine continues to give him warning on screens a hours of motor use are over recommended and his machine should be replaced.  His machine is over 5 years old.    He is awaiting on a new machine as prescription has been generated prior to this visit.  He recently changed from full facemask to nasal mask.  He does like nasal mask more however is continue to get used to it.  Overall he finds mask in pressure settings comfortable.    More recently he and his wife have been having trouble getting to sleep and staying asleep.  He is finding on bad nights it will take him hours to get to sleep wearing good nights he can fall asleep in 5 to 10 minutes.  He and his wife to watch TV in the bedroom.  In addition he has been waking up 1-2 times a night and 2-3 nights a week it will take him longer than 30 minutes get back to sleep.  This has been occurring over the past 6 months.  He does not nap regularly with maybe 1 time a week napping for 1 hour in the afternoon.  He tries to keep a regular sleep schedule.      Bedtime: 9pm   Sleep latency: good ngiht 5-10min, bad night hours   Awakenings: 1-2, sometimes has trouble getting back. 2-3 days longer than 30min   Wake time: 6-630am   Naps: one time a week for 1 hour     Sleep History  PSG in 2008 showed severe sleep apnea with AHI of 85 events per hour.  Following diagnosis he was placed on CPAP 12 cmH2O    Patient Active Problem List    Diagnosis Date Noted   • Calcification of gallbladder 04/28/2021   • Bilateral adrenal adenomas  04/22/2021   • Abnormal finding on GI tract imaging 04/22/2021   • Bilateral inguinal hernia without obstruction or gangrene 04/22/2021   • Pelvic pain 04/13/2021   • Umbilical hernia 04/13/2021   • Thrombocytopenia (HCC) 01/26/2021   • Anemia 01/22/2021   • Proteinuria 01/22/2021   • Colonic polyp 01/20/2021   • Chronic obstructive pulmonary disease (HCC) 05/01/2019   • Prediabetes 04/01/2019   • Anxiety and depression 06/18/2018   • NAHUM on CPAP 08/26/2016   • Obesity (BMI 30-39.9) 11/26/2014   • Dyslipidemia 11/26/2014   • GERD (gastroesophageal reflux disease) 11/26/2014   • Tobacco use 11/26/2014       Past Medical History:   Diagnosis Date   • Back pain    • Chickenpox    • Depression    • GERD (gastroesophageal reflux disease)    • Yi measles    • Gout    • Hyperlipidemia    • Influenza    • Panic disorder    • Sleep apnea    • Tobacco use 11/26/2014        Past Surgical History:   Procedure Laterality Date   • APPENDECTOMY     • ARTHROSCOPY, KNEE     • CARPAL TUNNEL ENDOSCOPIC      bilateral   • CARPAL TUNNEL RELEASE     • SHOULDER DECOMPRESSION ARTHROSCOPIC      right   • TONSILLECTOMY         Family History   Problem Relation Age of Onset   • Cancer Mother    • Cancer Father    • Stroke Father    • Diabetes Neg Hx    • Heart Disease Neg Hx    • Hypertension Neg Hx        Social History     Socioeconomic History   • Marital status:      Spouse name: Not on file   • Number of children: Not on file   • Years of education: Not on file   • Highest education level: Not on file   Occupational History   • Not on file   Tobacco Use   • Smoking status: Current Every Day Smoker     Packs/day: 1.00     Years: 53.00     Pack years: 53.00     Types: Cigarettes   • Smokeless tobacco: Never Used   • Tobacco comment: started smoking at age 15   Vaping Use   • Vaping Use: Never used   Substance and Sexual Activity   • Alcohol use: Not Currently     Alcohol/week: 0.0 oz   • Drug use: No   • Sexual activity: Yes      Partners: Female   Other Topics Concern   • Not on file   Social History Narrative   • Not on file     Social Determinants of Health     Financial Resource Strain:    • Difficulty of Paying Living Expenses: Not on file   Food Insecurity:    • Worried About Running Out of Food in the Last Year: Not on file   • Ran Out of Food in the Last Year: Not on file   Transportation Needs:    • Lack of Transportation (Medical): Not on file   • Lack of Transportation (Non-Medical): Not on file   Physical Activity:    • Days of Exercise per Week: Not on file   • Minutes of Exercise per Session: Not on file   Stress:    • Feeling of Stress : Not on file   Social Connections:    • Frequency of Communication with Friends and Family: Not on file   • Frequency of Social Gatherings with Friends and Family: Not on file   • Attends Judaism Services: Not on file   • Active Member of Clubs or Organizations: Not on file   • Attends Club or Organization Meetings: Not on file   • Marital Status: Not on file   Intimate Partner Violence:    • Fear of Current or Ex-Partner: Not on file   • Emotionally Abused: Not on file   • Physically Abused: Not on file   • Sexually Abused: Not on file   Housing Stability:    • Unable to Pay for Housing in the Last Year: Not on file   • Number of Places Lived in the Last Year: Not on file   • Unstable Housing in the Last Year: Not on file       Current Outpatient Medications   Medication Sig Dispense Refill   • pentoxifylline CR (TRENTAL) 400 MG CR tablet Take 1 Tablet by mouth 3 times a day with meals. 90 Tablet 0   • budesonide-formoterol (SYMBICORT) 160-4.5 MCG/ACT Aerosol INHALE 2 PUFFS BY MOUTH TWICE DAILY 3 Each 0   • omeprazole (PRILOSEC) 40 MG delayed-release capsule Take 1 capsule by mouth every day. 90 capsule 1   • DULoxetine (CYMBALTA) 60 MG Cap DR Particles delayed-release capsule Take 1 capsule by mouth every day. 90 capsule 1   • atorvastatin (LIPITOR) 20 MG Tab Take 1 tablet by mouth every  "day. 90 tablet 1   • Cholecalciferol (VITAMIN D) 125 MCG (5000 UT) Cap Take 5,000 Units by mouth every day.     • lisinopril (PRINIVIL) 2.5 MG Tab TAKE 1 TABLET BY MOUTH EVERY DAY 90 tablet 3   • albuterol 108 (90 Base) MCG/ACT Aero Soln inhalation aerosol Inhale 2 Puffs every 6 hours as needed for Shortness of Breath. 8.5 g 3   • clindamycin (CLEOCIN) 300 MG Cap TAKE 1 CAPSULE BY MOUTH EVERY 8 HOURS FOR 10 DAYS     • Sulfacetamide Sodium, Acne, 10 % Lotion Apply one daily 118 mL 6     No current facility-administered medications for this visit.        ALLERGIES: Augmentin, Metformin, and Zyban [bupropion]    ROS  Constitutional: Denies fever, chills, sweats,  weight loss, fatigue  Cardiovascular: Denies chest pain, tightness, palpitations, swelling in legs/feet  Respiratory: Denies shortness of breath, cough, sputum, wheezing, painful breathing   Sleep: per HPI  Gastrointestinal: Denies  difficulty swallowing, nausea, abdominal pain, diarrhea, constipation, heartburn.  Musculoskeletal: Denies painful joints, sore muscles,       PHYSICAL EXAM  /70 (BP Location: Right arm, Patient Position: Sitting, BP Cuff Size: Large adult)   Pulse 72   Temp 36.7 °C (98 °F) (Temporal)   Resp 16   Ht 1.702 m (5' 7\")   Wt 97.5 kg (215 lb)   SpO2 99%   BMI 33.67 kg/m²   Appearance: Well-nourished, well-developed, no acute distress  Eyes:  No scleral icterus , EOMI  ENMT: masked  Musculoskeletal:  Grossly normal; gait and station normal; digits and nails normal  Skin:  No rashes, petechiae, cyanosis  Neurologic: without focal signs; oriented to person, time, place, and purpose; judgement intact  Psychiatric:  No depression, anxiety, agitation      Medical Decision Making   Assessment and Plan  Micheal Musa Deneen Kiser is a 71 y.o.male  with GERD, dyslipidemia, anxiety with depression, obesity, and obstructive sleep apnea on CPAP.  Presents today for follow-up for NAHUM management.    The medical record was reviewed.      " Obstructive sleep apnea  Compliance data reviewed showing 100% usage > 4hours in last 30  days. Average AHI 4.4 events/hour. 90-95% pressure 12 CWP. Pt continues to use and benefit from machine.      AHI is still below 5 but is borderline.  We will plan to reassess at follow-up visit after receiving new machine.    Patient's machine is over 5 years old and giving warning sign that motor is getting old.  Would benefit from getting new machine.  Prescription for new machine was already generated prior to this visit needs work with DME to receive new machine.      PLAN:   -Order placed for mask and supplies   -Await receiving new CPAP machine  -Advised to reach out via [employer] with questions     Has been advised to continue the current  CPAP, clean equipment frequently, and get new mask and supplies as allowed by insurance and DME. Recommend an earlier appointment, if significant treatment barriers develop.    The risks of untreated sleep apnea were discussed with the patient at length. Patients with NAHUM are at increased risk of cardiovascular disease including coronary artery disease, systemic arterial hypertension, pulmonary arterial hypertension, cardiac arrythmias, and stroke. The patient was advised to avoid driving a motor vehicle when drowsy.    Positive airway pressure will favorably impact many of the adverse conditions and effects provoked by NAHUM.      Chronic Insomnia   With prolonged sleep latency, frequent awakenings with difficulty falling back asleep and feeling this is impacting daily functioning for 6 months meets criteria for chronic insomnia. Discussed potential causes of insomnia and importance of having a routine around bedtime. Advised to avoid laying in bed for more then 20-30 min if unable to fall asleep. Dicussed cognitive behavioral therapy for insomnia (CBTi) which is first line treatment for insomnia. Recommended pt participate in self directed CBTi as would likely benefit. Reviewed  pharmacologic therapy which is second line treatment and may be considered if circumstances permit.     RECOMMENDATIONS  -Reference material given to patient regarding CBTi   -Recommended he try 1 to 3 mg REM fresh melatonin at bedtime  -Advised to stop watching TV in bed  -We also discussed a few interventions noted below to help with the insomnia:  -Maintain a regular sleep schedule  -Avoid caffeinated beverages after lunch, and alcohol in late afternoon and evening  -Avoid prolonged use of light-emitting screens before bedtime  -Exercise regularly for at least 20 minutes, preferably more than four to five hours prior to bedtime  -Avoid daytime naps, especially if they are longer than 20 to 30 minutes or occur late in the day  -Advised to contact our office or myself with any questions via [email or web address]    Have advised the patient to follow up with the appropriate healthcare practitioners for all other medical problems and issues.    Return in about 4 months (around 3/19/2022).      Please note portions of this record was created using voice recognition software. I have made every reasonable attempt to correct obvious errors, but I expect that there are errors of grammar and possibly content I did not discover before finalizing the note.

## 2021-11-19 NOTE — PATIENT INSTRUCTIONS
-May try 1-3 mg of melatonin at bedtime such as RemFresh   -Avoid laying in bed longer than 30 min  -Maintain a regular sleep schedule  -Avoid caffeinated beverages after lunch, and alcohol in late afternoon and evening  -Avoid prolonged use of light-emitting screens before bedtime  -Exercise regularly for at least 20 minutes, preferably more than four to five hours prior to bedtime  -Avoid daytime naps, especially if they are longer than 20 to 30 minutes or occur late in the day    CBT-I Books  Brandie Mind: Turn Off Your Noisy Thoughts and Get a Good Night's Sleep - Renetta Stearns, Phd and Shoshana Pleitez, Phd  Accessible, enjoyable, and grounded in evidence-based cognitive behavioral therapy (CBT), Brandie Mind directly addresses the effects of rumination?or having an overactive brain?on your ability to sleep well. Written by two psychologists who specialize in sleep disorders, the book contains helpful exercises and insights into how you can better manage your thoughts at bedtime, and finally get some sleep.    Insomnia Solved: A Self-Directed Cognitive Behavioral Therapy for Insomnia (CBTI) Program - Rodrigo Warner MD  Cognitive behavioral therapy for insomnia (CBTI) is often structured as a 6-week treatment program that can help people who have difficulty falling asleep, staying asleep, or find that sleep is unrefreshing. CBTI is scientifically proven, highly effective, and does not rely on medications. CBTI has life-long benefits and most participants report improved sleep satisfaction. Insomnia Solved is based on the core features of this treatment.    Quiet Your Mind and Get to Sleep: Solutions to Insomnia for Those with Depression, Anxiety or Chronic Pain - Shoshana Pleitez, PhD  This workbook uses cognitive behavior therapy, which has been shown to work as well as sleep medications and produce longer-lasting effects. Research shows that it also works well for those whose insomnia is experienced in the  "context of anxiety, depression, and chronic pain. The complete program in this book goes to the root of your insomnia and offers the same techniques used by experienced sleep specialists.    \"Say Brandie to Insomnia\" by Mathieu Adams     \"No More Sleepless Nights\" by Serjio Mejía    CBT-I Online Resources  Path to Better Sleep (free) - https://www.veterantraining.va.gov/insomnia/index.asp   This free online Cognitive Behavioral Therapy for Insomnia course, which was developed for veterans, takes you through a sleep \"check-in\" and the various components of CBT-I, including modifying unhelpful behaviors and unhelpful thoughts around sleep.    Insomnia Solved - Rodrigo Warner MD ($79) - http://[email or web address]/fix-my-insomnia/   Insomnia Solved is a self-guided CBTI program created by Rodrigo Warner M.D., a board-certified medical doctor, that is priced inexpensively at $79. The complete program includes full access to exclusive multimedia content, including the 154-page eBook and online modules and audiofiles.    Ebuzzing and Teads - Jesusita Haney, PhD, CBSM ($129) - https://[employer]/   Sleepfitness is an insomnia program based on Cognitive Behavioral Treatment for Insomnia (CBTi) and is a 6-week self-guided online format. IT costs $219 for a 1-year subscription. You can sign up for the 7-day free trial and learn how CBTi can improve your sleep.    Apps  Insomnia  (free)    Offers a self-guided 5-week training plan designed to change sleep habits.  https://BALALIKEA.va.gov/carmina/insomnia-    "

## 2021-12-09 ENCOUNTER — OFFICE VISIT (OUTPATIENT)
Dept: NEPHROLOGY | Facility: MEDICAL CENTER | Age: 71
End: 2021-12-09
Payer: MEDICARE

## 2021-12-09 VITALS
TEMPERATURE: 97.8 F | DIASTOLIC BLOOD PRESSURE: 80 MMHG | WEIGHT: 223 LBS | HEIGHT: 67 IN | OXYGEN SATURATION: 98 % | BODY MASS INDEX: 35 KG/M2 | HEART RATE: 99 BPM | SYSTOLIC BLOOD PRESSURE: 136 MMHG

## 2021-12-09 DIAGNOSIS — I10 ESSENTIAL HYPERTENSION: ICD-10-CM

## 2021-12-09 DIAGNOSIS — D64.9 ANEMIA, UNSPECIFIED TYPE: ICD-10-CM

## 2021-12-09 DIAGNOSIS — R80.9 MICROALBUMINURIA DUE TO TYPE 2 DIABETES MELLITUS (HCC): ICD-10-CM

## 2021-12-09 DIAGNOSIS — E55.9 VITAMIN D DEFICIENCY: ICD-10-CM

## 2021-12-09 DIAGNOSIS — E11.29 MICROALBUMINURIA DUE TO TYPE 2 DIABETES MELLITUS (HCC): ICD-10-CM

## 2021-12-09 DIAGNOSIS — R80.9 PROTEINURIA, UNSPECIFIED TYPE: ICD-10-CM

## 2021-12-09 DIAGNOSIS — N18.2 CKD (CHRONIC KIDNEY DISEASE), STAGE II: ICD-10-CM

## 2021-12-09 PROCEDURE — 99214 OFFICE O/P EST MOD 30 MIN: CPT | Performed by: INTERNAL MEDICINE

## 2021-12-09 ASSESSMENT — ENCOUNTER SYMPTOMS
ORTHOPNEA: 0
DIARRHEA: 0
VOMITING: 0
WHEEZING: 0
FEVER: 0
NAUSEA: 0
MYALGIAS: 0
HEMOPTYSIS: 0
SINUS PAIN: 0
CHILLS: 0
BACK PAIN: 1
WEIGHT LOSS: 0
COUGH: 0
SHORTNESS OF BREATH: 0
ABDOMINAL PAIN: 0
EYES NEGATIVE: 1
FLANK PAIN: 0
PALPITATIONS: 0

## 2021-12-09 ASSESSMENT — FIBROSIS 4 INDEX: FIB4 SCORE: 1.82

## 2021-12-09 NOTE — PROGRESS NOTES
Subjective:      Micheal Brothers Jr. is a 71 y.o. male who presents with Follow-Up and Chronic Kidney Disease            Chronic Kidney Disease  Pertinent negatives include no abdominal pain, chest pain, chills, congestion, coughing, fever, myalgias, nausea or vomiting.     Micheal is coming today for f/u of proteinuria, CKD II  Doing well, no complaints  No dysuria/hematuria/flank pain  HTN: BP well controlled  CKD II stable at baseline  Microalbuminuria mild -to monitor  Proteinuria improving from 0.6  improved to 0.3     Review of Systems   Constitutional: Negative for chills, fever, malaise/fatigue and weight loss.   HENT: Negative for congestion, hearing loss and sinus pain.    Eyes: Negative.    Respiratory: Negative for cough, hemoptysis, shortness of breath and wheezing.    Cardiovascular: Negative for chest pain, palpitations, orthopnea and leg swelling.   Gastrointestinal: Negative for abdominal pain, diarrhea, nausea and vomiting.   Genitourinary: Negative for dysuria, flank pain, frequency, hematuria and urgency.   Musculoskeletal: Positive for back pain and joint pain. Negative for myalgias.   Skin: Negative.    All other systems reviewed and are negative.    Past Medical History:   Diagnosis Date   • Back pain    • Chickenpox    • Depression    • GERD (gastroesophageal reflux disease)    • Maur[place] measles    • Gout    • Hyperlipidemia    • Influenza    • Panic disorder    • Sleep apnea    • Tobacco use 11/26/2014       Family History   Problem Relation Age of Onset   • Cancer Mother    • Cancer Father    • Stroke Father    • Diabetes Neg Hx    • Heart Disease Neg Hx    • Hypertension Neg Hx        Social History     Socioeconomic History   • Marital status:      Spouse name: Not on file   • Number of children: Not on file   • Years of education: Not on file   • Highest education level: Not on file   Occupational History   • Not on file   Tobacco Use   • Smoking status: Current Every Day  "Smoker     Packs/day: 1.00     Years: 53.00     Pack years: 53.00     Types: Cigarettes   • Smokeless tobacco: Never Used   • Tobacco comment: started smoking at age 15   Vaping Use   • Vaping Use: Never used   Substance and Sexual Activity   • Alcohol use: Not Currently     Alcohol/week: 0.0 oz   • Drug use: No   • Sexual activity: Yes     Partners: Female   Other Topics Concern   • Not on file   Social History Narrative   • Not on file     Social Determinants of Health     Financial Resource Strain:    • Difficulty of Paying Living Expenses: Not on file   Food Insecurity:    • Worried About Running Out of Food in the Last Year: Not on file   • Ran Out of Food in the Last Year: Not on file   Transportation Needs:    • Lack of Transportation (Medical): Not on file   • Lack of Transportation (Non-Medical): Not on file   Physical Activity:    • Days of Exercise per Week: Not on file   • Minutes of Exercise per Session: Not on file   Stress:    • Feeling of Stress : Not on file   Social Connections:    • Frequency of Communication with Friends and Family: Not on file   • Frequency of Social Gatherings with Friends and Family: Not on file   • Attends Sikhism Services: Not on file   • Active Member of Clubs or Organizations: Not on file   • Attends Club or Organization Meetings: Not on file   • Marital Status: Not on file   Intimate Partner Violence:    • Fear of Current or Ex-Partner: Not on file   • Emotionally Abused: Not on file   • Physically Abused: Not on file   • Sexually Abused: Not on file   Housing Stability:    • Unable to Pay for Housing in the Last Year: Not on file   • Number of Places Lived in the Last Year: Not on file   • Unstable Housing in the Last Year: Not on file          Objective:     /80 (BP Location: Right arm, Patient Position: Sitting)   Pulse 99   Temp 36.6 °C (97.8 °F) (Temporal)   Ht 1.702 m (5' 7\")   Wt 101 kg (223 lb)   SpO2 98%   BMI 34.93 kg/m²      Physical " Exam  Constitutional:       General: He is not in acute distress.     Appearance: Normal appearance. He is well-developed. He is obese. He is not diaphoretic.   HENT:      Head: Normocephalic and atraumatic.      Nose: Nose normal.      Mouth/Throat:      Mouth: Mucous membranes are moist.      Pharynx: Oropharynx is clear.   Eyes:      Extraocular Movements: Extraocular movements intact.      Conjunctiva/sclera: Conjunctivae normal.      Pupils: Pupils are equal, round, and reactive to light.   Cardiovascular:      Rate and Rhythm: Normal rate and regular rhythm.      Pulses: Normal pulses.      Heart sounds: Normal heart sounds. No friction rub. No gallop.    Pulmonary:      Effort: Pulmonary effort is normal. No respiratory distress.      Breath sounds: Normal breath sounds. No wheezing, rhonchi or rales.   Abdominal:      General: Bowel sounds are normal. There is no distension.      Palpations: Abdomen is soft. There is no mass.      Tenderness: There is no abdominal tenderness. There is no right CVA tenderness or left CVA tenderness.   Musculoskeletal:      Cervical back: Normal range of motion and neck supple.      Right lower leg: No edema.      Left lower leg: No edema.   Skin:     General: Skin is warm.      Coloration: Skin is not jaundiced or pale.      Findings: No erythema or rash.   Neurological:      General: No focal deficit present.      Mental Status: He is alert and oriented to person, place, and time.      Cranial Nerves: No cranial nerve deficit.      Coordination: Coordination normal.   Psychiatric:         Mood and Affect: Mood normal.         Behavior: Behavior normal.         Thought Content: Thought content normal.         Judgment: Judgment normal.               Lab results reviewed: d/w Pt  Lab Results   Component Value Date/Time    CREATININE 1.08 11/11/2021 09:03 AM    POTASSIUM 4.6 11/11/2021 09:03 AM       Assessment/Plan:          1. CKD (chronic kidney disease), stage II  -   Creat  stable at baseline -to monitor    2. Microalbuminuria due to type 2 diabetes mellitus (HCC)      Mild on ACEi    4. Essential hypertension      BP well controlled -to monitor at home    5. Proteinuria, unspecified type      improving to 0.3    6. Vitamin D deficiency      Well controlled      To monitor      Recs:  Continue current treatment  Low salt diet  Monitor BP  Keep well hydrated  F/u 6 months

## 2021-12-15 ENCOUNTER — APPOINTMENT (OUTPATIENT)
Dept: NEPHROLOGY | Facility: MEDICAL CENTER | Age: 71
End: 2021-12-15
Payer: MEDICARE

## 2021-12-30 ENCOUNTER — OFFICE VISIT (OUTPATIENT)
Dept: DERMATOLOGY | Facility: IMAGING CENTER | Age: 71
End: 2021-12-30
Payer: MEDICARE

## 2021-12-30 DIAGNOSIS — L92.0 GA (GRANULOMA ANNULARE): ICD-10-CM

## 2021-12-30 DIAGNOSIS — L02.224 BOIL OF GROIN: ICD-10-CM

## 2021-12-30 PROCEDURE — 99213 OFFICE O/P EST LOW 20 MIN: CPT | Performed by: DERMATOLOGY

## 2021-12-30 RX ORDER — DOXYCYCLINE 100 MG/1
100 CAPSULE ORAL 2 TIMES DAILY
Qty: 20 CAPSULE | Refills: 0 | Status: SHIPPED | OUTPATIENT
Start: 2021-12-30 | End: 2022-02-24

## 2021-12-30 NOTE — PROGRESS NOTES
"CC: kenalog injection for GA and skin lesions    Subjective: Previously seen patient here for followup after kenalog injections. GA on arms and legs. Doing much better. Declined injections today    Groin area with skin boil.  Had gotten these periodically  Through adult years.  Had been managing with topical sulfacetamide lotion, which has worked less well this year after trx for tinea cruris with topical nystatin powder.   Has one site affected today. Does not want drainage - \"This is much like others. I'll just let it run its course\"    Initial hx.   Duration of rash: previously dx GA (2012, [place])  Symptoms: itching, only one site on ant right shin  Current treatment: Trental 400 mg  Prior rx: prednisone, hydroxychloroquine, minocycline & nicotinamide     ROS: no fevers/chills. +/- itch.  No cough  DermPMH: no skin cancer/melanoma  No problem-specific Assessment & Plan notes found for this encounter.    Relevant PMH:mult med comorbidities  Social: tobacco use    PE: Gen:WDWN male in NAD.  Skin:few pink thin partial dermal plaques on arms. Few scattered erythematous macules on arms. subcut nodule with overlying punctum, left inguinal skin    A/P: GA: chronic stable  -no IL TAC trx today per patient preference  -prev reviewed cont trental: 400mg PO TID    Furuncle, left inguinal skin:   -reviewed dx/tx  -declined I&D minor procedure today  -advised warm compresses in shower and allow to drain, likely to rupture with minimal trauma as clear punctum today  -reviewed anticipatory guidance for soft tissue infection/abscess and recommended urgent care/ER if occurs. Patient agreed  -agreed to Rx - doxycycline 100mg PO BID, se reviewed      F/u 2-3 months/PRN    I have reviewed medications relevant to my specialty.          "

## 2022-01-26 DIAGNOSIS — E78.5 DYSLIPIDEMIA: ICD-10-CM

## 2022-01-26 RX ORDER — ATORVASTATIN CALCIUM 20 MG/1
20 TABLET, FILM COATED ORAL DAILY
Qty: 90 TABLET | Refills: 0 | Status: SHIPPED | OUTPATIENT
Start: 2022-01-26 | End: 2022-02-24 | Stop reason: SDUPTHER

## 2022-01-27 ENCOUNTER — APPOINTMENT (OUTPATIENT)
Dept: DERMATOLOGY | Facility: IMAGING CENTER | Age: 72
End: 2022-01-27

## 2022-02-01 ENCOUNTER — OFFICE VISIT (OUTPATIENT)
Dept: ENDOCRINOLOGY | Facility: MEDICAL CENTER | Age: 72
End: 2022-02-01
Attending: NURSE PRACTITIONER
Payer: MEDICARE

## 2022-02-01 VITALS
HEART RATE: 106 BPM | WEIGHT: 222 LBS | HEIGHT: 67 IN | OXYGEN SATURATION: 99 % | DIASTOLIC BLOOD PRESSURE: 62 MMHG | BODY MASS INDEX: 34.84 KG/M2 | SYSTOLIC BLOOD PRESSURE: 120 MMHG

## 2022-02-01 DIAGNOSIS — E55.9 VITAMIN D DEFICIENCY: ICD-10-CM

## 2022-02-01 DIAGNOSIS — R73.03 PREDIABETES: ICD-10-CM

## 2022-02-01 DIAGNOSIS — D35.01 BILATERAL ADRENAL ADENOMAS: ICD-10-CM

## 2022-02-01 DIAGNOSIS — E78.5 HYPERLIPIDEMIA ASSOCIATED WITH TYPE 2 DIABETES MELLITUS (HCC): ICD-10-CM

## 2022-02-01 DIAGNOSIS — E11.69 HYPERLIPIDEMIA ASSOCIATED WITH TYPE 2 DIABETES MELLITUS (HCC): ICD-10-CM

## 2022-02-01 DIAGNOSIS — D35.02 BILATERAL ADRENAL ADENOMAS: ICD-10-CM

## 2022-02-01 PROCEDURE — 99211 OFF/OP EST MAY X REQ PHY/QHP: CPT | Performed by: NURSE PRACTITIONER

## 2022-02-01 PROCEDURE — 99214 OFFICE O/P EST MOD 30 MIN: CPT | Performed by: NURSE PRACTITIONER

## 2022-02-01 ASSESSMENT — FIBROSIS 4 INDEX: FIB4 SCORE: 1.82

## 2022-02-01 NOTE — PROGRESS NOTES
Chief Complaint: Follow-up evaluation for incidental finding of bilateral adrenal adenomas.    HPI:     Micheal Brothers Jr. is a very pleasant 71. y.o. retired  male here for for continued evaluation & treatment of the followin.  Bilateral Adrenal adenomas  This was a incidental finding on a CAT scan completed 2021.  PET scan was being completed for lower abdominal and pelvic pain.    Reports shows 1 cm nodule identified within each adrenal gland.    Patient denies history of hypertension.  Patient is currently not on any antihypertensive medications.  Patient denies heart palpitations and/or increased anxiousness.  Patient denies history of decreased potassium levels.  BP currently 120/62.     Patient completed the dexamethasone suppression test on 2021.  In reference to those findings the adrenal glands responded sufficiently.     Ref. Range 2021 08:10   Cortisol Latest Ref Range: 0.0 - 23.0 ug/dL 0.8   Acth Latest Ref Range: 7.2 - 63.3 pg/mL 1.1 (L)   Dhea Latest Ref Range: 0.630 - 4.700 ng/mL 0.229 (L)       Lab was not completed prior to this appointment.     2.  Type 2 diabetes that is well controlled.    Point-of-care A1c 2021: 6.2%   Patient is diet and activity controlled at this time.  Patient has never been on any antihyperglycemics.    Blood pressure is currently 140/80.  Currently taking lisinopril 2.5 mg daily, which was started after his initial consultation with this provider.  Currently being evaluated and treated by Dr. Cintron, Nephrology.      Ref. Range 2021 09:36   Total Protein, Urine Latest Ref Range: 0.0 - 15.0 mg/dL 56.0 (H)   Micro Alb Creat Ratio Latest Ref Range: 0 - 30 mg/g 269 (H)   Creatinine, Urine Latest Units: mg/dL 124.20       3.  Hyperlipidemia   Currently taking Lipitor 20 mg daily.  Patient has tolerated statin therapy well for over a year.  Patient denies myalgias and muscle cramps.     Ref. Range 2021 09:13    Cholesterol,Tot Latest Ref Range: 100 - 199 mg/dL 114   Triglycerides Latest Ref Range: 0 - 149 mg/dL 63   HDL Latest Ref Range: >=40 mg/dL 36 (A)   LDL Latest Ref Range: <100 mg/dL 65       4.  Vitamin D deficiency    Currently taking vitamin D 5000 IU daily.     Ref. Range 5/19/2021 09:36   25-Hydroxy   Vitamin D 25 Latest Ref Range: 30 - 80 ng/mL 88 (H)       Patient's medications, allergies, and social histories were reviewed and updated as appropriate.      ROS:       CONS:     No fever, no chills   EYES:     No diplopia, no blurry vision   CV:           No chest pain, no palpitations   PULM:     No SOB, no cough, no hemoptysis.   GI:            No nausea, no vomiting, no diarrhea, no constipation   ENDO:     No polyuria, no polydipsia, no heat intolerance, no cold intolerance     Past Medical History:  Problem List:  2021-11: Thomas's esophagus  2021-04: Calcification of gallbladder  2021-04: Bilateral adrenal adenomas  2021-04: Abnormal finding on GI tract imaging  2021-04: Bilateral inguinal hernia without obstruction or gangrene  2021-04: Pelvic pain  2021-04: Umbilical hernia  2021-01: Thrombocytopenia ([facility])  2021-01: Anemia  2021-01: Proteinuria  2021-01: Colonic polyp  2019-05: Chronic obstructive pulmonary disease ([facility])  2019-04: Prediabetes  2018-08: Obesity (BMI 35.0-39.9 without comorbidity) ([facility])  2018-06: Anxiety and depression  2016-08: NAHUM on CPAP  2014-11: Obesity (BMI 30-39.9)  2014-11: Dyslipidemia  2014-11: GERD (gastroesophageal reflux disease)  2014-11: Depression  2014-11: Tobacco use  2014-11: Right wrist pain      Past Surgical History:  Past Surgical History:   Procedure Laterality Date   • APPENDECTOMY     • ARTHROSCOPY, KNEE     • CARPAL TUNNEL ENDOSCOPIC      bilateral   • CARPAL TUNNEL RELEASE     • SHOULDER DECOMPRESSION ARTHROSCOPIC      right   • TONSILLECTOMY          Allergies:  Zyban [bupropion], Augmentin, and Metformin     Social History:  Social History     Tobacco Use  "  • Smoking status: Current Every Day Smoker     Packs/day: 1.00     Years: 53.00     Pack years: 53.00     Types: Cigarettes   • Smokeless tobacco: Never Used   • Tobacco comment: started smoking at age 15   Vaping Use   • Vaping Use: Never used   Substance Use Topics   • Alcohol use: Not Currently     Alcohol/week: 0.0 oz   • Drug use: No        Family History:   family history includes Cancer in his father and mother; Stroke in his father.      PHYSICAL EXAM:   Vital signs: /62 (BP Location: Left arm, Patient Position: Sitting, BP Cuff Size: Large adult)   Pulse (!) 106   Ht 1.702 m (5' 7\")   Wt 101 kg (222 lb)   SpO2 99%   BMI 34.77 kg/m²   GENERAL: Well-developed, well-nourished in no apparent distress.   EYE:  No ocular asymmetry, PERRLA  HENT: Pink, moist mucous membranes.    NECK: No thyromegaly.   CARDIOVASCULAR:  No murmurs  LUNGS: Clear breath sounds  ABDOMEN: Soft, nontender   EXTREMITIES: No clubbing, cyanosis, or edema.   NEUROLOGICAL: No gross focal motor abnormalities   LYMPH: No cervical adenopathy palpated.   SKIN: No rashes, lesions.     ASSESSMENT/PLAN:   1. Bilateral adrenal adenomas  Stable.  Dexamethasone suppression testing within normal limits.  Adrenal glands responded sufficiently.  Recommend continued monitoring of adrenal adenomas annually and follow-up imaging within 1 to 2 years.  Request labs to be completed prior to next appointment.    2. Controlled type 2 diabetes mellitus without complication, without long-term current use of insulin (HCC)  Stable.  Continue with lifestyle modifications.  Continue lisinopril 2.5 mg daily.  Detailed discussion with patient regarding diabetes; prediabetes management.  The concern is elevation of microalbumin level.  Explained to patient increase his water intake to at least 2 L a day while maintaining balanced meals such as my plate.gov that incorporate more fresh fruits and fresh vegetables while decreasing the amount of processed a " complex carbohydrates.      3. Hyperlipidemia associated with type 2 diabetes mellitus (HCC)  Stable.  Continue taking Lipitor 20 mg daily.      4. Vitamin D deficiency  Stable.  Recommend reducing vitamin D to 5 days a week.  Continue the same dosage of 5000 IUs.    Complete lab work 1 to 2 weeks prior to next appointment.  Next follow-up appointment in 6 months.    Thank you kindly for allowing me to participate in the endocrine care plan for this patient.    Atiya Massey, APRN  02/01/2022    CC:   Aris Aldana M.D.

## 2022-02-22 ENCOUNTER — HOSPITAL ENCOUNTER (OUTPATIENT)
Dept: LAB | Facility: MEDICAL CENTER | Age: 72
End: 2022-02-22
Attending: INTERNAL MEDICINE
Payer: MEDICARE

## 2022-02-22 ENCOUNTER — HOSPITAL ENCOUNTER (OUTPATIENT)
Dept: LAB | Facility: MEDICAL CENTER | Age: 72
End: 2022-02-22
Attending: NURSE PRACTITIONER
Payer: MEDICARE

## 2022-02-22 DIAGNOSIS — D35.02 BILATERAL ADRENAL ADENOMAS: ICD-10-CM

## 2022-02-22 DIAGNOSIS — D35.01 BILATERAL ADRENAL ADENOMAS: ICD-10-CM

## 2022-02-22 DIAGNOSIS — R73.03 PREDIABETES: ICD-10-CM

## 2022-02-22 DIAGNOSIS — E78.5 DYSLIPIDEMIA: Chronic | ICD-10-CM

## 2022-02-22 PROBLEM — E11.9 TYPE 2 DIABETES MELLITUS WITHOUT COMPLICATION, WITHOUT LONG-TERM CURRENT USE OF INSULIN (HCC): Status: ACTIVE | Noted: 2019-04-01

## 2022-02-22 LAB
ALBUMIN SERPL BCP-MCNC: 4.5 G/DL (ref 3.2–4.9)
ALBUMIN/GLOB SERPL: 1.5 G/DL
ALP SERPL-CCNC: 87 U/L (ref 30–99)
ALT SERPL-CCNC: 23 U/L (ref 2–50)
ALT SERPL-CCNC: 24 U/L (ref 2–50)
ANION GAP SERPL CALC-SCNC: 11 MMOL/L (ref 7–16)
ANION GAP SERPL CALC-SCNC: 13 MMOL/L (ref 7–16)
AST SERPL-CCNC: 15 U/L (ref 12–45)
BILIRUB SERPL-MCNC: 0.4 MG/DL (ref 0.1–1.5)
BUN SERPL-MCNC: 20 MG/DL (ref 8–22)
BUN SERPL-MCNC: 20 MG/DL (ref 8–22)
CALCIUM SERPL-MCNC: 9.5 MG/DL (ref 8.5–10.5)
CALCIUM SERPL-MCNC: 9.6 MG/DL (ref 8.5–10.5)
CHLORIDE SERPL-SCNC: 102 MMOL/L (ref 96–112)
CHLORIDE SERPL-SCNC: 102 MMOL/L (ref 96–112)
CHOLEST SERPL-MCNC: 138 MG/DL (ref 100–199)
CO2 SERPL-SCNC: 22 MMOL/L (ref 20–33)
CO2 SERPL-SCNC: 22 MMOL/L (ref 20–33)
CORTIS SERPL-MCNC: 5.8 UG/DL (ref 0–23)
CREAT SERPL-MCNC: 1.06 MG/DL (ref 0.5–1.4)
CREAT SERPL-MCNC: 1.12 MG/DL (ref 0.5–1.4)
DHEA-S SERPL-MCNC: 28.4 UG/DL (ref 33.6–249)
EST. AVERAGE GLUCOSE BLD GHB EST-MCNC: 146 MG/DL
FASTING STATUS PATIENT QL REPORTED: NORMAL
FASTING STATUS PATIENT QL REPORTED: NORMAL
GLOBULIN SER CALC-MCNC: 3 G/DL (ref 1.9–3.5)
GLUCOSE SERPL-MCNC: 104 MG/DL (ref 65–99)
GLUCOSE SERPL-MCNC: 105 MG/DL (ref 65–99)
HBA1C MFR BLD: 6.7 % (ref 4–5.6)
HDLC SERPL-MCNC: 44 MG/DL
LDLC SERPL CALC-MCNC: 84 MG/DL
POTASSIUM SERPL-SCNC: 4.8 MMOL/L (ref 3.6–5.5)
POTASSIUM SERPL-SCNC: 4.8 MMOL/L (ref 3.6–5.5)
PROT SERPL-MCNC: 7.5 G/DL (ref 6–8.2)
SODIUM SERPL-SCNC: 135 MMOL/L (ref 135–145)
SODIUM SERPL-SCNC: 137 MMOL/L (ref 135–145)
TRIGL SERPL-MCNC: 52 MG/DL (ref 0–149)
TSH SERPL DL<=0.005 MIU/L-ACNC: 1.91 UIU/ML (ref 0.38–5.33)

## 2022-02-22 PROCEDURE — 82024 ASSAY OF ACTH: CPT

## 2022-02-22 PROCEDURE — 36415 COLL VENOUS BLD VENIPUNCTURE: CPT

## 2022-02-22 PROCEDURE — 84460 ALANINE AMINO (ALT) (SGPT): CPT

## 2022-02-22 PROCEDURE — 82627 DEHYDROEPIANDROSTERONE: CPT

## 2022-02-22 PROCEDURE — 80048 BASIC METABOLIC PNL TOTAL CA: CPT

## 2022-02-22 PROCEDURE — 82088 ASSAY OF ALDOSTERONE: CPT

## 2022-02-22 PROCEDURE — 82626 DEHYDROEPIANDROSTERONE: CPT

## 2022-02-22 PROCEDURE — 82533 TOTAL CORTISOL: CPT

## 2022-02-22 PROCEDURE — 80061 LIPID PANEL: CPT

## 2022-02-22 PROCEDURE — 84443 ASSAY THYROID STIM HORMONE: CPT

## 2022-02-22 PROCEDURE — 83835 ASSAY OF METANEPHRINES: CPT

## 2022-02-22 PROCEDURE — 80053 COMPREHEN METABOLIC PANEL: CPT

## 2022-02-22 PROCEDURE — 84244 ASSAY OF RENIN: CPT

## 2022-02-22 PROCEDURE — 83036 HEMOGLOBIN GLYCOSYLATED A1C: CPT | Mod: GA

## 2022-02-22 PROCEDURE — 82384 ASSAY THREE CATECHOLAMINES: CPT

## 2022-02-24 ENCOUNTER — OFFICE VISIT (OUTPATIENT)
Dept: MEDICAL GROUP | Facility: PHYSICIAN GROUP | Age: 72
End: 2022-02-24
Payer: MEDICARE

## 2022-02-24 VITALS
OXYGEN SATURATION: 98 % | WEIGHT: 223 LBS | DIASTOLIC BLOOD PRESSURE: 70 MMHG | HEART RATE: 94 BPM | BODY MASS INDEX: 35 KG/M2 | SYSTOLIC BLOOD PRESSURE: 132 MMHG | TEMPERATURE: 98.4 F | RESPIRATION RATE: 18 BRPM | HEIGHT: 67 IN

## 2022-02-24 DIAGNOSIS — E78.5 TYPE 2 DIABETES MELLITUS WITH HYPERLIPIDEMIA (HCC): ICD-10-CM

## 2022-02-24 DIAGNOSIS — K21.9 GASTROESOPHAGEAL REFLUX DISEASE WITHOUT ESOPHAGITIS: ICD-10-CM

## 2022-02-24 DIAGNOSIS — E78.5 DYSLIPIDEMIA: ICD-10-CM

## 2022-02-24 DIAGNOSIS — J44.9 CHRONIC OBSTRUCTIVE PULMONARY DISEASE, UNSPECIFIED COPD TYPE (HCC): ICD-10-CM

## 2022-02-24 DIAGNOSIS — E55.9 VITAMIN D DEFICIENCY: ICD-10-CM

## 2022-02-24 DIAGNOSIS — D69.6 THROMBOCYTOPENIA (HCC): ICD-10-CM

## 2022-02-24 DIAGNOSIS — F41.0 PANIC DISORDER: ICD-10-CM

## 2022-02-24 DIAGNOSIS — E11.69 TYPE 2 DIABETES MELLITUS WITH HYPERLIPIDEMIA (HCC): ICD-10-CM

## 2022-02-24 PROBLEM — K22.70 BARRETT'S ESOPHAGUS: Chronic | Status: ACTIVE | Noted: 2021-11-19

## 2022-02-24 LAB — ACTH PLAS-MCNC: 21 PG/ML (ref 7.2–63.3)

## 2022-02-24 PROCEDURE — 99214 OFFICE O/P EST MOD 30 MIN: CPT | Performed by: INTERNAL MEDICINE

## 2022-02-24 RX ORDER — OMEPRAZOLE 40 MG/1
40 CAPSULE, DELAYED RELEASE ORAL
Qty: 90 CAPSULE | Refills: 1 | Status: SHIPPED | OUTPATIENT
Start: 2022-02-24 | End: 2022-08-16

## 2022-02-24 RX ORDER — DULOXETIN HYDROCHLORIDE 60 MG/1
60 CAPSULE, DELAYED RELEASE ORAL
Qty: 90 CAPSULE | Refills: 1 | Status: SHIPPED | OUTPATIENT
Start: 2022-02-24 | End: 2022-08-16

## 2022-02-24 RX ORDER — BUDESONIDE AND FORMOTEROL FUMARATE DIHYDRATE 160; 4.5 UG/1; UG/1
AEROSOL RESPIRATORY (INHALATION)
COMMUNITY
Start: 2022-02-23 | End: 2022-02-24

## 2022-02-24 RX ORDER — BUDESONIDE AND FORMOTEROL FUMARATE DIHYDRATE 160; 4.5 UG/1; UG/1
2 AEROSOL RESPIRATORY (INHALATION) 2 TIMES DAILY
Qty: 2 EACH | Refills: 6 | Status: SHIPPED | OUTPATIENT
Start: 2022-02-24 | End: 2024-01-15

## 2022-02-24 RX ORDER — ATORVASTATIN CALCIUM 20 MG/1
20 TABLET, FILM COATED ORAL DAILY
Qty: 90 TABLET | Refills: 0 | Status: SHIPPED | OUTPATIENT
Start: 2022-02-24 | End: 2022-07-18

## 2022-02-24 ASSESSMENT — PATIENT HEALTH QUESTIONNAIRE - PHQ9: CLINICAL INTERPRETATION OF PHQ2 SCORE: 0

## 2022-02-24 ASSESSMENT — FIBROSIS 4 INDEX: FIB4 SCORE: 1.48

## 2022-02-24 NOTE — ASSESSMENT & PLAN NOTE
This is a chronic condition.  Patient reported that he would like to switch back to taking Symbicort as Advair cost more than Symbicort.  Patient reported that he has done well with Symbicort.  No significant side effects reported.  He rarely ever uses albuterol.

## 2022-02-24 NOTE — ASSESSMENT & PLAN NOTE
Recent lab test showed slightly low platelet level.  The patient denies any history of bleeding.  He is presently not taking any aspirin.

## 2022-02-24 NOTE — ASSESSMENT & PLAN NOTE
Lab test result discussed with the patient.  Patient currently taking atorvastatin.  No significant side effects reported.

## 2022-02-24 NOTE — PROGRESS NOTES
"PRIMARY CARE CLINIC VISIT  Chief Complaint   Patient presents with   • Follow-Up     Follow-up COPD and diabetes    History of Present Illness     Type 2 diabetes mellitus with hyperlipidemia (HCC)  Recent A1c 6.7%.  Patient not interested in taking a medication at this time.  Patient reported that he has gained some weight since the last few months.  Advised the patient regarding diet and exercise and to try to maintain an ideal weight.    Chronic obstructive pulmonary disease (HCC)  This is a chronic condition.  Patient reported that he would like to switch back to taking Symbicort as Advair cost more than Symbicort.  Patient reported that he has done well with Symbicort.  No significant side effects reported.  He rarely ever uses albuterol.    Thrombocytopenia (HCC)  Recent lab test showed slightly low platelet level.  The patient denies any history of bleeding.  He is presently not taking any aspirin.    Dyslipidemia  Lab test result discussed with the patient.  Patient currently taking atorvastatin.  No significant side effects reported.      Current Outpatient Medications on File Prior to Visit   Medication Sig Dispense Refill   • Sulfacetamide Sodium, Acne, 10 % Lotion Apply one daily 118 mL 6   • Cholecalciferol (VITAMIN D) 125 MCG (5000 UT) Cap Take 5,000 Units by mouth every day.     • lisinopril (PRINIVIL) 2.5 MG Tab TAKE 1 TABLET BY MOUTH EVERY DAY 90 tablet 3   • albuterol 108 (90 Base) MCG/ACT Aero Soln inhalation aerosol Inhale 2 Puffs every 6 hours as needed for Shortness of Breath. 8.5 g 3     No current facility-administered medications on file prior to visit.        Allergies: Augmentin, Metformin, and Zyban [bupropion]    ROS  As per HPI above. All other systems reviewed and negative.      Past Medical, Social, and Family history reviewed and updated in EPIC     Objective     /70   Pulse 94   Temp 36.9 °C (98.4 °F) (Temporal)   Resp 18   Ht 1.702 m (5' 7\")   Wt 101 kg (223 lb)   SpO2 " 98%    Body mass index is 34.93 kg/m².    General: alert and oriented  Cardiovascular: regular rate and rhythm  Pulmonary: lungs : no wheezing   Gastrointestinal: BS present. No obvious mass noted    Results for SANDRA FERNANDEZ JR. (MRN [id number]) as of 2/24/2022 09:40   Ref. Range 2/22/2022 09:18   Sodium Latest Ref Range: 135 - 145 mmol/L 137   Potassium Latest Ref Range: 3.6 - 5.5 mmol/L 4.8   Chloride Latest Ref Range: 96 - 112 mmol/L 102   Co2 Latest Ref Range: 20 - 33 mmol/L 22   Anion Gap Latest Ref Range: 7.0 - 16.0  13.0   Glucose Latest Ref Range: 65 - 99 mg/dL 104 (H)   Bun Latest Ref Range: 8 - 22 mg/dL 20   Creatinine Latest Ref Range: 0.50 - 1.40 mg/dL 1.06   GFR If  Latest Ref Range: >60 mL/min/1.73 m 2 >60   GFR If Non  Latest Ref Range: >60 mL/min/1.73 m 2 >60   Calcium Latest Ref Range: 8.5 - 10.5 mg/dL 9.6   ALT(SGPT) Latest Ref Range: 2 - 50 U/L 23   Glycohemoglobin Latest Ref Range: 4.0 - 5.6 % 6.7 (H)   Estim. Avg Glu Latest Units: mg/dL 146   Cholesterol,Tot Latest Ref Range: 100 - 199 mg/dL 138   Triglycerides Latest Ref Range: 0 - 149 mg/dL 52   HDL Latest Ref Range: >=40 mg/dL 44   LDL Latest Ref Range: <100 mg/dL 84   TSH Latest Ref Range: 0.380 - 5.330 uIU/mL 1.910         Assessment and Plan      Dyslipidemia  Stable condition  - atorvastatin (LIPITOR) 20 MG Tab; Take 1 Tablet by mouth every day.  Dispense: 90 Tablet; Refill: 0  - ALANINE AMINO-TRANS; Future  - Lipid Profile; Future     Type 2 diabetes mellitus with hyperlipidemia (HCC)  As above the patient not interested in taking a medication.  Patient would like to work on his diet and exercise and will try to lose some weight.  Recommend follow-up in 4 months with lab test prior  - Referral to Ophthalmology  - HEMOGLOBIN A1C; Future  - Basic Metabolic Panel; Future  - MICROALBUMIN CREAT RATIO URINE; Future    Vitamin D deficiency    - VITAMIN D,25 HYDROXY; Future     Chronic obstructive pulmonary  disease, unspecified COPD type (HCC)  Stable condition.  Continue with Symbicort daily.  Rinse mouth after use.  Patient will use albuterol as needed     Thrombocytopenia (HCC)  Chronic condition.  Stable.  No history of bleeding.  Continue to monitor.               Please note that this dictation was created using voice recognition software. I have made every reasonable attempt to correct obvious errors but there may be errors of grammar and content that I may have overlooked prior to finalization of this note.      Aris Aldana MD  Internal Medicine  [facility]

## 2022-02-24 NOTE — ASSESSMENT & PLAN NOTE
Recent A1c 6.7%.  Patient not interested in taking a medication at this time.  Patient reported that he has gained some weight since the last few months.  Advised the patient regarding diet and exercise and to try to maintain an ideal weight.

## 2022-02-25 ENCOUNTER — OFFICE VISIT (OUTPATIENT)
Dept: DERMATOLOGY | Facility: IMAGING CENTER | Age: 72
End: 2022-02-25
Payer: MEDICARE

## 2022-02-25 DIAGNOSIS — L92.0 GRANULOMA ANNULARE: ICD-10-CM

## 2022-02-25 LAB — ALDOST SERPL-MCNC: 5.4 NG/DL

## 2022-02-25 PROCEDURE — 11901 INJECT SKIN LESIONS >7: CPT | Performed by: DERMATOLOGY

## 2022-02-25 NOTE — PROGRESS NOTES
CC: kenalog injection for GA and skin lesions      Subjective: Previously seen patient here for GA injections. Sites on right/left arms and right lower leg.     Initial hx.   Duration of rash: previously dx GA (2012, [place])  Symptoms: itching, only one site on ant right shin  Current treatment: Trental 400 mg  Prior rx: prednisone, hydroxychloroquine, minocycline & nicotinamide     ROS: no fevers/chills. +/- itch.  No cough  DermPMH: no skin cancer/melanoma  No problem-specific Assessment & Plan notes found for this encounter.    Relevant PMH:mult med comorbidities  Social: tobacco use    PE: Gen:WDWN male in NAD.  Skin:scattered pink thin partial annular dermal plaques on arms and one isolated on inner lower right calf.     A/P: GA: chronic stable  -IL TAC 10mg/ML - injected 3ml total into >7 dermal plaques after cleanign with ETOH, se prev reviewed  -prev reviewed cont trental: 400mg PO TID      F/u 2 months    I have reviewed medications relevant to my specialty.

## 2022-02-26 LAB
METANEPHS SERPL-SCNC: 0.22 NMOL/L (ref 0–0.49)
NORMETANEPHRINE SERPL-SCNC: 0.62 NMOL/L (ref 0–0.89)
RENIN PLAS-CCNC: 2.5 NG/ML/HR

## 2022-02-27 LAB — DHEA SERPL-MCNC: 0.37 NG/ML (ref 0.63–4.7)

## 2022-02-28 LAB
DOPAMINE SERPL-MCNC: 25 PG/ML (ref 0–20)
EPINEPH PLAS-MCNC: 28 PG/ML (ref 10–200)
NOREPINEPH PLAS-MCNC: 598 PG/ML (ref 80–520)

## 2022-03-01 ENCOUNTER — APPOINTMENT (OUTPATIENT)
Dept: DERMATOLOGY | Facility: IMAGING CENTER | Age: 72
End: 2022-03-01

## 2022-03-21 ENCOUNTER — TELEPHONE (OUTPATIENT)
Dept: OCCUPATIONAL MEDICINE | Facility: CLINIC | Age: 72
End: 2022-03-21
Payer: MEDICARE

## 2022-03-21 NOTE — TELEPHONE ENCOUNTER
Please refill my prescription for pentoxifylline 400 MG through [facility] on [address]. Do not send to wail order….  Thank you  Jak Brothers

## 2022-03-22 ENCOUNTER — OFFICE VISIT (OUTPATIENT)
Dept: SLEEP MEDICINE | Facility: MEDICAL CENTER | Age: 72
End: 2022-03-22
Payer: MEDICARE

## 2022-03-22 VITALS
WEIGHT: 222 LBS | OXYGEN SATURATION: 95 % | RESPIRATION RATE: 16 BRPM | HEIGHT: 67 IN | HEART RATE: 107 BPM | SYSTOLIC BLOOD PRESSURE: 118 MMHG | BODY MASS INDEX: 34.84 KG/M2 | DIASTOLIC BLOOD PRESSURE: 68 MMHG

## 2022-03-22 DIAGNOSIS — G47.33 OSA ON CPAP: Primary | ICD-10-CM

## 2022-03-22 PROCEDURE — 99213 OFFICE O/P EST LOW 20 MIN: CPT | Performed by: STUDENT IN AN ORGANIZED HEALTH CARE EDUCATION/TRAINING PROGRAM

## 2022-03-22 ASSESSMENT — FIBROSIS 4 INDEX: FIB4 SCORE: 1.48

## 2022-03-22 NOTE — PATIENT INSTRUCTIONS
"CPAP and BPAP Information  CPAP and BPAP are methods of helping a person breathe with the use of air pressure. CPAP stands for \"continuous positive airway pressure.\" BPAP stands for \"bi-level positive airway pressure.\" In both methods, air is blown through your nose or mouth and into your air passages to help you breathe well.  CPAP and BPAP use different amounts of pressure to blow air. With CPAP, the amount of pressure stays the same while you breathe in and out. With BPAP, the amount of pressure is increased when you breathe in (inhale) so that you can take larger breaths. Your health care provider will recommend whether CPAP or BPAP would be more helpful for you.  Why are CPAP and BPAP treatments used?  CPAP or BPAP can be helpful if you have:  · Sleep apnea.  · Chronic obstructive pulmonary disease (COPD).  · Heart failure.  · Medical conditions that weaken the muscles of the chest including muscular dystrophy, or neurological diseases such as amyotrophic lateral sclerosis (ALS).  · Other problems that cause breathing to be weak, abnormal, or difficult.  CPAP is most commonly used for obstructive sleep apnea (NAHUM) to keep the airways from collapsing when the muscles relax during sleep.  How is CPAP or BPAP administered?  Both CPAP and BPAP are provided by a small machine with a flexible plastic tube that attaches to a plastic mask. You wear the mask. Air is blown through the mask into your nose or mouth. The amount of pressure that is used to blow the air can be adjusted on the machine. Your health care provider will determine the pressure setting that should be used based on your individual needs.  When should CPAP or BPAP be used?  In most cases, the mask only needs to be worn during sleep. Generally, the mask needs to be worn throughout the night and during any daytime naps. People with certain medical conditions may also need to wear the mask at other times when they are awake. Follow instructions from your " health care provider about when to use the machine.  What are some tips for using the mask?    · Because the mask needs to be snug, some people feel trapped or closed-in (claustrophobic) when first using the mask. If you feel this way, you may need to get used to the mask. One way to do this is by holding the mask loosely over your nose or mouth and then gradually applying the mask more snugly. You can also gradually increase the amount of time that you use the mask.  · Masks are available in various types and sizes. Some fit over your mouth and nose while others fit over just your nose. If your mask does not fit well, talk with your health care provider about getting a different one.  · If you are using a mask that fits over your nose and you tend to breathe through your mouth, a chin strap may be applied to help keep your mouth closed.  · The CPAP and BPAP machines have alarms that may sound if the mask comes off or develops a leak.  · If you have trouble with the mask, it is very important that you talk with your health care provider about finding a way to make the mask easier to tolerate. Do not stop using the mask. Stopping the use of the mask could have a negative impact on your health.  What are some tips for using the machine?  · Place your CPAP or BPAP machine on a secure table or stand near an electrical outlet.  · Know where the on/off switch is located on the machine.  · Follow instructions from your health care provider about how to set the pressure on your machine and when you should use it.  · Do not eat or drink while the CPAP or BPAP machine is on. Food or fluids could get pushed into your lungs by the pressure of the CPAP or BPAP.  · Do not smoke. Tobacco smoke residue can damage the machine.  · For home use, CPAP and BPAP machines can be rented or purchased through home health care companies. Many different brands of machines are available. Renting a machine before purchasing may help you find out  which particular machine works well for you.  · Keep the CPAP or BPAP machine and attachments clean. Ask your health care provider for specific instructions.  Get help right away if:  · You have redness or open areas around your nose or mouth where the mask fits.  · You have trouble using the CPAP or BPAP machine.  · You cannot tolerate wearing the CPAP or BPAP mask.  · You have pain, discomfort, and bloating in your abdomen.  Summary  · CPAP and BPAP are methods of helping a person breathe with the use of air pressure.  · Both CPAP and BPAP are provided by a small machine with a flexible plastic tube that attaches to a plastic mask.  · If you have trouble with the mask, it is very important that you talk with your health care provider about finding a way to make the mask easier to tolerate.  This information is not intended to replace advice given to you by your health care provider. Make sure you discuss any questions you have with your health care provider.  Document Released: 09/15/2005 Document Revised: 04/08/2020 Document Reviewed: 11/06/2017  Elsevier Patient Education © 2020 [employer]

## 2022-03-22 NOTE — PROGRESS NOTES
[facility] Follow-up Visit    CC: Follow-up for NAHUM management      HPI:  Micheal Brothers Jr. is a 71 y.o.male  with GERD, dyslipidemia, depression with anxiety, obesity and obstructive sleep apnea on CPAP.  Presents today for compliance after receiving new CPAP machine.    Since last visit he has received his new machine.  Continues to use his machine nightly.  Finds using his machine a positive feel more rested and refreshed.  With his new machine he did receive a new mask a air fit N20 which he finds to not be the most comfortable.  He is interested in changing masks.  Since last visit he feels his sleep initiation has improved.  There is sometimes when he will have trouble getting to sleep the most of the time and get to sleep within 5 to 10 minutes.  He reports no other concerns at this time.    [facility]   Device: Airsense 11  Mask: N20   Aerophagia: No   Snoring: No   Dry mouth: No   Leak: No   Skin irritation: No   Chin strap: No     Bedtime: 9-930  Wake time: 6-630    SL: 5-10 min   Awakenings: 1-2    Sleep History  PSG in 2008 showed severe sleep apnea with AHI of 85 events per hour.  Following diagnosis he was placed on CPAP 12 cmH2O    Patient Active Problem List    Diagnosis Date Noted   • Thomas's esophagus 11/19/2021   • Calcification of gallbladder 04/28/2021   • Bilateral adrenal adenomas 04/22/2021   • Abnormal finding on GI tract imaging 04/22/2021   • Bilateral inguinal hernia without obstruction or gangrene 04/22/2021   • Pelvic pain 04/13/2021   • Umbilical hernia 04/13/2021   • Thrombocytopenia (HCC) 01/26/2021   • Anemia 01/22/2021   • Proteinuria 01/22/2021   • Colonic polyp 01/20/2021   • Chronic obstructive pulmonary disease (HCC) 05/01/2019   • Type 2 diabetes mellitus with hyperlipidemia (HCC) 04/01/2019   • Anxiety and depression 06/18/2018   • NAHUM on CPAP 08/26/2016   • Obesity (BMI 30-39.9) 11/26/2014   • Dyslipidemia 11/26/2014   • GERD  (gastroesophageal reflux disease) 11/26/2014   • Tobacco use 11/26/2014       Past Medical History:   Diagnosis Date   • Back pain    • Chickenpox    • Depression    • GERD (gastroesophageal reflux disease)    • Bulgarian measles    • Gout    • Hyperlipidemia    • Influenza    • Panic disorder    • Sleep apnea    • Tobacco use 11/26/2014        Past Surgical History:   Procedure Laterality Date   • APPENDECTOMY     • ARTHROSCOPY, KNEE     • CARPAL TUNNEL ENDOSCOPIC      bilateral   • CARPAL TUNNEL RELEASE     • SHOULDER DECOMPRESSION ARTHROSCOPIC      right   • TONSILLECTOMY         Family History   Problem Relation Age of Onset   • Cancer Mother    • Cancer Father    • Stroke Father    • Diabetes Neg Hx    • Heart Disease Neg Hx    • Hypertension Neg Hx        Social History     Socioeconomic History   • Marital status:      Spouse name: Not on file   • Number of children: Not on file   • Years of education: Not on file   • Highest education level: Not on file   Occupational History   • Not on file   Tobacco Use   • Smoking status: Current Every Day Smoker     Packs/day: 1.00     Years: 53.00     Pack years: 53.00     Types: Cigarettes   • Smokeless tobacco: Never Used   • Tobacco comment: started smoking at age 15   Vaping Use   • Vaping Use: Never used   Substance and Sexual Activity   • Alcohol use: Not Currently     Alcohol/week: 0.0 oz   • Drug use: No   • Sexual activity: Yes     Partners: Female   Other Topics Concern   • Not on file   Social History Narrative   • Not on file     Social Determinants of Health     Financial Resource Strain: Not on file   Food Insecurity: Not on file   Transportation Needs: Not on file   Physical Activity: Not on file   Stress: Not on file   Social Connections: Not on file   Intimate Partner Violence: Not on file   Housing Stability: Not on file       Current Outpatient Medications   Medication Sig Dispense Refill   • omeprazole (PRILOSEC) 40 MG delayed-release capsule  "Take 1 Capsule by mouth every day. 90 Capsule 1   • DULoxetine (CYMBALTA) 60 MG Cap DR Particles delayed-release capsule Take 1 Capsule by mouth every day. 90 Capsule 1   • atorvastatin (LIPITOR) 20 MG Tab Take 1 Tablet by mouth every day. 90 Tablet 0   • budesonide-formoterol (SYMBICORT) 160-4.5 MCG/ACT Aerosol Inhale 2 Puffs 2 times a day. 2 Each 6   • Sulfacetamide Sodium, Acne, 10 % Lotion Apply one daily 118 mL 6   • Cholecalciferol (VITAMIN D) 125 MCG (5000 UT) Cap Take 5,000 Units by mouth every day.     • lisinopril (PRINIVIL) 2.5 MG Tab TAKE 1 TABLET BY MOUTH EVERY DAY 90 tablet 3   • albuterol 108 (90 Base) MCG/ACT Aero Soln inhalation aerosol Inhale 2 Puffs every 6 hours as needed for Shortness of Breath. 8.5 g 3     No current facility-administered medications for this visit.        ALLERGIES: Augmentin, Metformin, and Zyban [bupropion]    ROS  Constitutional: Denies fevers, Denies weight changes  Ears/Nose/Throat/Mouth: Denies nasal congestion or sore throat   Cardiovascular: Denies chest pain  Respiratory: Denies shortness of breath, Denies cough  Gastrointestinal/Hepatic: Denies nausea, vomiting  Sleep: see HPI      PHYSICAL EXAM  /68 (BP Location: Left arm, Patient Position: Sitting, BP Cuff Size: Adult)   Pulse (!) 107   Resp 16   Ht 1.702 m (5' 7\")   Wt 101 kg (222 lb)   SpO2 95%   BMI 34.77 kg/m²   Appearance: Well-nourished, well-developed, no acute distress  Eyes:  No scleral icterus , EOMI  ENMT: masked  Musculoskeletal:  Grossly normal; gait and station normal; digits and nails normal  Skin:  No rashes, petechiae, cyanosis  Neurologic: without focal signs; oriented to person, time, place, and purpose; judgement intact      Medical Decision Making   Assessment and Plan  Micheal Brothers Jr. is a 71 y.o.male  with GERD, dyslipidemia, depression with anxiety, obesity and obstructive sleep apnea on CPAP.  Presents today for compliance after receiving new CPAP machine.    The " medical record was reviewed.    Obstructive sleep apnea  Compliance data reviewed showing 100% usage > 4hours in last 30  days. Average AHI 2.1 events/hour. 90-95% pressure 14.4 CWP. Pt continues to use and benefit from machine.      Discussed potential of visiting local CPAP store to help him with mask fit.  His current DME is in [place] and he does not go that regularly.    PLAN:   -Order placed for mask and supplies   -Advised to reach out via MyChart with questions     Has been advised to continue the current  CPAP, clean equipment frequently, and get new mask and supplies as allowed by insurance and DME. Recommend an earlier appointment, if significant treatment barriers develop.    The risks of untreated sleep apnea were discussed with the patient at length. Patients with NAHUM are at increased risk of cardiovascular disease including coronary artery disease, systemic arterial hypertension, pulmonary arterial hypertension, cardiac arrythmias, and stroke. The patient was advised to avoid driving a motor vehicle when drowsy.    Positive airway pressure will favorably impact many of the adverse conditions and effects provoked by NAHUM.    Have advised the patient to follow up with the appropriate healthcare practitioners for all other medical problems and issues.    No follow-ups on file.      Please note portions of this record was created using voice recognition software. I have made every reasonable attempt to correct obvious errors, but I expect that there are errors of grammar and possibly content I did not discover before finalizing the note.

## 2022-03-24 RX ORDER — PENTOXIFYLLINE 400 MG/1
400 TABLET, EXTENDED RELEASE ORAL
Qty: 90 TABLET | Refills: 4 | Status: SHIPPED | OUTPATIENT
Start: 2022-03-24 | End: 2023-05-23

## 2022-03-28 ENCOUNTER — TELEPHONE (OUTPATIENT)
Dept: ENDOCRINOLOGY | Facility: MEDICAL CENTER | Age: 72
End: 2022-03-28
Payer: MEDICARE

## 2022-03-28 DIAGNOSIS — E78.5 TYPE 2 DIABETES MELLITUS WITH HYPERLIPIDEMIA (HCC): ICD-10-CM

## 2022-03-28 DIAGNOSIS — E11.69 TYPE 2 DIABETES MELLITUS WITH HYPERLIPIDEMIA (HCC): ICD-10-CM

## 2022-04-18 PROBLEM — D64.9 ANEMIA: Chronic | Status: ACTIVE | Noted: 2021-01-22

## 2022-04-18 PROBLEM — D69.6 THROMBOCYTOPENIA (HCC): Chronic | Status: ACTIVE | Noted: 2021-01-26

## 2022-04-22 ENCOUNTER — OFFICE VISIT (OUTPATIENT)
Dept: DERMATOLOGY | Facility: IMAGING CENTER | Age: 72
End: 2022-04-22
Payer: MEDICARE

## 2022-04-22 DIAGNOSIS — B35.6 TINEA CRURIS: ICD-10-CM

## 2022-04-22 DIAGNOSIS — L92.0 GRANULOMA ANNULARE: ICD-10-CM

## 2022-04-22 PROCEDURE — 99213 OFFICE O/P EST LOW 20 MIN: CPT | Performed by: DERMATOLOGY

## 2022-04-22 RX ORDER — NYSTATIN 100000 U/G
1 CREAM TOPICAL 2 TIMES DAILY
Qty: 30 G | Refills: 1 | Status: SHIPPED | OUTPATIENT
Start: 2022-04-22 | End: 2023-10-26 | Stop reason: SDUPTHER

## 2022-04-22 NOTE — PROGRESS NOTES
CC: kenalog injection for GA and skin lesions      Subjective: Previously seen patient here for GA injections. Sites on right/left arms and right lower leg. Pt saw improvement with last tx. Does not desire re-trx today. Would opt to wait to see what response is maintained in 2 months.      Initial hx.  Duration of rash: previously dx GA (2012, [place], [place])  Symptoms: itching, only one site on ant right shin  Current treatment: Trental 400 mg  Prior rx: prednisone, hydroxychloroquine, minocycline & nicotinamide     ROS: no fevers/chills. +/- itch.  No cough  DermPMH: no skin cancer/melanoma  No problem-specific Assessment & Plan notes found for this encounter.    Relevant PMH:mult med comorbidities  Social: tobacco use    PE: Gen:WDWN male in NAD.  Skin:scattered pink thin partial annular dermal plaques on arms.     A/P: GA: chronic stable  -defered IL TAC today  -prev reviewed cont trental: 400mg PO TID    Hx of tinea cruris/candidiasis:  -rf topical nystatin per request  -reviewed alt OTC PRN    F/u 2 months    I have reviewed medications relevant to my specialty.

## 2022-06-04 ENCOUNTER — HOSPITAL ENCOUNTER (OUTPATIENT)
Dept: LAB | Facility: MEDICAL CENTER | Age: 72
End: 2022-06-04
Attending: INTERNAL MEDICINE
Payer: MEDICARE

## 2022-06-04 DIAGNOSIS — R80.9 PROTEINURIA, UNSPECIFIED TYPE: ICD-10-CM

## 2022-06-04 DIAGNOSIS — N18.2 CKD (CHRONIC KIDNEY DISEASE), STAGE II: ICD-10-CM

## 2022-06-04 DIAGNOSIS — D64.9 ANEMIA, UNSPECIFIED TYPE: ICD-10-CM

## 2022-06-04 LAB
ANION GAP SERPL CALC-SCNC: 10 MMOL/L (ref 7–16)
APPEARANCE UR: CLEAR
BACTERIA #/AREA URNS HPF: NEGATIVE /HPF
BILIRUB UR QL STRIP.AUTO: NEGATIVE
BUN SERPL-MCNC: 18 MG/DL (ref 8–22)
CALCIUM SERPL-MCNC: 9.6 MG/DL (ref 8.5–10.5)
CHLORIDE SERPL-SCNC: 103 MMOL/L (ref 96–112)
CO2 SERPL-SCNC: 23 MMOL/L (ref 20–33)
COLOR UR: YELLOW
CREAT SERPL-MCNC: 1.2 MG/DL (ref 0.5–1.4)
CREAT UR-MCNC: 247.77 MG/DL
EPI CELLS #/AREA URNS HPF: NEGATIVE /HPF
ERYTHROCYTE [DISTWIDTH] IN BLOOD BY AUTOMATED COUNT: 51.5 FL (ref 35.9–50)
GFR SERPLBLD CREATININE-BSD FMLA CKD-EPI: 64 ML/MIN/1.73 M 2
GLUCOSE SERPL-MCNC: 110 MG/DL (ref 65–99)
GLUCOSE UR STRIP.AUTO-MCNC: NEGATIVE MG/DL
HCT VFR BLD AUTO: 42.5 % (ref 42–52)
HGB BLD-MCNC: 13.8 G/DL (ref 14–18)
HYALINE CASTS #/AREA URNS LPF: ABNORMAL /LPF
KETONES UR STRIP.AUTO-MCNC: ABNORMAL MG/DL
LEUKOCYTE ESTERASE UR QL STRIP.AUTO: NEGATIVE
MCH RBC QN AUTO: 28.6 PG (ref 27–33)
MCHC RBC AUTO-ENTMCNC: 32.5 G/DL (ref 33.7–35.3)
MCV RBC AUTO: 88.2 FL (ref 81.4–97.8)
MICRO URNS: ABNORMAL
MORPHOLOGY BLD-IMP: NORMAL
NITRITE UR QL STRIP.AUTO: NEGATIVE
PH UR STRIP.AUTO: 5.5 [PH] (ref 5–8)
PLATELET # BLD AUTO: 150 K/UL (ref 164–446)
PMV BLD AUTO: 9.5 FL (ref 9–12.9)
POTASSIUM SERPL-SCNC: 4.7 MMOL/L (ref 3.6–5.5)
PROT UR QL STRIP: 100 MG/DL
PROT UR-MCNC: 53 MG/DL (ref 0–15)
RBC # BLD AUTO: 4.82 M/UL (ref 4.7–6.1)
RBC # URNS HPF: ABNORMAL /HPF
RBC UR QL AUTO: NEGATIVE
SODIUM SERPL-SCNC: 136 MMOL/L (ref 135–145)
SP GR UR STRIP.AUTO: 1.03
UROBILINOGEN UR STRIP.AUTO-MCNC: 0.2 MG/DL
WBC # BLD AUTO: 7.3 K/UL (ref 4.8–10.8)
WBC #/AREA URNS HPF: ABNORMAL /HPF

## 2022-06-04 PROCEDURE — 85027 COMPLETE CBC AUTOMATED: CPT

## 2022-06-04 PROCEDURE — 81001 URINALYSIS AUTO W/SCOPE: CPT

## 2022-06-04 PROCEDURE — 36415 COLL VENOUS BLD VENIPUNCTURE: CPT

## 2022-06-04 PROCEDURE — 84156 ASSAY OF PROTEIN URINE: CPT

## 2022-06-04 PROCEDURE — 82570 ASSAY OF URINE CREATININE: CPT

## 2022-06-04 PROCEDURE — 80048 BASIC METABOLIC PNL TOTAL CA: CPT

## 2022-06-06 DIAGNOSIS — D35.01 BILATERAL ADRENAL ADENOMAS: ICD-10-CM

## 2022-06-06 DIAGNOSIS — D35.02 BILATERAL ADRENAL ADENOMAS: ICD-10-CM

## 2022-06-06 RX ORDER — LISINOPRIL 2.5 MG/1
2.5 TABLET ORAL DAILY
Qty: 90 TABLET | Refills: 3 | Status: SHIPPED | OUTPATIENT
Start: 2022-06-06 | End: 2023-06-06

## 2022-06-09 ENCOUNTER — OFFICE VISIT (OUTPATIENT)
Dept: NEPHROLOGY | Facility: MEDICAL CENTER | Age: 72
End: 2022-06-09
Payer: MEDICARE

## 2022-06-09 VITALS
HEART RATE: 96 BPM | HEIGHT: 67 IN | WEIGHT: 220 LBS | SYSTOLIC BLOOD PRESSURE: 116 MMHG | BODY MASS INDEX: 34.53 KG/M2 | DIASTOLIC BLOOD PRESSURE: 66 MMHG | TEMPERATURE: 97 F | OXYGEN SATURATION: 97 %

## 2022-06-09 DIAGNOSIS — E11.29 MICROALBUMINURIA DUE TO TYPE 2 DIABETES MELLITUS (HCC): ICD-10-CM

## 2022-06-09 DIAGNOSIS — I10 ESSENTIAL HYPERTENSION: ICD-10-CM

## 2022-06-09 DIAGNOSIS — R80.9 MICROALBUMINURIA DUE TO TYPE 2 DIABETES MELLITUS (HCC): ICD-10-CM

## 2022-06-09 DIAGNOSIS — E55.9 VITAMIN D DEFICIENCY: ICD-10-CM

## 2022-06-09 DIAGNOSIS — R80.9 PROTEINURIA, UNSPECIFIED TYPE: ICD-10-CM

## 2022-06-09 DIAGNOSIS — D64.9 ANEMIA, UNSPECIFIED TYPE: ICD-10-CM

## 2022-06-09 DIAGNOSIS — N18.2 CKD (CHRONIC KIDNEY DISEASE), STAGE II: ICD-10-CM

## 2022-06-09 PROCEDURE — 99214 OFFICE O/P EST MOD 30 MIN: CPT | Performed by: INTERNAL MEDICINE

## 2022-06-09 ASSESSMENT — ENCOUNTER SYMPTOMS
CHILLS: 0
HEMOPTYSIS: 0
COUGH: 0
WHEEZING: 0
NAUSEA: 0
FLANK PAIN: 0
ORTHOPNEA: 0
FEVER: 0
MYALGIAS: 0
EYES NEGATIVE: 1
ABDOMINAL PAIN: 0
BACK PAIN: 0
SHORTNESS OF BREATH: 0
PALPITATIONS: 0
WEIGHT LOSS: 0
NECK PAIN: 0
SINUS PAIN: 0
VOMITING: 0

## 2022-06-09 ASSESSMENT — FIBROSIS 4 INDEX: FIB4 SCORE: 1.5

## 2022-06-09 NOTE — PROGRESS NOTES
Subjective:      Micheal Brothers Jr. is a 71 y.o. male who presents with Follow-Up and Chronic Kidney Disease            Chronic Kidney Disease  Pertinent negatives include no abdominal pain, chest pain, chills, congestion, coughing, fever, myalgias, nausea, neck pain or vomiting.     Micheal is coming today for f/u of proteinuria, CKD II  Doing well, no complaints  No dysuria/hematuria/flank pain  HTN: BP well controlled  CKD II stable at baseline  Microalbuminuria mild -to monitor  Proteinuria improving from 0.6  -to 0.3  -now at 0.2    Review of Systems   Constitutional: Negative for chills, fever, malaise/fatigue and weight loss.   HENT: Negative for congestion, hearing loss and sinus pain.    Eyes: Negative.    Respiratory: Negative for cough, hemoptysis, shortness of breath and wheezing.    Cardiovascular: Negative for chest pain, palpitations, orthopnea and leg swelling.   Gastrointestinal: Negative for abdominal pain, nausea and vomiting.   Genitourinary: Negative for dysuria, flank pain, frequency, hematuria and urgency.   Musculoskeletal: Negative for back pain, joint pain, myalgias and neck pain.   Skin: Negative.    All other systems reviewed and are negative.    Past Medical History:   Diagnosis Date   • Back pain    • Chickenpox    • Depression    • GERD (gastroesophageal reflux disease)    • [place] measles    • Gout    • Hyperlipidemia    • Influenza    • Panic disorder    • Sleep apnea    • Tobacco use 11/26/2014       Family History   Problem Relation Age of Onset   • Cancer Mother    • Cancer Father    • Stroke Father    • Diabetes Neg Hx    • Heart Disease Neg Hx    • Hypertension Neg Hx        Social History     Socioeconomic History   • Marital status:    Tobacco Use   • Smoking status: Current Every Day Smoker     Packs/day: 1.00     Years: 53.00     Pack years: 53.00     Types: Cigarettes   • Smokeless tobacco: Never Used   • Tobacco comment: started smoking at age 15   Vaping  "Use   • Vaping Use: Never used   Substance and Sexual Activity   • Alcohol use: Not Currently     Alcohol/week: 0.0 oz   • Drug use: No   • Sexual activity: Yes     Partners: Female          Objective:     /66 (BP Location: Right arm, Patient Position: Sitting, BP Cuff Size: Adult)   Pulse 96   Temp 36.1 °C (97 °F) (Temporal)   Ht 1.702 m (5' 7\")   Wt 99.8 kg (220 lb)   SpO2 97%   BMI 34.46 kg/m²      Physical Exam  Vitals reviewed.   Constitutional:       General: He is not in acute distress.     Appearance: Normal appearance. He is well-developed. He is not diaphoretic.   HENT:      Head: Normocephalic and atraumatic.      Nose: Nose normal.      Mouth/Throat:      Mouth: Mucous membranes are moist.      Pharynx: Oropharynx is clear.   Eyes:      Extraocular Movements: Extraocular movements intact.      Conjunctiva/sclera: Conjunctivae normal.      Pupils: Pupils are equal, round, and reactive to light.   Cardiovascular:      Rate and Rhythm: Normal rate and regular rhythm.      Pulses: Normal pulses.      Heart sounds: Normal heart sounds.     No friction rub. No gallop.   Pulmonary:      Effort: Pulmonary effort is normal. No respiratory distress.      Breath sounds: Normal breath sounds. No wheezing or rales.   Abdominal:      General: Bowel sounds are normal. There is no distension.      Palpations: Abdomen is soft. There is no mass.      Tenderness: There is no abdominal tenderness. There is no right CVA tenderness, left CVA tenderness or guarding.   Musculoskeletal:      Cervical back: Neck supple.      Right lower leg: No edema.      Left lower leg: No edema.   Skin:     General: Skin is warm.      Coloration: Skin is not pale.      Findings: No erythema or rash.   Neurological:      General: No focal deficit present.      Mental Status: He is alert and oriented to person, place, and time.      Cranial Nerves: No cranial nerve deficit.      Coordination: Coordination normal.   Psychiatric:       "   Mood and Affect: Mood normal.         Thought Content: Thought content normal.         Judgment: Judgment normal.               Lab results reviewed: d/w Pt  Lab Results   Component Value Date/Time    CREATININE 1.20 06/04/2022 08:50 AM    POTASSIUM 4.7 06/04/2022 08:50 AM       Assessment/Plan:          1. CKD (chronic kidney disease), stage II  -   Creat stable at baseline -to monitor    2. Microalbuminuria due to type 2 diabetes mellitus (HCC)      Mild on ACEi    4. Essential hypertension      BP well controlled -to monitor at home    5. Proteinuria, unspecified type      improving to 0.3 -0.2    6. Vitamin D deficiency      Well controlled      To monitor      Recs:  Continue current treatment  Low salt diet  Monitor BP  Keep well hydrated  F/u 12 months

## 2022-06-30 ENCOUNTER — OFFICE VISIT (OUTPATIENT)
Dept: DERMATOLOGY | Facility: IMAGING CENTER | Age: 72
End: 2022-06-30
Payer: MEDICARE

## 2022-06-30 DIAGNOSIS — L92.0 GRANULOMA ANNULARE: ICD-10-CM

## 2022-06-30 DIAGNOSIS — L29.9 ITCHING: ICD-10-CM

## 2022-06-30 PROCEDURE — 11901 INJECT SKIN LESIONS >7: CPT | Performed by: DERMATOLOGY

## 2022-06-30 NOTE — PROGRESS NOTES
"CC: kenalog injection for GA      Subjective: Previously seen patient here for GA injections. Sites on lower legs and left arm. Pt saw improvement with last tx.    Per patient some improvement , would like to discuss more injections today.  Sites on legs very itchy.  Trying to use moisturizer more.      From prior notes:  \"Initial hx.  Duration of rash: previously dx GA (2012, [place], [place])  Symptoms: itching, only one site on ant right shin  Current treatment: Trental 400 mg  Prior rx: prednisone, hydroxychloroquine, minocycline & nicotinamide\"     ROS: no fevers/chills. +/- itch.  No cough  DermPMH: no skin cancer/melanoma  No problem-specific Assessment & Plan notes found for this encounter.    Relevant PMH:mult med comorbidities  Social: tobacco use    PE: Gen:WDWN male in NAD.  Skin:scattered pink thin partial annular dermal plaques on arms and legs    A/P: GA: chronic flaring, itching  -R/B/A reviewed prior to injections: cleaned >10 sites with EtOH prior to injection 0.1-0.2mg 10mg/ml Kenalog. Total 2.5 ml injected  -prev reviewed cont trental: 400mg PO TID    F/u 2 months    I have reviewed medications relevant to my specialty.          "

## 2022-07-17 DIAGNOSIS — E78.5 DYSLIPIDEMIA: ICD-10-CM

## 2022-07-18 RX ORDER — ATORVASTATIN CALCIUM 20 MG/1
TABLET, FILM COATED ORAL
Qty: 90 TABLET | Refills: 0 | Status: SHIPPED | OUTPATIENT
Start: 2022-07-18 | End: 2022-10-17

## 2022-08-06 ENCOUNTER — HOSPITAL ENCOUNTER (OUTPATIENT)
Dept: LAB | Facility: MEDICAL CENTER | Age: 72
End: 2022-08-06
Attending: INTERNAL MEDICINE
Payer: MEDICARE

## 2022-08-06 DIAGNOSIS — N18.2 CKD (CHRONIC KIDNEY DISEASE), STAGE II: ICD-10-CM

## 2022-08-06 DIAGNOSIS — E11.29 MICROALBUMINURIA DUE TO TYPE 2 DIABETES MELLITUS (HCC): ICD-10-CM

## 2022-08-06 DIAGNOSIS — E78.5 DYSLIPIDEMIA: ICD-10-CM

## 2022-08-06 DIAGNOSIS — D64.9 ANEMIA, UNSPECIFIED TYPE: ICD-10-CM

## 2022-08-06 DIAGNOSIS — E55.9 VITAMIN D DEFICIENCY: ICD-10-CM

## 2022-08-06 DIAGNOSIS — R80.9 PROTEINURIA, UNSPECIFIED TYPE: ICD-10-CM

## 2022-08-06 DIAGNOSIS — R80.9 MICROALBUMINURIA DUE TO TYPE 2 DIABETES MELLITUS (HCC): ICD-10-CM

## 2022-08-06 DIAGNOSIS — E78.5 TYPE 2 DIABETES MELLITUS WITH HYPERLIPIDEMIA (HCC): ICD-10-CM

## 2022-08-06 DIAGNOSIS — E11.69 TYPE 2 DIABETES MELLITUS WITH HYPERLIPIDEMIA (HCC): ICD-10-CM

## 2022-08-06 PROBLEM — E11.22 CKD STAGE 2 DUE TO TYPE 2 DIABETES MELLITUS (HCC): Status: ACTIVE | Noted: 2022-08-06

## 2022-08-06 LAB
25(OH)D3 SERPL-MCNC: 70 NG/ML (ref 30–100)
ALT SERPL-CCNC: 24 U/L (ref 2–50)
ANION GAP SERPL CALC-SCNC: 12 MMOL/L (ref 7–16)
APPEARANCE UR: CLEAR
BACTERIA #/AREA URNS HPF: NEGATIVE /HPF
BILIRUB UR QL STRIP.AUTO: NEGATIVE
BUN SERPL-MCNC: 18 MG/DL (ref 8–22)
CALCIUM SERPL-MCNC: 9.6 MG/DL (ref 8.5–10.5)
CHLORIDE SERPL-SCNC: 100 MMOL/L (ref 96–112)
CHOLEST SERPL-MCNC: 144 MG/DL (ref 100–199)
CO2 SERPL-SCNC: 23 MMOL/L (ref 20–33)
COLOR UR: YELLOW
CREAT SERPL-MCNC: 1.19 MG/DL (ref 0.5–1.4)
CREAT UR-MCNC: 195.18 MG/DL
CREAT UR-MCNC: 196.2 MG/DL
EPI CELLS #/AREA URNS HPF: NEGATIVE /HPF
ERYTHROCYTE [DISTWIDTH] IN BLOOD BY AUTOMATED COUNT: 49.5 FL (ref 35.9–50)
EST. AVERAGE GLUCOSE BLD GHB EST-MCNC: 140 MG/DL
FASTING STATUS PATIENT QL REPORTED: NORMAL
GFR SERPLBLD CREATININE-BSD FMLA CKD-EPI: 65 ML/MIN/1.73 M 2
GLUCOSE SERPL-MCNC: 113 MG/DL (ref 65–99)
GLUCOSE UR STRIP.AUTO-MCNC: NEGATIVE MG/DL
HBA1C MFR BLD: 6.5 % (ref 4–5.6)
HCT VFR BLD AUTO: 42.6 % (ref 42–52)
HDLC SERPL-MCNC: 40 MG/DL
HGB BLD-MCNC: 14.3 G/DL (ref 14–18)
HYALINE CASTS #/AREA URNS LPF: ABNORMAL /LPF
KETONES UR STRIP.AUTO-MCNC: NEGATIVE MG/DL
LDLC SERPL CALC-MCNC: 90 MG/DL
LEUKOCYTE ESTERASE UR QL STRIP.AUTO: ABNORMAL
MCH RBC QN AUTO: 30.4 PG (ref 27–33)
MCHC RBC AUTO-ENTMCNC: 33.6 G/DL (ref 33.7–35.3)
MCV RBC AUTO: 90.4 FL (ref 81.4–97.8)
MICRO URNS: ABNORMAL
MICROALBUMIN UR-MCNC: 18.3 MG/DL
MICROALBUMIN/CREAT UR: 93 MG/G (ref 0–30)
NITRITE UR QL STRIP.AUTO: NEGATIVE
PH UR STRIP.AUTO: 6 [PH] (ref 5–8)
PLATELET # BLD AUTO: 162 K/UL (ref 164–446)
PMV BLD AUTO: 10.5 FL (ref 9–12.9)
POTASSIUM SERPL-SCNC: 4.7 MMOL/L (ref 3.6–5.5)
PROT UR QL STRIP: 30 MG/DL
PROT UR-MCNC: 39 MG/DL (ref 0–15)
PROT/CREAT UR: 200 MG/G (ref 15–68)
RBC # BLD AUTO: 4.71 M/UL (ref 4.7–6.1)
RBC # URNS HPF: ABNORMAL /HPF
RBC UR QL AUTO: NEGATIVE
SODIUM SERPL-SCNC: 135 MMOL/L (ref 135–145)
SP GR UR STRIP.AUTO: 1.02
TRIGL SERPL-MCNC: 72 MG/DL (ref 0–149)
UROBILINOGEN UR STRIP.AUTO-MCNC: 0.2 MG/DL
WBC # BLD AUTO: 7.3 K/UL (ref 4.8–10.8)
WBC #/AREA URNS HPF: ABNORMAL /HPF

## 2022-08-06 PROCEDURE — 85027 COMPLETE CBC AUTOMATED: CPT

## 2022-08-06 PROCEDURE — 84460 ALANINE AMINO (ALT) (SGPT): CPT

## 2022-08-06 PROCEDURE — 83036 HEMOGLOBIN GLYCOSYLATED A1C: CPT | Mod: GA

## 2022-08-06 PROCEDURE — 80048 BASIC METABOLIC PNL TOTAL CA: CPT

## 2022-08-06 PROCEDURE — 80061 LIPID PANEL: CPT

## 2022-08-06 PROCEDURE — 36415 COLL VENOUS BLD VENIPUNCTURE: CPT | Mod: GA

## 2022-08-06 PROCEDURE — 82306 VITAMIN D 25 HYDROXY: CPT

## 2022-08-06 PROCEDURE — 81001 URINALYSIS AUTO W/SCOPE: CPT

## 2022-08-06 PROCEDURE — 82043 UR ALBUMIN QUANTITATIVE: CPT

## 2022-08-06 PROCEDURE — 84156 ASSAY OF PROTEIN URINE: CPT

## 2022-08-06 PROCEDURE — 82570 ASSAY OF URINE CREATININE: CPT | Mod: 91

## 2022-08-06 PROCEDURE — 82570 ASSAY OF URINE CREATININE: CPT

## 2022-08-14 DIAGNOSIS — F41.0 PANIC DISORDER: ICD-10-CM

## 2022-08-14 DIAGNOSIS — K21.9 GASTROESOPHAGEAL REFLUX DISEASE WITHOUT ESOPHAGITIS: ICD-10-CM

## 2022-08-15 ENCOUNTER — OFFICE VISIT (OUTPATIENT)
Dept: MEDICAL GROUP | Facility: PHYSICIAN GROUP | Age: 72
End: 2022-08-15
Payer: MEDICARE

## 2022-08-15 VITALS
SYSTOLIC BLOOD PRESSURE: 122 MMHG | WEIGHT: 221.3 LBS | TEMPERATURE: 97.7 F | HEART RATE: 82 BPM | OXYGEN SATURATION: 98 % | HEIGHT: 67 IN | BODY MASS INDEX: 34.73 KG/M2 | RESPIRATION RATE: 16 BRPM | DIASTOLIC BLOOD PRESSURE: 70 MMHG

## 2022-08-15 DIAGNOSIS — E78.5 TYPE 2 DIABETES MELLITUS WITH HYPERLIPIDEMIA (HCC): ICD-10-CM

## 2022-08-15 DIAGNOSIS — E11.69 TYPE 2 DIABETES MELLITUS WITH HYPERLIPIDEMIA (HCC): ICD-10-CM

## 2022-08-15 DIAGNOSIS — Z13.6 ENCOUNTER FOR ABDOMINAL AORTIC ANEURYSM (AAA) SCREENING: ICD-10-CM

## 2022-08-15 DIAGNOSIS — E66.01 SEVERE OBESITY (HCC): ICD-10-CM

## 2022-08-15 DIAGNOSIS — N18.2 CKD STAGE 2 DUE TO TYPE 2 DIABETES MELLITUS (HCC): ICD-10-CM

## 2022-08-15 DIAGNOSIS — D69.6 THROMBOCYTOPENIA (HCC): ICD-10-CM

## 2022-08-15 DIAGNOSIS — E78.5 DYSLIPIDEMIA: ICD-10-CM

## 2022-08-15 DIAGNOSIS — J44.9 CHRONIC OBSTRUCTIVE PULMONARY DISEASE, UNSPECIFIED COPD TYPE (HCC): ICD-10-CM

## 2022-08-15 DIAGNOSIS — E11.22 CKD STAGE 2 DUE TO TYPE 2 DIABETES MELLITUS (HCC): ICD-10-CM

## 2022-08-15 PROCEDURE — 99214 OFFICE O/P EST MOD 30 MIN: CPT | Performed by: INTERNAL MEDICINE

## 2022-08-15 ASSESSMENT — FIBROSIS 4 INDEX: FIB4 SCORE: 1.360827634879543388

## 2022-08-15 NOTE — ASSESSMENT & PLAN NOTE
Chronic condition this is associated with proteinuria.  Patient is followed by nephrologist.  The patient currently taking lisinopril.

## 2022-08-15 NOTE — ASSESSMENT & PLAN NOTE
Chronic condition.  The patient is using albuterol as needed.  He denies any shortness of breath or wheezing.

## 2022-08-15 NOTE — ASSESSMENT & PLAN NOTE
Body mass index is 34.66 kg/m².   Brief discussion with the patient regarding diet, exercise, and lifestyle modification to help achieve and maintain healthy weight

## 2022-08-15 NOTE — PROGRESS NOTES
PRIMARY CARE CLINIC VISIT  Chief Complaint   Patient presents with    Lab Results         History of Present Illness     Type 2 diabetes mellitus with hyperlipidemia (HCC)  This is a chronic condition.  The patient currently not on any therapy.  Recent A1c 6.5%.    Chronic obstructive pulmonary disease (HCC)  Chronic condition.  The patient is using albuterol as needed.  He denies any shortness of breath or wheezing.    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)  Chronic condition this is associated with proteinuria.  Patient is followed by nephrologist.  The patient currently taking lisinopril.    Severe obesity ([facility])  Body mass index is 34.66 kg/m².   Brief discussion with the patient regarding diet, exercise, and lifestyle modification to help achieve and maintain healthy weight              Thrombocytopenia (HCC)  Previous lab test show platelets in the 150s to 160s.  Patient denies any history of bleeding.    Current Outpatient Medications on File Prior to Visit   Medication Sig Dispense Refill    atorvastatin (LIPITOR) 20 MG Tab TAKE 1 TABLET DAILY 90 Tablet 0    lisinopril (PRINIVIL) 2.5 MG Tab Take 1 Tablet by mouth every day. 90 Tablet 3    nystatin (MYCOSTATIN) 530246 UNIT/GM Cream topical cream Apply 1 g topically 2 times a day. 30 g 1    pentoxifylline CR (TRENTAL) 400 MG CR tablet Take 1 Tablet by mouth 3 times a day with meals. 90 Tablet 4    omeprazole (PRILOSEC) 40 MG delayed-release capsule Take 1 Capsule by mouth every day. 90 Capsule 1    DULoxetine (CYMBALTA) 60 MG Cap DR Particles delayed-release capsule Take 1 Capsule by mouth every day. 90 Capsule 1    budesonide-formoterol (SYMBICORT) 160-4.5 MCG/ACT Aerosol Inhale 2 Puffs 2 times a day. 2 Each 6    Sulfacetamide Sodium, Acne, 10 % Lotion Apply one daily 118 mL 6    Cholecalciferol (VITAMIN D) 125 MCG (5000 UT) Cap Take 5,000 Units by mouth every day.      albuterol 108 (90 Base) MCG/ACT Aero Soln inhalation aerosol Inhale 2 Puffs  "every 6 hours as needed for Shortness of Breath. 8.5 g 3     No current facility-administered medications on file prior to visit.        Allergies: Augmentin, Metformin, and Zyban [bupropion]    ROS  As per HPI above. All other systems reviewed and negative.      Past Medical, Social, and Family history reviewed and updated in EPIC     Objective     /70 (BP Location: Right arm, Patient Position: Sitting, BP Cuff Size: Adult long)   Pulse 82   Temp 36.5 °C (97.7 °F) (Temporal)   Resp 16   Ht 1.702 m (5' 7\")   Wt 100 kg (221 lb 4.8 oz)   SpO2 98%    Body mass index is 34.66 kg/m².    General: alert in no apparent distress.  Cardiovascular: regular rate and rhythm  Pulmonary: lungs : no wheezing   Gastrointestinal: BS present. No obvious mass noted  Monofilament testing with a 10 gram force: sensation intact: decreased bilaterally  Visual Inspection: Feet without maceration, ulcers, fissures.  Pedal pulses: decreased bilaterally         Assessment and Plan     1. Type 2 diabetes mellitus with hyperlipidemia (HCC)  - HEMOGLOBIN A1C; Future  - Basic Metabolic Panel; Future  A1c 6.5%.  Discussed with the patient not interested in taking any therapy for diabetes at this time.  Recommended diet and exercise.  2. Severe obesity ([facility])  Recommended healthy diet and exercise.  Patient will try to lose some weight.  3. Thrombocytopenia ([facility])  Platelets slightly low.  Patient denies any history of bleeding.  Continue to monitor.  4. Dyslipidemia  - Lipid Profile; Future  Continue with atorvastatin.  5. Encounter for abdominal aortic aneurysm (AAA) screening  Discussed with the patient declined aortic ultrasound  6. Chronic obstructive pulmonary disease, unspecified COPD type (HCC)  Chronic stable condition.  Continue current management Symbicort twice daily and albuterol as needed.  7. 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)      This is a chronic condition.  Continue follow-up with nephrology service.  " Continue with low protein diet.  Continue with lisinopril.                    Please note that this dictation was created using voice recognition software. I have made every reasonable attempt to correct obvious errors, but I expect that there are errors of grammar and possibly content that I did not discover before finalizing the note.    Aris Aldana MD  Internal Medicine  [facility]

## 2022-08-16 RX ORDER — DULOXETIN HYDROCHLORIDE 60 MG/1
CAPSULE, DELAYED RELEASE ORAL
Qty: 90 CAPSULE | Refills: 1 | Status: SHIPPED | OUTPATIENT
Start: 2022-08-16 | End: 2023-02-07

## 2022-08-16 RX ORDER — OMEPRAZOLE 40 MG/1
CAPSULE, DELAYED RELEASE ORAL
Qty: 90 CAPSULE | Refills: 1 | Status: SHIPPED | OUTPATIENT
Start: 2022-08-16 | End: 2023-02-07

## 2022-08-25 ENCOUNTER — OFFICE VISIT (OUTPATIENT)
Dept: DERMATOLOGY | Facility: IMAGING CENTER | Age: 72
End: 2022-08-25
Payer: MEDICARE

## 2022-08-25 DIAGNOSIS — L92.0 GRANULOMA ANNULARE: ICD-10-CM

## 2022-08-25 DIAGNOSIS — L82.0 SEBORRHEIC KERATOSES, INFLAMED: ICD-10-CM

## 2022-08-25 DIAGNOSIS — L29.9 ITCHING: ICD-10-CM

## 2022-08-25 DIAGNOSIS — L91.8 SKIN TAG: ICD-10-CM

## 2022-08-25 DIAGNOSIS — L30.9 ECZEMA, UNSPECIFIED TYPE: ICD-10-CM

## 2022-08-25 PROCEDURE — 99213 OFFICE O/P EST LOW 20 MIN: CPT | Mod: 25 | Performed by: DERMATOLOGY

## 2022-08-25 PROCEDURE — 11200 RMVL SKIN TAGS UP TO&INC 15: CPT | Mod: 59 | Performed by: DERMATOLOGY

## 2022-08-25 PROCEDURE — 17110 DESTRUCTION B9 LES UP TO 14: CPT | Performed by: DERMATOLOGY

## 2022-08-25 PROCEDURE — 11901 INJECT SKIN LESIONS >7: CPT | Mod: 59 | Performed by: DERMATOLOGY

## 2022-08-25 NOTE — PROGRESS NOTES
"CC: kenalog injection for GA      Subjective: Previously seen patient here for GA injections. Sites on left arm. Pt saw improvement with last tx. Desires additional trx.     Rash X few days -week, mostly on arms. Very red and itchy. Denies new products, no known changes. Inside reading. Treats with cortisone cream.    Skin tags, under arms, desires trx - LN2    Couple irritated sun spots on chest, desires trx.     From prior notes:  \"Initial hx.  Duration of rash: previously dx GA (2012, [place])  Symptoms: itching, only one site on ant right shin  Current treatment: Trental 400 mg  Prior rx: prednisone, hydroxychloroquine, minocycline & nicotinamide\"     ROS: no fevers/chills. ++ itch.  No cough  DermPMH: no skin cancer/melanoma  No problem-specific Assessment & Plan notes found for this encounter.    Relevant PMH:mult med comorbidities  Social: tobacco use; to go on cruise with wife [place] this fall    PE: Gen:WDWN male in NAD.  Skin: pink thin partial annular dermal plaques on arm-left.  Irritated appearing waxy papules on central chest.  Multiple skin colored tags on axilla bilaterally. erythematous ezcematous papules/plaques on upper arms>lower arms.  Chest/back appearing largely spared.     A/P: GA: chronic flaring, itching  -R/B/A reviewed prior to injections: cleaned >8 sites with EtOH prior to injection 0.1-0.3mg 10mg/ml Kenalog. Total 1.5 ml injected into 2 primary lesions - total 8+ injections  -prev reviewed cont trental: 400mg PO TID    ISK, chest:  -LN2 25 sec X 2 cycles X 2 lesions  -f/u PRN    Skin tags: approx 15 treated - 6 r axilla and 10 left axilla.  -reviewed dx/tx  -desires LN2 treatment today  -see note below re: cost discussion    Rash/eczema/arms:   -reviewed dx/tx  -lidex cream bID PRN, se reviewed  -moisturizer use reviewed  -f/u PRN    Itching:   -antihistamine OTCs reviewed    F/u 2 months    I have reviewed medications relevant to my specialty.      Is aware that if insurance " doesn't cover cost of Skin tag treatment, would be $150 for services today.

## 2022-10-13 PROBLEM — D50.9 IRON DEFICIENCY ANEMIA: Chronic | Status: ACTIVE | Noted: 2022-10-13

## 2022-10-13 PROBLEM — D50.9 IRON DEFICIENCY ANEMIA: Status: ACTIVE | Noted: 2022-10-13

## 2022-10-16 DIAGNOSIS — E78.5 DYSLIPIDEMIA: ICD-10-CM

## 2022-10-17 RX ORDER — ATORVASTATIN CALCIUM 20 MG/1
TABLET, FILM COATED ORAL
Qty: 90 TABLET | Refills: 0 | Status: SHIPPED | OUTPATIENT
Start: 2022-10-17 | End: 2022-11-15

## 2022-11-04 ENCOUNTER — PATIENT MESSAGE (OUTPATIENT)
Dept: HEALTH INFORMATION MANAGEMENT | Facility: OTHER | Age: 72
End: 2022-11-04

## 2022-11-14 DIAGNOSIS — E78.5 DYSLIPIDEMIA: ICD-10-CM

## 2022-11-15 ENCOUNTER — OFFICE VISIT (OUTPATIENT)
Dept: ENDOCRINOLOGY | Facility: MEDICAL CENTER | Age: 72
End: 2022-11-15
Attending: INTERNAL MEDICINE
Payer: MEDICARE

## 2022-11-15 VITALS
HEART RATE: 113 BPM | HEIGHT: 67 IN | DIASTOLIC BLOOD PRESSURE: 58 MMHG | WEIGHT: 217.3 LBS | SYSTOLIC BLOOD PRESSURE: 114 MMHG | BODY MASS INDEX: 34.11 KG/M2 | OXYGEN SATURATION: 99 %

## 2022-11-15 DIAGNOSIS — E55.9 VITAMIN D DEFICIENCY: ICD-10-CM

## 2022-11-15 DIAGNOSIS — E78.5 DYSLIPIDEMIA: ICD-10-CM

## 2022-11-15 DIAGNOSIS — D35.01 BILATERAL ADRENAL ADENOMAS: ICD-10-CM

## 2022-11-15 DIAGNOSIS — E11.9 CONTROLLED TYPE 2 DIABETES MELLITUS WITHOUT COMPLICATION, WITHOUT LONG-TERM CURRENT USE OF INSULIN (HCC): ICD-10-CM

## 2022-11-15 DIAGNOSIS — D35.02 BILATERAL ADRENAL ADENOMAS: ICD-10-CM

## 2022-11-15 DIAGNOSIS — Z79.84 LONG TERM (CURRENT) USE OF ORAL HYPOGLYCEMIC DRUGS: ICD-10-CM

## 2022-11-15 LAB
HBA1C MFR BLD: 6.2 % (ref 0–5.6)
INT CON NEG: ABNORMAL
INT CON POS: ABNORMAL

## 2022-11-15 PROCEDURE — 99211 OFF/OP EST MAY X REQ PHY/QHP: CPT | Performed by: INTERNAL MEDICINE

## 2022-11-15 PROCEDURE — 83036 HEMOGLOBIN GLYCOSYLATED A1C: CPT | Performed by: INTERNAL MEDICINE

## 2022-11-15 PROCEDURE — 99214 OFFICE O/P EST MOD 30 MIN: CPT | Performed by: INTERNAL MEDICINE

## 2022-11-15 RX ORDER — DEXAMETHASONE 1 MG
1 TABLET ORAL
Qty: 2 TABLET | Refills: 0 | Status: SHIPPED | OUTPATIENT
Start: 2022-11-15 | End: 2023-05-23

## 2022-11-15 RX ORDER — DAPAGLIFLOZIN 5 MG/1
5 TABLET, FILM COATED ORAL DAILY
Qty: 30 TABLET | Refills: 11 | Status: SHIPPED | OUTPATIENT
Start: 2022-11-15 | End: 2023-05-23

## 2022-11-15 RX ORDER — ATORVASTATIN CALCIUM 20 MG/1
TABLET, FILM COATED ORAL
Qty: 90 TABLET | Refills: 0 | Status: SHIPPED | OUTPATIENT
Start: 2022-11-15 | End: 2023-04-15

## 2022-11-15 ASSESSMENT — FIBROSIS 4 INDEX: FIB4 SCORE: 1.38

## 2022-11-15 NOTE — TELEPHONE ENCOUNTER
Received request via: Pharmacy    Was the patient seen in the last year in this department? Yes    Does the patient have an active prescription (recently filled or refills available) for medication(s) requested? No    Does the patient have FDC Plus and need 100 day supply (blood pressure, diabetes and cholesterol meds only)? Patient does not have SCP

## 2022-11-15 NOTE — PROGRESS NOTES
CHIEF COMPLAINT: Patient is here for follow up of Type 2 Diabetes Mellitus    HPI:     Micheal Brothers Jr. is a 72 y.o. male with Type 2 Diabetes Mellitus here for follow up.    Labs from 11/15/2022 HbA1c is 6.2%      He was previously seen by the nurse practitioner and this is my first time meeting him.  He has history of bilateral 1 cm adrenal adenomas with calcification seen on CT scan in April 2021.   He denies a personal history of malignancy      Baseline work-up by the nurse practitioner for hormonal hypersecretion was negative for Cushing's disease, pheochromocytoma and hyperaldosteronism.  All of his hormonal work-up for hypersecretion of August 2022 came back unremarkable.        Aside from the bilateral adrenal adenomas he has controlled type 2 diabetes with renal complications particularly persistent mild albuminuria.  He is being followed by Dr. Olson [facility].      He is not on OHAs for his diabetes      He does have diabetic kidney disease  He is on an ACE inhibitor      We talked about starting an SGLT2 inhibitor today      He does have hyperlipidemia and is on atorvastatin 20 mg daily  His LDL cholesterol was 90 on August 6, 2022      He had an eye exam on Ashley 15, 2022        BG Diary:  Not required     Weight has been stable    Diabetes Complications   Retinopathy: No known retinopathy.  Last eye exam: 6/15/2022  Neuropathy: Denies paresthesias or numbness in hands or feet. Denies any foot wounds.  Exercise: Minimal.  Diet: Fair.  Patient's medications, allergies, and social histories were reviewed and updated as appropriate.    ROS:     CONS:     No fever, no chills   EYES:     No diplopia, no blurry vision   CV:           No chest pain, no palpitations   PULM:     No SOB, no cough, no hemoptysis.   GI:            No nausea, no vomiting, no diarrhea, no constipation   ENDO:     No polyuria, no polydipsia, no heat intolerance, no cold intolerance       Past Medical History:  Problem  "List:  2022-10: Iron deficiency anemia  2022-08: Severe obesity ([facility])  2022-08: CKD stage 2 due to type 2 diabetes mellitus ([facility])  2021-11: Thomas's esophagus  2021-04: Calcification of gallbladder  2021-04: Bilateral adrenal adenomas  2021-04: Abnormal finding on GI tract imaging  2021-04: Bilateral inguinal hernia without obstruction or gangrene  2021-04: Pelvic pain  2021-04: Umbilical hernia  2021-01: Thrombocytopenia ([facility])  2021-01: Proteinuria  2021-01: Colonic polyp  2019-05: Chronic obstructive pulmonary disease ([facility])  2019-04: Type 2 diabetes mellitus with hyperlipidemia ([facility])  2018-08: Obesity (BMI 35.0-39.9 without comorbidity) ([facility])  2018-06: Anxiety and depression  2016-08: NAHUM on CPAP  2014-11: Obesity (BMI 30-39.9)  2014-11: Dyslipidemia  2014-11: GERD (gastroesophageal reflux disease)  2014-11: Depression  2014-11: Tobacco use  2014-11: Right wrist pain      Past Surgical History:  Past Surgical History:   Procedure Laterality Date    APPENDECTOMY      ARTHROSCOPY, KNEE      CARPAL TUNNEL ENDOSCOPIC      bilateral    CARPAL TUNNEL RELEASE      SHOULDER DECOMPRESSION ARTHROSCOPIC      right    TONSILLECTOMY          Allergies:  Augmentin, Metformin, and Zyban [bupropion]     Social History:  Social History     Tobacco Use    Smoking status: Every Day     Packs/day: 1.00     Years: 53.00     Pack years: 53.00     Types: Cigarettes    Smokeless tobacco: Never    Tobacco comments:     started smoking at age 15   Vaping Use    Vaping Use: Never used   Substance Use Topics    Alcohol use: Not Currently     Alcohol/week: 0.0 oz    Drug use: No        Family History:   family history includes Cancer in his father and mother; Stroke in his father.      PHYSICAL EXAM:   OBJECTIVE:  Vital signs: /58 (BP Location: Left arm)   Pulse (!) 113   Ht 1.702 m (5' 7\")   Wt 98.6 kg (217 lb 4.8 oz)   SpO2 99%   BMI 34.03 kg/m²   GENERAL: Well-developed, well-nourished in no apparent distress.   EYE:  No ocular " asymmetry, PERRLA  HENT: Pink, moist mucous membranes.    NECK: No thyromegaly.   CARDIOVASCULAR:  No murmurs  LUNGS: Clear breath sounds  ABDOMEN: Soft, nontender   EXTREMITIES: No clubbing, cyanosis, or edema.   NEUROLOGICAL: No gross focal motor abnormalities   LYMPH: No cervical adenopathy palpated.   SKIN: No rashes, lesions.     Labs:  Lab Results   Component Value Date/Time    HBA1C 6.2 (A) 11/15/2022 11:15 AM        Lab Results   Component Value Date/Time    WBC 7.3 2022 08:11 AM    RBC 4.71 2022 08:11 AM    HEMOGLOBIN 14.3 2022 08:11 AM    MCV 90.4 2022 08:11 AM    MCH 30.4 2022 08:11 AM    MCHC 33.6 (L) 2022 08:11 AM    RDW 49.5 2022 08:11 AM    MPV 10.5 2022 08:11 AM       Lab Results   Component Value Date/Time    SODIUM 135 2022 08:13 AM    POTASSIUM 4.7 2022 08:13 AM    CHLORIDE 100 2022 08:13 AM    CO2 23 2022 08:13 AM    ANION 12.0 2022 08:13 AM    GLUCOSE 113 (H) 2022 08:13 AM    BUN 18 2022 08:13 AM    CREATININE 1.19 2022 08:13 AM    CALCIUM 9.6 2022 08:13 AM    ASTSGOT 15 2022 09:16 AM    ALTSGPT 24 2022 08:13 AM    TBILIRUBIN 0.4 2022 09:16 AM    ALBUMIN 4.5 2022 09:16 AM    TOTPROTEIN 7.5 2022 09:16 AM    GLOBULIN 3.0 2022 09:16 AM    AGRATIO 1.5 2022 09:16 AM       Lab Results   Component Value Date/Time    CHOLSTRLTOT 144 2022 0813    TRIGLYCERIDE 72 2022 0813    HDL 40 2022 0813    LDL 90 2022 0813       Lab Results   Component Value Date/Time    MALBCRT 93 (H) 2022 08:06 AM    MICROALBUR 18.3 2022 08:06 AM        Lab Results   Component Value Date/Time    TSHULTRASEN 1.910 2022 0918     No results found for: FREEDIR  No results found for: FREET3  No results found for: THYSTIMIG    IMAGIN2021 9:21 AM     HISTORY/REASON FOR EXAM:  Lower abdominal and pelvic pain        TECHNIQUE/EXAM DESCRIPTION:  CT  abdomen and pelvis with contrast, enterography protocol.     Following oral administration of VoLumen oral contrast and water as well as intravenous administration of 100 mL of Omnipaque 350 nonionic contrast, thin section helical CT is performed from the level of the diaphragm through the ischial tuberosities.   Axial, coronal, and sagittal images are sent to PACS.     Low dose optimization technique was utilized for this CT exam including automated exposure control and adjustment of the mA and/or kV according to patient size.     COMPARISON:  None.     FINDINGS:  Visualized lung bases are clear.     Abdomen: Calcifications are noted in the spleen. No focal mass is identified in the liver. Liver is smoothly marginated. Small calcification is noted at the inferior edge of the gallbladder. 1 cm nodule is identified within each adrenal gland. The   kidneys enhance symmetrically. Renal cysts are noted bilaterally. No follow-up recommended. There is no adenopathy or free fluid. Umbilical hernia contains abdominal fat.     Pelvis: There is no free fluid or lymphadenopathy.     Bowel: There is wall thickening versus normal contraction between the first and second portion of the duodenum. There is extensive diverticulosis. Wall thickening in the proximal half of the sigmoid colon is identified. No other stricture or filling   defect or mass is identified within the bowel.        1.  Possible normal contraction versus abnormal wall thickening possibly caused by tumor located between the first and second portions of the duodenum.     2.  There is diverticulosis.     3.  Wall thickening in the sigmoid colon is noted which could be caused by normal contraction or spasm versus neoplasm.     4.  Otherwise no mass wall thickening or filling defect noted in the small bowel.     5.  Calcification noted in the gallbladder.     6.  Umbilical hernia contains abdominal fat.     7.  Inguinal hernias bilaterally containing abdominal  fat.     8.  1 cm nodule is identified within each adrenal gland. These likely represent adrenal adenomas.  Signed by Pelon Lyons M.D. on 4/22/2021 12:02 PM  Narrative & Impression     4/22/2021 9:21 AM     HISTORY/REASON FOR EXAM:  Lower abdominal and pelvic pain        TECHNIQUE/EXAM DESCRIPTION:  CT abdomen and pelvis with contrast, enterography protocol.     Following oral administration of VoLumen oral contrast and water as well as intravenous administration of 100 mL of Omnipaque 350 nonionic contrast, thin section helical CT is performed from the level of the diaphragm through the ischial tuberosities.   Axial, coronal, and sagittal images are sent to PACS.     Low dose optimization technique was utilized for this CT exam including automated exposure control and adjustment of the mA and/or kV according to patient size.     COMPARISON:  None.     FINDINGS:  Visualized lung bases are clear.     Abdomen: Calcifications are noted in the spleen. No focal mass is identified in the liver. Liver is smoothly marginated. Small calcification is noted at the inferior edge of the gallbladder. 1 cm nodule is identified within each adrenal gland. The   kidneys enhance symmetrically. Renal cysts are noted bilaterally. No follow-up recommended. There is no adenopathy or free fluid. Umbilical hernia contains abdominal fat.     Pelvis: There is no free fluid or lymphadenopathy.     Bowel: There is wall thickening versus normal contraction between the first and second portion of the duodenum. There is extensive diverticulosis. Wall thickening in the proximal half of the sigmoid colon is identified. No other stricture or filling   defect or mass is identified within the bowel.     IMPRESSION:     1.  Possible normal contraction versus abnormal wall thickening possibly caused by tumor located between the first and second portions of the duodenum.     2.  There is diverticulosis.     3.  Wall thickening in the sigmoid colon is  noted which could be caused by normal contraction or spasm versus neoplasm.     4.  Otherwise no mass wall thickening or filling defect noted in the small bowel.     5.  Calcification noted in the gallbladder.     6.  Umbilical hernia contains abdominal fat.     7.  Inguinal hernias bilaterally containing abdominal fat.        ASSESSMENT/PLAN:     1. Controlled type 2 diabetes mellitus without complication, without long-term current use of insulin (HCC)  Controlled  I want him to start Farxiga 5 mg daily for his diabetes for renal protection  Reviewed the side effects and benefits of this medication  Discussed the importance of adequate hydration  He is up-to-date with his labs and eye exam  I want him to follow-up in 6 months with complete labs    2. Bilateral adrenal adenomas  Stable  I am scheduling him for repeat adrenal CT for surveillance of his bilateral adrenal nodules  I am also repeating his hormonal work-up for hypersecretion  This includes a dexamethasone suppression test, morning aldosterone renin and plasma fractionated metanephrines    3. Dyslipidemia  Stable  Continue atorvastatin    4. Vitamin D deficiency  Stable  Continue monitoring    5. Long term (current) use of oral hypoglycemic drugs  Patient is on multiple oral agents for type 2 diabetes management      Return in about 6 months (around 5/15/2023).      Thank you kindly for allowing me to participate in the diabetes care plan for this patient.    Tj Monsalve MD, [facility], [facility], [facility]  11/15/22    CC:   Aris Aldana M.D.

## 2022-11-16 ENCOUNTER — HOSPITAL ENCOUNTER (OUTPATIENT)
Dept: LAB | Facility: MEDICAL CENTER | Age: 72
End: 2022-11-16
Attending: INTERNAL MEDICINE
Payer: MEDICARE

## 2022-11-16 DIAGNOSIS — D35.01 BILATERAL ADRENAL ADENOMAS: ICD-10-CM

## 2022-11-16 DIAGNOSIS — D35.02 BILATERAL ADRENAL ADENOMAS: ICD-10-CM

## 2022-11-16 LAB
ALBUMIN SERPL BCP-MCNC: 4.6 G/DL (ref 3.2–4.9)
ALBUMIN/GLOB SERPL: 1.5 G/DL
ALP SERPL-CCNC: 79 U/L (ref 30–99)
ALT SERPL-CCNC: 32 U/L (ref 2–50)
ANION GAP SERPL CALC-SCNC: 12 MMOL/L (ref 7–16)
AST SERPL-CCNC: 21 U/L (ref 12–45)
BILIRUB SERPL-MCNC: 0.4 MG/DL (ref 0.1–1.5)
BUN SERPL-MCNC: 22 MG/DL (ref 8–22)
CALCIUM SERPL-MCNC: 10.1 MG/DL (ref 8.5–10.5)
CHLORIDE SERPL-SCNC: 100 MMOL/L (ref 96–112)
CO2 SERPL-SCNC: 22 MMOL/L (ref 20–33)
CORTIS SERPL-MCNC: 0.9 UG/DL (ref 0–23)
CREAT SERPL-MCNC: 1.14 MG/DL (ref 0.5–1.4)
GFR SERPLBLD CREATININE-BSD FMLA CKD-EPI: 68 ML/MIN/1.73 M 2
GLOBULIN SER CALC-MCNC: 3.1 G/DL (ref 1.9–3.5)
GLUCOSE SERPL-MCNC: 115 MG/DL (ref 65–99)
POTASSIUM SERPL-SCNC: 5 MMOL/L (ref 3.6–5.5)
PROT SERPL-MCNC: 7.7 G/DL (ref 6–8.2)
SODIUM SERPL-SCNC: 134 MMOL/L (ref 135–145)

## 2022-11-16 PROCEDURE — 83835 ASSAY OF METANEPHRINES: CPT

## 2022-11-16 PROCEDURE — 80053 COMPREHEN METABOLIC PANEL: CPT

## 2022-11-16 PROCEDURE — 82088 ASSAY OF ALDOSTERONE: CPT

## 2022-11-16 PROCEDURE — 84244 ASSAY OF RENIN: CPT

## 2022-11-16 PROCEDURE — 80299 QUANTITATIVE ASSAY DRUG: CPT

## 2022-11-16 PROCEDURE — 82533 TOTAL CORTISOL: CPT

## 2022-11-16 PROCEDURE — 36415 COLL VENOUS BLD VENIPUNCTURE: CPT

## 2022-11-18 LAB — ALDOST SERPL-MCNC: 8.3 NG/DL

## 2022-11-19 LAB
METANEPHS SERPL-SCNC: 0.24 NMOL/L (ref 0–0.49)
NORMETANEPHRINE SERPL-SCNC: 0.66 NMOL/L (ref 0–0.89)

## 2022-11-20 LAB — RENIN PLAS-CCNC: 6.9 NG/ML/HR

## 2022-11-21 LAB — TEST NAME 95000: NORMAL

## 2022-11-28 ENCOUNTER — HOSPITAL ENCOUNTER (OUTPATIENT)
Dept: RADIOLOGY | Facility: MEDICAL CENTER | Age: 72
End: 2022-11-28
Attending: INTERNAL MEDICINE
Payer: MEDICARE

## 2022-11-28 DIAGNOSIS — D35.02 BILATERAL ADRENAL ADENOMAS: ICD-10-CM

## 2022-11-28 DIAGNOSIS — D35.01 BILATERAL ADRENAL ADENOMAS: ICD-10-CM

## 2022-11-28 PROCEDURE — 700117 HCHG RX CONTRAST REV CODE 255: Performed by: INTERNAL MEDICINE

## 2022-11-28 PROCEDURE — 74170 CT ABD WO CNTRST FLWD CNTRST: CPT

## 2022-11-28 RX ADMIN — IOHEXOL 100 ML: 350 INJECTION, SOLUTION INTRAVENOUS at 14:49

## 2022-12-09 ENCOUNTER — OFFICE VISIT (OUTPATIENT)
Dept: DERMATOLOGY | Facility: IMAGING CENTER | Age: 72
End: 2022-12-09
Payer: MEDICARE

## 2022-12-09 DIAGNOSIS — D22.9 NEVUS: ICD-10-CM

## 2022-12-09 DIAGNOSIS — L81.4 LENTIGO: ICD-10-CM

## 2022-12-09 DIAGNOSIS — L57.0 ACTINIC KERATOSIS: ICD-10-CM

## 2022-12-09 DIAGNOSIS — L82.1 SEBORRHEIC KERATOSIS: ICD-10-CM

## 2022-12-09 DIAGNOSIS — L92.0 GRANULOMA ANNULARE: ICD-10-CM

## 2022-12-09 PROCEDURE — 17000 DESTRUCT PREMALG LESION: CPT | Performed by: DERMATOLOGY

## 2022-12-09 PROCEDURE — 99213 OFFICE O/P EST LOW 20 MIN: CPT | Mod: 25 | Performed by: DERMATOLOGY

## 2022-12-09 NOTE — PROGRESS NOTES
"CC: skin exam upper body    Subjective: Previously seen patient here for recurrent GA injections.   Pt  would like an Upper body exam. Wife had recent dx of melanoma and desires eval.       From prior notes:  \"Initial hx.  Duration of rash: previously dx GA (2012, [place], [place])  Symptoms: itching, only one site on ant right shin  Current treatment: Trental 400 mg  Prior rx: prednisone, hydroxychloroquine, minocycline & nicotinamide\"     ROS: no fevers/chills. + itch.  No cough  Relevant PMH:mult med comorbidities  Social: tobacco use; cruise with wife [place] this fall    PE: Gen:WDWN male in NAD.  Skin: upper body exam. Thin HK papule on left facial cheek. Scattered waxy papules on head, torso. Few healing abrasions on arms.  Annular pink dermal plaques on arm-left.      A/P: GA: quiescent today  -prev reviewed cont trental: 400mg PO TID  -rtc 2 months, injections PRN    AKs: face  -counseled on diagnosis and treatment, including risks/benefits/alternatives of cyrotherapy  -LN2 25 sec X 2 cycles X 1lesions  -f/u if persists at 1 month    Nevus/Lentigos/SK:  -b/r    F/u 2 months    I have reviewed medications relevant to my specialty.  "

## 2023-01-03 ENCOUNTER — TELEPHONE (OUTPATIENT)
Dept: HEALTH INFORMATION MANAGEMENT | Facility: OTHER | Age: 73
End: 2023-01-03

## 2023-01-04 DIAGNOSIS — J44.9 CHRONIC OBSTRUCTIVE PULMONARY DISEASE, UNSPECIFIED COPD TYPE (HCC): ICD-10-CM

## 2023-01-16 ENCOUNTER — OFFICE VISIT (OUTPATIENT)
Dept: SLEEP MEDICINE | Facility: MEDICAL CENTER | Age: 73
End: 2023-01-16
Payer: MEDICARE

## 2023-01-16 VITALS
HEIGHT: 67 IN | HEART RATE: 100 BPM | WEIGHT: 218 LBS | DIASTOLIC BLOOD PRESSURE: 70 MMHG | RESPIRATION RATE: 14 BRPM | OXYGEN SATURATION: 98 % | BODY MASS INDEX: 34.21 KG/M2 | SYSTOLIC BLOOD PRESSURE: 136 MMHG

## 2023-01-16 DIAGNOSIS — G47.33 OSA ON CPAP: Primary | ICD-10-CM

## 2023-01-16 DIAGNOSIS — R09.81 NASAL CONGESTION: ICD-10-CM

## 2023-01-16 PROCEDURE — 99213 OFFICE O/P EST LOW 20 MIN: CPT | Performed by: STUDENT IN AN ORGANIZED HEALTH CARE EDUCATION/TRAINING PROGRAM

## 2023-01-16 ASSESSMENT — FIBROSIS 4 INDEX: FIB4 SCORE: 1.67

## 2023-01-16 ASSESSMENT — PATIENT HEALTH QUESTIONNAIRE - PHQ9: CLINICAL INTERPRETATION OF PHQ2 SCORE: 0

## 2023-01-16 NOTE — PROGRESS NOTES
[facility] Follow-up Visit    CC: Yearly follow-up regarding management of obstructive sleep apnea      HPI:  Micheal Brothers Jr. is a 72 y.o.male  with dyslipidemia, depression with anxiety, dyslipidemia, obesity, COPD and obstructive sleep apnea on CPAP.  Presents today to follow-up regarding management of obstructive sleep apnea.    He continues to use his CPAP regularly.  Overall he finds the mask and pressures comfortable.    He does report going on a cruise in October with his wife.  While on the cruise they appeared to have tract a illness such as an upper respiratory infection.  He states that his symptoms been difficult to clear since that time.  He has noticed that his nasal congestion is worse at night with using his CPAP.  He is unsure as to the direct cause.  He has discussed this with his PCP.  He has been using Mucinex and Mucinex D at times.  However states in the last week he has not used Mucinex.  He has not tried fluticasone nasal spray.  He is planning to see his PCP in a month and he does have pulmonary function testing scheduled.    He reports in terms of his CPAP he has no other concerns or complaints.    DME provider: CPAP and more  Device: Airsense 11  Mask: N20   Aerophagia: No   Snoring: No   Dry mouth: No   Leak: No   Skin irritation: No   Chin strap: No        Sleep History  PSG in 2008 showed severe sleep apnea with AHI of 85 events per hour.  Following diagnosis he was placed on CPAP 12 cmH2O    Patient Active Problem List    Diagnosis Date Noted    Long term (current) use of oral hypoglycemic drugs 11/15/2022    Iron deficiency anemia 10/13/2022    Severe obesity (HCC) 08/15/2022    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) 08/06/2022    Thomas's esophagus 11/19/2021    Calcification of gallbladder 04/28/2021    Bilateral adrenal adenomas 04/22/2021    Abnormal finding on GI tract imaging 04/22/2021    Bilateral inguinal hernia without obstruction or  gangrene 04/22/2021    Pelvic pain 04/13/2021    Umbilical hernia 04/13/2021    Thrombocytopenia (HCC) 01/26/2021    Proteinuria 01/22/2021    Colonic polyp 01/20/2021    Chronic obstructive pulmonary disease (HCC) 05/01/2019    Type 2 diabetes mellitus with hyperlipidemia ([facility]) 04/01/2019    Anxiety and depression 06/18/2018    NAHUM on CPAP 08/26/2016    Obesity (BMI 30-39.9) 11/26/2014    Dyslipidemia 11/26/2014    GERD (gastroesophageal reflux disease) 11/26/2014    Tobacco use 11/26/2014       Past Medical History:   Diagnosis Date    Back pain     Chickenpox     Depression     GERD (gastroesophageal reflux disease)     Mal[place] measles     Gout     Hyperlipidemia     Influenza     Panic disorder     Sleep apnea     Tobacco use 11/26/2014        Past Surgical History:   Procedure Laterality Date    APPENDECTOMY      ARTHROSCOPY, KNEE      CARPAL TUNNEL ENDOSCOPIC      bilateral    CARPAL TUNNEL RELEASE      SHOULDER DECOMPRESSION ARTHROSCOPIC      right    TONSILLECTOMY         Family History   Problem Relation Age of Onset    Cancer Mother     Cancer Father     Stroke Father     Diabetes Neg Hx     Heart Disease Neg Hx     Hypertension Neg Hx        Social History     Socioeconomic History    Marital status:      Spouse name: Not on file    Number of children: Not on file    Years of education: Not on file    Highest education level: Not on file   Occupational History    Not on file   Tobacco Use    Smoking status: Every Day     Packs/day: 1.00     Years: 53.00     Pack years: 53.00     Types: Cigarettes    Smokeless tobacco: Never    Tobacco comments:     started smoking at age 15   Vaping Use    Vaping Use: Never used   Substance and Sexual Activity    Alcohol use: Not Currently     Alcohol/week: 0.0 oz    Drug use: No    Sexual activity: Yes     Partners: Female   Other Topics Concern    Not on file   Social History Narrative    Not on file     Social Determinants of Health     Financial Resource Strain:  Not on file   Food Insecurity: Not on file   Transportation Needs: Not on file   Physical Activity: Not on file   Stress: Not on file   Social Connections: Not on file   Intimate Partner Violence: Not on file   Housing Stability: Not on file       Current Outpatient Medications   Medication Sig Dispense Refill    atorvastatin (LIPITOR) 20 MG Tab TAKE 1 TABLET DAILY 90 Tablet 0    dapagliflozin propanediol (FARXIGA) 5 MG Tab Take 1 Tablet by mouth every day. 30 Tablet 11    dexamethasone (DECADRON) 1 MG Tab Take 1 Tablet by mouth 1 time a day as needed (for midnight suppression test). 2 Tablet 0    fluocinonide (LIDEX) 0.05 % Cream AAA arms, body BID PRN rash, itching.  Do not use on face, axilla, groin. 120 g 1    omeprazole (PRILOSEC) 40 MG delayed-release capsule TAKE 1 CAPSULE DAILY 90 Capsule 1    DULoxetine (CYMBALTA) 60 MG Cap DR Particles delayed-release capsule TAKE 1 CAPSULE DAILY 90 Capsule 1    lisinopril (PRINIVIL) 2.5 MG Tab Take 1 Tablet by mouth every day. 90 Tablet 3    nystatin (MYCOSTATIN) 704647 UNIT/GM Cream topical cream Apply 1 g topically 2 times a day. 30 g 1    pentoxifylline CR (TRENTAL) 400 MG CR tablet Take 1 Tablet by mouth 3 times a day with meals. 90 Tablet 4    budesonide-formoterol (SYMBICORT) 160-4.5 MCG/ACT Aerosol Inhale 2 Puffs 2 times a day. 2 Each 6    Sulfacetamide Sodium, Acne, 10 % Lotion Apply one daily 118 mL 6    Cholecalciferol (VITAMIN D) 125 MCG (5000 UT) Cap Take 5,000 Units by mouth every day.      albuterol 108 (90 Base) MCG/ACT Aero Soln inhalation aerosol Inhale 2 Puffs every 6 hours as needed for Shortness of Breath. 8.5 g 3     No current facility-administered medications for this visit.        ALLERGIES: Augmentin, Metformin, and Zyban [bupropion]    ROS  Constitutional: Denies fevers, Denies weight changes  Ears/Nose/Throat/Mouth: Denies nasal congestion or sore throat   Cardiovascular: Denies chest pain  Respiratory: Denies shortness of breath, Denies  "cough  Gastrointestinal/Hepatic: Denies nausea, vomiting  Sleep: see HPI      PHYSICAL EXAM  /70 (BP Location: Left arm, Patient Position: Sitting, BP Cuff Size: Large adult)   Pulse 100   Resp 14   Ht 1.702 m (5' 7\")   Wt 98.9 kg (218 lb)   SpO2 98%   BMI 34.14 kg/m²   Appearance: Well-nourished, well-developed, no acute distress  Eyes:  No scleral icterus , EOMI  ENMT: masked  Musculoskeletal:  Grossly normal; gait and station normal; digits and nails normal  Skin:  No rashes, petechiae, cyanosis  Neurologic: without focal signs; oriented to person, time, place, and purpose; judgement intact      Medical Decision Making   Assessment and Plan  Micheal Brothers Jr. is a 72 y.o.male  with dyslipidemia, depression with anxiety, dyslipidemia, obesity, COPD and obstructive sleep apnea on CPAP.  Presents today to follow-up regarding management of obstructive sleep apnea.    The medical record was reviewed.    Obstructive sleep apnea  Compliance data reviewed showing 100% usage > 4hours in last 30  days. Average AHI 2.7 events/hour. 90-95% pressure 14.7 CWP. Pt continues to use and benefit from machine.      Auto CPAP 10-15    PLAN:   -Order placed for mask and supplies   -Advised to reach out via MyChart with questions     Has been advised to continue the current CPAP, clean equipment frequently, and get new mask and supplies as allowed by insurance and DME. Recommend an earlier appointment, if significant treatment barriers develop.    Patients with NAHUM are at increased risk of cardiovascular disease including coronary artery disease, systemic arterial hypertension, pulmonary arterial hypertension, cardiac arrythmias, and stroke. The patient was advised to avoid driving a motor vehicle when drowsy.    Positive airway pressure will favorably impact many of the adverse conditions and effects provoked by NAHUM.    Nasal congestion  Congestion has been lingering since upper respiratory illness in October " 2022.  He has been using Mucinex with some benefit however he does not want to be on this long-term.  Discussed at this point likely illness may be prolonged irritation such as allergic rhinitis.  Recommended he consider fluticasone nasal spray which he is open to.  Advised that he may start fluticasone nasal spray.  This would be 1 spray in each nostril.  Recommended he try this for 2 to 3 weeks and see if this helps his nasal congestion at night.  Recommended he follow-up with his PCP as scheduled appointment in February.    Plan  -Start fluticasone nasal spray 1 spray in each nostril daily      Have advised the patient to follow up with the appropriate healthcare practitioners for all other medical problems and issues.    Return in about 1 year (around 1/16/2024).      Please note portions of this record was created using voice recognition software. I have made every reasonable attempt to correct obvious errors, but I expect that there are errors of grammar and possibly content I did not discover before finalizing the note.

## 2023-01-31 ENCOUNTER — HOSPITAL ENCOUNTER (OUTPATIENT)
Dept: LAB | Facility: MEDICAL CENTER | Age: 73
End: 2023-01-31
Attending: INTERNAL MEDICINE
Payer: MEDICARE

## 2023-01-31 DIAGNOSIS — D69.6 THROMBOCYTOPENIA (HCC): ICD-10-CM

## 2023-01-31 DIAGNOSIS — E78.5 DYSLIPIDEMIA: ICD-10-CM

## 2023-01-31 DIAGNOSIS — E11.69 TYPE 2 DIABETES MELLITUS WITH HYPERLIPIDEMIA (HCC): ICD-10-CM

## 2023-01-31 DIAGNOSIS — E78.5 TYPE 2 DIABETES MELLITUS WITH HYPERLIPIDEMIA (HCC): ICD-10-CM

## 2023-01-31 LAB
ANION GAP SERPL CALC-SCNC: 12 MMOL/L (ref 7–16)
BASOPHILS # BLD AUTO: 1.1 % (ref 0–1.8)
BASOPHILS # BLD: 0.09 K/UL (ref 0–0.12)
BUN SERPL-MCNC: 16 MG/DL (ref 8–22)
CALCIUM SERPL-MCNC: 9.7 MG/DL (ref 8.5–10.5)
CHLORIDE SERPL-SCNC: 102 MMOL/L (ref 96–112)
CHOLEST SERPL-MCNC: 138 MG/DL (ref 100–199)
CO2 SERPL-SCNC: 23 MMOL/L (ref 20–33)
CREAT SERPL-MCNC: 1.24 MG/DL (ref 0.5–1.4)
EOSINOPHIL # BLD AUTO: 0.15 K/UL (ref 0–0.51)
EOSINOPHIL NFR BLD: 1.8 % (ref 0–6.9)
ERYTHROCYTE [DISTWIDTH] IN BLOOD BY AUTOMATED COUNT: 47.3 FL (ref 35.9–50)
EST. AVERAGE GLUCOSE BLD GHB EST-MCNC: 137 MG/DL
FASTING STATUS PATIENT QL REPORTED: NORMAL
GFR SERPLBLD CREATININE-BSD FMLA CKD-EPI: 61 ML/MIN/1.73 M 2
GLUCOSE SERPL-MCNC: 111 MG/DL (ref 65–99)
HBA1C MFR BLD: 6.4 % (ref 4–5.6)
HCT VFR BLD AUTO: 46 % (ref 42–52)
HDLC SERPL-MCNC: 36 MG/DL
HGB BLD-MCNC: 15 G/DL (ref 14–18)
IMM GRANULOCYTES # BLD AUTO: 0.07 K/UL (ref 0–0.11)
IMM GRANULOCYTES NFR BLD AUTO: 0.8 % (ref 0–0.9)
LDLC SERPL CALC-MCNC: 88 MG/DL
LYMPHOCYTES # BLD AUTO: 2.45 K/UL (ref 1–4.8)
LYMPHOCYTES NFR BLD: 28.9 % (ref 22–41)
MCH RBC QN AUTO: 30.9 PG (ref 27–33)
MCHC RBC AUTO-ENTMCNC: 32.6 G/DL (ref 33.7–35.3)
MCV RBC AUTO: 94.8 FL (ref 81.4–97.8)
MONOCYTES # BLD AUTO: 0.59 K/UL (ref 0–0.85)
MONOCYTES NFR BLD AUTO: 7 % (ref 0–13.4)
NEUTROPHILS # BLD AUTO: 5.13 K/UL (ref 1.82–7.42)
NEUTROPHILS NFR BLD: 60.4 % (ref 44–72)
NRBC # BLD AUTO: 0.04 K/UL
NRBC BLD-RTO: 0.5 /100 WBC
PLATELET # BLD AUTO: 277 K/UL (ref 164–446)
PMV BLD AUTO: 10.9 FL (ref 9–12.9)
POTASSIUM SERPL-SCNC: 4.7 MMOL/L (ref 3.6–5.5)
RBC # BLD AUTO: 4.85 M/UL (ref 4.7–6.1)
SODIUM SERPL-SCNC: 137 MMOL/L (ref 135–145)
TRIGL SERPL-MCNC: 70 MG/DL (ref 0–149)
WBC # BLD AUTO: 8.5 K/UL (ref 4.8–10.8)

## 2023-01-31 PROCEDURE — 83036 HEMOGLOBIN GLYCOSYLATED A1C: CPT | Mod: GA

## 2023-01-31 PROCEDURE — 85025 COMPLETE CBC W/AUTO DIFF WBC: CPT

## 2023-01-31 PROCEDURE — 80061 LIPID PANEL: CPT

## 2023-01-31 PROCEDURE — 80048 BASIC METABOLIC PNL TOTAL CA: CPT

## 2023-01-31 PROCEDURE — 36415 COLL VENOUS BLD VENIPUNCTURE: CPT

## 2023-02-01 ENCOUNTER — HOSPITAL ENCOUNTER (OUTPATIENT)
Dept: PULMONOLOGY | Facility: MEDICAL CENTER | Age: 73
End: 2023-02-01
Attending: INTERNAL MEDICINE
Payer: MEDICARE

## 2023-02-01 PROCEDURE — 94726 PLETHYSMOGRAPHY LUNG VOLUMES: CPT | Mod: MISDOCU | Performed by: INTERNAL MEDICINE

## 2023-02-01 PROCEDURE — 94726 PLETHYSMOGRAPHY LUNG VOLUMES: CPT

## 2023-02-01 PROCEDURE — 94060 EVALUATION OF WHEEZING: CPT

## 2023-02-01 PROCEDURE — 94060 EVALUATION OF WHEEZING: CPT | Mod: MISDOCU | Performed by: INTERNAL MEDICINE

## 2023-02-01 RX ADMIN — Medication 2.5 MG: at 13:24

## 2023-02-01 ASSESSMENT — PULMONARY FUNCTION TESTS
FEV1/FVC_PERCENT_PREDICTED: 84
FEV1/FVC: 64
FEV1: 2.12
FEV1_LLN: 2.36
FEV1_PERCENT_CHANGE: 5
FEV1/FVC_PERCENT_LLN: 64
FEV1_PREDICTED: 2.82
FEV1/FVC_PERCENT_CHANGE: 0
FVC_PREDICTED: 3.72
FVC_PERCENT_PREDICTED: 94
FEV1/FVC_PERCENT_LLN: 64
FEV1/FVC_PERCENT_PREDICTED: 76
FVC: 3.52
FEV1_PERCENT_CHANGE: 6
FEV1/FVC: 63
FEV1_PERCENT_PREDICTED: 79
FEV1/FVC_PERCENT_PREDICTED: 84
FEV1/FVC_PREDICTED: 76
FVC: 3.34
FEV1/FVC: 63.92
FEV1/FVC_PERCENT_PREDICTED: 84
FEV1_LLN: 2.36
FEV1/FVC_PERCENT_CHANGE: 120
FEV1/FVC: 63
FVC_LLN: 3.11
FVC_LLN: 3.11
FEV1: 2.25
FVC_PERCENT_PREDICTED: 89
FEV1_PERCENT_PREDICTED: 75
FEV1/FVC_PERCENT_PREDICTED: 83

## 2023-02-05 DIAGNOSIS — K21.9 GASTROESOPHAGEAL REFLUX DISEASE WITHOUT ESOPHAGITIS: ICD-10-CM

## 2023-02-05 DIAGNOSIS — F41.0 PANIC DISORDER: ICD-10-CM

## 2023-02-06 ENCOUNTER — PATIENT MESSAGE (OUTPATIENT)
Dept: HEALTH INFORMATION MANAGEMENT | Facility: OTHER | Age: 73
End: 2023-02-06

## 2023-02-07 RX ORDER — DULOXETIN HYDROCHLORIDE 60 MG/1
CAPSULE, DELAYED RELEASE ORAL
Qty: 90 CAPSULE | Refills: 0 | Status: SHIPPED | OUTPATIENT
Start: 2023-02-07 | End: 2023-05-16

## 2023-02-07 RX ORDER — OMEPRAZOLE 40 MG/1
CAPSULE, DELAYED RELEASE ORAL
Qty: 90 CAPSULE | Refills: 0 | Status: SHIPPED | OUTPATIENT
Start: 2023-02-07 | End: 2023-05-16

## 2023-02-15 ENCOUNTER — OFFICE VISIT (OUTPATIENT)
Dept: MEDICAL GROUP | Facility: PHYSICIAN GROUP | Age: 73
End: 2023-02-15
Payer: MEDICARE

## 2023-02-15 VITALS
TEMPERATURE: 99 F | HEIGHT: 67 IN | RESPIRATION RATE: 22 BRPM | OXYGEN SATURATION: 98 % | HEART RATE: 92 BPM | WEIGHT: 219 LBS | BODY MASS INDEX: 34.37 KG/M2 | SYSTOLIC BLOOD PRESSURE: 110 MMHG | DIASTOLIC BLOOD PRESSURE: 62 MMHG

## 2023-02-15 DIAGNOSIS — J02.9 SORE THROAT: ICD-10-CM

## 2023-02-15 DIAGNOSIS — N18.2 CKD STAGE 2 DUE TO TYPE 2 DIABETES MELLITUS (HCC): ICD-10-CM

## 2023-02-15 DIAGNOSIS — R05.1 ACUTE COUGH: ICD-10-CM

## 2023-02-15 DIAGNOSIS — E78.5 TYPE 2 DIABETES MELLITUS WITH HYPERLIPIDEMIA (HCC): Chronic | ICD-10-CM

## 2023-02-15 DIAGNOSIS — Z72.0 TOBACCO USE: Chronic | ICD-10-CM

## 2023-02-15 DIAGNOSIS — J44.9 CHRONIC OBSTRUCTIVE PULMONARY DISEASE, UNSPECIFIED COPD TYPE (HCC): ICD-10-CM

## 2023-02-15 DIAGNOSIS — E78.5 DYSLIPIDEMIA: Chronic | ICD-10-CM

## 2023-02-15 DIAGNOSIS — E11.22 CKD STAGE 2 DUE TO TYPE 2 DIABETES MELLITUS (HCC): ICD-10-CM

## 2023-02-15 DIAGNOSIS — E66.01 SEVERE OBESITY (HCC): ICD-10-CM

## 2023-02-15 DIAGNOSIS — D69.6 THROMBOCYTOPENIA (HCC): Chronic | ICD-10-CM

## 2023-02-15 DIAGNOSIS — E11.69 TYPE 2 DIABETES MELLITUS WITH HYPERLIPIDEMIA (HCC): Chronic | ICD-10-CM

## 2023-02-15 LAB
EXTERNAL QUALITY CONTROL: NORMAL
FLUAV+FLUBV AG SPEC QL IA: NEGATIVE
INT CON NEG: NEGATIVE
INT CON POS: POSITIVE
S PYO AG THROAT QL: NEGATIVE
SARS-COV+SARS-COV-2 AG RESP QL IA.RAPID: NEGATIVE

## 2023-02-15 PROCEDURE — 87426 SARSCOV CORONAVIRUS AG IA: CPT | Performed by: INTERNAL MEDICINE

## 2023-02-15 PROCEDURE — 87880 STREP A ASSAY W/OPTIC: CPT | Performed by: INTERNAL MEDICINE

## 2023-02-15 PROCEDURE — 99214 OFFICE O/P EST MOD 30 MIN: CPT | Mod: 25,CS | Performed by: INTERNAL MEDICINE

## 2023-02-15 PROCEDURE — 99406 BEHAV CHNG SMOKING 3-10 MIN: CPT | Performed by: INTERNAL MEDICINE

## 2023-02-15 PROCEDURE — 87804 INFLUENZA ASSAY W/OPTIC: CPT | Performed by: INTERNAL MEDICINE

## 2023-02-15 RX ORDER — BENZONATATE 100 MG/1
100 CAPSULE ORAL 3 TIMES DAILY PRN
Qty: 60 CAPSULE | Refills: 0 | Status: SHIPPED | OUTPATIENT
Start: 2023-02-15 | End: 2023-02-15

## 2023-02-15 RX ORDER — CODEINE PHOSPHATE AND GUAIFENESIN 10; 100 MG/5ML; MG/5ML
5 SOLUTION ORAL EVERY 8 HOURS PRN
Qty: 118 ML | Refills: 0 | Status: SHIPPED | OUTPATIENT
Start: 2023-02-15 | End: 2023-03-03

## 2023-02-15 ASSESSMENT — FIBROSIS 4 INDEX: FIB4 SCORE: 0.98

## 2023-02-15 NOTE — ASSESSMENT & PLAN NOTE
Chronic condition.  This condition is associated with proteinuria previously.  Patient followed by nephrologist  Recent lab test showed GFR in the 60s.  Advised the patient to avoid NSAIDs.

## 2023-02-15 NOTE — ASSESSMENT & PLAN NOTE
New condition    Pt reported symptoms have been noted  x 2 days.    Current symptoms: Sore throat congestion, intermittent cough    SOB /wheezing:  []Yes  [x]No    Chest pain/discomfort  []Yes  [x]No    Dizziness   []Yes  [x]No        Fever / chills   []Yes  [x]No

## 2023-02-15 NOTE — ASSESSMENT & PLAN NOTE
Chronic ongoing condition.  The patient is taking atorvastatin 20 Mg daily.  No significant side effects noted.

## 2023-02-15 NOTE — PROGRESS NOTES
PRIMARY CARE CLINIC VISIT    Chief complaint:    Sore throat congestion  Follow-up diabetes  COPD  CKD 2  Obesity  Hyperlipidemia      History of Present Illness     Thrombocytopenia (HCC)  Chronic condition.  The patient has been followed by hematologist.  Patient denies any history of bleeding.  The most recent blood test showed platelets level has improved.  Recommend to continue to monitor.    Type 2 diabetes mellitus with hyperlipidemia (HCC)  Chronic condition.  Patient was seen by endocrinologist Dr. Monsalve.  The patient was started on Farxiga 5 mg daily.  Patient tolerating medication well no significant side effects reported.    Chronic obstructive pulmonary disease (HCC)  Chronic ongoing condition.  Patient currently using Symbicort 2 puffs twice daily.    Patient stated that he had pulmonary function test done February 1, 2023.  I do not see the report in the epic system at this time.  I have asked my assistant to investigate    Patient denies shortness of breath or wheezing.    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)  Chronic condition.  This condition is associated with proteinuria previously.  Patient followed by nephrologist  Recent lab test showed GFR in the 60s.  Advised the patient to avoid NSAIDs.    Severe obesity (HCC)  Chronic condition.    Body mass index is 34.3 kg/m².   Brief discussion with the patient regarding diet, exercise, and lifestyle modification to help achieve and maintain healthy weight  - on weight loss            Dyslipidemia  Chronic ongoing condition.  The patient is taking atorvastatin 20 Mg daily.  No significant side effects noted.    Tobacco use  Chronic condition. Uncontrolled    I spent 5 minutes of face to face counseling pt regarding nicotine cessation.   Discussed w pt and counseling on harmful effects of nicotine.     Strongly advised pt to quit.      Sore throat  New condition    Pt reported symptoms have been noted  x 2 days.    Current  symptoms: Sore throat congestion, intermittent cough    SOB /wheezing:  []Yes  [x]No    Chest pain/discomfort  []Yes  [x]No    Dizziness   []Yes  [x]No        Fever / chills   []Yes  [x]No          Current Outpatient Medications on File Prior to Visit   Medication Sig Dispense Refill    omeprazole (PRILOSEC) 40 MG delayed-release capsule TAKE 1 CAPSULE DAILY 90 Capsule 0    DULoxetine (CYMBALTA) 60 MG Cap DR Particles delayed-release capsule TAKE 1 CAPSULE DAILY 90 Capsule 0    atorvastatin (LIPITOR) 20 MG Tab TAKE 1 TABLET DAILY 90 Tablet 0    dapagliflozin propanediol (FARXIGA) 5 MG Tab Take 1 Tablet by mouth every day. 30 Tablet 11    fluocinonide (LIDEX) 0.05 % Cream AAA arms, body BID PRN rash, itching.  Do not use on face, axilla, groin. 120 g 1    nystatin (MYCOSTATIN) 566043 UNIT/GM Cream topical cream Apply 1 g topically 2 times a day. 30 g 1    budesonide-formoterol (SYMBICORT) 160-4.5 MCG/ACT Aerosol Inhale 2 Puffs 2 times a day. 2 Each 6    Cholecalciferol (VITAMIN D) 125 MCG (5000 UT) Cap Take 5,000 Units by mouth every day.      albuterol 108 (90 Base) MCG/ACT Aero Soln inhalation aerosol Inhale 2 Puffs every 6 hours as needed for Shortness of Breath. 8.5 g 3    dexamethasone (DECADRON) 1 MG Tab Take 1 Tablet by mouth 1 time a day as needed (for midnight suppression test). (Patient not taking: Reported on 2/15/2023) 2 Tablet 0    lisinopril (PRINIVIL) 2.5 MG Tab Take 1 Tablet by mouth every day. 90 Tablet 3    pentoxifylline CR (TRENTAL) 400 MG CR tablet Take 1 Tablet by mouth 3 times a day with meals. 90 Tablet 4    Sulfacetamide Sodium, Acne, 10 % Lotion Apply one daily (Patient not taking: Reported on 2/15/2023) 118 mL 6     No current facility-administered medications on file prior to visit.        Allergies: Augmentin, Metformin, and Zyban [bupropion]    Current Outpatient Medications Ordered in Epic   Medication Sig Dispense Refill    benzonatate (TESSALON) 100 MG Cap Take 1 Capsule by mouth 3  times a day as needed for Cough. 60 Capsule 0    omeprazole (PRILOSEC) 40 MG delayed-release capsule TAKE 1 CAPSULE DAILY 90 Capsule 0    DULoxetine (CYMBALTA) 60 MG Cap DR Particles delayed-release capsule TAKE 1 CAPSULE DAILY 90 Capsule 0    atorvastatin (LIPITOR) 20 MG Tab TAKE 1 TABLET DAILY 90 Tablet 0    dapagliflozin propanediol (FARXIGA) 5 MG Tab Take 1 Tablet by mouth every day. 30 Tablet 11    fluocinonide (LIDEX) 0.05 % Cream AAA arms, body BID PRN rash, itching.  Do not use on face, axilla, groin. 120 g 1    nystatin (MYCOSTATIN) 660842 UNIT/GM Cream topical cream Apply 1 g topically 2 times a day. 30 g 1    budesonide-formoterol (SYMBICORT) 160-4.5 MCG/ACT Aerosol Inhale 2 Puffs 2 times a day. 2 Each 6    Cholecalciferol (VITAMIN D) 125 MCG (5000 UT) Cap Take 5,000 Units by mouth every day.      albuterol 108 (90 Base) MCG/ACT Aero Soln inhalation aerosol Inhale 2 Puffs every 6 hours as needed for Shortness of Breath. 8.5 g 3    dexamethasone (DECADRON) 1 MG Tab Take 1 Tablet by mouth 1 time a day as needed (for midnight suppression test). (Patient not taking: Reported on 2/15/2023) 2 Tablet 0    lisinopril (PRINIVIL) 2.5 MG Tab Take 1 Tablet by mouth every day. 90 Tablet 3    pentoxifylline CR (TRENTAL) 400 MG CR tablet Take 1 Tablet by mouth 3 times a day with meals. 90 Tablet 4    Sulfacetamide Sodium, Acne, 10 % Lotion Apply one daily (Patient not taking: Reported on 2/15/2023) 118 mL 6     No current Epic-ordered facility-administered medications on file.       Past Medical History:   Diagnosis Date    Back pain     Chickenpox     Depression     GERD (gastroesophageal reflux disease)     [place] measles     Gout     Hyperlipidemia     Influenza     Panic disorder     Sleep apnea     Tobacco use 11/26/2014       Past Surgical History:   Procedure Laterality Date    APPENDECTOMY      ARTHROSCOPY, KNEE      CARPAL TUNNEL ENDOSCOPIC      bilateral    CARPAL TUNNEL RELEASE      SHOULDER DECOMPRESSION  "ARTHROSCOPIC      right    TONSILLECTOMY         Family History   Problem Relation Age of Onset    Cancer Mother     Cancer Father     Stroke Father     Diabetes Neg Hx     Heart Disease Neg Hx     Hypertension Neg Hx        Social History     Tobacco Use   Smoking Status Every Day    Packs/day: 1.00    Years: 53.00    Pack years: 53.00    Types: Cigarettes   Smokeless Tobacco Never   Tobacco Comments    started smoking at age 15       Social History     Substance and Sexual Activity   Alcohol Use Not Currently    Alcohol/week: 0.0 oz       Review of systems.  As per HPI above. All other systems reviewed and negative.      Past Medical, Social, and Family history reviewed and updated in EPIC     Objective     /62 (BP Location: Right arm, Patient Position: Sitting, BP Cuff Size: Large adult)   Pulse 92   Temp 37.2 °C (99 °F) (Temporal)   Resp (!) 22   Ht 1.702 m (5' 7\")   Wt 99.3 kg (219 lb)   SpO2 98%    Body mass index is 34.3 kg/m².    General: alert in no apparent distress.  Cardiovascular: regular rate and rhythm  Pulmonary: lungs : no wheezing   Gastrointestinal: BS present. No obvious mass noted  Nose with slight mucus formation no purulent drainage  oroPharynx erythema but no exudates  Neuro nonfocal      Lab Results   Component Value Date/Time    HBA1C 6.4 (H) 01/31/2023 08:44 AM    HBA1C 6.2 (A) 11/15/2022 11:15 AM    HBA1C 6.5 (H) 08/06/2022 08:13 AM       Lab Results   Component Value Date/Time    WBC 8.5 01/31/2023 08:44 AM    HEMOGLOBIN 15.0 01/31/2023 08:44 AM    HEMATOCRIT 46.0 01/31/2023 08:44 AM    MCV 94.8 01/31/2023 08:44 AM    PLATELETCT 277 01/31/2023 08:44 AM         Lab Results   Component Value Date/Time    SODIUM 137 01/31/2023 08:44 AM    POTASSIUM 4.7 01/31/2023 08:44 AM    GLUCOSE 111 (H) 01/31/2023 08:44 AM    BUN 16 01/31/2023 08:44 AM    CREATININE 1.24 01/31/2023 08:44 AM       Lab Results   Component Value Date/Time    CHOLSTRLTOT 138 01/31/2023 08:44 AM    LDL 88 " 01/31/2023 08:44 AM    HDL 36 (A) 01/31/2023 08:44 AM    TRIGLYCERIDE 70 01/31/2023 08:44 AM       Lab Results   Component Value Date/Time    ALTMICHELLEPT 32 11/16/2022 06:52 AM             Assessment and Plan     1. Sore throat/congestion  This is a new condition.  Likely viral syndrome.  Rule out COVID infection versus bacterial       - POCT SARS-COV Antigen MO Manual Result  - POCT Influenza A/B  - POCT Rapid Strep A      Symptomatic and supportive care recommended:   Advised pt to stay well hydrated and plenty of rest   For sore throat: rec warm salt water gargles soft foods. Throat coats /chloraseptic sprays prn.  Saline nasal spray and Flonase as a decongestant.   Infection control measures at home.  Hand washing, covering sneeze/cough, disinfect surfaces.   May try otc mucinex as needed [expectorant]    - Return to clinic if not better. Go to urgent care or ER is symptoms worsen /change such as temperature >101, worsening shortness of breath, wheezing, worsening cough, chest pain, dizziness, lightheadedness, lethargy, confusion, headaches, change in vision, motor weakness, abdominal pain, the inability to keep fluids/ foods down, etc... or any change in the pt's condition.    Other orders  - benzonatate (TESSALON) 100 MG Cap; Take 1 Capsule by mouth 3 times a day as needed for Cough.  Dispense: 60 Capsule; Refill: 0.  Potential side effects of the medication discussed with the patient.        2. Type 2 diabetes mellitus with hyperlipidemia (HCC)  Chronic condition.  Excellent control.  A1c 6.4% as above.  Advised the patient continue to take Farxiga 5 mg daily.  A1c goal of 7% discussed.  Pt's education:   -Advised the  benefits of blood glucose monitoring, potential hypoglycemia , medication mode of action/ possible side effects, the effects of exercise, potential acute and chronic conditions related to diabetes.   -Discussed with pt regarding healthy diet and making better choices to help blood sugar.  -Also  encouraged pt to continue with regular exercises/walking.             3. Chronic obstructive pulmonary disease, unspecified COPD type (HCC)  Chronic condition.  Strongly advised the patient to quit smoking.  Continue Symbicort 2 puff twice daily.  Patient may use albuterol as needed      4. 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)  Chronic condition.  Stable.  Advised the patient to avoid NSAID.  Recommend low protein diet.  Continue to monitor.  Continue follow-up with nephrologist  As directed.      5. Thrombocytopenia (HCC)  Chronic condition.  Recent lab test show improved platelet level.  Patient denies any history of bleeding.  Continue to monitor.      6. Tobacco use  Chronic condition.  Uncontrolled.  Strongly advised the patient to quit smoking.      7. Severe obesity (HCC)  Chronic condition.  Uncontrolled.  Recommend healthy diet and exercise.  Encouraged the patient to lose weight.      8. Dyslipidemia    This is a chronic condition.  Stable.  Continue atorvastatin 20 Mg daily.  Continue with low-fat low-cholesterol diet.                      Please note that this dictation was created using voice recognition software. I have made every reasonable attempt to correct obvious errors, but I expect that there are errors of grammar and possibly content that I did not discover before finalizing the note.    Aris Aldana MD  Internal Medicine  [facility]

## 2023-02-15 NOTE — ASSESSMENT & PLAN NOTE
Chronic condition.  The patient has been followed by hematologist.  Patient denies any history of bleeding.  The most recent blood test showed platelets level has improved.  Recommend to continue to monitor.

## 2023-02-15 NOTE — ASSESSMENT & PLAN NOTE
Chronic condition.    Body mass index is 34.3 kg/m².   Brief discussion with the patient regarding diet, exercise, and lifestyle modification to help achieve and maintain healthy weight  - on weight loss

## 2023-02-15 NOTE — ASSESSMENT & PLAN NOTE
Chronic ongoing condition.  Patient currently using Symbicort 2 puffs twice daily.    Patient stated that he had pulmonary function test done February 1, 2023.  I do not see the report in the epic system at this time.  I have asked my assistant to investigate    Patient denies shortness of breath or wheezing.

## 2023-02-15 NOTE — ASSESSMENT & PLAN NOTE
Chronic condition.  Patient was seen by endocrinologist Dr. Monsalve.  The patient was started on Farxiga 5 mg daily.  Patient tolerating medication well no significant side effects reported.

## 2023-02-15 NOTE — ASSESSMENT & PLAN NOTE
Chronic condition. Uncontrolled    I spent 5 minutes of face to face counseling pt regarding nicotine cessation.   Discussed w pt and counseling on harmful effects of nicotine.     Strongly advised pt to quit.

## 2023-02-24 ENCOUNTER — OFFICE VISIT (OUTPATIENT)
Dept: DERMATOLOGY | Facility: IMAGING CENTER | Age: 73
End: 2023-02-24
Payer: MEDICARE

## 2023-02-24 DIAGNOSIS — L29.9 ITCHING: ICD-10-CM

## 2023-02-24 DIAGNOSIS — B07.9 VIRAL WARTS, UNSPECIFIED TYPE: ICD-10-CM

## 2023-02-24 DIAGNOSIS — L92.0 GRANULOMA ANNULARE: ICD-10-CM

## 2023-02-24 PROCEDURE — 17110 DESTRUCTION B9 LES UP TO 14: CPT | Performed by: DERMATOLOGY

## 2023-02-24 PROCEDURE — 99213 OFFICE O/P EST LOW 20 MIN: CPT | Mod: 25 | Performed by: DERMATOLOGY

## 2023-02-24 RX ORDER — TRIAMCINOLONE ACETONIDE 5 MG/G
CREAM TOPICAL
Qty: 15 G | Refills: 1 | Status: SHIPPED | OUTPATIENT
Start: 2023-02-24 | End: 2023-04-21 | Stop reason: SDUPTHER

## 2023-02-24 NOTE — PROGRESS NOTES
"CC: GA     Subjective: Previously seen patient here for recurrent GA injections. Sites doing well today, declines injection of steroids. Does want rf of topical TAC 0.5% cream .     Has wart/growth on right 3rd digit, desires trx.     From prior notes:  \"Initial hx.  Duration of rash: previously dx GA (2012, [place], [place])  Symptoms: itching, only one site on ant right shin  Current treatment: Trental 400 mg  Prior rx: prednisone, hydroxychloroquine, minocycline & nicotinamide\"     ROS: no fevers/chills. + itch.  No cough  Relevant PMH:mult med comorbidities  Social: tobacco use;     PE: Gen:WDWN male in NAD.  Skin: focal exam: verrucous papule on r 3rd digit. Annular pink dermal plaques on arm-left.      A/P: GA: quiescent today  -prev reviewed cont trental: 400mg PO TID  -TAC 0.5% cream BID PRN, se reviewed  -rtc 2 months, injections PRN     VV, right finger:  -LN2 25 sec X 2 cycles  X1 lesion  -f/u if persists at 1 month  -home therapies reviewed    F/u 2 -3 months    I have reviewed medications relevant to my specialty.  "

## 2023-03-07 DIAGNOSIS — J44.9 CHRONIC OBSTRUCTIVE PULMONARY DISEASE, UNSPECIFIED COPD TYPE (HCC): ICD-10-CM

## 2023-03-07 RX ORDER — ALBUTEROL SULFATE 90 UG/1
2 AEROSOL, METERED RESPIRATORY (INHALATION) EVERY 6 HOURS PRN
Qty: 8.5 G | Refills: 3 | Status: SHIPPED | OUTPATIENT
Start: 2023-03-07 | End: 2024-01-15 | Stop reason: SDUPTHER

## 2023-03-07 NOTE — TELEPHONE ENCOUNTER
Received request via: Patient    Was the patient seen in the last year in this department? Yes    Does the patient have an active prescription (recently filled or refills available) for medication(s) requested? No    Does the patient have USP Plus and need 100 day supply (blood pressure, diabetes and cholesterol meds only)? Patient does not have SCP

## 2023-03-09 NOTE — PROCEDURES
"DATE OF SERVICE:  02/01/2023     PULMONARY FUNCTION TEST INTERPRETATION\"     REFERRING PROVIDER:  Aris Aldana MD     REASON FOR REQUEST:  Chronic obstructive pulmonary disease.     INTERPRETATION:  1.  Acceptable and reproducible.  2.  FEV1 2.12 liters (75%), FVC 3.34 liters (89%), ratio 63%.  3.  Flow volume loops consistent with obstruction.  4.  TLC 6.33 L (98%).     IMPRESSION:  Moderate obstruction with no response to bronchodilator except   for mid flow.  There was partial response.  Evidence of mild air trapping.  No   hyperinflation.        ______________________________  MD ADRIANNE Perez/JUANA    DD:  03/08/2023 18:43  DT:  03/08/2023 19:34    Job#:  [id number]    CC:Aris Aldana MD(User)  "

## 2023-03-13 DIAGNOSIS — E11.29 MICROALBUMINURIA DUE TO TYPE 2 DIABETES MELLITUS (HCC): ICD-10-CM

## 2023-03-13 DIAGNOSIS — E55.9 VITAMIN D DEFICIENCY: ICD-10-CM

## 2023-03-13 DIAGNOSIS — R80.9 MICROALBUMINURIA DUE TO TYPE 2 DIABETES MELLITUS (HCC): ICD-10-CM

## 2023-03-13 DIAGNOSIS — N18.2 CKD (CHRONIC KIDNEY DISEASE), STAGE II: ICD-10-CM

## 2023-03-13 DIAGNOSIS — I10 ESSENTIAL HYPERTENSION: ICD-10-CM

## 2023-03-14 ENCOUNTER — OFFICE VISIT (OUTPATIENT)
Dept: SLEEP MEDICINE | Facility: MEDICAL CENTER | Age: 73
End: 2023-03-14
Attending: INTERNAL MEDICINE
Payer: MEDICARE

## 2023-03-14 VITALS
HEART RATE: 86 BPM | OXYGEN SATURATION: 97 % | BODY MASS INDEX: 34.37 KG/M2 | HEIGHT: 67 IN | SYSTOLIC BLOOD PRESSURE: 122 MMHG | DIASTOLIC BLOOD PRESSURE: 66 MMHG | WEIGHT: 219 LBS

## 2023-03-14 DIAGNOSIS — J44.9 CHRONIC OBSTRUCTIVE PULMONARY DISEASE, UNSPECIFIED COPD TYPE (HCC): ICD-10-CM

## 2023-03-14 PROCEDURE — 99213 OFFICE O/P EST LOW 20 MIN: CPT | Performed by: STUDENT IN AN ORGANIZED HEALTH CARE EDUCATION/TRAINING PROGRAM

## 2023-03-14 PROCEDURE — 99205 OFFICE O/P NEW HI 60 MIN: CPT | Performed by: STUDENT IN AN ORGANIZED HEALTH CARE EDUCATION/TRAINING PROGRAM

## 2023-03-14 RX ORDER — VARENICLINE TARTRATE
KIT
Qty: 53 EACH | Refills: 0 | Status: SHIPPED | OUTPATIENT
Start: 2023-03-14 | End: 2023-05-23

## 2023-03-14 RX ORDER — IPRATROPIUM BROMIDE AND ALBUTEROL SULFATE 2.5; .5 MG/3ML; MG/3ML
3 SOLUTION RESPIRATORY (INHALATION) EVERY 4 HOURS PRN
Qty: 3 ML | Refills: 3 | Status: SHIPPED | OUTPATIENT
Start: 2023-03-14 | End: 2023-05-18 | Stop reason: SDUPTHER

## 2023-03-14 ASSESSMENT — ENCOUNTER SYMPTOMS
WHEEZING: 0
COUGH: 1
VOMITING: 0
FOCAL WEAKNESS: 0
EYE DISCHARGE: 0
SHORTNESS OF BREATH: 0
CHILLS: 0
FEVER: 0
HEMOPTYSIS: 0
SPUTUM PRODUCTION: 1
NAUSEA: 0
FALLS: 0

## 2023-03-14 ASSESSMENT — FIBROSIS 4 INDEX: FIB4 SCORE: 0.98

## 2023-03-15 NOTE — ASSESSMENT & PLAN NOTE
PFTs w/mild obstruction w/o BD response. Still smoking but has cut down significantly from multiple packs per day to 4-5 cigarettes per day now. Would like to try Chantix to completely quit smoking    - smoking cessation  - Chantix  - continue symbicort  - prn albuterol  - duonebs w/DME albuterol   - flutter valve for airway clearance  - did discuss with patient the increased risk for malignancy due to his smoking history but he does not want to pursue CT chest at this time bc he's not sure if he would like to know if he has lung cancer - I told him to think about it and discuss w/his partner and we can re-address at next visit

## 2023-03-15 NOTE — PROGRESS NOTES
Pulmonary Clinic Note    Chief Complaint:  Chief Complaint   Patient presents with    Establish Care     Referred by Aris Aldana M.D. for COPD      HPI:   Micheal Brothers Jr. is a very pleasant 72 y.o. male with history of tobacco smoking 50+ pkyrs, currently down to 4-5 cigarettes/day, who presents to pulmonary clinic for COPD.     Patient states that he and his wife went on a cruise about 6 months ago which developed a respiratory infection.  Since then he has had a significant cough that has slowly improved with the inhaler but not resolved.  Reports doing reductive cough feels like he has to continuously clear and cough up the sputum in order to be able to breathe.   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        Past Medical History:   Diagnosis Date    Back pain     Chickenpox     Cough     Depression     GERD (gastroesophageal reflux disease)     [place] measles     Gout     Hyperlipidemia     Influenza     Panic disorder     Shortness of breath     Sleep apnea     Sputum production     Tobacco use 11/26/2014    Wheezing        Past Surgical History:   Procedure Laterality Date    APPENDECTOMY      ARTHROSCOPY, KNEE      CARPAL TUNNEL ENDOSCOPIC      bilateral    CARPAL TUNNEL RELEASE      SHOULDER DECOMPRESSION ARTHROSCOPIC      right    TONSILLECTOMY         Social History     Socioeconomic History    Marital status:      Spouse name: Not on file    Number of children: Not on file    Years of education: Not on file    Highest education level: Not on file   Occupational History    Not on file   Tobacco Use    Smoking status: Every Day     Packs/day: 0.50     Years: 53.00     Pack years: 26.50     Types: Cigarettes    Smokeless tobacco: Never    Tobacco comments:     started smoking at age 15   Vaping Use    Vaping Use: Never used   Substance and Sexual Activity    Alcohol use: Not Currently     Alcohol/week: 0.0 oz    Drug use:  No    Sexual activity: Yes     Partners: Female   Other Topics Concern    Not on file   Social History Narrative    Not on file     Social Determinants of Health     Financial Resource Strain: Not on file   Food Insecurity: Not on file   Transportation Needs: Not on file   Physical Activity: Not on file   Stress: Not on file   Social Connections: Not on file   Intimate Partner Violence: Not on file   Housing Stability: Not on file          Family History   Problem Relation Age of Onset    Cancer Mother     Cancer Father     Stroke Father     Diabetes Neg Hx     Heart Disease Neg Hx     Hypertension Neg Hx        Current Outpatient Medications on File Prior to Visit   Medication Sig Dispense Refill    albuterol 108 (90 Base) MCG/ACT Aero Soln inhalation aerosol Inhale 2 Puffs every 6 hours as needed for Shortness of Breath. 8.5 g 3    triamcinolone acetonide (KENALOG) 0.5 % Cream AAA BID PRN itching, body sites only. Do not use on face, axilla,groin 15 g 1    omeprazole (PRILOSEC) 40 MG delayed-release capsule TAKE 1 CAPSULE DAILY 90 Capsule 0    DULoxetine (CYMBALTA) 60 MG Cap DR Particles delayed-release capsule TAKE 1 CAPSULE DAILY 90 Capsule 0    atorvastatin (LIPITOR) 20 MG Tab TAKE 1 TABLET DAILY 90 Tablet 0    dapagliflozin propanediol (FARXIGA) 5 MG Tab Take 1 Tablet by mouth every day. 30 Tablet 11    dexamethasone (DECADRON) 1 MG Tab Take 1 Tablet by mouth 1 time a day as needed (for midnight suppression test). 2 Tablet 0    fluocinonide (LIDEX) 0.05 % Cream AAA arms, body BID PRN rash, itching.  Do not use on face, axilla, groin. 120 g 1    lisinopril (PRINIVIL) 2.5 MG Tab Take 1 Tablet by mouth every day. 90 Tablet 3    nystatin (MYCOSTATIN) 642203 UNIT/GM Cream topical cream Apply 1 g topically 2 times a day. 30 g 1    pentoxifylline CR (TRENTAL) 400 MG CR tablet Take 1 Tablet by mouth 3 times a day with meals. 90 Tablet 4    budesonide-formoterol (SYMBICORT) 160-4.5 MCG/ACT Aerosol Inhale 2 Puffs 2  "times a day. 2 Each 6    Sulfacetamide Sodium, Acne, 10 % Lotion Apply one daily 118 mL 6    Cholecalciferol (VITAMIN D) 125 MCG (5000 UT) Cap Take 5,000 Units by mouth every day.       No current facility-administered medications on file prior to visit.       Allergies: Augmentin, Metformin, and Zyban [bupropion]      ROS:   Review of Systems   Constitutional:  Negative for chills and fever.   HENT:  Negative for hearing loss.    Eyes:  Negative for discharge.   Respiratory:  Positive for cough and sputum production. Negative for hemoptysis, shortness of breath and wheezing.    Cardiovascular:  Negative for chest pain.   Gastrointestinal:  Negative for nausea and vomiting.   Musculoskeletal:  Negative for falls.   Skin:  Negative for rash.   Neurological:  Negative for focal weakness.     Vitals:  /66 (BP Location: Left arm, Patient Position: Sitting, BP Cuff Size: Adult)   Pulse 86   Ht 1.702 m (5' 7\")   Wt 99.3 kg (219 lb)   SpO2 97%     Physical Exam:  Physical Exam  Vitals and nursing note reviewed.   Constitutional:       General: He is not in acute distress.     Appearance: Normal appearance. He is not ill-appearing or toxic-appearing.   HENT:      Head: Normocephalic and atraumatic.      Nose: Nose normal.      Mouth/Throat:      Mouth: Mucous membranes are moist.   Eyes:      General: No scleral icterus.     Conjunctiva/sclera: Conjunctivae normal.   Cardiovascular:      Rate and Rhythm: Normal rate and regular rhythm.   Pulmonary:      Effort: Pulmonary effort is normal. No respiratory distress.      Breath sounds: No wheezing, rhonchi or rales.   Abdominal:      Palpations: Abdomen is soft.   Musculoskeletal:         General: No deformity or signs of injury. Normal range of motion.      Cervical back: Normal range of motion.   Skin:     General: Skin is warm and dry.   Neurological:      General: No focal deficit present.      Mental Status: He is alert. Mental status is at baseline. "   Psychiatric:         Mood and Affect: Mood normal.         Behavior: Behavior normal.       Data:    PFT 2/1/23: Mild obstruction without a significant BD response.  No restriction.    Assessment/Plan:    Problem List Items Addressed This Visit       Chronic obstructive pulmonary disease (HCC)     PFTs w/mild obstruction w/o BD response. Still smoking but has cut down significantly from multiple packs per day to 4-5 cigarettes per day now. Would like to try Chantix to completely quit smoking    - smoking cessation  - Chantix  - continue symbicort  - prn albuterol  - duonebs w/DME albuterol   - flutter valve for airway clearance  - did discuss with patient the increased risk for malignancy due to his smoking history but he does not want to pursue CT chest at this time bc he's not sure if he would like to know if he has lung cancer - I told him to think about it and discuss w/his partner and we can re-address at next visit         Relevant Medications    ipratropium-albuterol (DUONEB) 0.5-2.5 (3) MG/3ML nebulizer solution    Varenicline Tartrate, Starter, (CHANTIX STARTING MONTH PAK) 0.5 MG X 11 & 1 MG X 42 Tablet Therapy Pack    Other Relevant Orders    DME Nebulizer    DME Flutter Valve       Return 6-8 weeks.     This note was generated using voice recognition software which has a chance of producing errors of grammar and possibly content.  I have made every reasonable attempt to find and correct any obvious errors, but it should be expected that some may not be found prior to finalization of this note.    Time spent in record review prior to patient arrival, reviewing results, and in face-to-face encounter totaled 60 min, excluding any procedures if performed.    Rosario Sánchez MD  Pulmonary and Critical Care Medicine  [facility]

## 2023-03-23 ENCOUNTER — PATIENT MESSAGE (OUTPATIENT)
Dept: SLEEP MEDICINE | Facility: MEDICAL CENTER | Age: 73
End: 2023-03-23
Payer: MEDICARE

## 2023-03-27 ENCOUNTER — PATIENT MESSAGE (OUTPATIENT)
Dept: SLEEP MEDICINE | Facility: MEDICAL CENTER | Age: 73
End: 2023-03-27
Payer: MEDICARE

## 2023-03-29 ENCOUNTER — TELEPHONE (OUTPATIENT)
Dept: HEALTH INFORMATION MANAGEMENT | Facility: OTHER | Age: 73
End: 2023-03-29

## 2023-04-03 ENCOUNTER — TELEPHONE (OUTPATIENT)
Dept: SCHEDULING | Facility: IMAGING CENTER | Age: 73
End: 2023-04-03

## 2023-04-03 NOTE — TELEPHONE ENCOUNTER
NS    Good morning,   Patient has been calling, states he has sent [employer] [facility] messages as well as called and nobody responds.  His Nebulizer and medication is not being covered by [employer] because there is no DX.  [email or web address]  Please contact patient.      [phone]    Thank You,   Alysa MANLEY

## 2023-04-14 DIAGNOSIS — E78.5 DYSLIPIDEMIA: ICD-10-CM

## 2023-04-15 RX ORDER — ATORVASTATIN CALCIUM 20 MG/1
TABLET, FILM COATED ORAL
Qty: 90 TABLET | Refills: 2 | Status: SHIPPED | OUTPATIENT
Start: 2023-04-15 | End: 2024-01-04

## 2023-04-21 ENCOUNTER — OFFICE VISIT (OUTPATIENT)
Dept: DERMATOLOGY | Facility: IMAGING CENTER | Age: 73
End: 2023-04-21
Payer: MEDICARE

## 2023-04-21 DIAGNOSIS — L82.1 SEBORRHEIC KERATOSIS: ICD-10-CM

## 2023-04-21 DIAGNOSIS — L92.0 GRANULOMA ANNULARE: ICD-10-CM

## 2023-04-21 DIAGNOSIS — L29.9 ITCHING: ICD-10-CM

## 2023-04-21 DIAGNOSIS — L30.9 ECZEMA, UNSPECIFIED TYPE: ICD-10-CM

## 2023-04-21 DIAGNOSIS — D49.2 NEOPLASM OF SKIN: ICD-10-CM

## 2023-04-21 PROCEDURE — 99214 OFFICE O/P EST MOD 30 MIN: CPT | Mod: 25 | Performed by: DERMATOLOGY

## 2023-04-21 PROCEDURE — 11102 TANGNTL BX SKIN SINGLE LES: CPT | Performed by: DERMATOLOGY

## 2023-04-21 RX ORDER — TRIAMCINOLONE ACETONIDE 5 MG/G
CREAM TOPICAL
Qty: 15 G | Refills: 1 | Status: SHIPPED | OUTPATIENT
Start: 2023-04-21 | End: 2023-04-21

## 2023-04-21 NOTE — PROGRESS NOTES
"CC: warts/ GA    Subjective: Previously seen patient here for wart fv states home therapies just soften the surrounding skin around wart but wart does not come off. Desires shave removal today.    GA sites - no need for injections. Sites doing well today, declines injection of steroids.     Itching sites on right wrist/left-right thighs. Benign in appearance.     From prior notes:  \"Initial hx.  Duration of rash: previously dx GA (2012, [place], [place])  Symptoms: itching, only one site on ant right shin  Current treatment: Trental 400 mg  Prior rx: prednisone, hydroxychloroquine, minocycline & nicotinamide\"     ROS: no fevers/chills. + itch.  No cough  Relevant PMH:mult med comorbidities  Social: tobacco use;     PE: Gen:WDWN male in NAD.  Skin: focal exam: verrucous papule on r 3rd digit. Few waxy papules right arm-appearing benign. Scattered eczematous dermatitis appearing plaques on thighs-bilaterally.     A/P: GA: quiescent today  -will d/c trental: prev 400mg PO TID, has been decreasing with no flare noted  -rtc injections PRN    Eczema/Itching: thighs  -TAC 0.5% cream BID PRN/home supply, se reviewed     Neoplasm/VV/FB, right finger:  -consent for bx, including R/B/A. Cleaned with EtOH, anesthesia with lidocaine 1% NO epiephrine, shave bx, AlCl3 for hemostasis  -vaseline/bandage and wound care reviewed    SK right wrist:  -benign    F/u prn    I have reviewed medications relevant to my specialty.  "

## 2023-05-08 ENCOUNTER — HOSPITAL ENCOUNTER (OUTPATIENT)
Dept: LAB | Facility: MEDICAL CENTER | Age: 73
End: 2023-05-08
Attending: INTERNAL MEDICINE
Payer: MEDICARE

## 2023-05-08 DIAGNOSIS — E78.5 DYSLIPIDEMIA: ICD-10-CM

## 2023-05-08 DIAGNOSIS — Z79.84 LONG TERM (CURRENT) USE OF ORAL HYPOGLYCEMIC DRUGS: ICD-10-CM

## 2023-05-08 DIAGNOSIS — E55.9 VITAMIN D DEFICIENCY: ICD-10-CM

## 2023-05-08 DIAGNOSIS — D35.02 BILATERAL ADRENAL ADENOMAS: ICD-10-CM

## 2023-05-08 DIAGNOSIS — D35.01 BILATERAL ADRENAL ADENOMAS: ICD-10-CM

## 2023-05-08 DIAGNOSIS — E11.9 CONTROLLED TYPE 2 DIABETES MELLITUS WITHOUT COMPLICATION, WITHOUT LONG-TERM CURRENT USE OF INSULIN (HCC): ICD-10-CM

## 2023-05-08 LAB
25(OH)D3 SERPL-MCNC: 48 NG/ML (ref 30–100)
ALBUMIN SERPL BCP-MCNC: 4.3 G/DL (ref 3.2–4.9)
ALBUMIN/GLOB SERPL: 1.4 G/DL
ALP SERPL-CCNC: 81 U/L (ref 30–99)
ALT SERPL-CCNC: 36 U/L (ref 2–50)
ANION GAP SERPL CALC-SCNC: 12 MMOL/L (ref 7–16)
AST SERPL-CCNC: 24 U/L (ref 12–45)
BILIRUB SERPL-MCNC: 0.6 MG/DL (ref 0.1–1.5)
BUN SERPL-MCNC: 18 MG/DL (ref 8–22)
CALCIUM ALBUM COR SERPL-MCNC: 9.5 MG/DL (ref 8.5–10.5)
CALCIUM SERPL-MCNC: 9.7 MG/DL (ref 8.5–10.5)
CHLORIDE SERPL-SCNC: 102 MMOL/L (ref 96–112)
CHOLEST SERPL-MCNC: 180 MG/DL (ref 100–199)
CO2 SERPL-SCNC: 22 MMOL/L (ref 20–33)
CREAT SERPL-MCNC: 1.22 MG/DL (ref 0.5–1.4)
CREAT UR-MCNC: 117.03 MG/DL
FASTING STATUS PATIENT QL REPORTED: NORMAL
GFR SERPLBLD CREATININE-BSD FMLA CKD-EPI: 62 ML/MIN/1.73 M 2
GLOBULIN SER CALC-MCNC: 3 G/DL (ref 1.9–3.5)
GLUCOSE SERPL-MCNC: 115 MG/DL (ref 65–99)
HDLC SERPL-MCNC: 36 MG/DL
LDLC SERPL CALC-MCNC: 123 MG/DL
MICROALBUMIN UR-MCNC: 7.7 MG/DL
MICROALBUMIN/CREAT UR: 66 MG/G (ref 0–30)
POTASSIUM SERPL-SCNC: 5.3 MMOL/L (ref 3.6–5.5)
PROT SERPL-MCNC: 7.3 G/DL (ref 6–8.2)
SODIUM SERPL-SCNC: 136 MMOL/L (ref 135–145)
TRIGL SERPL-MCNC: 105 MG/DL (ref 0–149)
TSH SERPL DL<=0.005 MIU/L-ACNC: 2.06 UIU/ML (ref 0.38–5.33)

## 2023-05-08 PROCEDURE — 80061 LIPID PANEL: CPT

## 2023-05-08 PROCEDURE — 82570 ASSAY OF URINE CREATININE: CPT

## 2023-05-08 PROCEDURE — 82306 VITAMIN D 25 HYDROXY: CPT

## 2023-05-08 PROCEDURE — 80053 COMPREHEN METABOLIC PANEL: CPT

## 2023-05-08 PROCEDURE — 36415 COLL VENOUS BLD VENIPUNCTURE: CPT

## 2023-05-08 PROCEDURE — 82043 UR ALBUMIN QUANTITATIVE: CPT

## 2023-05-08 PROCEDURE — 84443 ASSAY THYROID STIM HORMONE: CPT

## 2023-05-15 DIAGNOSIS — K21.9 GASTROESOPHAGEAL REFLUX DISEASE WITHOUT ESOPHAGITIS: ICD-10-CM

## 2023-05-15 DIAGNOSIS — F41.0 PANIC DISORDER: ICD-10-CM

## 2023-05-15 NOTE — PROGRESS NOTES
"BENIGN EXCISION PROCEDURE NOTE:    Removal of cyst 2/2 skin pain. Risks, benefits and alternatives of procedure, including, but not limited to scar/poor cosmetic outcome, bleeding, pain, infection, nerve damage, recurrence of lesion, failed surgery, and need for further surgery, were discussed and written informed consent obtained. Verbal time out completed.     Allergies reviewed: Yes  Pacemaker/defibrillator: No  Artificial joints: No  Antibiotics given: No  Blood thinners/anticoagulants: No    Pre-op diagnosis: EIc  Post-op diagnosis: Same  Site:back  Pre-op size:2.8mm    /60   Temp 37 °C (98.6 °F)   Ht 1.702 m (5' 7\")   Wt 89.4 kg (197 lb)     Procedure: Area of surgery was prepped with alcohol, marked with a sterile marking pen. Anesthesia with 1% lidocaine with epinephrine administered with 30 gauge needle. The area was again cleaned with povidine-iodine swab. With sterile technique, a 10 blade scalpel was used to make an elliptical incision over the lesion to the level of the subcutaneous fat. Dissection was completed with iris/tissue scissors, and the mass was removed. Hemostasis was achieved with pressure.  Specimen was placed into biopsy container and sent to pathology by staff.    Intermediate closure note:  3.0 monocryl buried vertical mattress sutures were placed x 4 to close dead space. 4.0 superficial running suture was placed to approximate wound edge.  Vaseline applied to wound with bandage. Patient tolerated procedure well and there were no complications, blood loss was minimal.     Final wound size: 26mm    Bandage was placed with vaseline, telfa, gauze and tape. Wound care was discussed with the patient, and written instructions were provided. Patient to return to clinic in 10-14 days for suture removal. Patient to call us if any problems or concerns with the procedure site arise prior to scheduled appointment.    Brittni Bravo M.D.      " O-Z Flap Text: An advancement flap was designed O-Z. Local anesthesia was obtained. The defect edges were debeveled with a #15 scalpel blade. The flap was incised to the underlying adipose tissue. The flap was then undermined and elevated. The tissue surrounding the operative site was undermined extensively with blunt scissor dissection. Hemostasis was obtained using electrocoagulation. The flap was then advanced into the primary defect. Raised tissue cones were removed as necessary by standard technique. The superficial fascia and subcutaneous layers were closed with buried vertical mattress sutures. The epidermis was then approximated. Careful attention was given to eversion and even approximation of the wound edges. A pressure dressing was applied.

## 2023-05-16 RX ORDER — OMEPRAZOLE 40 MG/1
CAPSULE, DELAYED RELEASE ORAL
Qty: 90 CAPSULE | Refills: 0 | Status: SHIPPED | OUTPATIENT
Start: 2023-05-16 | End: 2023-08-14

## 2023-05-16 RX ORDER — DULOXETIN HYDROCHLORIDE 60 MG/1
CAPSULE, DELAYED RELEASE ORAL
Qty: 90 CAPSULE | Refills: 0 | Status: SHIPPED | OUTPATIENT
Start: 2023-05-16 | End: 2023-08-14

## 2023-05-18 ENCOUNTER — TELEPHONE (OUTPATIENT)
Dept: SLEEP MEDICINE | Facility: MEDICAL CENTER | Age: 73
End: 2023-05-18

## 2023-05-18 DIAGNOSIS — J44.9 CHRONIC OBSTRUCTIVE PULMONARY DISEASE, UNSPECIFIED COPD TYPE (HCC): ICD-10-CM

## 2023-05-18 NOTE — TELEPHONE ENCOUNTER
Jak calling regarding his prescription of ipratropium-albuterol (DUONEB) 0.5-2.5 (3) MG/3ML nebulizer solution [[id number]]    States medication was called in 03/2023 with 3 refills, prior authorization was obtained. Went to [facility] on [address] to get refill and pharmacy states they will not refill with out prior authorization for each refill. Please review and advise.     Jak states that Mariana was extremely helpful when this issue last arose and would like a call back from her to assist again.

## 2023-05-19 RX ORDER — IPRATROPIUM BROMIDE AND ALBUTEROL SULFATE 2.5; .5 MG/3ML; MG/3ML
3 SOLUTION RESPIRATORY (INHALATION) EVERY 4 HOURS PRN
Qty: 3 ML | Refills: 3 | Status: SHIPPED | OUTPATIENT
Start: 2023-05-19

## 2023-05-19 NOTE — TELEPHONE ENCOUNTER
Caller Name: Micheal Rolandailyn Kiser                 Call Back Number: [phone] (home)         Patient approves a detailed voicemail message: N\A    Have we ever prescribed this med? Yes.  If yes, what date? 3/14/23    Last OV: 3/14/23 W Dr. Sánchez     Next OV: n/a     DX: SOB    Medications:ipratropium-albuterol (DUONEB) 0.5-2.5 (3) MG/3ML nebulizer solution

## 2023-05-23 ENCOUNTER — OFFICE VISIT (OUTPATIENT)
Dept: ENDOCRINOLOGY | Facility: MEDICAL CENTER | Age: 73
End: 2023-05-23
Attending: INTERNAL MEDICINE
Payer: MEDICARE

## 2023-05-23 VITALS
OXYGEN SATURATION: 97 % | DIASTOLIC BLOOD PRESSURE: 84 MMHG | HEART RATE: 99 BPM | HEIGHT: 67 IN | BODY MASS INDEX: 34.12 KG/M2 | SYSTOLIC BLOOD PRESSURE: 136 MMHG | WEIGHT: 217.4 LBS

## 2023-05-23 DIAGNOSIS — E55.9 VITAMIN D DEFICIENCY: ICD-10-CM

## 2023-05-23 DIAGNOSIS — D35.01 BILATERAL ADRENAL ADENOMAS: ICD-10-CM

## 2023-05-23 DIAGNOSIS — E11.29 CONTROLLED TYPE 2 DIABETES MELLITUS WITH MICROALBUMINURIA, WITHOUT LONG-TERM CURRENT USE OF INSULIN (HCC): ICD-10-CM

## 2023-05-23 DIAGNOSIS — D35.02 BILATERAL ADRENAL ADENOMAS: ICD-10-CM

## 2023-05-23 DIAGNOSIS — Z79.84 LONG TERM (CURRENT) USE OF ORAL HYPOGLYCEMIC DRUGS: ICD-10-CM

## 2023-05-23 DIAGNOSIS — R80.9 CONTROLLED TYPE 2 DIABETES MELLITUS WITH MICROALBUMINURIA, WITHOUT LONG-TERM CURRENT USE OF INSULIN (HCC): ICD-10-CM

## 2023-05-23 DIAGNOSIS — E78.5 DYSLIPIDEMIA: ICD-10-CM

## 2023-05-23 LAB
HBA1C MFR BLD: 6.2 % (ref ?–5.8)
POCT INT CON NEG: NEGATIVE
POCT INT CON POS: POSITIVE

## 2023-05-23 PROCEDURE — 3075F SYST BP GE 130 - 139MM HG: CPT | Performed by: INTERNAL MEDICINE

## 2023-05-23 PROCEDURE — 99214 OFFICE O/P EST MOD 30 MIN: CPT | Performed by: INTERNAL MEDICINE

## 2023-05-23 PROCEDURE — 3079F DIAST BP 80-89 MM HG: CPT | Performed by: INTERNAL MEDICINE

## 2023-05-23 PROCEDURE — 99212 OFFICE O/P EST SF 10 MIN: CPT | Performed by: INTERNAL MEDICINE

## 2023-05-23 PROCEDURE — 83036 HEMOGLOBIN GLYCOSYLATED A1C: CPT | Performed by: INTERNAL MEDICINE

## 2023-05-23 RX ORDER — METFORMIN HYDROCHLORIDE 500 MG/1
1000 TABLET, EXTENDED RELEASE ORAL DAILY
Qty: 180 TABLET | Refills: 1 | Status: SHIPPED | OUTPATIENT
Start: 2023-05-23

## 2023-05-23 ASSESSMENT — FIBROSIS 4 INDEX: FIB4 SCORE: 1.05

## 2023-05-23 NOTE — PROGRESS NOTES
CHIEF COMPLAINT: Patient is here for follow up of Type 2 Diabetes Mellitus    HPI:     Micheal Brothers Jr. is a 72 y.o. male with Type 2 Diabetes Mellitus here for follow up.      Labs from 5/23/2023 show A1c is 6.2%  Labs from 11/15/2022 show A1c is 6.2%      He was previously seen by the nurse practitioner and this is my first time meeting him.  He has history of bilateral 1 cm adrenal adenomas with calcification seen on CT scan in April 2021.     He denies a personal history of malignancy      Baseline work-up by the nurse practitioner for hormonal hypersecretion was negative for Cushing's disease, pheochromocytoma and hyperaldosteronism.  All of his hormonal work-up for hypersecretion of August 2022 came back unremarkable.    And repeat work-up on November 2022 showed normal plasma fractionated metanephrines  Normal overnight low-dose DST with morning cortisol of 0.9.  Dexamethasone drug level was measured  Aldosterone was normal 8 renin was normal at 6.9 ruling out hyper Hugh      CT of adrenals on November 20, 2022 showed a stable right adrenal gland measuring 1 cm with negative precontrast Hounsfield units of -10 COMPATIBLE with lipophilic benign adenoma  It also showed a stable right adrenal adenoma with negative precontrast tonsil units of 7 measurement was not provided          Aside from the bilateral adrenal adenomas he has controlled type 2 diabetes with renal complications particularly persistent albuminuria.  He is being followed by Dr. [facility].  Diabeyes was diagnosed on Feb 2022 ( a1c was 6.7%)  He reports intolerance of Metformin.          He is now Farxiga 10mg daily  He does not want to take this anymore due to cost  I recommend trying Metformin ER        He does have diabetic kidney disease  He is on an ACE inhibitor  UACR was 66 on 5/2023  UACR was 93 on 8/2022        He does have hyperlipidemia and is on atorvastatin 20 mg daily  His LDL cholesterol was 123 on 5/2023      He  had an eye exam on Ashley 15, 2022  He reports that he has an eye exam appointment on July 23        BG Diary:  Not required     Weight has been stable    Diabetes Complications   Retinopathy: No known retinopathy.  Last eye exam: 6/15/2022  Neuropathy: Denies paresthesias or numbness in hands or feet. Denies any foot wounds.  Exercise: Minimal.  Diet: Fair.  Patient's medications, allergies, and social histories were reviewed and updated as appropriate.    ROS:     CONS:     No fever, no chills   EYES:     No diplopia, no blurry vision   CV:           No chest pain, no palpitations   PULM:     No SOB, no cough, no hemoptysis.   GI:            No nausea, no vomiting, no diarrhea, no constipation   ENDO:     No polyuria, no polydipsia, no heat intolerance, no cold intolerance       Past Medical History:  Problem List:  2023-02: Sore throat  2022-11: Long term (current) use of oral hypoglycemic drugs  2022-10: Iron deficiency anemia  2022-08: Severe obesity ([facility])  2022-08: CKD stage 2 due to type 2 diabetes mellitus ([facility])  2021-11: Thomas's esophagus  2021-04: Calcification of gallbladder  2021-04: Bilateral adrenal adenomas  2021-04: Abnormal finding on GI tract imaging  2021-04: Bilateral inguinal hernia without obstruction or gangrene  2021-04: Pelvic pain  2021-04: Umbilical hernia  2021-01: Thrombocytopenia ([facility])  2021-01: Proteinuria  2021-01: Colonic polyp  2019-05: Chronic obstructive pulmonary disease ([facility])  2019-04: Type 2 diabetes mellitus with hyperlipidemia ([facility])  2018-08: Obesity (BMI 35.0-39.9 without comorbidity) ([facility])  2018-06: Anxiety and depression  2016-08: NAHUM on CPAP  2014-11: Dyslipidemia  2014-11: GERD (gastroesophageal reflux disease)  2014-11: Depression  2014-11: Tobacco use  2014-11: Right wrist pain      Past Surgical History:  Past Surgical History:   Procedure Laterality Date    APPENDECTOMY      ARTHROSCOPY, KNEE      CARPAL TUNNEL ENDOSCOPIC      bilateral    CARPAL TUNNEL RELEASE       "SHOULDER DECOMPRESSION ARTHROSCOPIC      right    TONSILLECTOMY          Allergies:  Augmentin, Metformin, and Zyban [bupropion]     Social History:  Social History     Tobacco Use    Smoking status: Every Day     Packs/day: 0.50     Years: 53.00     Pack years: 26.50     Types: Cigarettes    Smokeless tobacco: Never    Tobacco comments:     started smoking at age 15   Vaping Use    Vaping Use: Never used   Substance Use Topics    Alcohol use: Not Currently     Alcohol/week: 0.0 oz    Drug use: No        Family History:   family history includes Cancer in his father and mother; Stroke in his father.      PHYSICAL EXAM:   OBJECTIVE:  Vital signs: /84   Pulse 99   Ht 1.702 m (5' 7\")   Wt 98.6 kg (217 lb 6.4 oz)   SpO2 97%   BMI 34.05 kg/m²   GENERAL: Well-developed, well-nourished in no apparent distress.   EYE:  No ocular asymmetry, PERRLA  HENT: Pink, moist mucous membranes.    NECK: No thyromegaly.   CARDIOVASCULAR:  No murmurs  LUNGS: Clear breath sounds  ABDOMEN: Soft, nontender   EXTREMITIES: No clubbing, cyanosis, or edema.   NEUROLOGICAL: No gross focal motor abnormalities   LYMPH: No cervical adenopathy palpated.   SKIN: No rashes, lesions.     Labs:  Lab Results   Component Value Date/Time    HBA1C 6.4 (H) 01/31/2023 08:44 AM        Lab Results   Component Value Date/Time    WBC 8.5 01/31/2023 08:44 AM    RBC 4.85 01/31/2023 08:44 AM    HEMOGLOBIN 15.0 01/31/2023 08:44 AM    MCV 94.8 01/31/2023 08:44 AM    MCH 30.9 01/31/2023 08:44 AM    MCHC 32.6 (L) 01/31/2023 08:44 AM    RDW 47.3 01/31/2023 08:44 AM    MPV 10.9 01/31/2023 08:44 AM       Lab Results   Component Value Date/Time    SODIUM 136 05/08/2023 08:14 AM    POTASSIUM 5.3 05/08/2023 08:14 AM    CHLORIDE 102 05/08/2023 08:14 AM    CO2 22 05/08/2023 08:14 AM    ANION 12.0 05/08/2023 08:14 AM    GLUCOSE 115 (H) 05/08/2023 08:14 AM    BUN 18 05/08/2023 08:14 AM    CREATININE 1.22 05/08/2023 08:14 AM    CALCIUM 9.7 05/08/2023 08:14 AM    YOLANDA " 24 2023 08:14 AM    ALTSGPT 36 2023 08:14 AM    TBILIRUBIN 0.6 2023 08:14 AM    ALBUMIN 4.3 2023 08:14 AM    TOTPROTEIN 7.3 2023 08:14 AM    GLOBULIN 3.0 2023 08:14 AM    AGRATIO 1.4 2023 08:14 AM       Lab Results   Component Value Date/Time    CHOLSTRLTOT 144 2022 0813    TRIGLYCERIDE 72 2022 0813    HDL 40 2022 0813    LDL 90 2022 0813       Lab Results   Component Value Date/Time    MALBCRT 66 (H) 2023 08:14 AM    MICROALBUR 7.7 2023 08:14 AM        Lab Results   Component Value Date/Time    TSHULTRASEN 1.910 2022 0918     No results found for: FREEDIR  No results found for: FREET3  No results found for: THYSTIMIG    IMAGIN2021 9:21 AM     HISTORY/REASON FOR EXAM:  Lower abdominal and pelvic pain        TECHNIQUE/EXAM DESCRIPTION:  CT abdomen and pelvis with contrast, enterography protocol.     Following oral administration of VoLumen oral contrast and water as well as intravenous administration of 100 mL of Omnipaque 350 nonionic contrast, thin section helical CT is performed from the level of the diaphragm through the ischial tuberosities.   Axial, coronal, and sagittal images are sent to PACS.     Low dose optimization technique was utilized for this CT exam including automated exposure control and adjustment of the mA and/or kV according to patient size.     COMPARISON:  None.     FINDINGS:  Visualized lung bases are clear.     Abdomen: Calcifications are noted in the spleen. No focal mass is identified in the liver. Liver is smoothly marginated. Small calcification is noted at the inferior edge of the gallbladder. 1 cm nodule is identified within each adrenal gland. The   kidneys enhance symmetrically. Renal cysts are noted bilaterally. No follow-up recommended. There is no adenopathy or free fluid. Umbilical hernia contains abdominal fat.     Pelvis: There is no free fluid or lymphadenopathy.     Bowel: There is  wall thickening versus normal contraction between the first and second portion of the duodenum. There is extensive diverticulosis. Wall thickening in the proximal half of the sigmoid colon is identified. No other stricture or filling   defect or mass is identified within the bowel.        1.  Possible normal contraction versus abnormal wall thickening possibly caused by tumor located between the first and second portions of the duodenum.     2.  There is diverticulosis.     3.  Wall thickening in the sigmoid colon is noted which could be caused by normal contraction or spasm versus neoplasm.     4.  Otherwise no mass wall thickening or filling defect noted in the small bowel.     5.  Calcification noted in the gallbladder.     6.  Umbilical hernia contains abdominal fat.     7.  Inguinal hernias bilaterally containing abdominal fat.     8.  1 cm nodule is identified within each adrenal gland. These likely represent adrenal adenomas.  Signed by Pelon Lyons M.D. on 4/22/2021 12:02 PM  Narrative & Impression     4/22/2021 9:21 AM     HISTORY/REASON FOR EXAM:  Lower abdominal and pelvic pain        TECHNIQUE/EXAM DESCRIPTION:  CT abdomen and pelvis with contrast, enterography protocol.     Following oral administration of VoLumen oral contrast and water as well as intravenous administration of 100 mL of Omnipaque 350 nonionic contrast, thin section helical CT is performed from the level of the diaphragm through the ischial tuberosities.   Axial, coronal, and sagittal images are sent to PACS.     Low dose optimization technique was utilized for this CT exam including automated exposure control and adjustment of the mA and/or kV according to patient size.     COMPARISON:  None.     FINDINGS:  Visualized lung bases are clear.     Abdomen: Calcifications are noted in the spleen. No focal mass is identified in the liver. Liver is smoothly marginated. Small calcification is noted at the inferior edge of the gallbladder.  1 cm nodule is identified within each adrenal gland. The   kidneys enhance symmetrically. Renal cysts are noted bilaterally. No follow-up recommended. There is no adenopathy or free fluid. Umbilical hernia contains abdominal fat.     Pelvis: There is no free fluid or lymphadenopathy.     Bowel: There is wall thickening versus normal contraction between the first and second portion of the duodenum. There is extensive diverticulosis. Wall thickening in the proximal half of the sigmoid colon is identified. No other stricture or filling   defect or mass is identified within the bowel.     IMPRESSION:     1.  Possible normal contraction versus abnormal wall thickening possibly caused by tumor located between the first and second portions of the duodenum.     2.  There is diverticulosis.     3.  Wall thickening in the sigmoid colon is noted which could be caused by normal contraction or spasm versus neoplasm.     4.  Otherwise no mass wall thickening or filling defect noted in the small bowel.     5.  Calcification noted in the gallbladder.     6.  Umbilical hernia contains abdominal fat.     7.  Inguinal hernias bilaterally containing abdominal fat.        ASSESSMENT/PLAN:     1. Controlled type 2 diabetes mellitus without complication, without long-term current use of insulin (HCC)  Controlled  Stop Farxiga based on patient request despite renal benefits of this medication  Unfortunately he cannot qualify for patient assistance because of his income   Start metformin extended release 500 g twice a day  Discussed the importance of adequate hydration  He is up-to-date with his labs   I want him to follow-up in 6 months with [facility] with repeat of his urine albumin      2. Bilateral adrenal adenomas  Stable  He has stable noncancerous fat filled bilateral adrenal adenomas with no evidence of hormone hypersecretion  Additional imaging is not recommended at this time    3. Dyslipidemia  Stable  Continue atorvastatin  Continue  monitoring repeat fasting lipids in 12 months    4. Vitamin D deficiency  Stable  Continue monitoring    5. Long term (current) use of oral hypoglycemic drugs  Patient is on multiple oral agents for type 2 diabetes management      Return in about 6 months (around 11/23/2023), or if symptoms worsen or fail to improve.      Thank you kindly for allowing me to participate in the diabetes care plan for this patient.    Tj Monsalve MD, [facility], [facility]      CC:   Aris Aldana M.D.

## 2023-05-25 ENCOUNTER — TELEPHONE (OUTPATIENT)
Dept: SLEEP MEDICINE | Facility: MEDICAL CENTER | Age: 73
End: 2023-05-25
Payer: MEDICARE

## 2023-06-06 DIAGNOSIS — D35.01 BILATERAL ADRENAL ADENOMAS: ICD-10-CM

## 2023-06-06 DIAGNOSIS — D35.02 BILATERAL ADRENAL ADENOMAS: ICD-10-CM

## 2023-06-06 RX ORDER — LISINOPRIL 2.5 MG/1
2.5 TABLET ORAL DAILY
Qty: 90 TABLET | Refills: 3 | Status: SHIPPED | OUTPATIENT
Start: 2023-06-06 | End: 2023-09-03 | Stop reason: SDUPTHER

## 2023-06-07 ENCOUNTER — HOSPITAL ENCOUNTER (OUTPATIENT)
Dept: LAB | Facility: MEDICAL CENTER | Age: 73
End: 2023-06-07
Attending: INTERNAL MEDICINE
Payer: MEDICARE

## 2023-06-07 DIAGNOSIS — E11.29 CONTROLLED TYPE 2 DIABETES MELLITUS WITH MICROALBUMINURIA, WITHOUT LONG-TERM CURRENT USE OF INSULIN (HCC): ICD-10-CM

## 2023-06-07 DIAGNOSIS — R80.9 CONTROLLED TYPE 2 DIABETES MELLITUS WITH MICROALBUMINURIA, WITHOUT LONG-TERM CURRENT USE OF INSULIN (HCC): ICD-10-CM

## 2023-06-07 DIAGNOSIS — Z79.84 LONG TERM (CURRENT) USE OF ORAL HYPOGLYCEMIC DRUGS: ICD-10-CM

## 2023-06-07 DIAGNOSIS — D35.02 BILATERAL ADRENAL ADENOMAS: ICD-10-CM

## 2023-06-07 DIAGNOSIS — E78.5 DYSLIPIDEMIA: ICD-10-CM

## 2023-06-07 DIAGNOSIS — D35.01 BILATERAL ADRENAL ADENOMAS: ICD-10-CM

## 2023-06-07 DIAGNOSIS — E55.9 VITAMIN D DEFICIENCY: ICD-10-CM

## 2023-06-07 LAB
25(OH)D3 SERPL-MCNC: 56 NG/ML (ref 30–100)
ANION GAP SERPL CALC-SCNC: 13 MMOL/L (ref 7–16)
BUN SERPL-MCNC: 16 MG/DL (ref 8–22)
CALCIUM SERPL-MCNC: 9.6 MG/DL (ref 8.5–10.5)
CHLORIDE SERPL-SCNC: 101 MMOL/L (ref 96–112)
CO2 SERPL-SCNC: 21 MMOL/L (ref 20–33)
CREAT SERPL-MCNC: 1.09 MG/DL (ref 0.5–1.4)
FASTING STATUS PATIENT QL REPORTED: NORMAL
GFR SERPLBLD CREATININE-BSD FMLA CKD-EPI: 71 ML/MIN/1.73 M 2
GLUCOSE SERPL-MCNC: 102 MG/DL (ref 65–99)
POTASSIUM SERPL-SCNC: 4.6 MMOL/L (ref 3.6–5.5)
SODIUM SERPL-SCNC: 135 MMOL/L (ref 135–145)

## 2023-06-07 PROCEDURE — 80048 BASIC METABOLIC PNL TOTAL CA: CPT

## 2023-06-07 PROCEDURE — 82306 VITAMIN D 25 HYDROXY: CPT

## 2023-06-07 PROCEDURE — 36415 COLL VENOUS BLD VENIPUNCTURE: CPT

## 2023-06-14 ENCOUNTER — OFFICE VISIT (OUTPATIENT)
Dept: NEPHROLOGY | Facility: MEDICAL CENTER | Age: 73
End: 2023-06-14
Payer: MEDICARE

## 2023-06-14 VITALS
TEMPERATURE: 97.6 F | BODY MASS INDEX: 33.43 KG/M2 | HEIGHT: 67 IN | HEART RATE: 99 BPM | DIASTOLIC BLOOD PRESSURE: 70 MMHG | OXYGEN SATURATION: 97 % | SYSTOLIC BLOOD PRESSURE: 126 MMHG | WEIGHT: 213 LBS

## 2023-06-14 DIAGNOSIS — I10 ESSENTIAL HYPERTENSION: ICD-10-CM

## 2023-06-14 DIAGNOSIS — E55.9 VITAMIN D DEFICIENCY: ICD-10-CM

## 2023-06-14 DIAGNOSIS — R80.9 MICROALBUMINURIA DUE TO TYPE 2 DIABETES MELLITUS (HCC): ICD-10-CM

## 2023-06-14 DIAGNOSIS — D64.9 ANEMIA, UNSPECIFIED TYPE: ICD-10-CM

## 2023-06-14 DIAGNOSIS — N18.2 CKD (CHRONIC KIDNEY DISEASE), STAGE II: ICD-10-CM

## 2023-06-14 DIAGNOSIS — R80.9 PROTEINURIA, UNSPECIFIED TYPE: ICD-10-CM

## 2023-06-14 DIAGNOSIS — E11.29 MICROALBUMINURIA DUE TO TYPE 2 DIABETES MELLITUS (HCC): ICD-10-CM

## 2023-06-14 PROCEDURE — 99213 OFFICE O/P EST LOW 20 MIN: CPT | Performed by: INTERNAL MEDICINE

## 2023-06-14 PROCEDURE — 3074F SYST BP LT 130 MM HG: CPT | Performed by: INTERNAL MEDICINE

## 2023-06-14 PROCEDURE — 3078F DIAST BP <80 MM HG: CPT | Performed by: INTERNAL MEDICINE

## 2023-06-14 ASSESSMENT — ENCOUNTER SYMPTOMS
CHILLS: 0
FLANK PAIN: 0
ORTHOPNEA: 0
EYES NEGATIVE: 1
HEMOPTYSIS: 0
NAUSEA: 0
ABDOMINAL PAIN: 0
SHORTNESS OF BREATH: 0
VOMITING: 0
WEIGHT LOSS: 0
PALPITATIONS: 0
COUGH: 0
WHEEZING: 0
SINUS PAIN: 0
DIARRHEA: 0
FEVER: 0

## 2023-06-14 ASSESSMENT — FIBROSIS 4 INDEX: FIB4 SCORE: 1.05

## 2023-06-14 NOTE — PROGRESS NOTES
Subjective:      Micheal Brothers Jr. is a 72 y.o. male who presents with Follow-Up and Chronic Kidney Disease            Chronic Kidney Disease  Pertinent negatives include no abdominal pain, chest pain, chills, congestion, coughing, fever, nausea or vomiting.     Micheal is coming today for f/u of proteinuria, CKD II  Doing well, no complaints  No dysuria/hematuria/flank pain  HTN: BP well controlled  CKD II stable at baseline at 1.0-1.2  Microalbuminuria better, very mild - on ACEi -to monitor      Review of Systems   Constitutional:  Negative for chills, fever, malaise/fatigue and weight loss.   HENT:  Negative for congestion, hearing loss and sinus pain.    Eyes: Negative.    Respiratory:  Negative for cough, hemoptysis, shortness of breath and wheezing.    Cardiovascular:  Negative for chest pain, palpitations, orthopnea and leg swelling.   Gastrointestinal:  Negative for abdominal pain, diarrhea, nausea and vomiting.   Genitourinary:  Negative for dysuria, flank pain, frequency, hematuria and urgency.   Skin: Negative.    All other systems reviewed and are negative.    Past Medical History:   Diagnosis Date    Back pain     Chickenpox     Cough     Depression     GERD (gastroesophageal reflux disease)     Uzbek measles     Gout     Hyperlipidemia     Influenza     Panic disorder     Shortness of breath     Sleep apnea     Sputum production     Tobacco use 11/26/2014    Wheezing        Family History   Problem Relation Age of Onset    Cancer Mother     Cancer Father     Stroke Father     Diabetes Neg Hx     Heart Disease Neg Hx     Hypertension Neg Hx        Social History     Socioeconomic History    Marital status:    Tobacco Use    Smoking status: Every Day     Packs/day: 0.50     Years: 53.00     Pack years: 26.50     Types: Cigarettes    Smokeless tobacco: Never    Tobacco comments:     started smoking at age 15   Vaping Use    Vaping Use: Never used   Substance and Sexual Activity     "Alcohol use: Not Currently     Alcohol/week: 0.0 oz    Drug use: No    Sexual activity: Yes     Partners: Female          Objective:     /70 (BP Location: Right arm, Patient Position: Sitting, BP Cuff Size: Adult)   Pulse 99   Temp 36.4 °C (97.6 °F)   Ht 1.702 m (5' 7\")   Wt 96.6 kg (213 lb)   SpO2 97%   BMI 33.36 kg/m²      Physical Exam  Vitals reviewed.   Constitutional:       General: He is not in acute distress.     Appearance: Normal appearance. He is well-developed. He is not diaphoretic.   HENT:      Head: Normocephalic and atraumatic.      Nose: Nose normal.      Mouth/Throat:      Mouth: Mucous membranes are moist.      Pharynx: Oropharynx is clear.   Eyes:      Extraocular Movements: Extraocular movements intact.      Conjunctiva/sclera: Conjunctivae normal.      Pupils: Pupils are equal, round, and reactive to light.   Cardiovascular:      Rate and Rhythm: Normal rate and regular rhythm.      Pulses: Normal pulses.      Heart sounds: Normal heart sounds.   Pulmonary:      Effort: Pulmonary effort is normal. No respiratory distress.      Breath sounds: Normal breath sounds. No wheezing or rales.   Abdominal:      General: Bowel sounds are normal. There is no distension.      Palpations: Abdomen is soft. There is no mass.      Tenderness: There is no abdominal tenderness. There is no right CVA tenderness or left CVA tenderness.   Musculoskeletal:      Cervical back: Normal range of motion and neck supple.      Right lower leg: No edema.      Left lower leg: No edema.   Skin:     General: Skin is warm.      Coloration: Skin is not pale.      Findings: No erythema or rash.   Neurological:      General: No focal deficit present.      Mental Status: He is alert and oriented to person, place, and time.      Cranial Nerves: No cranial nerve deficit.      Coordination: Coordination normal.   Psychiatric:         Mood and Affect: Mood normal.         Behavior: Behavior normal.         Thought Content: " Thought content normal.         Judgment: Judgment normal.               Lab results reviewed: d/w Pt  Lab Results   Component Value Date/Time    CREATININE 1.09 06/07/2023 08:43 AM    POTASSIUM 4.6 06/07/2023 08:43 AM       Assessment/Plan:          1. CKD (chronic kidney disease), stage II  -   Creat stable at baseline -to monitor    2. Microalbuminuria due to type 2 diabetes mellitus (HCC)      Mild on ACEi    4. Essential hypertension      BP well controlled -to monitor at home    5. Anemia:      Hb WNL -to monitor    6. Vitamin D deficiency      Well controlled      To monitor    Recs:  Continue current treatment  Monitor BP and call clinic if BP > 135/85  Low salt diet  Keep well hydrated  F/u 12 months

## 2023-06-23 ENCOUNTER — OFFICE VISIT (OUTPATIENT)
Dept: DERMATOLOGY | Facility: IMAGING CENTER | Age: 73
End: 2023-06-23
Payer: MEDICARE

## 2023-06-23 DIAGNOSIS — B07.9 VIRAL WARTS, UNSPECIFIED TYPE: ICD-10-CM

## 2023-06-23 DIAGNOSIS — M54.9 CHRONIC BACK PAIN, UNSPECIFIED BACK LOCATION, UNSPECIFIED BACK PAIN LATERALITY: ICD-10-CM

## 2023-06-23 DIAGNOSIS — L92.0 GRANULOMA ANNULARE: ICD-10-CM

## 2023-06-23 DIAGNOSIS — G89.29 CHRONIC BACK PAIN, UNSPECIFIED BACK LOCATION, UNSPECIFIED BACK PAIN LATERALITY: ICD-10-CM

## 2023-06-23 PROCEDURE — 99213 OFFICE O/P EST LOW 20 MIN: CPT | Performed by: DERMATOLOGY

## 2023-06-23 NOTE — PROGRESS NOTES
"CC: warts/ GA    Subjective: Previously seen patient here for wart on right 3rd finger - recurred after freezing, home therapies and bx in April 2023.  Desires site be cut out.     Interested in prednisone for back pain - reports stenosis and back discomfort alleviated with steroids. Can't see PCP for 2 months.    GA well controlled. Denies desire for [place] steroid trx today.     From prior notes:  \"Initial hx.  Duration of rash: previously dx [place] (2012, [place])  Symptoms: itching, only one site on ant right shin  Current treatment: Trental 400 mg  Prior rx: prednisone, hydroxychloroquine, minocycline & nicotinamide\"     ROS: no fevers/chills. + itch.  No cough. Chronic back pain  Relevant PMH:mult med comorbidities  Social: tobacco use;     PE: Gen:WDWN male in NAD.  Skin: focal exam: depressed scar with disruption of finger print pattern on 3rd digit. Few pink papules right leg, appearing dermal.     A/P: GA: quiescent today  -rtc injections PRN    VV, right 3rd finger:  -declined LN2 / shave removal today  -Advised interim home therapies  -will rtc 2 months for [id number] preferred  -offered hand surgery for specialty care, declined    Back pain: chronic  -advised pain clinic, declined referral today  -defer steroid trx to PCP/Pain specialist, as not aware of trx as indicated for stenosis and risks high (DM/Thomas's esophagus, on BP med)    F/u prn    I have reviewed medications relevant to my specialty.  "

## 2023-06-26 ENCOUNTER — OFFICE VISIT (OUTPATIENT)
Dept: URGENT CARE | Facility: PHYSICIAN GROUP | Age: 73
End: 2023-06-26
Payer: MEDICARE

## 2023-06-26 VITALS
RESPIRATION RATE: 16 BRPM | BODY MASS INDEX: 33.59 KG/M2 | TEMPERATURE: 97.5 F | HEIGHT: 67 IN | WEIGHT: 214 LBS | DIASTOLIC BLOOD PRESSURE: 70 MMHG | SYSTOLIC BLOOD PRESSURE: 118 MMHG | OXYGEN SATURATION: 98 % | HEART RATE: 80 BPM

## 2023-06-26 DIAGNOSIS — M54.50 ACUTE LEFT-SIDED LOW BACK PAIN WITHOUT SCIATICA: ICD-10-CM

## 2023-06-26 DIAGNOSIS — S39.012A LUMBAR STRAIN, INITIAL ENCOUNTER: ICD-10-CM

## 2023-06-26 PROCEDURE — 3074F SYST BP LT 130 MM HG: CPT

## 2023-06-26 PROCEDURE — 3078F DIAST BP <80 MM HG: CPT

## 2023-06-26 PROCEDURE — 99213 OFFICE O/P EST LOW 20 MIN: CPT

## 2023-06-26 RX ORDER — METHYLPREDNISOLONE 4 MG/1
TABLET ORAL
Qty: 21 TABLET | Refills: 0 | Status: SHIPPED | OUTPATIENT
Start: 2023-06-26 | End: 2023-11-28

## 2023-06-26 ASSESSMENT — FIBROSIS 4 INDEX: FIB4 SCORE: 1.05

## 2023-06-26 NOTE — PROGRESS NOTES
Subjective:   Micheal Brothers Jr. is a 72 y.o. male who presents for Back Pain (Pt states he has been having low back pain for a while and it started hurting again x 1 week)      HPI:    Patient presents to urgent care with acute low back injury sustained one week ago while lifting a heavy object.  No radiation  Pain is described as an ache.  Exacerbated by extension, left rotation, lateral bending.  Pain is alleviated with rest, hot packs, massage  No associated symptoms like: Fever, bowel/bladder incontinence, saddle anesthesia, neurological deficits.  Denies history of steroid use, malignancy, infection, depression.  Retired , reports history of arthritis      ROS As above in HPI    Medications:    Current Outpatient Medications on File Prior to Visit   Medication Sig Dispense Refill    lisinopril (PRINIVIL) 2.5 MG Tab TAKE 1 TABLET BY MOUTH EVERY DAY 90 Tablet 3    metFORMIN ER (GLUCOPHAGE XR) 500 MG TABLET SR 24 HR Take 2 Tablets by mouth every day. 180 Tablet 1    ipratropium-albuterol (DUONEB) 0.5-2.5 (3) MG/3ML nebulizer solution Take 3 mL by nebulization every four hours as needed for Shortness of Breath (Wheezing). 3 mL 3    DULoxetine (CYMBALTA) 60 MG Cap DR Particles delayed-release capsule TAKE 1 CAPSULE DAILY 90 Capsule 0    omeprazole (PRILOSEC) 40 MG delayed-release capsule TAKE 1 CAPSULE DAILY 90 Capsule 0    atorvastatin (LIPITOR) 20 MG Tab TAKE 1 TABLET DAILY 90 Tablet 2    albuterol 108 (90 Base) MCG/ACT Aero Soln inhalation aerosol Inhale 2 Puffs every 6 hours as needed for Shortness of Breath. 8.5 g 3    nystatin (MYCOSTATIN) 086717 UNIT/GM Cream topical cream Apply 1 g topically 2 times a day. 30 g 1    budesonide-formoterol (SYMBICORT) 160-4.5 MCG/ACT Aerosol Inhale 2 Puffs 2 times a day. 2 Each 6    Cholecalciferol (VITAMIN D) 125 MCG (5000 UT) Cap Take 5,000 Units by mouth every day.       No current facility-administered medications on file prior to visit.     "    Allergies:   Augmentin, Metformin, and Zyban [bupropion]    Problem List:   Patient Active Problem List   Diagnosis    Dyslipidemia    GERD (gastroesophageal reflux disease)    Tobacco use    NAHUM on CPAP    Anxiety and depression    Type 2 diabetes mellitus with hyperlipidemia (HCC)    Chronic obstructive pulmonary disease (HCC)    Colonic polyp    Proteinuria    Thrombocytopenia (HCC)    Pelvic pain    Umbilical hernia    Bilateral adrenal adenomas    Abnormal finding on GI tract imaging    Bilateral inguinal hernia without obstruction or gangrene    Calcification of gallbladder    Thomas's esophagus    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)    Severe obesity (HCC)    Iron deficiency anemia    Long term (current) use of oral hypoglycemic drugs    Sore throat        Surgical History:  Past Surgical History:   Procedure Laterality Date    APPENDECTOMY      ARTHROSCOPY, KNEE      CARPAL TUNNEL ENDOSCOPIC      bilateral    CARPAL TUNNEL RELEASE      SHOULDER DECOMPRESSION ARTHROSCOPIC      right    TONSILLECTOMY         Past Social Hx:   Social History     Tobacco Use    Smoking status: Every Day     Packs/day: 0.50     Years: 53.00     Pack years: 26.50     Types: Cigarettes    Smokeless tobacco: Never    Tobacco comments:     started smoking at age 15   Vaping Use    Vaping Use: Never used   Substance Use Topics    Alcohol use: Not Currently     Alcohol/week: 0.0 oz    Drug use: No          Problem list, medications, and allergies reviewed by myself today in Epic.     Objective:     /70 (BP Location: Right arm, Patient Position: Sitting, BP Cuff Size: Adult long)   Pulse 80   Temp 36.4 °C (97.5 °F) (Temporal)   Resp 16   Ht 1.702 m (5' 7\")   Wt 97.1 kg (214 lb)   SpO2 98%   BMI 33.52 kg/m²     Physical Exam  Vitals and nursing note reviewed.   Constitutional:       Appearance: Normal appearance.   Cardiovascular:      Rate and Rhythm: Normal rate and regular rhythm.      Heart sounds: " Normal heart sounds.   Pulmonary:      Effort: Pulmonary effort is normal.      Breath sounds: Normal breath sounds.   Abdominal:      General: Bowel sounds are normal.      Palpations: Abdomen is soft.      Tenderness: There is no right CVA tenderness or left CVA tenderness.   Musculoskeletal:         General: Tenderness present. No swelling, deformity or signs of injury.      Right lower leg: No edema.      Left lower leg: No edema.      Comments: Left lumbar paraspinal muscles are tender to palpation. No deformity, dislocation, crepitus, bony step offs. Cervical and thoracic spinous processes are not tender to palpation. No tenderness to palpation of bilateral hips. Slightly reduced range of motions secondary to pain. Sensation is intact to light and sharp touch and is symmetric, cap refill is less than 2 seconds, 2+ DP pulses bilaterally. No edema, erythema, fluctuance, warmth. No ecchymosis. No rash. Negative R SLR, negative L SLR.    Skin:     Capillary Refill: Capillary refill takes less than 2 seconds.      Findings: No bruising or erythema.   Neurological:      Mental Status: He is alert and oriented to person, place, and time.         Assessment/Plan:     Diagnosis and associated orders:   1. Acute left-sided low back pain without sciatica  - methylPREDNISolone (MEDROL DOSEPAK) 4 MG Tablet Therapy Pack; Follow schedule on package instructions.  Dispense: 21 Tablet; Refill: 0        Comments/MDM:     Low back pain without radiculopathy.  No red flags.  Scheduled Tylenol as first-line treatment also recommended capsaicin cream, lidocaine patches.  Continue with ice packs, heat packs.  Avoid lifting, carrying, pushing, pulling of heavy objects until pain has resolved  Physical activity as tolerated, encourage patient to stay active.  Reviewed low back exercises, see AVS  Return to urgent care low back pain does not improve over the next week         Please note that this dictation was created using voice  recognition software. I have made a reasonable attempt to correct obvious errors, but I expect that there are errors of grammar and possibly content that I did not discover before finalizing the note.    This note was electronically signed by Alysa Alfaro DNP

## 2023-07-03 ENCOUNTER — OFFICE VISIT (OUTPATIENT)
Dept: SLEEP MEDICINE | Facility: MEDICAL CENTER | Age: 73
End: 2023-07-03
Attending: STUDENT IN AN ORGANIZED HEALTH CARE EDUCATION/TRAINING PROGRAM
Payer: MEDICARE

## 2023-07-03 VITALS
SYSTOLIC BLOOD PRESSURE: 118 MMHG | DIASTOLIC BLOOD PRESSURE: 62 MMHG | WEIGHT: 208 LBS | BODY MASS INDEX: 32.65 KG/M2 | OXYGEN SATURATION: 97 % | HEART RATE: 98 BPM | HEIGHT: 67 IN

## 2023-07-03 DIAGNOSIS — R05.2 SUBACUTE COUGH: ICD-10-CM

## 2023-07-03 DIAGNOSIS — J44.9 CHRONIC OBSTRUCTIVE PULMONARY DISEASE, UNSPECIFIED COPD TYPE (HCC): ICD-10-CM

## 2023-07-03 PROBLEM — R05.9 COUGH: Status: ACTIVE | Noted: 2023-07-03

## 2023-07-03 PROCEDURE — 3074F SYST BP LT 130 MM HG: CPT | Performed by: STUDENT IN AN ORGANIZED HEALTH CARE EDUCATION/TRAINING PROGRAM

## 2023-07-03 PROCEDURE — 99212 OFFICE O/P EST SF 10 MIN: CPT | Performed by: STUDENT IN AN ORGANIZED HEALTH CARE EDUCATION/TRAINING PROGRAM

## 2023-07-03 PROCEDURE — 3078F DIAST BP <80 MM HG: CPT | Performed by: STUDENT IN AN ORGANIZED HEALTH CARE EDUCATION/TRAINING PROGRAM

## 2023-07-03 PROCEDURE — 99215 OFFICE O/P EST HI 40 MIN: CPT | Performed by: STUDENT IN AN ORGANIZED HEALTH CARE EDUCATION/TRAINING PROGRAM

## 2023-07-03 ASSESSMENT — ENCOUNTER SYMPTOMS
FEVER: 0
SPUTUM PRODUCTION: 1
HEMOPTYSIS: 0
COUGH: 1
CHILLS: 0
VOMITING: 0
NAUSEA: 0
FOCAL WEAKNESS: 0
EYE DISCHARGE: 0
FALLS: 0
SHORTNESS OF BREATH: 0
WHEEZING: 0

## 2023-07-03 ASSESSMENT — FIBROSIS 4 INDEX: FIB4 SCORE: 1.05

## 2023-07-03 NOTE — ASSESSMENT & PLAN NOTE
Likely postinfectious - now almost completely resolved. Also suspect a mild component of his COPD and smoking contributing to cough    - plan as noted for COPD

## 2023-07-03 NOTE — PROGRESS NOTES
Pulmonary Clinic Note    Chief Complaint:  Chief Complaint   Patient presents with    Follow-Up     Last seen 3/14/23      HPI:   Micheal Brothers Jr. is a very pleasant 72 y.o. male with history of tobacco smoking 50+ pkyrs, currently down to 4-5 cigarettes/day, who presents to pulmonary clinic for COPD.     3/14/23: Patient states that he and his wife went on a cruise about 6 months ago which developed a respiratory infection.  Since then he has had a significant cough that has slowly improved with the inhaler but not resolved.  Reports doing reductive cough feels like he has to continuously clear and cough up the sputum in order to be able to breathe.   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    7/3/23: doing well - cough almost completely resolved. Stopped using symbicort and rarely uses albuterol rescue inhaler. Does nebs when he feels like he needs to help clear his cough/congestion. Compliant with his CPAP.     Past Medical History:   Diagnosis Date    Back pain     Chickenpox     Cough 7/3/2023    Depression     GERD (gastroesophageal reflux disease)     [place] measles     Gout     Hyperlipidemia     Influenza     Panic disorder     Sleep apnea     Sputum production     Tobacco use 11/26/2014    Wheezing        Past Surgical History:   Procedure Laterality Date    APPENDECTOMY      ARTHROSCOPY, KNEE      CARPAL TUNNEL ENDOSCOPIC      bilateral    CARPAL TUNNEL RELEASE      SHOULDER DECOMPRESSION ARTHROSCOPIC      right    TONSILLECTOMY         Social History     Socioeconomic History    Marital status:      Spouse name: Not on file    Number of children: Not on file    Years of education: Not on file    Highest education level: Not on file   Occupational History    Not on file   Tobacco Use    Smoking status: Every Day     Packs/day: 0.50     Years: 53.00     Pack years: 26.50     Types: Cigarettes    Smokeless tobacco: Never     Tobacco comments:     started smoking at age 15   Vaping Use    Vaping Use: Never used   Substance and Sexual Activity    Alcohol use: Not Currently     Alcohol/week: 0.0 oz    Drug use: No    Sexual activity: Yes     Partners: Female   Other Topics Concern    Not on file   Social History Narrative    Not on file     Social Determinants of Health     Financial Resource Strain: Not on file   Food Insecurity: Not on file   Transportation Needs: Not on file   Physical Activity: Not on file   Stress: Not on file   Social Connections: Not on file   Intimate Partner Violence: Not on file   Housing Stability: Not on file          Family History   Problem Relation Age of Onset    Cancer Mother     Cancer Father     Stroke Father     Diabetes Neg Hx     Heart Disease Neg Hx     Hypertension Neg Hx        Current Outpatient Medications on File Prior to Visit   Medication Sig Dispense Refill    methylPREDNISolone (MEDROL DOSEPAK) 4 MG Tablet Therapy Pack Follow schedule on package instructions. 21 Tablet 0    lisinopril (PRINIVIL) 2.5 MG Tab TAKE 1 TABLET BY MOUTH EVERY DAY 90 Tablet 3    metFORMIN ER (GLUCOPHAGE XR) 500 MG TABLET SR 24 HR Take 2 Tablets by mouth every day. 180 Tablet 1    ipratropium-albuterol (DUONEB) 0.5-2.5 (3) MG/3ML nebulizer solution Take 3 mL by nebulization every four hours as needed for Shortness of Breath (Wheezing). 3 mL 3    DULoxetine (CYMBALTA) 60 MG Cap DR Particles delayed-release capsule TAKE 1 CAPSULE DAILY 90 Capsule 0    omeprazole (PRILOSEC) 40 MG delayed-release capsule TAKE 1 CAPSULE DAILY 90 Capsule 0    atorvastatin (LIPITOR) 20 MG Tab TAKE 1 TABLET DAILY 90 Tablet 2    albuterol 108 (90 Base) MCG/ACT Aero Soln inhalation aerosol Inhale 2 Puffs every 6 hours as needed for Shortness of Breath. 8.5 g 3    nystatin (MYCOSTATIN) 869543 UNIT/GM Cream topical cream Apply 1 g topically 2 times a day. 30 g 1    budesonide-formoterol (SYMBICORT) 160-4.5 MCG/ACT Aerosol Inhale 2 Puffs 2 times a  "day. 2 Each 6    Cholecalciferol (VITAMIN D) 125 MCG (5000 UT) Cap Take 5,000 Units by mouth every day.       No current facility-administered medications on file prior to visit.       Allergies: Augmentin, Metformin, and Zyban [bupropion]      ROS:   Review of Systems   Constitutional:  Negative for chills and fever.   HENT:  Negative for hearing loss.    Eyes:  Negative for discharge.   Respiratory:  Positive for cough and sputum production. Negative for hemoptysis, shortness of breath and wheezing.    Cardiovascular:  Negative for chest pain.   Gastrointestinal:  Negative for nausea and vomiting.   Musculoskeletal:  Negative for falls.   Skin:  Negative for rash.   Neurological:  Negative for focal weakness.       Vitals:  /62 (BP Location: Left arm, Patient Position: Sitting, BP Cuff Size: Adult)   Pulse 98   Ht 1.702 m (5' 7\")   Wt 94.3 kg (208 lb)   SpO2 97%     Physical Exam:  Physical Exam  Vitals and nursing note reviewed.   Constitutional:       General: He is not in acute distress.     Appearance: Normal appearance. He is not ill-appearing or toxic-appearing.   HENT:      Head: Normocephalic and atraumatic.      Nose: Nose normal.      Mouth/Throat:      Mouth: Mucous membranes are moist.   Eyes:      General: No scleral icterus.     Conjunctiva/sclera: Conjunctivae normal.   Cardiovascular:      Rate and Rhythm: Normal rate and regular rhythm.   Pulmonary:      Effort: Pulmonary effort is normal. No respiratory distress.      Breath sounds: No wheezing, rhonchi or rales.   Abdominal:      Palpations: Abdomen is soft.   Musculoskeletal:         General: No deformity or signs of injury. Normal range of motion.      Cervical back: Normal range of motion.   Skin:     General: Skin is warm and dry.   Neurological:      General: No focal deficit present.      Mental Status: He is alert. Mental status is at baseline.   Psychiatric:         Mood and Affect: Mood normal.         Behavior: Behavior " normal.         Data:    PFT 2/1/23: Mild obstruction without a significant BD response.  No restriction.    Assessment/Plan:    Problem List Items Addressed This Visit       Chronic obstructive pulmonary disease (HCC)     PFTs w/mild obstruction w/o BD response. Still smoking but has cut down significantly from multiple packs per day to 4-5 cigarettes per day now.      - smoking cessation  - prn albuterol  - duonebs prn  - encouraged to remain active/exercise routinely   - did discuss with patient at last visit the increased risk for malignancy due to his smoking history but he does not want to pursue CT chest at this time bc he's not sure if he would like to know if he has lung cancer - can re-address in future visits if he's more amenable          Cough     Likely postinfectious - now almost completely resolved. Also suspect a mild component of his COPD and smoking contributing to cough    - plan as noted for COPD                Return in 6-12 months or if symptoms worsen or fail to improve.     This note was generated using voice recognition software which has a chance of producing errors of grammar and possibly content.  I have made every reasonable attempt to find and correct any obvious errors, but it should be expected that some may not be found prior to finalization of this note.    Time spent in record review prior to patient arrival, reviewing results, and in face-to-face encounter totaled 40 min, excluding any procedures if performed.    Rosario Sánchez MD  Pulmonary and Critical Care Medicine  Formerly [facility], later [facility]

## 2023-07-03 NOTE — ASSESSMENT & PLAN NOTE
PFTs w/mild obstruction w/o BD response. Still smoking but has cut down significantly from multiple packs per day to 4-5 cigarettes per day now.      - smoking cessation  - prn albuterol  - duonebs prn  - encouraged to remain active/exercise routinely   - did discuss with patient at last visit the increased risk for malignancy due to his smoking history but he does not want to pursue CT chest at this time bc he's not sure if he would like to know if he has lung cancer - can re-address in future visits if he's more amenable

## 2023-07-27 ENCOUNTER — APPOINTMENT (OUTPATIENT)
Dept: DERMATOLOGY | Facility: IMAGING CENTER | Age: 73
End: 2023-07-27

## 2023-08-13 DIAGNOSIS — F41.0 PANIC DISORDER: ICD-10-CM

## 2023-08-13 DIAGNOSIS — K21.9 GASTROESOPHAGEAL REFLUX DISEASE WITHOUT ESOPHAGITIS: ICD-10-CM

## 2023-08-14 RX ORDER — OMEPRAZOLE 40 MG/1
CAPSULE, DELAYED RELEASE ORAL
Qty: 90 CAPSULE | Refills: 0 | Status: SHIPPED | OUTPATIENT
Start: 2023-08-14 | End: 2023-11-07

## 2023-08-14 RX ORDER — DULOXETIN HYDROCHLORIDE 60 MG/1
CAPSULE, DELAYED RELEASE ORAL
Qty: 90 CAPSULE | Refills: 0 | Status: SHIPPED | OUTPATIENT
Start: 2023-08-14 | End: 2023-11-07

## 2023-09-03 DIAGNOSIS — D35.01 BILATERAL ADRENAL ADENOMAS: ICD-10-CM

## 2023-09-03 DIAGNOSIS — D35.02 BILATERAL ADRENAL ADENOMAS: ICD-10-CM

## 2023-09-03 RX ORDER — LISINOPRIL 2.5 MG/1
2.5 TABLET ORAL DAILY
Qty: 90 TABLET | Refills: 3 | Status: SHIPPED | OUTPATIENT
Start: 2023-09-03

## 2023-10-23 ENCOUNTER — TELEPHONE (OUTPATIENT)
Dept: HEALTH INFORMATION MANAGEMENT | Facility: OTHER | Age: 73
End: 2023-10-23
Payer: MEDICARE

## 2023-10-26 ENCOUNTER — OFFICE VISIT (OUTPATIENT)
Dept: DERMATOLOGY | Facility: IMAGING CENTER | Age: 73
End: 2023-10-26
Payer: MEDICARE

## 2023-10-26 DIAGNOSIS — L02.224 BOIL OF GROIN: ICD-10-CM

## 2023-10-26 DIAGNOSIS — L28.1 PICKER'S NODULE: ICD-10-CM

## 2023-10-26 DIAGNOSIS — L81.4 LENTIGO: ICD-10-CM

## 2023-10-26 DIAGNOSIS — D69.2 SENILE PURPURA (HCC): ICD-10-CM

## 2023-10-26 DIAGNOSIS — L92.0 GRANULOMA ANNULARE: ICD-10-CM

## 2023-10-26 DIAGNOSIS — L82.1 SEBORRHEIC KERATOSIS: ICD-10-CM

## 2023-10-26 DIAGNOSIS — L30.9 ECZEMA, UNSPECIFIED TYPE: ICD-10-CM

## 2023-10-26 DIAGNOSIS — B07.9 VIRAL WARTS, UNSPECIFIED TYPE: ICD-10-CM

## 2023-10-26 DIAGNOSIS — L29.9 ITCHING: ICD-10-CM

## 2023-10-26 PROBLEM — F41.9 ANXIETY: Status: ACTIVE | Noted: 2023-10-26

## 2023-10-26 PROCEDURE — 11102 TANGNTL BX SKIN SINGLE LES: CPT | Performed by: DERMATOLOGY

## 2023-10-26 PROCEDURE — 99213 OFFICE O/P EST LOW 20 MIN: CPT | Mod: 25 | Performed by: DERMATOLOGY

## 2023-10-26 RX ORDER — SODIUM FLUORIDE 6.1 MG/ML
GEL, DENTIFRICE DENTAL
COMMUNITY
Start: 2023-08-10

## 2023-10-26 RX ORDER — NYSTATIN 100000 U/G
CREAM TOPICAL
Qty: 30 G | Refills: 3 | Status: SHIPPED | OUTPATIENT
Start: 2023-10-26

## 2023-10-26 NOTE — PROGRESS NOTES
"CC: warts/GA    Subjective: Previously seen patient here for full body exam.  Denies sites I/B/C/B  Has scratched at site on right arm - unroofs and poorly healing  GA on arms/legs, periodic without flare today. Would consider restarting pentoxyfilene if recurs.     Groin rash    wart on right 3rd finger - recurred after freezing, home therapies and bx in April 2023.  Desires re-bx today.       From prior notes:  \"Initial hx.  Duration of rash: previously dx GA (2012, Amy, ID)  Symptoms: itching, only one site on ant right shin  Current treatment: Trental 400 mg  Prior rx: prednisone, hydroxychloroquine, minocycline & nicotinamide\"     ROS: no fevers/chills. + itch.  No cough. Chronic back pain  Relevant PMH:mult med comorbidities  Social: tobacco use;     PE: Gen:WDWN male in NAD.  Skin: Scalp/face/eyes/lips/neck/chest/back/arms/legs/hands/feet/buttocks - without suspicious lesions noted.  Genitals exam declined  -scattered hyperpigmented macules/papules appearing on head/torso/extremities, appearing benign without suspicious features  -r 3rd digit verrucous papule.   -Few pink papules right leg, appearing dermal.   -pink dermal papules/papulonodules on right forearm and left upper arm  -purpuric patch on left arm    A/P: GA: quiescent or limited affected skin today  -rtc injections PRN    VV, right 3rd finger:  -consent for shave removal/bx, including R/B/A. Cleaned with EtOH, anesthesia with lidocaine 1% without epinephrine, shave bx, AlCl3 for hemostasis followed by electrodesciation for bleeding  -vaseline/bandage and wound care reviewed    R arm 's nodule, Itching: reviewed use of antihistamines, future possible MTX/dupixent? - may improve any eczematous rashes and/or GA, though risks of immunosuppression on MTX  -f/u PRN  -reviewed moisturizer use  -counseled re: signs/sx of topical steroid toxicity    Hx boils groin:  -advised hibiclens as body wash  -intertrigo zone: desired rf nystatin cream "     Lentigos/SK:head>torso/extremities    Senile purpura, left arm:  -no trx reviewed today    F/u 6mo-1year/PRN    I have reviewed medications relevant to my specialty.

## 2023-11-02 ENCOUNTER — TELEPHONE (OUTPATIENT)
Dept: DERMATOLOGY | Facility: IMAGING CENTER | Age: 73
End: 2023-11-02
Payer: MEDICARE

## 2023-11-04 DIAGNOSIS — K21.9 GASTROESOPHAGEAL REFLUX DISEASE WITHOUT ESOPHAGITIS: ICD-10-CM

## 2023-11-04 DIAGNOSIS — F41.0 PANIC DISORDER: ICD-10-CM

## 2023-11-07 RX ORDER — OMEPRAZOLE 40 MG/1
CAPSULE, DELAYED RELEASE ORAL
Qty: 90 CAPSULE | Refills: 0 | Status: SHIPPED | OUTPATIENT
Start: 2023-11-07 | End: 2024-02-27 | Stop reason: SDUPTHER

## 2023-11-07 RX ORDER — DULOXETIN HYDROCHLORIDE 60 MG/1
CAPSULE, DELAYED RELEASE ORAL
Qty: 90 CAPSULE | Refills: 0 | Status: SHIPPED | OUTPATIENT
Start: 2023-11-07 | End: 2024-02-27

## 2023-11-08 ENCOUNTER — HOSPITAL ENCOUNTER (OUTPATIENT)
Dept: LAB | Facility: MEDICAL CENTER | Age: 73
End: 2023-11-08
Attending: INTERNAL MEDICINE
Payer: MEDICARE

## 2023-11-08 DIAGNOSIS — N18.2 CKD (CHRONIC KIDNEY DISEASE), STAGE II: ICD-10-CM

## 2023-11-08 DIAGNOSIS — R80.9 PROTEINURIA, UNSPECIFIED TYPE: ICD-10-CM

## 2023-11-08 DIAGNOSIS — I10 ESSENTIAL HYPERTENSION: ICD-10-CM

## 2023-11-08 DIAGNOSIS — R80.9 MICROALBUMINURIA DUE TO TYPE 2 DIABETES MELLITUS (HCC): ICD-10-CM

## 2023-11-08 DIAGNOSIS — E11.29 MICROALBUMINURIA DUE TO TYPE 2 DIABETES MELLITUS (HCC): ICD-10-CM

## 2023-11-08 LAB
25(OH)D3 SERPL-MCNC: 76 NG/ML (ref 30–100)
ALBUMIN SERPL BCP-MCNC: 4.5 G/DL (ref 3.2–4.9)
ALBUMIN/GLOB SERPL: 1.4 G/DL
ALP SERPL-CCNC: 73 U/L (ref 30–99)
ALT SERPL-CCNC: 23 U/L (ref 2–50)
ANION GAP SERPL CALC-SCNC: 11 MMOL/L (ref 7–16)
APPEARANCE UR: CLEAR
AST SERPL-CCNC: 12 U/L (ref 12–45)
BILIRUB SERPL-MCNC: 0.4 MG/DL (ref 0.1–1.5)
BILIRUB UR QL STRIP.AUTO: NEGATIVE
BUN SERPL-MCNC: 19 MG/DL (ref 8–22)
CALCIUM ALBUM COR SERPL-MCNC: 9.6 MG/DL (ref 8.5–10.5)
CALCIUM SERPL-MCNC: 10 MG/DL (ref 8.5–10.5)
CHLORIDE SERPL-SCNC: 102 MMOL/L (ref 96–112)
CO2 SERPL-SCNC: 24 MMOL/L (ref 20–33)
COLOR UR: YELLOW
CREAT SERPL-MCNC: 1.17 MG/DL (ref 0.5–1.4)
CREAT UR-MCNC: 100.28 MG/DL
CREAT UR-MCNC: 100.83 MG/DL
GFR SERPLBLD CREATININE-BSD FMLA CKD-EPI: 65 ML/MIN/1.73 M 2
GLOBULIN SER CALC-MCNC: 3.2 G/DL (ref 1.9–3.5)
GLUCOSE SERPL-MCNC: 98 MG/DL (ref 65–99)
GLUCOSE UR STRIP.AUTO-MCNC: NEGATIVE MG/DL
KETONES UR STRIP.AUTO-MCNC: NEGATIVE MG/DL
LEUKOCYTE ESTERASE UR QL STRIP.AUTO: NEGATIVE
MICRO URNS: NORMAL
MICROALBUMIN UR-MCNC: 6.3 MG/DL
MICROALBUMIN/CREAT UR: 63 MG/G (ref 0–30)
NITRITE UR QL STRIP.AUTO: NEGATIVE
PH UR STRIP.AUTO: 6 [PH] (ref 5–8)
POTASSIUM SERPL-SCNC: 4.9 MMOL/L (ref 3.6–5.5)
PROT SERPL-MCNC: 7.7 G/DL (ref 6–8.2)
PROT UR QL STRIP: NEGATIVE MG/DL
PROT UR-MCNC: 16 MG/DL (ref 0–15)
RBC UR QL AUTO: NEGATIVE
SODIUM SERPL-SCNC: 137 MMOL/L (ref 135–145)
SP GR UR STRIP.AUTO: 1.02
UROBILINOGEN UR STRIP.AUTO-MCNC: 0.2 MG/DL

## 2023-11-08 PROCEDURE — 84156 ASSAY OF PROTEIN URINE: CPT

## 2023-11-08 PROCEDURE — 82043 UR ALBUMIN QUANTITATIVE: CPT

## 2023-11-08 PROCEDURE — 80053 COMPREHEN METABOLIC PANEL: CPT

## 2023-11-08 PROCEDURE — 36415 COLL VENOUS BLD VENIPUNCTURE: CPT

## 2023-11-08 PROCEDURE — 81003 URINALYSIS AUTO W/O SCOPE: CPT

## 2023-11-08 PROCEDURE — 82570 ASSAY OF URINE CREATININE: CPT

## 2023-11-08 PROCEDURE — 82306 VITAMIN D 25 HYDROXY: CPT

## 2023-11-27 ENCOUNTER — OFFICE VISIT (OUTPATIENT)
Dept: NEPHROLOGY | Facility: MEDICAL CENTER | Age: 73
End: 2023-11-27
Payer: MEDICARE

## 2023-11-27 VITALS
RESPIRATION RATE: 14 BRPM | HEIGHT: 67 IN | TEMPERATURE: 97.8 F | DIASTOLIC BLOOD PRESSURE: 84 MMHG | BODY MASS INDEX: 34.06 KG/M2 | WEIGHT: 217 LBS | SYSTOLIC BLOOD PRESSURE: 124 MMHG | HEART RATE: 97 BPM | OXYGEN SATURATION: 97 %

## 2023-11-27 DIAGNOSIS — R80.9 MICROALBUMINURIA DUE TO TYPE 2 DIABETES MELLITUS (HCC): ICD-10-CM

## 2023-11-27 DIAGNOSIS — N18.2 CKD (CHRONIC KIDNEY DISEASE), STAGE II: ICD-10-CM

## 2023-11-27 DIAGNOSIS — D64.9 ANEMIA, UNSPECIFIED TYPE: ICD-10-CM

## 2023-11-27 DIAGNOSIS — I10 ESSENTIAL HYPERTENSION: ICD-10-CM

## 2023-11-27 DIAGNOSIS — E11.29 MICROALBUMINURIA DUE TO TYPE 2 DIABETES MELLITUS (HCC): ICD-10-CM

## 2023-11-27 DIAGNOSIS — E55.9 VITAMIN D DEFICIENCY: ICD-10-CM

## 2023-11-27 PROCEDURE — 99213 OFFICE O/P EST LOW 20 MIN: CPT | Performed by: INTERNAL MEDICINE

## 2023-11-27 PROCEDURE — 3079F DIAST BP 80-89 MM HG: CPT | Performed by: INTERNAL MEDICINE

## 2023-11-27 PROCEDURE — 3074F SYST BP LT 130 MM HG: CPT | Performed by: INTERNAL MEDICINE

## 2023-11-27 ASSESSMENT — ENCOUNTER SYMPTOMS
EYES NEGATIVE: 1
WHEEZING: 0
FEVER: 0
WEIGHT LOSS: 0
ORTHOPNEA: 0
SHORTNESS OF BREATH: 0
CHILLS: 0
DIARRHEA: 0
VOMITING: 0
ABDOMINAL PAIN: 0
PALPITATIONS: 0
COUGH: 0
HEMOPTYSIS: 0
NAUSEA: 0
FLANK PAIN: 0
SINUS PAIN: 0

## 2023-11-27 ASSESSMENT — FIBROSIS 4 INDEX: FIB4 SCORE: 0.67

## 2023-11-27 NOTE — PATIENT INSTRUCTIONS
Continue current treatment  Low salt diet  Monitor BP and call calinic if BP > 135/85  Keep well hydrated  Stop smoking  Avoid NSAID's

## 2023-11-27 NOTE — PROGRESS NOTES
Subjective:      Micheal Brothers Jr. is a 73 y.o. male who presents with Follow-Up and Chronic Kidney Disease            Chronic Kidney Disease  Pertinent negatives include no abdominal pain, chest pain, chills, congestion, coughing, fever, nausea or vomiting.     Micheal is coming today for f/u of CKD II, proteinuria  Doing well, no complaints  No dysuria/hematuria/flank pain  HTN: BP well controlled -compliant to medications, low salt diet  CKD II stable at baseline at 1.0-1.2  Microalbuminuria very mild - on ACEi -to monitor      Review of Systems   Constitutional:  Negative for chills, fever, malaise/fatigue and weight loss.   HENT:  Negative for congestion, hearing loss and sinus pain.    Eyes: Negative.    Respiratory:  Negative for cough, hemoptysis, shortness of breath and wheezing.    Cardiovascular:  Negative for chest pain, palpitations, orthopnea and leg swelling.   Gastrointestinal:  Negative for abdominal pain, diarrhea, nausea and vomiting.   Genitourinary:  Negative for dysuria, flank pain, frequency, hematuria and urgency.   Skin: Negative.    All other systems reviewed and are negative.    Past Medical History:   Diagnosis Date    Back pain     Chickenpox     Cough 7/3/2023    Depression     GERD (gastroesophageal reflux disease)     [place] measles     Gout     Hyperlipidemia     Influenza     Panic disorder     Sleep apnea     Sputum production     Tobacco use 11/26/2014    Wheezing        Family History   Problem Relation Age of Onset    Cancer Mother     Cancer Father     Stroke Father     Diabetes Neg Hx     Heart Disease Neg Hx     Hypertension Neg Hx        Social History     Socioeconomic History    Marital status:    Tobacco Use    Smoking status: Every Day     Current packs/day: 0.50     Average packs/day: 0.5 packs/day for 53.0 years (26.5 ttl pk-yrs)     Types: Cigarettes    Smokeless tobacco: Never    Tobacco comments:     started smoking at age 15   Vaping Use     "Vaping Use: Never used   Substance and Sexual Activity    Alcohol use: Not Currently     Alcohol/week: 0.0 oz    Drug use: No    Sexual activity: Yes     Partners: Female          Objective:     /84 (BP Location: Right arm, Patient Position: Sitting, BP Cuff Size: Adult)   Pulse 97   Temp 36.6 °C (97.8 °F) (Temporal)   Resp 14   Ht 1.702 m (5' 7\")   Wt 98.4 kg (217 lb)   SpO2 97%   BMI 33.99 kg/m²      Physical Exam  Vitals reviewed.   Constitutional:       General: He is not in acute distress.     Appearance: Normal appearance. He is well-developed. He is not diaphoretic.   HENT:      Head: Normocephalic and atraumatic.      Nose: Nose normal.      Mouth/Throat:      Mouth: Mucous membranes are moist.      Pharynx: Oropharynx is clear.   Eyes:      Extraocular Movements: Extraocular movements intact.      Conjunctiva/sclera: Conjunctivae normal.      Pupils: Pupils are equal, round, and reactive to light.   Cardiovascular:      Rate and Rhythm: Normal rate and regular rhythm.      Pulses: Normal pulses.      Heart sounds: Normal heart sounds.   Pulmonary:      Effort: Pulmonary effort is normal. No respiratory distress.      Breath sounds: Normal breath sounds. No wheezing or rales.   Abdominal:      General: Bowel sounds are normal. There is no distension.      Palpations: Abdomen is soft. There is no mass.      Tenderness: There is no abdominal tenderness. There is no right CVA tenderness or left CVA tenderness.   Musculoskeletal:      Cervical back: Normal range of motion and neck supple.      Right lower leg: No edema.      Left lower leg: No edema.   Skin:     General: Skin is warm.      Coloration: Skin is not pale.      Findings: No erythema or rash.   Neurological:      General: No focal deficit present.      Mental Status: He is alert and oriented to person, place, and time.      Cranial Nerves: No cranial nerve deficit.      Coordination: Coordination normal.   Psychiatric:         Mood and " Affect: Mood normal.         Behavior: Behavior normal.         Thought Content: Thought content normal.         Judgment: Judgment normal.               Lab results reviewed: d/w Pt   Latest Reference Range & Units 01/31/23 08:44   WBC 4.8 - 10.8 K/uL 8.5   RBC 4.70 - 6.10 M/uL 4.85   Hemoglobin 14.0 - 18.0 g/dL 15.0   Hematocrit 42.0 - 52.0 % 46.0   MCV 81.4 - 97.8 fL 94.8   MCH 27.0 - 33.0 pg 30.9   MCHC 33.7 - 35.3 g/dL 32.6 (L)   RDW 35.9 - 50.0 fL 47.3   Platelet Count 164 - 446 K/uL 277   MPV 9.0 - 12.9 fL 10.9   (L): Data is abnormally low   Latest Reference Range & Units 06/07/23 08:43 11/08/23 09:08   Sodium 135 - 145 mmol/L 135 137   Potassium 3.6 - 5.5 mmol/L 4.6 4.9   Chloride 96 - 112 mmol/L 101 102   Co2 20 - 33 mmol/L 21 24   Anion Gap 7.0 - 16.0  13.0 11.0   Glucose 65 - 99 mg/dL 102 (H) 98   Bun 8 - 22 mg/dL 16 19   Creatinine 0.50 - 1.40 mg/dL 1.09 1.17   GFR (CKD-EPI) >60 mL/min/1.73 m 2 71 65   Calcium 8.5 - 10.5 mg/dL 9.6 10.0   Correct Calcium 8.5 - 10.5 mg/dL  9.6   AST(SGOT) 12 - 45 U/L  12   ALT(SGPT) 2 - 50 U/L  23   Alkaline Phosphatase 30 - 99 U/L  73   Total Bilirubin 0.1 - 1.5 mg/dL  0.4   Albumin 3.2 - 4.9 g/dL  4.5   Total Protein 6.0 - 8.2 g/dL  7.7   Globulin 1.9 - 3.5 g/dL  3.2   A-G Ratio g/dL  1.4   (H): Data is abnormally high   Latest Reference Range & Units 08/06/22 08:08 05/08/23 08:14 11/08/23 09:14 11/08/23 09:15   Creatinine, Random Urine mg/dL 195.18   100.83   Total Protein, Urine 0.0 - 15.0 mg/dL 39.0 (H)  16.0 (H)    Protein Creatinine Ratio 15 - 68 mg/g 200 (H)      Micro Alb Creat Ratio 0 - 30 mg/g  66 (H)  63 (H)   Creatinine, Urine mg/dL  117.03  100.28   Microalbumin, Urine Random mg/dL  7.7  6.3   (H): Data is abnormally high    Assessment/Plan:          1. CKD (chronic kidney disease), stage II  -   Creat stable at baseline -to monitor    2. Microalbuminuria due to type 2 diabetes mellitus (HCC)      Mild on ACEi    4. Essential hypertension      BP well  controlled -to monitor at home    5. Anemia:      Hb WNL -to monitor    6. Vitamin D deficiency      Well controlled      To monitor    Recs:  Continue current treatment  Monitor BP and call clinic if BP > 135/85  Low salt diet  Keep well hydrated  F/u 6 months

## 2023-11-28 ENCOUNTER — NON-PROVIDER VISIT (OUTPATIENT)
Dept: ENDOCRINOLOGY | Facility: MEDICAL CENTER | Age: 73
End: 2023-11-28
Attending: INTERNAL MEDICINE
Payer: MEDICARE

## 2023-11-28 VITALS
OXYGEN SATURATION: 97 % | HEART RATE: 72 BPM | WEIGHT: 216 LBS | BODY MASS INDEX: 33.9 KG/M2 | DIASTOLIC BLOOD PRESSURE: 70 MMHG | SYSTOLIC BLOOD PRESSURE: 108 MMHG | HEIGHT: 67 IN

## 2023-11-28 DIAGNOSIS — E55.9 VITAMIN D DEFICIENCY: ICD-10-CM

## 2023-11-28 DIAGNOSIS — E11.29 CONTROLLED TYPE 2 DIABETES MELLITUS WITH MICROALBUMINURIA, WITHOUT LONG-TERM CURRENT USE OF INSULIN (HCC): ICD-10-CM

## 2023-11-28 DIAGNOSIS — R80.9 CONTROLLED TYPE 2 DIABETES MELLITUS WITH MICROALBUMINURIA, WITHOUT LONG-TERM CURRENT USE OF INSULIN (HCC): ICD-10-CM

## 2023-11-28 DIAGNOSIS — E78.5 DYSLIPIDEMIA: ICD-10-CM

## 2023-11-28 LAB
HBA1C MFR BLD: 6.4 % (ref ?–5.8)
POCT INT CON NEG: NEGATIVE
POCT INT CON POS: POSITIVE

## 2023-11-28 PROCEDURE — 99212 OFFICE O/P EST SF 10 MIN: CPT | Performed by: INTERNAL MEDICINE

## 2023-11-28 PROCEDURE — 83036 HEMOGLOBIN GLYCOSYLATED A1C: CPT

## 2023-11-28 ASSESSMENT — FIBROSIS 4 INDEX: FIB4 SCORE: 0.67

## 2023-11-28 NOTE — LETTER
Critical access hospital  Aris Aldana M.D.  [address]  [facility] [address]  Fax: [phone]   Authorization for Release/Disclosure of   Protected Health Information   Name: MICHEAL FERNANDEZ JR. : 1950 SSN: [id number]   Address: [address] [place] [id number] Phone:    [phone] (home)    I authorize the entity listed below to release/disclose the PHI below to:   Critical access hospital/Aris Aldana M.D. and Suma Duvall R.N.   Provider or Entity Name:  [facility]   Address   City, State, Zip   Phone:  [phone]    Fax:  [phone]   Reason for request: continuity of care   Information to be released:    [  ] LAST COLONOSCOPY,  including any PATH REPORT and follow-up  [  ] LAST FIT/COLOGUARD RESULT [  ] LAST DEXA  [  ] LAST MAMMOGRAM  [  ] LAST PAP  [  ] LAST LABS [  x] RETINA EXAM REPORT  [  ] IMMUNIZATION RECORDS  [  ] Release all info      [  ] Check here and initial the line next to each item to release ALL health information INCLUDING  _____ Care and treatment for drug and / or alcohol abuse  _____ HIV testing, infection status, or AIDS  _____ Genetic Testing    DATES OF SERVICE OR TIME PERIOD TO BE DISCLOSED: _____________  I understand and acknowledge that:  * This Authorization may be revoked at any time by you in writing, except if your health information has already been used or disclosed.  * Your health information that will be used or disclosed as a result of you signing this authorization could be re-disclosed by the recipient. If this occurs, your re-disclosed health information may no longer be protected by State or Federal laws.  * You may refuse to sign this Authorization. Your refusal will not affect your ability to obtain treatment.  * This Authorization becomes effective upon signing and will  on (date) __________.      If no date is indicated, this Authorization will  one (1) year from the signature date.    Name: Micheal Lencho  Deneen Kiser  Signature: Date:   11/28/2023     PLEASE FAX REQUESTED RECORDS BACK TO: [phone]

## 2023-11-28 NOTE — PROGRESS NOTES
RN-CDE Note    Subjective:   Endocrinology Clinic Progress Note  PCP: Aris Aldana M.D.    HPI:  Micheal Brothers Jr. is a 73 y.o. old patient who is seen today by the Diabetes Nurse Specialist for review of Type 2 Diabetes.    Recent changes in health: Health good.  DM:   Last A1c:   Lab Results   Component Value Date/Time    HBA1C 6.4 (A) 11/28/2023 01:52 PM      Previous A1c was 6.2 on 5/23/23  A1C GOAL: < 7    Diabetes Medications:   Metformin  mg daily      Exercise: Yard work and outside a lot in the warmer months.  Diet: Breakfast is toast or bagel.  Skips lunch.  Dinner Protein, vegetables, and carbohydrate.  1 regular pepsi daily and a [employer] coffee drink  Patient's body mass index is unknown because there is no height or weight on file. Exercise and nutrition counseling were performed at this visit.    Glucose monitoring frequency: Not testing   Hypoglycemic episodes: no  Last Retinal Exam:  Saw Dr. Tapia in June 2023 .   We will send a letter to try to get the report.  Daily Foot Exam: Yes   Foot Exam:  Monofilament: done  Lab Results   Component Value Date/Time    MALBCRT 63 (H) 11/08/2023 09:15 AM    MICROALBUR 6.3 11/08/2023 09:15 AM        ACR Albumin/Creatinine Ratio goal <30     HTN:   Blood pressure goal <130/<80 .   Currently Rx ACE/ARB: Yes     Dyslipidemia:    Lab Results   Component Value Date/Time    CHOLSTRLTOT 180 05/08/2023 08:14 AM     (H) 05/08/2023 08:14 AM    HDL 36 (A) 05/08/2023 08:14 AM    TRIGLYCERIDE 105 05/08/2023 08:14 AM         Currently Rx Statin: Yes     He  reports that he has been smoking cigarettes. He has a 26.5 pack-year smoking history. He has never used smokeless tobacco.      Plan:     Discussed and educated on:   - All medications, side effects and compliance (discussed carefully)  - Annual eye examinations at Ophthalmology  - Home glucose monitoring emphasized  - Weight control and daily exercise    Recommended medication changes: No  changes at this time.  He will continue with Metformin  mg daily.

## 2023-11-29 ENCOUNTER — PATIENT MESSAGE (OUTPATIENT)
Dept: HEALTH INFORMATION MANAGEMENT | Facility: OTHER | Age: 73
End: 2023-11-29

## 2024-01-02 DIAGNOSIS — E78.5 DYSLIPIDEMIA: ICD-10-CM

## 2024-01-04 RX ORDER — ATORVASTATIN CALCIUM 20 MG/1
TABLET, FILM COATED ORAL
Qty: 90 TABLET | Refills: 0 | Status: SHIPPED | OUTPATIENT
Start: 2024-01-04 | End: 2024-02-27 | Stop reason: SDUPTHER

## 2024-01-04 NOTE — TELEPHONE ENCOUNTER
Received request via: Pharmacy    Was the patient seen in the last year in this department? No    Does the patient have an active prescription (recently filled or refills available) for medication(s) requested? No    Does the patient have [facility] and need 100 day supply (blood pressure, diabetes and cholesterol meds only)? Patient does not have SCP

## 2024-01-15 ENCOUNTER — OFFICE VISIT (OUTPATIENT)
Dept: SLEEP MEDICINE | Facility: MEDICAL CENTER | Age: 74
End: 2024-01-15
Attending: STUDENT IN AN ORGANIZED HEALTH CARE EDUCATION/TRAINING PROGRAM
Payer: MEDICARE

## 2024-01-15 VITALS
BODY MASS INDEX: 34.06 KG/M2 | DIASTOLIC BLOOD PRESSURE: 66 MMHG | HEART RATE: 90 BPM | SYSTOLIC BLOOD PRESSURE: 112 MMHG | HEIGHT: 67 IN | WEIGHT: 217 LBS | OXYGEN SATURATION: 100 %

## 2024-01-15 DIAGNOSIS — J44.9 CHRONIC OBSTRUCTIVE PULMONARY DISEASE, UNSPECIFIED COPD TYPE (HCC): ICD-10-CM

## 2024-01-15 PROCEDURE — 3078F DIAST BP <80 MM HG: CPT | Performed by: STUDENT IN AN ORGANIZED HEALTH CARE EDUCATION/TRAINING PROGRAM

## 2024-01-15 PROCEDURE — 99212 OFFICE O/P EST SF 10 MIN: CPT | Performed by: STUDENT IN AN ORGANIZED HEALTH CARE EDUCATION/TRAINING PROGRAM

## 2024-01-15 PROCEDURE — 99215 OFFICE O/P EST HI 40 MIN: CPT | Performed by: STUDENT IN AN ORGANIZED HEALTH CARE EDUCATION/TRAINING PROGRAM

## 2024-01-15 PROCEDURE — 3074F SYST BP LT 130 MM HG: CPT | Performed by: STUDENT IN AN ORGANIZED HEALTH CARE EDUCATION/TRAINING PROGRAM

## 2024-01-15 RX ORDER — TIOTROPIUM BROMIDE INHALATION SPRAY 3.12 UG/1
5 SPRAY, METERED RESPIRATORY (INHALATION) DAILY
Qty: 4 G | Refills: 6 | Status: SHIPPED | OUTPATIENT
Start: 2024-01-15 | End: 2024-01-18

## 2024-01-15 RX ORDER — ALBUTEROL SULFATE 90 UG/1
2 AEROSOL, METERED RESPIRATORY (INHALATION) EVERY 6 HOURS PRN
Qty: 8.5 G | Refills: 3 | Status: SHIPPED | OUTPATIENT
Start: 2024-01-15

## 2024-01-15 RX ORDER — IPRATROPIUM BROMIDE AND ALBUTEROL SULFATE 2.5; .5 MG/3ML; MG/3ML
3 SOLUTION RESPIRATORY (INHALATION) EVERY 4 HOURS PRN
Qty: 30 EACH | Refills: 3 | Status: SHIPPED | OUTPATIENT
Start: 2024-01-15

## 2024-01-15 ASSESSMENT — ENCOUNTER SYMPTOMS
CHILLS: 0
FOCAL WEAKNESS: 0
HEMOPTYSIS: 0
SHORTNESS OF BREATH: 0
FEVER: 0
VOMITING: 0
NAUSEA: 0
WHEEZING: 0
EYE DISCHARGE: 0
COUGH: 1
FALLS: 0
SPUTUM PRODUCTION: 1

## 2024-01-15 ASSESSMENT — FIBROSIS 4 INDEX: FIB4 SCORE: 0.67

## 2024-01-15 ASSESSMENT — PATIENT HEALTH QUESTIONNAIRE - PHQ9: CLINICAL INTERPRETATION OF PHQ2 SCORE: 0

## 2024-01-15 NOTE — ASSESSMENT & PLAN NOTE
PFTs w/mild obstruction w/o BD response.      - smoking cessation  - start Spiriva  - prn albuterol  - duonebs prn  - encouraged to remain active/exercise routinely   - did discuss with patient previously the increased risk for malignancy due to his smoking history but he did not want to pursue CT chest at that time bc he's not sure if he would like to know if he has lung cancer - can re-address in future visits if he's more amenable

## 2024-01-15 NOTE — ASSESSMENT & PLAN NOTE
Likely postinfectious - now almost completely resolved. Also suspect a component of his COPD and smoking contributing to cough     - plan as noted for COPD

## 2024-01-15 NOTE — PROGRESS NOTES
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    Additional time was required with the treatment management and/or planning phases due to: close monitoring of blood counts due to prior/concurrent chemotherapy.     Thank you for allowing us to participate in the care of this patient. Please do not hesitate to contact our office with questions or concerns.    Sherman Arshad MD  Department of Radiation Oncology  [facility]    Note to patient: The 21st Century Cures Act makes medical notes like these available to patients in the interest of transparency. However, be advised this is a medical document. It is intended as peer to peer communication. It is written in medical language and may contain abbreviations or verbiage that are unfamiliar. It may appear blunt or direct. Medical documents are intended to carry relevant information, facts as evident, and the clinical opinion of the practitioner.    Pulmonary Clinic Note    Chief Complaint:  Chief Complaint   Patient presents with    Follow-Up     Last seen 7/3/23 by Dr. Sánchez for copd      HPI:   Micheal Brothers Jr. is a very pleasant 72 y.o. male with history of tobacco smoking 50+ pkyrs, currently down to 4-5 cigarettes/day, who presents to pulmonary clinic for COPD.     3/14/23: Patient states that he and his wife went on a cruise about 6 months ago which developed a respiratory infection.  Since then he has had a significant cough that has slowly improved with the inhaler but not resolved.  Reports doing reductive cough feels like he has to continuously clear and cough up the sputum in order to be able to breathe.   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    7/3/23: doing well - cough almost completely resolved. Stopped using symbicort and rarely uses albuterol rescue inhaler. Does nebs when he feels like he needs to help clear his cough/congestion. Compliant with his CPAP.     1/15/24: doing well -bought a treadmill in December, 2023 and has slowly been increasing his exercise capacity.  He has noticed that since increasing his exercise, he has been using his albuterol about once a day whereas previously he was rarely using it and that why he has stopped the Symbicort.    Past Medical History:   Diagnosis Date    Back pain     Chickenpox     Cough 7/3/2023    Depression     GERD (gastroesophageal reflux disease)     Tongan measles     Gout     Hyperlipidemia     Influenza     Panic disorder     Sleep apnea     Sputum production     Tobacco use 11/26/2014    Wheezing        Past Surgical History:   Procedure Laterality Date    APPENDECTOMY      ARTHROSCOPY, KNEE      CARPAL TUNNEL ENDOSCOPIC      bilateral    CARPAL TUNNEL RELEASE      SHOULDER DECOMPRESSION ARTHROSCOPIC      right    TONSILLECTOMY         Social History     Socioeconomic History    Marital status:       Spouse name: Not on file    Number of children: Not on file    Years of education: Not on file    Highest education level: Not on file   Occupational History    Not on file   Tobacco Use    Smoking status: Every Day     Current packs/day: 0.50     Average packs/day: 0.5 packs/day for 53.0 years (26.5 ttl pk-yrs)     Types: Cigarettes    Smokeless tobacco: Never    Tobacco comments:     started smoking at age 15   Vaping Use    Vaping Use: Never used   Substance and Sexual Activity    Alcohol use: Not Currently     Alcohol/week: 0.0 oz    Drug use: No    Sexual activity: Yes     Partners: Female   Other Topics Concern    Not on file   Social History Narrative    Not on file     Social Determinants of Health     Financial Resource Strain: Not on file   Food Insecurity: Not on file   Transportation Needs: Not on file   Physical Activity: Not on file   Stress: Not on file   Social Connections: Not on file   Intimate Partner Violence: Not on file   Housing Stability: Not on file          Family History   Problem Relation Age of Onset    Cancer Mother     Cancer Father     Stroke Father     Diabetes Neg Hx     Heart Disease Neg Hx     Hypertension Neg Hx        Current Outpatient Medications on File Prior to Visit   Medication Sig Dispense Refill    atorvastatin (LIPITOR) 20 MG Tab TAKE 1 TABLET DAILY 90 Tablet 0    DULoxetine (CYMBALTA) 60 MG Cap DR Particles delayed-release capsule TAKE 1 CAPSULE DAILY 90 Capsule 0    omeprazole (PRILOSEC) 40 MG delayed-release capsule TAKE 1 CAPSULE DAILY 90 Capsule 0    PREVIDENT 5000 DRY MOUTH 1.1 % Gel       nystatin (MYCOSTATIN) 218689 UNIT/GM Cream topical cream AAA groin BID for rash. Use for 7-10days when present 30 g 3    lisinopril (PRINIVIL) 2.5 MG Tab Take 1 Tablet by mouth every day. 90 Tablet 3    metFORMIN ER (GLUCOPHAGE XR) 500 MG TABLET SR 24 HR Take 2 Tablets by mouth every day. 180 Tablet 1    ipratropium-albuterol (DUONEB) 0.5-2.5 (3) MG/3ML nebulizer solution Take 3  "mL by nebulization every four hours as needed for Shortness of Breath (Wheezing). 3 mL 3    Cholecalciferol (VITAMIN D) 125 MCG (5000 UT) Cap Take 5,000 Units by mouth every day.      budesonide-formoterol (SYMBICORT) 160-4.5 MCG/ACT Aerosol Inhale 2 Puffs 2 times a day. (Patient not taking: Reported on 11/28/2023) 2 Each 6     No current facility-administered medications on file prior to visit.       Allergies: Augmentin, Metformin, and Zyban [bupropion]      ROS:   Review of Systems   Constitutional:  Negative for chills and fever.   HENT:  Negative for hearing loss.    Eyes:  Negative for discharge.   Respiratory:  Positive for cough and sputum production. Negative for hemoptysis, shortness of breath and wheezing.    Cardiovascular:  Negative for chest pain.   Gastrointestinal:  Negative for nausea and vomiting.   Musculoskeletal:  Negative for falls.   Skin:  Negative for rash.   Neurological:  Negative for focal weakness.       Vitals:  /66 (BP Location: Left arm, Patient Position: Sitting, BP Cuff Size: Large adult)   Pulse 90   Ht 1.702 m (5' 7\")   Wt 98.4 kg (217 lb)   SpO2 100%     Physical Exam:  Physical Exam  Vitals and nursing note reviewed.   Constitutional:       General: He is not in acute distress.     Appearance: Normal appearance. He is not ill-appearing or toxic-appearing.   HENT:      Head: Normocephalic and atraumatic.      Nose: Nose normal.      Mouth/Throat:      Mouth: Mucous membranes are moist.   Eyes:      General: No scleral icterus.     Conjunctiva/sclera: Conjunctivae normal.   Cardiovascular:      Rate and Rhythm: Normal rate and regular rhythm.   Pulmonary:      Effort: Pulmonary effort is normal. No respiratory distress.      Breath sounds: No wheezing, rhonchi or rales.   Abdominal:      Palpations: Abdomen is soft.   Musculoskeletal:         General: No deformity or signs of injury. Normal range of motion.      Cervical back: Normal range of motion.   Skin:     General: " Skin is warm and dry.   Neurological:      General: No focal deficit present.      Mental Status: He is alert. Mental status is at baseline.   Psychiatric:         Mood and Affect: Mood normal.         Behavior: Behavior normal.         Data:    PFT 2/1/23: Mild obstruction without a significant BD response.  No restriction.    Assessment/Plan:    Problem List Items Addressed This Visit       Chronic obstructive pulmonary disease (HCC)     PFTs w/mild obstruction w/o BD response.      - smoking cessation  - start Spiriva  - prn albuterol  - duonebs prn  - encouraged to remain active/exercise routinely   - did discuss with patient previously the increased risk for malignancy due to his smoking history but he did not want to pursue CT chest at that time bc he's not sure if he would like to know if he has lung cancer - can re-address in future visits if he's more amenable                Relevant Medications    tiotropium (SPIRIVA RESPIMAT) 2.5 mcg/Act Aero Soln    ipratropium-albuterol (DUONEB) 0.5-2.5 (3) MG/3ML nebulizer solution    albuterol 108 (90 Base) MCG/ACT Aero Soln inhalation aerosol         Return 6-12 months.     This note was generated using voice recognition software which has a chance of producing errors of grammar and possibly content.  I have made every reasonable attempt to find and correct any obvious errors, but it should be expected that some may not be found prior to finalization of this note.    Time spent in record review prior to patient arrival, reviewing results, and in face-to-face encounter totaled 40 min, excluding any procedures if performed.    Rosario Sánchez MD  Pulmonary and Critical Care Medicine  [facility]

## 2024-01-18 ENCOUNTER — TELEPHONE (OUTPATIENT)
Dept: SLEEP MEDICINE | Facility: MEDICAL CENTER | Age: 74
End: 2024-01-18
Payer: MEDICARE

## 2024-01-18 DIAGNOSIS — J44.9 CHRONIC OBSTRUCTIVE PULMONARY DISEASE, UNSPECIFIED COPD TYPE (HCC): ICD-10-CM

## 2024-01-18 RX ORDER — UMECLIDINIUM BROMIDE AND VILANTEROL TRIFENATATE 62.5; 25 UG/1; UG/1
1 POWDER RESPIRATORY (INHALATION) DAILY
Qty: 60 EACH | Refills: 3 | Status: SHIPPED | OUTPATIENT
Start: 2024-01-18

## 2024-01-18 NOTE — TELEPHONE ENCOUNTER
Received RX for Anoro, RTC, paid claim but at a high copay of $419. Called and spoke to patient, looks like his insurance has a deductible to be met. We discussed possibly being able to go through the manufacture but they require $600 out of pocket costs first as well. Patient stated he would skip for now but thinks he can meet the deductible soon. Released rx to pharmacy on file for future filling.

## 2024-01-22 ENCOUNTER — TELEPHONE (OUTPATIENT)
Dept: HEALTH INFORMATION MANAGEMENT | Facility: OTHER | Age: 74
End: 2024-01-22
Payer: MEDICARE

## 2024-02-16 ENCOUNTER — PATIENT MESSAGE (OUTPATIENT)
Dept: HEALTH INFORMATION MANAGEMENT | Facility: OTHER | Age: 74
End: 2024-02-16

## 2024-02-27 ENCOUNTER — PATIENT MESSAGE (OUTPATIENT)
Dept: MEDICAL GROUP | Facility: PHYSICIAN GROUP | Age: 74
End: 2024-02-27
Payer: MEDICARE

## 2024-02-27 ENCOUNTER — PATIENT MESSAGE (OUTPATIENT)
Dept: SLEEP MEDICINE | Facility: MEDICAL CENTER | Age: 74
End: 2024-02-27
Payer: MEDICARE

## 2024-02-27 DIAGNOSIS — F41.0 PANIC DISORDER: ICD-10-CM

## 2024-02-27 DIAGNOSIS — E78.5 DYSLIPIDEMIA: ICD-10-CM

## 2024-02-27 DIAGNOSIS — K21.9 GASTROESOPHAGEAL REFLUX DISEASE WITHOUT ESOPHAGITIS: ICD-10-CM

## 2024-02-27 RX ORDER — DULOXETIN HYDROCHLORIDE 60 MG/1
60 CAPSULE, DELAYED RELEASE ORAL DAILY
Qty: 90 CAPSULE | Refills: 0 | Status: SHIPPED | OUTPATIENT
Start: 2024-02-27

## 2024-02-27 RX ORDER — DULOXETIN HYDROCHLORIDE 60 MG/1
60 CAPSULE, DELAYED RELEASE ORAL DAILY
Qty: 90 CAPSULE | Refills: 1 | Status: CANCELLED | OUTPATIENT
Start: 2024-02-27

## 2024-02-27 RX ORDER — OMEPRAZOLE 40 MG/1
40 CAPSULE, DELAYED RELEASE ORAL DAILY
Qty: 90 CAPSULE | Refills: 0 | Status: SHIPPED | OUTPATIENT
Start: 2024-02-27

## 2024-02-27 RX ORDER — ATORVASTATIN CALCIUM 20 MG/1
20 TABLET, FILM COATED ORAL DAILY
Qty: 90 TABLET | Refills: 0 | Status: SHIPPED | OUTPATIENT
Start: 2024-02-27

## 2024-02-28 ENCOUNTER — OFFICE VISIT (OUTPATIENT)
Dept: SLEEP MEDICINE | Facility: MEDICAL CENTER | Age: 74
End: 2024-02-28
Attending: STUDENT IN AN ORGANIZED HEALTH CARE EDUCATION/TRAINING PROGRAM
Payer: MEDICARE

## 2024-02-28 VITALS
DIASTOLIC BLOOD PRESSURE: 72 MMHG | RESPIRATION RATE: 14 BRPM | HEIGHT: 67 IN | HEART RATE: 100 BPM | BODY MASS INDEX: 33.89 KG/M2 | SYSTOLIC BLOOD PRESSURE: 140 MMHG | OXYGEN SATURATION: 97 % | WEIGHT: 215.9 LBS

## 2024-02-28 DIAGNOSIS — G47.33 OSA ON CPAP: Primary | ICD-10-CM

## 2024-02-28 PROCEDURE — 99213 OFFICE O/P EST LOW 20 MIN: CPT | Performed by: STUDENT IN AN ORGANIZED HEALTH CARE EDUCATION/TRAINING PROGRAM

## 2024-02-28 PROCEDURE — 3077F SYST BP >= 140 MM HG: CPT | Performed by: STUDENT IN AN ORGANIZED HEALTH CARE EDUCATION/TRAINING PROGRAM

## 2024-02-28 PROCEDURE — 3078F DIAST BP <80 MM HG: CPT | Performed by: STUDENT IN AN ORGANIZED HEALTH CARE EDUCATION/TRAINING PROGRAM

## 2024-02-28 ASSESSMENT — FIBROSIS 4 INDEX: FIB4 SCORE: 0.67

## 2024-02-28 NOTE — PATIENT INSTRUCTIONS
"    CBT-I Books  Brandie Mind: Turn Off Your Noisy Thoughts and Get a Good Night's Sleep - Renetta Stearns, Phd and Shoshana Pleitez, Phd  Accessible, enjoyable, and grounded in evidence-based cognitive behavioral therapy (CBT), Brandie Mind directly addresses the effects of rumination?or having an overactive brain?on your ability to sleep well. Written by two psychologists who specialize in sleep disorders, the book contains helpful exercises and insights into how you can better manage your thoughts at bedtime, and finally get some sleep.    Insomnia Solved: A Self-Directed Cognitive Behavioral Therapy for Insomnia (CBTI) Program - Rodrigo Warner MD  Cognitive behavioral therapy for insomnia (CBTI) is often structured as a 6-week treatment program that can help people who have difficulty falling asleep, staying asleep, or find that sleep is unrefreshing. CBTI is scientifically proven, highly effective, and does not rely on medications. CBTI has life-long benefits and most participants report improved sleep satisfaction. Insomnia Solved is based on the core features of this treatment.    Quiet Your Mind and Get to Sleep: Solutions to Insomnia for Those with Depression, Anxiety or Chronic Pain - Shoshana Pleitez, PhD  This workbook uses cognitive behavior therapy, which has been shown to work as well as sleep medications and produce longer-lasting effects. Research shows that it also works well for those whose insomnia is experienced in the context of anxiety, depression, and chronic pain. The complete program in this book goes to the root of your insomnia and offers the same techniques used by experienced sleep specialists.    \"Say Brandie to Insomnia\" by Mathieu Adams     \"No More Sleepless Nights\" by Serjio Mejía    CBT-I Online Resources  Path to Better Sleep (free) - https://www.veterantraining.va.gov/insomnia/index.asp   This free online Cognitive Behavioral Therapy for Insomnia course, which was developed for " "veterans, takes you through a sleep \"check-in\" and the various components of CBT-I, including modifying unhelpful behaviors and unhelpful thoughts around sleep.    Insomnia Solved - Quirino Warner MD ($79) - http://[email or web address] [email or web address]/fix-my-insomnia/   Insomnia Solved is a self-guided CBTI program created by Quirino Warner M.D., a board-certified medical doctor, that is priced inexpensively at $79. The complete program includes full access to exclusive multimedia content, including the 154-page eBook and online modules and audiofiles.    [employer] - Jesusita Haney, PhD, [facility] ($129) - https://Ploonge/   Sleepfitness is an insomnia program based on Cognitive Behavioral Treatment for Insomnia (CBTi) and is a 6-week self-guided online format. IT costs $219 for a 1-year subscription. You can sign up for the 7-day free trial and learn how CBTi can improve your sleep.    Apps  Insomnia  (free)   Offers a self-guided 5-week training plan designed to change sleep habits.  https://Pipelinefx.va.gov/carmina/insomnia-    "

## 2024-02-28 NOTE — PROGRESS NOTES
[facility] Follow-up Visit    CC: Yearly follow-up for management of obstructive sleep apnea      HPI:  Micheal Brothers Jr. is a 73 y.o.male  with dyslipidemia, obesity, COPD, depression with anxiety, and obstructive sleep apnea on CPAP who presents today to follow-up regarding management of obstructive sleep apnea.    He does continue to use his CPAP machine nightly.  Overall finds his mask and pressures comfortable.  He does notice benefit with using his PAP machine.  Has no complaints regarding his PAP machine.    He does report some difficulty regarding sleep initiation as well as staying asleep.  He states in the last few months he has had difficulty with falling asleep and staying asleep at times.  It seems to come and go.  He does report he had some difficulty with a tooth that required extraction.  He felt that this interrupted his sleep at that time but that has since been fixed.  He does occasionally take Benadryl only 1 or 2 times a week but does not use it nightly.    DME provider: CPAP and more  Device: Airsense 11  Mask: N20   Aerophagia: No   Snoring: No   Dry mouth: No   Leak: No   Skin irritation: No   Chin strap: No      Sleep History  PSG in 2008 showed severe sleep apnea with AHI of 85 events per hour.  Following diagnosis he was placed on CPAP 12 cmH2O    Patient Active Problem List    Diagnosis Date Noted    Anxiety 10/26/2023    Cough 07/03/2023    Sore throat 02/15/2023    Long term (current) use of oral hypoglycemic drugs 11/15/2022    Iron deficiency anemia 10/13/2022    Severe obesity (HCC) 08/15/2022    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) 08/06/2022    Thomas's esophagus 11/19/2021    Calcification of gallbladder 04/28/2021    Bilateral adrenal adenomas 04/22/2021    Abnormal finding on GI tract imaging 04/22/2021    Bilateral inguinal hernia without obstruction or gangrene 04/22/2021    Pelvic pain 04/13/2021    Umbilical hernia 04/13/2021     Thrombocytopenia (HCC) 01/26/2021    Proteinuria 01/22/2021    Colonic polyp 01/20/2021    Chronic obstructive pulmonary disease (HCC) 05/01/2019    Type 2 diabetes mellitus with hyperlipidemia (HCC) 04/01/2019    Anxiety and depression 06/18/2018    NAHUM on CPAP 08/26/2016    Dyslipidemia 11/26/2014    GERD (gastroesophageal reflux disease) 11/26/2014    Tobacco use 11/26/2014       Past Medical History:   Diagnosis Date    Back pain     Chickenpox     Cough 7/3/2023    Depression     GERD (gastroesophageal reflux disease)     Al[place]ian measles     Gout     Hyperlipidemia     Influenza     Panic disorder     Sleep apnea     Sputum production     Tobacco use 11/26/2014    Wheezing         Past Surgical History:   Procedure Laterality Date    APPENDECTOMY      ARTHROSCOPY, KNEE      CARPAL TUNNEL ENDOSCOPIC      bilateral    CARPAL TUNNEL RELEASE      SHOULDER DECOMPRESSION ARTHROSCOPIC      right    TONSILLECTOMY         Family History   Problem Relation Age of Onset    Cancer Mother     Cancer Father     Stroke Father     Diabetes Neg Hx     Heart Disease Neg Hx     Hypertension Neg Hx        Social History     Socioeconomic History    Marital status:      Spouse name: Not on file    Number of children: Not on file    Years of education: Not on file    Highest education level: Not on file   Occupational History    Not on file   Tobacco Use    Smoking status: Every Day     Current packs/day: 0.50     Average packs/day: 0.5 packs/day for 53.0 years (26.5 ttl pk-yrs)     Types: Cigarettes    Smokeless tobacco: Never    Tobacco comments:     started smoking at age 15   Vaping Use    Vaping Use: Never used   Substance and Sexual Activity    Alcohol use: Not Currently     Alcohol/week: 0.0 oz    Drug use: No    Sexual activity: Yes     Partners: Female   Other Topics Concern    Not on file   Social History Narrative    Not on file     Social Determinants of Health     Financial Resource Strain: Not on file   Food  Insecurity: Not on file   Transportation Needs: Not on file   Physical Activity: Not on file   Stress: Not on file   Social Connections: Not on file   Intimate Partner Violence: Not on file   Housing Stability: Not on file       Current Outpatient Medications   Medication Sig Dispense Refill    atorvastatin (LIPITOR) 20 MG Tab Take 1 Tablet by mouth every day. 90 Tablet 0    omeprazole (PRILOSEC) 40 MG delayed-release capsule Take 1 Capsule by mouth every day. 90 Capsule 0    umeclidinium-vilanterol (ANORO ELLIPTA) 62.5-25 MCG/ACT AEROSOL POWDER, BREATH ACTIVATED inhaler Inhale 1 Puff every day. 60 Each 3    ipratropium-albuterol (DUONEB) 0.5-2.5 (3) MG/3ML nebulizer solution Take 3 mL by nebulization every four hours as needed for Shortness of Breath (Wheezing). 30 Each 3    albuterol 108 (90 Base) MCG/ACT Aero Soln inhalation aerosol Inhale 2 Puffs every 6 hours as needed for Shortness of Breath. 8.5 g 3    PREVIDENT 5000 DRY MOUTH 1.1 % Gel       nystatin (MYCOSTATIN) 706563 UNIT/GM Cream topical cream AAA groin BID for rash. Use for 7-10days when present 30 g 3    lisinopril (PRINIVIL) 2.5 MG Tab Take 1 Tablet by mouth every day. 90 Tablet 3    metFORMIN ER (GLUCOPHAGE XR) 500 MG TABLET SR 24 HR Take 2 Tablets by mouth every day. 180 Tablet 1    ipratropium-albuterol (DUONEB) 0.5-2.5 (3) MG/3ML nebulizer solution Take 3 mL by nebulization every four hours as needed for Shortness of Breath (Wheezing). 3 mL 3    Cholecalciferol (VITAMIN D) 125 MCG (5000 UT) Cap Take 5,000 Units by mouth every day.      DULoxetine (CYMBALTA) 60 MG Cap DR Particles delayed-release capsule Take 1 Capsule by mouth every day. 90 Capsule 0     No current facility-administered medications for this visit.        ALLERGIES: Augmentin, Metformin, and Zyban [bupropion]    ROS  Constitutional: Denies fevers, Denies weight changes  Ears/Nose/Throat/Mouth: Denies nasal congestion or sore throat   Cardiovascular: Denies chest pain  Respiratory:  "Denies shortness of breath, Denies cough  Gastrointestinal/Hepatic: Denies nausea, vomiting  Sleep: see HPI      PHYSICAL EXAM  BP (!) 140/72 (BP Location: Left arm, Patient Position: Sitting, BP Cuff Size: Adult)   Pulse 100   Resp 14   Ht 1.702 m (5' 7\")   Wt 97.9 kg (215 lb 14.4 oz)   SpO2 97%   BMI 33.81 kg/m²   Appearance: Well-nourished, well-developed, no acute distress  Eyes:  No scleral icterus , EOMI  Musculoskeletal:  Grossly normal; gait and station normal; digits and nails normal  Skin:  No rashes, petechiae, cyanosis  Neurologic: without focal signs; oriented to person, time, place, and purpose; judgement intact      Medical Decision Making   Assessment and Plan  Micheal Brothers Jr. is a 73 y.o.male  with dyslipidemia, obesity, COPD, depression with anxiety, and obstructive sleep apnea on CPAP who presents today to follow-up regarding management of obstructive sleep apnea.    The medical record was reviewed.    Obstructive sleep apnea  Compliance data reviewed showing 100% usage > 4hours in last 30  days. Average AHI 2.9 events/hour. Pt continues to use and benefit from machine.      Current Settings Auto CPAP 10-15    PLAN:   -Order placed for mask and supplies   -Advised to reach out via MyChart with questions     Has been advised to continue the current CPAP, clean equipment frequently, and get new mask and supplies as allowed by insurance and DME. Recommend an earlier appointment, if significant treatment barriers develop.    Patients with NAHUM are at increased risk of cardiovascular disease including coronary artery disease, systemic arterial hypertension, pulmonary arterial hypertension, cardiac arrythmias, and stroke. The patient was advised to avoid driving a motor vehicle when drowsy.    Positive airway pressure will favorably impact many of the adverse conditions and effects provoked by NAHUM.    With his difficulty regarding sleep initiation and sleep maintenance advised of " cognitive behavioral therapy for insomnia.  Also discussed he should avoid taking Benadryl regularly.  Information given on self-directed CBT-I    Have advised the patient to follow up with the appropriate healthcare practitioners for all other medical problems and issues.    Return in about 1 year (around 2/28/2025).      Please note portions of this record was created using voice recognition software. I have made every reasonable attempt to correct obvious errors, but I expect that there are errors of grammar and possibly content I did not discover before finalizing the note.

## 2024-02-28 NOTE — PATIENT COMMUNICATION
Called and spoke with pts pharmacy. They said it needed a PA. Notified it does not wit Medicare. I asked them to rerun it under Medicare Part B.  He tried but Medicare system is down. Told them I will call back in a couple of hours to recheck.    Called and spoke with pt. Pt notified of this.

## 2024-03-06 NOTE — PATIENT COMMUNICATION
Called and spoke with the pharmacist at [facility]. Asked if they can rerun the medicare for the Duoneb.    Called and spoke with pt. Notified pt of this. Pt was insisting for us to complete a Medicare form for this. Explained to pt, that we I call them it should go through if the medicare system is up. But pt still insisted on form. Told pt I will call and ask the pharmacy to see if this would speed up the process.    Called and spoke with the pharmacy. They said no, it will not speed up the process because medicare is down right now.     Called pt and lm, notifiying pt the form will not speed up the process, that I will send a [employer] message with further detail information.

## 2024-03-21 RX ORDER — TRIAMCINOLONE ACETONIDE 5 MG/G
CREAM TOPICAL
Qty: 15 G | Refills: 1 | Status: SHIPPED | OUTPATIENT
Start: 2024-03-21

## 2024-04-25 ENCOUNTER — OFFICE VISIT (OUTPATIENT)
Dept: DERMATOLOGY | Facility: IMAGING CENTER | Age: 74
End: 2024-04-25
Payer: MEDICARE

## 2024-04-25 DIAGNOSIS — L29.9 ITCHING: ICD-10-CM

## 2024-04-25 DIAGNOSIS — L30.9 ECZEMA, UNSPECIFIED TYPE: ICD-10-CM

## 2024-04-25 PROCEDURE — 99213 OFFICE O/P EST LOW 20 MIN: CPT | Performed by: DERMATOLOGY

## 2024-04-25 NOTE — PROGRESS NOTES
CC: Spots on all 4 extremities    Subjective:  Previously seen patient here for rash.  Has had red itching occur on extremities.  Improved with Cerave cream use.     ROS: no fevers/chills. +itch.  No cough  Relevant PMH:GA - occ [place] tac trx  Social:smoker    PE: Gen:WDWN male in NAD. Skin: eczematous papules/plaques on extremities    A/P: Eczema/itching;mild  -Inflammation - lidex cream BID -->PRN  -moisturizer use eucerin vs cerave    F/u PRN      I have reviewed medications relevant to my specialty.

## 2024-04-26 ENCOUNTER — PATIENT MESSAGE (OUTPATIENT)
Dept: SLEEP MEDICINE | Facility: MEDICAL CENTER | Age: 74
End: 2024-04-26
Payer: MEDICARE

## 2024-04-26 DIAGNOSIS — N18.2 CKD STAGE 2 DUE TO TYPE 2 DIABETES MELLITUS (HCC): ICD-10-CM

## 2024-04-26 DIAGNOSIS — E78.5 TYPE 2 DIABETES MELLITUS WITH HYPERLIPIDEMIA (HCC): Chronic | ICD-10-CM

## 2024-04-26 DIAGNOSIS — E11.69 TYPE 2 DIABETES MELLITUS WITH HYPERLIPIDEMIA (HCC): Chronic | ICD-10-CM

## 2024-04-26 DIAGNOSIS — D69.6 THROMBOCYTOPENIA (HCC): Chronic | ICD-10-CM

## 2024-04-26 DIAGNOSIS — E78.5 DYSLIPIDEMIA: Chronic | ICD-10-CM

## 2024-04-26 DIAGNOSIS — J44.9 CHRONIC OBSTRUCTIVE PULMONARY DISEASE, UNSPECIFIED COPD TYPE (HCC): ICD-10-CM

## 2024-04-26 DIAGNOSIS — E11.22 CKD STAGE 2 DUE TO TYPE 2 DIABETES MELLITUS (HCC): ICD-10-CM

## 2024-04-26 RX ORDER — IPRATROPIUM BROMIDE AND ALBUTEROL SULFATE 2.5; .5 MG/3ML; MG/3ML
3 SOLUTION RESPIRATORY (INHALATION) EVERY 4 HOURS PRN
Qty: 3 ML | Refills: 3 | Status: SHIPPED | OUTPATIENT
Start: 2024-04-26 | End: 2024-04-29 | Stop reason: SDUPTHER

## 2024-04-29 DIAGNOSIS — J44.9 CHRONIC OBSTRUCTIVE PULMONARY DISEASE, UNSPECIFIED COPD TYPE (HCC): ICD-10-CM

## 2024-04-29 NOTE — TELEPHONE ENCOUNTER
Incoming Fax: [facility] pharmacy PA assistance    Re: Ipratropium-Albuterol Solution    Message: requesting a PA    Outcome: called and spoke with pharmacy. Asked them to run it under Medicare Part B. They did. They rx not going through because of the Quantity of Rx.       Pended rx. Please review.

## 2024-04-30 RX ORDER — IPRATROPIUM BROMIDE AND ALBUTEROL SULFATE 2.5; .5 MG/3ML; MG/3ML
3 SOLUTION RESPIRATORY (INHALATION) EVERY 4 HOURS PRN
Qty: 180 ML | Refills: 3 | Status: SHIPPED | OUTPATIENT
Start: 2024-04-30

## 2024-05-02 ENCOUNTER — HOSPITAL ENCOUNTER (OUTPATIENT)
Dept: LAB | Facility: MEDICAL CENTER | Age: 74
End: 2024-05-02
Attending: INTERNAL MEDICINE
Payer: MEDICARE

## 2024-05-02 DIAGNOSIS — R80.9 MICROALBUMINURIA DUE TO TYPE 2 DIABETES MELLITUS (HCC): ICD-10-CM

## 2024-05-02 DIAGNOSIS — D64.9 ANEMIA, UNSPECIFIED TYPE: ICD-10-CM

## 2024-05-02 DIAGNOSIS — I10 ESSENTIAL HYPERTENSION: ICD-10-CM

## 2024-05-02 DIAGNOSIS — E11.29 MICROALBUMINURIA DUE TO TYPE 2 DIABETES MELLITUS (HCC): ICD-10-CM

## 2024-05-02 DIAGNOSIS — N18.2 CKD (CHRONIC KIDNEY DISEASE), STAGE II: ICD-10-CM

## 2024-05-02 DIAGNOSIS — E78.5 TYPE 2 DIABETES MELLITUS WITH HYPERLIPIDEMIA (HCC): Chronic | ICD-10-CM

## 2024-05-02 DIAGNOSIS — D69.6 THROMBOCYTOPENIA (HCC): Chronic | ICD-10-CM

## 2024-05-02 DIAGNOSIS — E55.9 VITAMIN D DEFICIENCY: ICD-10-CM

## 2024-05-02 DIAGNOSIS — R80.9 CONTROLLED TYPE 2 DIABETES MELLITUS WITH MICROALBUMINURIA, WITHOUT LONG-TERM CURRENT USE OF INSULIN (HCC): ICD-10-CM

## 2024-05-02 DIAGNOSIS — E11.22 CKD STAGE 2 DUE TO TYPE 2 DIABETES MELLITUS (HCC): ICD-10-CM

## 2024-05-02 DIAGNOSIS — E78.5 DYSLIPIDEMIA: Chronic | ICD-10-CM

## 2024-05-02 DIAGNOSIS — N18.2 CKD STAGE 2 DUE TO TYPE 2 DIABETES MELLITUS (HCC): ICD-10-CM

## 2024-05-02 DIAGNOSIS — E11.29 CONTROLLED TYPE 2 DIABETES MELLITUS WITH MICROALBUMINURIA, WITHOUT LONG-TERM CURRENT USE OF INSULIN (HCC): ICD-10-CM

## 2024-05-02 DIAGNOSIS — E78.5 DYSLIPIDEMIA: ICD-10-CM

## 2024-05-02 DIAGNOSIS — E11.69 TYPE 2 DIABETES MELLITUS WITH HYPERLIPIDEMIA (HCC): Chronic | ICD-10-CM

## 2024-05-02 LAB
APPEARANCE UR: CLEAR
BACTERIA #/AREA URNS HPF: NEGATIVE /HPF
BILIRUB UR QL STRIP.AUTO: NEGATIVE
COLOR UR: YELLOW
EPI CELLS #/AREA URNS HPF: NEGATIVE /HPF
EST. AVERAGE GLUCOSE BLD GHB EST-MCNC: 131 MG/DL
GLUCOSE UR STRIP.AUTO-MCNC: NEGATIVE MG/DL
HBA1C MFR BLD: 6.2 % (ref 4–5.6)
HYALINE CASTS #/AREA URNS LPF: ABNORMAL /LPF
KETONES UR STRIP.AUTO-MCNC: NEGATIVE MG/DL
LEUKOCYTE ESTERASE UR QL STRIP.AUTO: NEGATIVE
MICRO URNS: ABNORMAL
NITRITE UR QL STRIP.AUTO: NEGATIVE
PH UR STRIP.AUTO: 6 [PH] (ref 5–8)
PROT UR QL STRIP: 30 MG/DL
RBC # URNS HPF: ABNORMAL /HPF
RBC UR QL AUTO: NEGATIVE
SP GR UR STRIP.AUTO: 1.02
UROBILINOGEN UR STRIP.AUTO-MCNC: 0.2 MG/DL
WBC #/AREA URNS HPF: ABNORMAL /HPF

## 2024-05-03 LAB
25(OH)D3 SERPL-MCNC: 78 NG/ML (ref 30–100)
25(OH)D3 SERPL-MCNC: 80 NG/ML (ref 30–100)
ALBUMIN SERPL BCP-MCNC: 4.3 G/DL (ref 3.2–4.9)
ALBUMIN/GLOB SERPL: 1.5 G/DL
ALP SERPL-CCNC: 78 U/L (ref 30–99)
ALT SERPL-CCNC: 29 U/L (ref 2–50)
ANION GAP SERPL CALC-SCNC: 12 MMOL/L (ref 7–16)
ANION GAP SERPL CALC-SCNC: 12 MMOL/L (ref 7–16)
ANION GAP SERPL CALC-SCNC: 13 MMOL/L (ref 7–16)
AST SERPL-CCNC: 24 U/L (ref 12–45)
BILIRUB SERPL-MCNC: 0.6 MG/DL (ref 0.1–1.5)
BUN SERPL-MCNC: 19 MG/DL (ref 8–22)
BUN SERPL-MCNC: 19 MG/DL (ref 8–22)
BUN SERPL-MCNC: 20 MG/DL (ref 8–22)
CALCIUM ALBUM COR SERPL-MCNC: 9.3 MG/DL (ref 8.5–10.5)
CALCIUM SERPL-MCNC: 9.5 MG/DL (ref 8.5–10.5)
CALCIUM SERPL-MCNC: 9.6 MG/DL (ref 8.5–10.5)
CALCIUM SERPL-MCNC: 9.7 MG/DL (ref 8.5–10.5)
CHLORIDE SERPL-SCNC: 100 MMOL/L (ref 96–112)
CHLORIDE SERPL-SCNC: 101 MMOL/L (ref 96–112)
CHLORIDE SERPL-SCNC: 101 MMOL/L (ref 96–112)
CHOLEST SERPL-MCNC: 123 MG/DL (ref 100–199)
CHOLEST SERPL-MCNC: 128 MG/DL (ref 100–199)
CO2 SERPL-SCNC: 22 MMOL/L (ref 20–33)
CREAT SERPL-MCNC: 1.07 MG/DL (ref 0.5–1.4)
CREAT SERPL-MCNC: 1.14 MG/DL (ref 0.5–1.4)
CREAT SERPL-MCNC: 1.15 MG/DL (ref 0.5–1.4)
CREAT UR-MCNC: 115.31 MG/DL
CREAT UR-MCNC: 119.72 MG/DL
CREAT UR-MCNC: 124.86 MG/DL
GFR SERPLBLD CREATININE-BSD FMLA CKD-EPI: 67 ML/MIN/1.73 M 2
GFR SERPLBLD CREATININE-BSD FMLA CKD-EPI: 67 ML/MIN/1.73 M 2
GFR SERPLBLD CREATININE-BSD FMLA CKD-EPI: 73 ML/MIN/1.73 M 2
GLOBULIN SER CALC-MCNC: 2.9 G/DL (ref 1.9–3.5)
GLUCOSE SERPL-MCNC: 100 MG/DL (ref 65–99)
GLUCOSE SERPL-MCNC: 102 MG/DL (ref 65–99)
GLUCOSE SERPL-MCNC: 103 MG/DL (ref 65–99)
HDLC SERPL-MCNC: 40 MG/DL
HDLC SERPL-MCNC: 40 MG/DL
LDLC SERPL CALC-MCNC: 71 MG/DL
LDLC SERPL CALC-MCNC: 76 MG/DL
MICROALBUMIN UR-MCNC: 6.8 MG/DL
MICROALBUMIN UR-MCNC: 7.1 MG/DL
MICROALBUMIN UR-MCNC: 7.2 MG/DL
MICROALBUMIN/CREAT UR: 57 MG/G (ref 0–30)
MICROALBUMIN/CREAT UR: 59 MG/G (ref 0–30)
MICROALBUMIN/CREAT UR: 60 MG/G (ref 0–30)
POTASSIUM SERPL-SCNC: 4.5 MMOL/L (ref 3.6–5.5)
POTASSIUM SERPL-SCNC: 4.6 MMOL/L (ref 3.6–5.5)
POTASSIUM SERPL-SCNC: 4.6 MMOL/L (ref 3.6–5.5)
PROT SERPL-MCNC: 7.2 G/DL (ref 6–8.2)
PTH-INTACT SERPL-MCNC: 40.9 PG/ML (ref 14–72)
SODIUM SERPL-SCNC: 135 MMOL/L (ref 135–145)
T3FREE SERPL-MCNC: 3.15 PG/ML (ref 2–4.4)
T4 FREE SERPL-MCNC: 0.9 NG/DL (ref 0.93–1.7)
TRIGL SERPL-MCNC: 59 MG/DL (ref 0–149)
TRIGL SERPL-MCNC: 60 MG/DL (ref 0–149)
TSH SERPL DL<=0.005 MIU/L-ACNC: 2.02 UIU/ML (ref 0.38–5.33)

## 2024-05-04 ENCOUNTER — HOSPITAL ENCOUNTER (OUTPATIENT)
Dept: LAB | Facility: MEDICAL CENTER | Age: 74
End: 2024-05-04
Attending: INTERNAL MEDICINE
Payer: MEDICARE

## 2024-05-06 ENCOUNTER — HOSPITAL ENCOUNTER (OUTPATIENT)
Facility: MEDICAL CENTER | Age: 74
End: 2024-05-06
Attending: INTERNAL MEDICINE
Payer: MEDICARE

## 2024-05-06 LAB
ERYTHROCYTE [DISTWIDTH] IN BLOOD BY AUTOMATED COUNT: 45.7 FL (ref 35.9–50)
HCT VFR BLD AUTO: 40.9 % (ref 42–52)
HGB BLD-MCNC: 14.1 G/DL (ref 14–18)
MCH RBC QN AUTO: 31.8 PG (ref 27–33)
MCHC RBC AUTO-ENTMCNC: 34.5 G/DL (ref 32.3–36.5)
MCV RBC AUTO: 92.1 FL (ref 81.4–97.8)
PLATELET # BLD AUTO: 219 K/UL (ref 164–446)
PLATELET # BLD AUTO: ABNORMAL K/UL (ref 164–446)
PLATELETS.RETICULATED NFR BLD AUTO: 12.5 % (ref 0.6–13.1)
PMV BLD AUTO: 11.2 FL (ref 9–12.9)
RBC # BLD AUTO: 4.44 M/UL (ref 4.7–6.1)
WBC # BLD AUTO: 7.7 K/UL (ref 4.8–10.8)

## 2024-05-16 ENCOUNTER — OFFICE VISIT (OUTPATIENT)
Dept: DERMATOLOGY | Facility: IMAGING CENTER | Age: 74
End: 2024-05-16
Payer: MEDICARE

## 2024-05-16 DIAGNOSIS — L92.0 GRANULOMA ANNULARE: ICD-10-CM

## 2024-05-16 DIAGNOSIS — L29.9 ITCHING: ICD-10-CM

## 2024-05-16 PROCEDURE — 99213 OFFICE O/P EST LOW 20 MIN: CPT | Mod: 25 | Performed by: DERMATOLOGY

## 2024-05-16 PROCEDURE — 11901 INJECT SKIN LESIONS >7: CPT | Performed by: DERMATOLOGY

## 2024-05-16 NOTE — PROGRESS NOTES
CC: Medication Follow-up      Subjective:  Previously seen patient here for rash.  Has had red itching occur on extremities.  Patient states medication is working has a concern of a spot on lt leg. Lumps on arms   Improved with Cerave cream use.     ROS: no fevers/chills. +itch.  No cough  Relevant PMH:GA - occ [place] tac trx  Social:smoker    PE: Gen:WDWN male in NAD. Skin: eczematous papules/plaques on extremities, now appearing more dermal/annular.  Dermal annular plaques on left arm    A/P: Eczema/itching;mild may be evolving right leg or showing more signs/sx of GA:  -trial sites trx on right leg and GA sites on left arm treated:    -r/b/a reviewed prior to [place] TAC   -10mg/ml, 1.5 ML total treated in 7+ sites on left arm and right lower leg - upper portion of plaque    Itching, if recurs. Managed well today by report  -Inflammation - lidex cream BID -->PRN, message for refills  -moisturizer use eucerin vs cerave - handout supplied    F/u 6-8 weeks/PRN      I have reviewed medications relevant to my specialty.

## 2024-05-22 ENCOUNTER — HOSPITAL ENCOUNTER (OUTPATIENT)
Dept: RADIOLOGY | Facility: MEDICAL CENTER | Age: 74
End: 2024-05-22
Attending: INTERNAL MEDICINE
Payer: MEDICARE

## 2024-05-22 ENCOUNTER — APPOINTMENT (OUTPATIENT)
Dept: MEDICAL GROUP | Facility: PHYSICIAN GROUP | Age: 74
End: 2024-05-22
Payer: MEDICARE

## 2024-05-22 VITALS
BODY MASS INDEX: 34.11 KG/M2 | HEART RATE: 90 BPM | DIASTOLIC BLOOD PRESSURE: 72 MMHG | WEIGHT: 217.3 LBS | TEMPERATURE: 98.2 F | SYSTOLIC BLOOD PRESSURE: 126 MMHG | OXYGEN SATURATION: 97 % | HEIGHT: 67 IN

## 2024-05-22 DIAGNOSIS — K21.9 GASTROESOPHAGEAL REFLUX DISEASE WITHOUT ESOPHAGITIS: Chronic | ICD-10-CM

## 2024-05-22 DIAGNOSIS — D69.6 THROMBOCYTOPENIA (HCC): Chronic | ICD-10-CM

## 2024-05-22 DIAGNOSIS — E78.5 TYPE 2 DIABETES MELLITUS WITH HYPERLIPIDEMIA (HCC): Chronic | ICD-10-CM

## 2024-05-22 DIAGNOSIS — F17.210 CIGARETTE NICOTINE DEPENDENCE WITHOUT COMPLICATION: ICD-10-CM

## 2024-05-22 DIAGNOSIS — R80.9 PROTEINURIA, UNSPECIFIED TYPE: Chronic | ICD-10-CM

## 2024-05-22 DIAGNOSIS — E11.22 CKD STAGE 2 DUE TO TYPE 2 DIABETES MELLITUS (HCC): ICD-10-CM

## 2024-05-22 DIAGNOSIS — F32.A ANXIETY AND DEPRESSION: Chronic | ICD-10-CM

## 2024-05-22 DIAGNOSIS — E11.69 TYPE 2 DIABETES MELLITUS WITH HYPERLIPIDEMIA (HCC): Chronic | ICD-10-CM

## 2024-05-22 DIAGNOSIS — E66.01 SEVERE OBESITY (HCC): ICD-10-CM

## 2024-05-22 DIAGNOSIS — N18.2 CKD STAGE 2 DUE TO TYPE 2 DIABETES MELLITUS (HCC): ICD-10-CM

## 2024-05-22 DIAGNOSIS — R06.09 DYSPNEA ON EXERTION: ICD-10-CM

## 2024-05-22 DIAGNOSIS — E78.5 DYSLIPIDEMIA: Chronic | ICD-10-CM

## 2024-05-22 DIAGNOSIS — J44.9 CHRONIC OBSTRUCTIVE PULMONARY DISEASE, UNSPECIFIED COPD TYPE (HCC): ICD-10-CM

## 2024-05-22 DIAGNOSIS — F41.9 ANXIETY AND DEPRESSION: Chronic | ICD-10-CM

## 2024-05-22 DIAGNOSIS — Z12.11 COLON CANCER SCREENING: ICD-10-CM

## 2024-05-22 PROBLEM — Z79.84 LONG TERM (CURRENT) USE OF ORAL HYPOGLYCEMIC DRUGS: Status: RESOLVED | Noted: 2022-11-15 | Resolved: 2024-05-22

## 2024-05-22 PROBLEM — J02.9 SORE THROAT: Status: RESOLVED | Noted: 2023-02-15 | Resolved: 2024-05-22

## 2024-05-22 PROBLEM — R05.9 COUGH: Status: RESOLVED | Noted: 2023-07-03 | Resolved: 2024-05-22

## 2024-05-22 PROCEDURE — 3074F SYST BP LT 130 MM HG: CPT | Performed by: INTERNAL MEDICINE

## 2024-05-22 PROCEDURE — 93000 ELECTROCARDIOGRAM COMPLETE: CPT | Performed by: INTERNAL MEDICINE

## 2024-05-22 PROCEDURE — 99215 OFFICE O/P EST HI 40 MIN: CPT | Performed by: INTERNAL MEDICINE

## 2024-05-22 PROCEDURE — 3078F DIAST BP <80 MM HG: CPT | Performed by: INTERNAL MEDICINE

## 2024-05-22 PROCEDURE — 92250 FUNDUS PHOTOGRAPHY W/I&R: CPT | Mod: TC | Performed by: INTERNAL MEDICINE

## 2024-05-22 RX ORDER — OMEPRAZOLE 40 MG/1
40 CAPSULE, DELAYED RELEASE ORAL DAILY
Qty: 90 CAPSULE | Refills: 0 | Status: SHIPPED | OUTPATIENT
Start: 2024-05-22

## 2024-05-22 RX ORDER — DULOXETIN HYDROCHLORIDE 60 MG/1
60 CAPSULE, DELAYED RELEASE ORAL DAILY
Qty: 90 CAPSULE | Refills: 0 | Status: SHIPPED | OUTPATIENT
Start: 2024-05-22

## 2024-05-22 NOTE — ASSESSMENT & PLAN NOTE
Chronic condition.  Patient followed by hematology service.  Platelet level mildly low previously.  Patient denies any history of bleeding.

## 2024-05-22 NOTE — ASSESSMENT & PLAN NOTE
This is a chronic condition.  Condition associated with proteinuria.  Patient followed by nephrology service.  No new symptoms reported.  Advised the patient to avoid NSAIDs.

## 2024-05-22 NOTE — ASSESSMENT & PLAN NOTE
This is a new condition.  The patient reported recurrent shortness of breath noted with mild exertion.  Patient denies chest discomfort nausea vomiting or diaphoresis.  Currently at rest the patient asymptomatic.  Patient denies family history of heart disease.  Cardiac risk factors include diabetes, hypertension, hyperlipidemia, obesity and smoking.

## 2024-05-22 NOTE — ASSESSMENT & PLAN NOTE
This is a chronic condition.  The patient is being treated with joan Ellipta  Patient followed by pulmonology service.  Use albuterol as needed for rescue treatment.  Currently the patient asymptomatic

## 2024-05-22 NOTE — ASSESSMENT & PLAN NOTE
Chronic condition.  Patient followed by endocrinology service.  Patient is presently taking metformin 1000 Mg daily.   Previous A1c 6.2%  Patient denies significant hypoglycemic symptoms.

## 2024-05-22 NOTE — ASSESSMENT & PLAN NOTE
Chronic condition.  The patient is currently taking omeprazole daily.  Patient denies nausea vomiting dysphagia or unexplained weight loss

## 2024-05-22 NOTE — ASSESSMENT & PLAN NOTE
Chronic condition.  Associated with CKD 2. Pt taking lisinopril daily   Patient followed by nephrology service.  The patient currently on low protein diet.  No new symptoms reported.

## 2024-05-22 NOTE — PROGRESS NOTES
PRIMARY CARE CLINIC VISIT        Chief Complaint   Patient presents with    Follow-Up     Check up       Dyspnea on exertion  CKD 2  Follow-up diabetes  Thrombocytopenia  Proteinuria  COPD  Obesity  Hyperlipidemia  Cigarette smoking  Acid reflux  Medication review  Lab results       History of Present Illness     Type 2 diabetes mellitus with hyperlipidemia (HCC)  Chronic condition.  Patient followed by endocrinology service.  Patient is presently taking metformin 1000 Mg daily.   Previous A1c 6.2%  Patient denies significant hypoglycemic symptoms.    Thrombocytopenia (HCC)  Chronic condition.  Patient followed by hematology service.  Platelet level mildly low previously.  Patient denies any history of bleeding.    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)  This is a chronic condition.  Condition associated with proteinuria.  Patient followed by nephrology service.  No new symptoms reported.  Advised the patient to avoid NSAIDs.    Proteinuria  Chronic condition.  Associated with CKD 2. Pt taking lisinopril daily   Patient followed by nephrology service.  The patient currently on low protein diet.  No new symptoms reported.    Chronic obstructive pulmonary disease (HCC)  This is a chronic condition.  The patient is being treated with anoro Ellipta  Patient followed by pulmonology service.  Use albuterol as needed for rescue treatment.  Currently the patient asymptomatic    Severe obesity (HCC)  Chronic condition.  Discussed with the patient regarding diet, exercise, and lifestyle modification to help achieve and maintain healthy weight         Dyslipidemia  Chronic ongoing condition for the patient being treated with atorvastatin.  Patient tolerated the treatment well.  Lab test result discussed with the patient.    GERD (gastroesophageal reflux disease)  Chronic condition.  The patient is currently taking omeprazole daily.  Patient denies nausea vomiting dysphagia or unexplained weight loss    Dependence on  nicotine from cigarettes  Chronic condition.  Discussed with the patient regarding diet, exercise, and lifestyle modification to help achieve and maintain healthy weight         Anxiety and depression  This is a chronic condition.  Patient is being treated with duloxetine 60 Mg daily.  The patient denies SI.  Tolerating medication well.    Dyspnea on exertion  This is a new condition.  The patient reported recurrent shortness of breath noted with mild exertion.  Patient denies chest discomfort nausea vomiting or diaphoresis.  Currently at rest the patient asymptomatic.  Patient denies family history of heart disease.  Cardiac risk factors include diabetes, hypertension, hyperlipidemia, obesity and smoking.    Current Outpatient Medications on File Prior to Visit   Medication Sig Dispense Refill    ipratropium-albuterol (DUONEB) 0.5-2.5 (3) MG/3ML nebulizer solution Take 3 mL by nebulization every four hours as needed for Shortness of Breath (Wheezing). 180 mL 3    fluocinonide (LIDEX) 0.05 % Cream AAA arms/legs BID PRN rash/itching. Do not use on face, axilla, groin 60 g 1    triamcinolone acetonide (KENALOG) 0.5 % Cream APPLY TOPICALLY TWICE DAILY AS NEEDED FOR ITCHING, BODY SITES ONLY. DO NOT USE ON FACE, AXILLA, OR GROIN 15 g 1    atorvastatin (LIPITOR) 20 MG Tab Take 1 Tablet by mouth every day. 90 Tablet 0    umeclidinium-vilanterol (ANORO ELLIPTA) 62.5-25 MCG/ACT AEROSOL POWDER, BREATH ACTIVATED inhaler Inhale 1 Puff every day. 60 Each 3    albuterol 108 (90 Base) MCG/ACT Aero Soln inhalation aerosol Inhale 2 Puffs every 6 hours as needed for Shortness of Breath. 8.5 g 3    PREVIDENT 5000 DRY MOUTH 1.1 % Gel       nystatin (MYCOSTATIN) 833749 UNIT/GM Cream topical cream AAA groin BID for rash. Use for 7-10days when present 30 g 3    lisinopril (PRINIVIL) 2.5 MG Tab Take 1 Tablet by mouth every day. 90 Tablet 3    metFORMIN ER (GLUCOPHAGE XR) 500 MG TABLET SR 24 HR Take 2 Tablets by mouth every day. 180 Tablet  1    Cholecalciferol (VITAMIN D) 125 MCG (5000 UT) Cap Take 5,000 Units by mouth every day.       No current facility-administered medications on file prior to visit.        Allergies: Augmentin, Metformin, and Zyban [bupropion]    Current Outpatient Medications Ordered in Epic   Medication Sig Dispense Refill    DULoxetine (CYMBALTA) 60 MG Cap DR Particles delayed-release capsule Take 1 Capsule by mouth every day. 90 Capsule 0    omeprazole (PRILOSEC) 40 MG delayed-release capsule Take 1 Capsule by mouth every day. 90 Capsule 0    ipratropium-albuterol (DUONEB) 0.5-2.5 (3) MG/3ML nebulizer solution Take 3 mL by nebulization every four hours as needed for Shortness of Breath (Wheezing). 180 mL 3    fluocinonide (LIDEX) 0.05 % Cream AAA arms/legs BID PRN rash/itching. Do not use on face, axilla, groin 60 g 1    triamcinolone acetonide (KENALOG) 0.5 % Cream APPLY TOPICALLY TWICE DAILY AS NEEDED FOR ITCHING, BODY SITES ONLY. DO NOT USE ON FACE, AXILLA, OR GROIN 15 g 1    atorvastatin (LIPITOR) 20 MG Tab Take 1 Tablet by mouth every day. 90 Tablet 0    umeclidinium-vilanterol (ANORO ELLIPTA) 62.5-25 MCG/ACT AEROSOL POWDER, BREATH ACTIVATED inhaler Inhale 1 Puff every day. 60 Each 3    albuterol 108 (90 Base) MCG/ACT Aero Soln inhalation aerosol Inhale 2 Puffs every 6 hours as needed for Shortness of Breath. 8.5 g 3    PREVIDENT 5000 DRY MOUTH 1.1 % Gel       nystatin (MYCOSTATIN) 608632 UNIT/GM Cream topical cream AAA groin BID for rash. Use for 7-10days when present 30 g 3    lisinopril (PRINIVIL) 2.5 MG Tab Take 1 Tablet by mouth every day. 90 Tablet 3    metFORMIN ER (GLUCOPHAGE XR) 500 MG TABLET SR 24 HR Take 2 Tablets by mouth every day. 180 Tablet 1    Cholecalciferol (VITAMIN D) 125 MCG (5000 UT) Cap Take 5,000 Units by mouth every day.       No current [facility]-ordered facility-administered medications on file.       Past Medical History:   Diagnosis Date    Back pain     Chickenpox     Cough 7/3/2023    Depression      Dyspnea on exertion 5/22/2024    GERD (gastroesophageal reflux disease)     [place] measles     Gout     Hyperlipidemia     Influenza     Panic disorder     Sleep apnea     Sputum production     Tobacco use 11/26/2014    Wheezing        Past Surgical History:   Procedure Laterality Date    APPENDECTOMY      ARTHROSCOPY, KNEE      CARPAL TUNNEL ENDOSCOPIC      bilateral    CARPAL TUNNEL RELEASE      SHOULDER DECOMPRESSION ARTHROSCOPIC      right    TONSILLECTOMY         Family History   Problem Relation Age of Onset    Cancer Mother     Cancer Father     Stroke Father     Diabetes Neg Hx     Heart Disease Neg Hx     Hypertension Neg Hx        Social History     Tobacco Use   Smoking Status Every Day    Current packs/day: 0.50    Average packs/day: 0.5 packs/day for 53.0 years (26.5 ttl pk-yrs)    Types: Cigarettes   Smokeless Tobacco Never   Tobacco Comments    started smoking at age 15       Social History     Substance and Sexual Activity   Alcohol Use Not Currently    Alcohol/week: 0.0 oz       Review of systems  As per HPI above. All other systems reviewed and negative.      Past Medical, Social, and Family history reviewed and updated in [facility]       LAB DATA:    Lab Results   Component Value Date/Time    HBA1C 6.2 (H) 05/02/2024 09:40 AM    HBA1C 6.4 (A) 11/28/2023 01:52 PM    HBA1C 6.2 (A) 05/23/2023 11:10 AM       Lab Results   Component Value Date/Time    WBC 7.7 05/06/2024 09:47 AM    HEMOGLOBIN 14.1 05/06/2024 09:47 AM    HEMATOCRIT 40.9 (L) 05/06/2024 09:47 AM    MCV 92.1 05/06/2024 09:47 AM    PLATELETCT See Note 05/06/2024 09:47 AM       Lab Results   Component Value Date/Time    SODIUM 135 05/02/2024 09:48 AM    POTASSIUM 4.5 05/02/2024 09:48 AM    GLUCOSE 102 (H) 05/02/2024 09:48 AM    BUN 20 05/02/2024 09:48 AM    CREATININE 1.15 05/02/2024 09:48 AM       Lab Results   Component Value Date/Time    CHOLSTRLTOT 123 05/02/2024 09:48 AM    TRIGLYCERIDE 60 05/02/2024 09:48 AM    HDL 40 05/02/2024 09:48 AM  "   LDL 71 05/02/2024 09:48 AM       Lab Results   Component Value Date/Time    ALTSGSARAH 29 05/02/2024 09:48 AM          Objective     /72 (BP Location: Left arm, Patient Position: Sitting, BP Cuff Size: Adult)   Pulse 90   Temp 36.8 °C (98.2 °F) (Temporal)   Ht 1.702 m (5' 7\")   Wt 98.6 kg (217 lb 4.8 oz)   SpO2 97%    Body mass index is 34.03 kg/m².    General: alert in no apparent distress.  Cardiovascular: regular rate and rhythm  Pulmonary: lungs : no wheezing   Gastrointestinal: BS present.       I independently interpreted the patient's ekg:    Sinus rhythm.  Rate 76.  Ventricular premature complex.  Right bundle branch block and left anterior fascicular block.  Abnormal EKG.  Result discussed with the patient.  Refer the patient to cardiology for further evaluation.      Assessment and Plan     1. Dyspnea on exertion  This is an acute condition.  Uncontrolled.  Patient reported recurrent shortness of breath noted with simple activities such as working out in the yard or walking.  Cardiac risk factors include diabetes, hypertension, hyperlipidemia, obesity and smoking.  - DX-CHEST-2 VIEWS; Future  - EKG  - REFERRAL TO CARDIOLOGY  Recommend the patient to go to ER if symptoms recur    2. 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)  Chronic condition per stable per advised the patient to avoid NSAID.  Continue low protein diet and follow-up with nephrology service as directed      3. Type 2 diabetes mellitus with hyperlipidemia (HCC)  Chronic condition.  Stable per A1c 6.2%.  Patient reported that he has stopped taking metformin due to recurrent loose stools/diarrhea.  At this time the patient does not wish to take any medication.  He has pending appointment to see endocrinologist next month and will discuss with the specialist at that time.  Discussed with the patient goals for Diabetes management:  -HbA1C < 7% /  Fasting BG   /  2 hrs postprandial  - 180    Pt's education: "   -Discussed with potential microvascular/macrovascular complications of diabetes  -The importance of increasing physical activity to improve diabetes control  discussed with the patient.   -Patient was also educated on changing diet and making better choices to help control blood sugar.          4. Thrombocytopenia (HCC)  Chronic condition.  Stable.  Patient denied history of bleeding.  Continue to monitor    5. Proteinuria, unspecified type  This is a chronic condition per stable.  Recommend low protein diet.  Avoid NSAID.  Continue follow-up with nephrology.    6. Chronic obstructive pulmonary disease, unspecified COPD type (HCC)  Chronic condition.  Stable.  Continue Anoro Ellipta daily.  Patient may use albuterol as needed.        7. Severe obesity (HCC)  Chronic condition.  Uncontrolled.  Recommend diet and lifestyle modification.  Encouraged patient to lose weight    8. Dyslipidemia  Chronic condition.  Stable.  Atorvastatin 20 Mg daily    9. Cigarette nicotine dependence without complication  Chronic condition.  Strongly advised the patient to quit smoking.  Strongly recommended  - REFERRAL TO LUNG CANCER SCREENING PROGRAM  The patient however refused      10. Gastroesophageal reflux disease without esophagitis  - omeprazole (PRILOSEC) 40 MG delayed-release capsule; Take 1 Capsule by mouth every day.  Dispense: 90 Capsule; Refill: 0    11.Colon cancer screening  - Referral to GI for Colonoscopy            Attestation: I spent:    43  minutes -    That includes time for chart review before the visit, the actual patient visit, and time spent on documentation in EMR after the visit.  Chart review/prep, review of other providers' records, imaging/lab review, face-to-face time for history/examination, pt's counseling/education, ordering, prescribing,  review of results/meds/ treatment plan with patient, and care coordination.               Please note that this dictation was created using voice recognition  software. I have made every reasonable attempt to correct obvious errors, but I expect that there are errors of grammar and possibly content that I did not discover before finalizing the note.    Aris Aldana MD  Internal Medicine  [facility]

## 2024-05-22 NOTE — ASSESSMENT & PLAN NOTE
Chronic ongoing condition for the patient being treated with atorvastatin.  Patient tolerated the treatment well.  Lab test result discussed with the patient.

## 2024-05-22 NOTE — ASSESSMENT & PLAN NOTE
This is a chronic condition.  Patient is being treated with duloxetine 60 Mg daily.  The patient denies SI.  Tolerating medication well.

## 2024-05-27 DIAGNOSIS — E78.5 DYSLIPIDEMIA: ICD-10-CM

## 2024-05-28 ENCOUNTER — TELEPHONE (OUTPATIENT)
Dept: MEDICAL GROUP | Facility: PHYSICIAN GROUP | Age: 74
End: 2024-05-28
Payer: MEDICARE

## 2024-05-28 DIAGNOSIS — E11.69 TYPE 2 DIABETES MELLITUS WITH HYPERLIPIDEMIA (HCC): Chronic | ICD-10-CM

## 2024-05-28 DIAGNOSIS — E78.5 TYPE 2 DIABETES MELLITUS WITH HYPERLIPIDEMIA (HCC): Chronic | ICD-10-CM

## 2024-05-28 LAB — RETINAL SCREEN: POSITIVE

## 2024-05-28 RX ORDER — ATORVASTATIN CALCIUM 20 MG/1
20 TABLET, FILM COATED ORAL DAILY
Qty: 90 TABLET | Refills: 3 | Status: SHIPPED | OUTPATIENT
Start: 2024-05-28

## 2024-05-28 NOTE — TELEPHONE ENCOUNTER
Pt had a positive finding on retina exam.  Pt informed-he states he sees Dr Saturnino Tapia and has an upcoming appointment.  Faxed results to their office at pt request.

## 2024-05-28 NOTE — TELEPHONE ENCOUNTER
Received request via: Patient    Was the patient seen in the last year in this department? Yes    Does the patient have an active prescription (recently filled or refills available) for medication(s) requested? No    [facility]     Does the patient have [facility] Plus and need 100 day supply (blood pressure, diabetes and cholesterol meds only)? Patient does not have SCP

## 2024-05-29 ENCOUNTER — OFFICE VISIT (OUTPATIENT)
Dept: NEPHROLOGY | Facility: MEDICAL CENTER | Age: 74
End: 2024-05-29
Payer: MEDICARE

## 2024-05-29 VITALS
DIASTOLIC BLOOD PRESSURE: 68 MMHG | SYSTOLIC BLOOD PRESSURE: 130 MMHG | TEMPERATURE: 97.6 F | OXYGEN SATURATION: 98 % | WEIGHT: 216 LBS | HEIGHT: 67 IN | HEART RATE: 94 BPM | BODY MASS INDEX: 33.9 KG/M2

## 2024-05-29 DIAGNOSIS — R80.9 MICROALBUMINURIA DUE TO TYPE 2 DIABETES MELLITUS (HCC): ICD-10-CM

## 2024-05-29 DIAGNOSIS — N18.2 CKD (CHRONIC KIDNEY DISEASE), STAGE II: ICD-10-CM

## 2024-05-29 DIAGNOSIS — E11.29 MICROALBUMINURIA DUE TO TYPE 2 DIABETES MELLITUS (HCC): ICD-10-CM

## 2024-05-29 DIAGNOSIS — I10 ESSENTIAL HYPERTENSION: ICD-10-CM

## 2024-05-29 DIAGNOSIS — E55.9 VITAMIN D DEFICIENCY: ICD-10-CM

## 2024-05-29 DIAGNOSIS — D64.9 ANEMIA, UNSPECIFIED TYPE: ICD-10-CM

## 2024-05-29 ASSESSMENT — ENCOUNTER SYMPTOMS
FEVER: 0
SHORTNESS OF BREATH: 0
HEMOPTYSIS: 0
ORTHOPNEA: 0
WEIGHT LOSS: 0
FLANK PAIN: 0
PALPITATIONS: 0
NAUSEA: 0
EYES NEGATIVE: 1
WHEEZING: 0
DIARRHEA: 0
CHILLS: 0
ABDOMINAL PAIN: 0
COUGH: 1
VOMITING: 0
SINUS PAIN: 0

## 2024-05-29 NOTE — PROGRESS NOTES
Subjective:      Micheal Brothers Jr. is a 73 y.o. male who presents with Chronic Kidney Disease            Chronic Kidney Disease  Associated symptoms include coughing. Pertinent negatives include no abdominal pain, chest pain, chills, congestion, fever, nausea or vomiting.     Micheal is coming today for f/u of CKD II, microalbuminuria  Doing well, no complaints except cough ( hx/of COPD -f/u in Pulmonary clinic)  No dysuria/hematuria/flank pain  HTN: BP well controlled -compliant to medications, low salt diet  CKD II stable at baseline at 1.0-1.2  Microalbuminuria very mild - on ACEi -to monitor      Review of Systems   Constitutional:  Negative for chills, fever, malaise/fatigue and weight loss.   HENT:  Negative for congestion, hearing loss and sinus pain.    Eyes: Negative.    Respiratory:  Positive for cough. Negative for hemoptysis, shortness of breath and wheezing.    Cardiovascular:  Negative for chest pain, palpitations, orthopnea and leg swelling.   Gastrointestinal:  Negative for abdominal pain, diarrhea, nausea and vomiting.   Genitourinary:  Negative for dysuria, flank pain, frequency, hematuria and urgency.   Skin: Negative.    All other systems reviewed and are negative.    Past Medical History:   Diagnosis Date    Back pain     Chickenpox     Cough 7/3/2023    Depression     Dyspnea on exertion 5/22/2024    GERD (gastroesophageal reflux disease)     [place] measles     Gout     Hyperlipidemia     Influenza     Panic disorder     Sleep apnea     Sputum production     Tobacco use 11/26/2014    Wheezing        Family History   Problem Relation Age of Onset    Cancer Mother     Cancer Father     Stroke Father     Diabetes Neg Hx     Heart Disease Neg Hx     Hypertension Neg Hx        Social History     Socioeconomic History    Marital status:    Tobacco Use    Smoking status: Every Day     Current packs/day: 0.50     Average packs/day: 0.5 packs/day for 53.0 years (26.5 ttl pk-yrs)      "Types: Cigarettes    Smokeless tobacco: Never    Tobacco comments:     started smoking at age 15   Vaping Use    Vaping status: Never Used   Substance and Sexual Activity    Alcohol use: Not Currently     Alcohol/week: 0.0 oz    Drug use: No    Sexual activity: Yes     Partners: Female          Objective:     /68 (BP Location: Right arm, Patient Position: Sitting, BP Cuff Size: Adult)   Pulse 94   Temp 36.4 °C (97.6 °F) (Temporal)   Ht 1.702 m (5' 7\")   Wt 98 kg (216 lb)   SpO2 98%   BMI 33.83 kg/m²      Physical Exam  Vitals reviewed.   Constitutional:       General: He is not in acute distress.     Appearance: Normal appearance. He is well-developed. He is not diaphoretic.   HENT:      Head: Normocephalic and atraumatic.      Nose: Nose normal.      Mouth/Throat:      Mouth: Mucous membranes are moist.      Pharynx: Oropharynx is clear.   Eyes:      Extraocular Movements: Extraocular movements intact.      Conjunctiva/sclera: Conjunctivae normal.      Pupils: Pupils are equal, round, and reactive to light.   Cardiovascular:      Rate and Rhythm: Normal rate and regular rhythm.      Pulses: Normal pulses.      Heart sounds: Normal heart sounds.   Pulmonary:      Effort: Pulmonary effort is normal. No respiratory distress.      Breath sounds: Normal breath sounds. No wheezing or rales.   Abdominal:      General: Bowel sounds are normal. There is no distension.      Palpations: Abdomen is soft. There is no mass.      Tenderness: There is no abdominal tenderness. There is no right CVA tenderness or left CVA tenderness.   Musculoskeletal:      Cervical back: Normal range of motion and neck supple.      Right lower leg: No edema.      Left lower leg: No edema.   Skin:     General: Skin is warm.      Coloration: Skin is not pale.      Findings: No erythema or rash.   Neurological:      General: No focal deficit present.      Mental Status: He is alert and oriented to person, place, and time.      Cranial " Nerves: No cranial nerve deficit.      Coordination: Coordination normal.   Psychiatric:         Mood and Affect: Mood normal.         Behavior: Behavior normal.         Thought Content: Thought content normal.         Judgment: Judgment normal.               Lab results reviewed: d/w Pt   Latest Reference Range & Units 05/02/24 09:45 05/02/24 09:48 05/06/24 09:47 05/22/24 00:00   WBC 4.8 - 10.8 K/uL   7.7    RBC 4.70 - 6.10 M/uL   4.44 (L)    Hemoglobin 14.0 - 18.0 g/dL   14.1    Hematocrit 42.0 - 52.0 %   40.9 (L)    MCV 81.4 - 97.8 fL   92.1    MCH 27.0 - 33.0 pg   31.8    MCHC 32.3 - 36.5 g/dL   34.5    RDW 35.9 - 50.0 fL   45.7    Platelet Count 164 - 446 K/uL   See Note    MPV 9.0 - 12.9 fL   11.2    Imm. Plt Fraction 0.6 - 13.1 %   12.5    Platelet Count, Citrated 164 - 446 K/uL   219    Sodium 135 - 145 mmol/L  135     Potassium 3.6 - 5.5 mmol/L  4.5     Chloride 96 - 112 mmol/L  101     Co2 20 - 33 mmol/L  22     Anion Gap 7.0 - 16.0   12.0     Glucose 65 - 99 mg/dL  102 (H)     Bun 8 - 22 mg/dL  20     Creatinine 0.50 - 1.40 mg/dL  1.15     GFR (CKD-EPI) >60 mL/min/1.73 m 2  67     Calcium 8.5 - 10.5 mg/dL  9.5     Correct Calcium 8.5 - 10.5 mg/dL  9.3     AST(SGOT) 12 - 45 U/L  24     ALT(SGPT) 2 - 50 U/L  29     Alkaline Phosphatase 30 - 99 U/L  78     Total Bilirubin 0.1 - 1.5 mg/dL  0.6     Albumin 3.2 - 4.9 g/dL  4.3     Total Protein 6.0 - 8.2 g/dL  7.2     Globulin 1.9 - 3.5 g/dL  2.9     A-G Ratio g/dL  1.5     Cholesterol,Tot 100 - 199 mg/dL  123     Triglycerides 0 - 149 mg/dL  60     HDL >=40 mg/dL  40     LDL <100 mg/dL  71     Micro Alb Creat Ratio 0 - 30 mg/g 57 (H) 59 (H)     Creatinine, Urine mg/dL 124.86 115.31     Microalbumin, Urine Random mg/dL 7.1 6.8     Urobilinogen, Urine Negative  0.2      Color  Yellow      Character  Clear      Specific Gravity <1.035  1.020      Ph 5.0 - 8.0  6.0      Glucose Negative mg/dL Negative      Ketones Negative mg/dL Negative      Bilirubin Negative   Negative      Occult Blood Negative  Negative      Protein Negative mg/dL 30 !      Nitrite Negative  Negative      Leukocyte Esterase Negative  Negative      Micro Urine Req  Microscopic      WBC /hpf 0-2 !      RBC /hpf 0-2 !      Epithelial Cells /hpf Negative      Bacteria None /hpf Negative      Hyaline Cast /lpf 0-2      25-Hydroxy   Vitamin D 25 30 - 100 ng/mL  78     TSH 0.380 - 5.330 uIU/mL  2.020     Free T-4 0.93 - 1.70 ng/dL  0.90 (L)     T3,Free 2.00 - 4.40 pg/mL  3.15     EKG     Rpt   (L): Data is abnormally low  (H): Data is abnormally high  !: Data is abnormal  Rpt: View report in Results Review for more information         1. CKD (chronic kidney disease), stage II  -   Creat stable at baseline -to monitor    2. Microalbuminuria due to type 2 diabetes mellitus (HCC)      Mild on ACEi    4. Essential hypertension      BP well controlled -to monitor at home    5. Anemia:      Hb WNL -to monitor    6. Vitamin D deficiency      Well controlled      To monitor    Recs:  Continue current treatment  Monitor BP and call clinic if BP > 135/85  Low salt diet  Keep well hydrated  F/u 12 months

## 2024-06-03 ENCOUNTER — OFFICE VISIT (OUTPATIENT)
Dept: CARDIOLOGY | Facility: MEDICAL CENTER | Age: 74
End: 2024-06-03
Attending: INTERNAL MEDICINE
Payer: MEDICARE

## 2024-06-03 VITALS
DIASTOLIC BLOOD PRESSURE: 70 MMHG | OXYGEN SATURATION: 96 % | WEIGHT: 215 LBS | SYSTOLIC BLOOD PRESSURE: 126 MMHG | HEIGHT: 67 IN | HEART RATE: 101 BPM | BODY MASS INDEX: 33.74 KG/M2 | RESPIRATION RATE: 16 BRPM

## 2024-06-03 DIAGNOSIS — E78.5 TYPE 2 DIABETES MELLITUS WITH HYPERLIPIDEMIA (HCC): Chronic | ICD-10-CM

## 2024-06-03 DIAGNOSIS — F17.210 CIGARETTE NICOTINE DEPENDENCE WITHOUT COMPLICATION: Chronic | ICD-10-CM

## 2024-06-03 DIAGNOSIS — J44.9 CHRONIC OBSTRUCTIVE PULMONARY DISEASE, UNSPECIFIED COPD TYPE (HCC): ICD-10-CM

## 2024-06-03 DIAGNOSIS — E11.69 TYPE 2 DIABETES MELLITUS WITH HYPERLIPIDEMIA (HCC): Chronic | ICD-10-CM

## 2024-06-03 DIAGNOSIS — N18.2 CKD STAGE 2 DUE TO TYPE 2 DIABETES MELLITUS (HCC): Chronic | ICD-10-CM

## 2024-06-03 DIAGNOSIS — E11.22 CKD STAGE 2 DUE TO TYPE 2 DIABETES MELLITUS (HCC): Chronic | ICD-10-CM

## 2024-06-03 DIAGNOSIS — R06.09 DOE (DYSPNEA ON EXERTION): ICD-10-CM

## 2024-06-03 PROCEDURE — 3078F DIAST BP <80 MM HG: CPT | Performed by: INTERNAL MEDICINE

## 2024-06-03 PROCEDURE — 99213 OFFICE O/P EST LOW 20 MIN: CPT | Mod: 25 | Performed by: INTERNAL MEDICINE

## 2024-06-03 PROCEDURE — 99204 OFFICE O/P NEW MOD 45 MIN: CPT | Performed by: INTERNAL MEDICINE

## 2024-06-03 PROCEDURE — 99406 BEHAV CHNG SMOKING 3-10 MIN: CPT | Performed by: INTERNAL MEDICINE

## 2024-06-03 PROCEDURE — 3074F SYST BP LT 130 MM HG: CPT | Performed by: INTERNAL MEDICINE

## 2024-06-03 NOTE — PROGRESS NOTES
"CARDIOLOGY NEW PATIENT CONSULTATION    PCP: Aris Aldana M.D.    1. AVITIA (dyspnea on exertion)    2. Cigarette nicotine dependence without complication    3. Type 2 diabetes mellitus with hyperlipidemia (HCC)    4. Chronic obstructive pulmonary disease, unspecified COPD type (HCC)    5. 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)        Micheal Brothers Jr. has class II dyspnea on exertion likely related to COPD though I cannot exclude an element of occult cardiac disease.  I recommended an echocardiogram-particularly in light of the bifascicular block on ECG.  We did not pursue stress testing as he is unable to exercise with knee discomfort and ultimately is content with exertional capacities and so exact diagnosis is not felt needed.  I did explain to him that the biggest threat to his wellbeing is smoking.  I encouraged him to limit the cigarette consumption to only those which bring him the most satisfaction.  Often this is 2 to 3 cigarettes/day.  I encouraged him to keep cigarettes in his medicine cabinet.  If he can get to a couple cigarettes per day he will be in a much better position to quit totally.    Follow up: to be determined after testing is complete      History: Micheal Brothers Jr. is a 73 y.o. male with history of COPD, ongoing tobacco abuse presenting for assessment of dyspnea.  Exertional capacities have been stable over the past year though he does find occasional midday tiredness.  The symptoms only set him when he does not have more things to do.  Was able to spread 2 yards of DG through the weekend without difficulty.      PE:  /70 (BP Location: Left arm, Patient Position: Sitting, BP Cuff Size: Adult)   Pulse (!) 101   Resp 16   Ht 1.702 m (5' 7\")   Wt 97.5 kg (215 lb)   SpO2 96%   BMI 33.67 kg/m²   GEN: NAD  RESP: CTAB  CVS: RRR, No M/R/G  ABD: Soft, NT/ND  EXT: WWP, no edema    Studies interpreted by me: ECG: Bifascicular block  I counseled the patient " "for 4 minutes on smoking cessation. We discussed keeping cigarettes in the medicine cabinet..     The ASCVD Risk score (Clyde DK, et al., 2019) failed to calculate.    Studies  Lab Results   Component Value Date/Time    CHOLSTRLTOT 123 05/02/2024 09:48 AM    LDL 71 05/02/2024 09:48 AM    HDL 40 05/02/2024 09:48 AM    TRIGLYCERIDE 60 05/02/2024 09:48 AM       Lab Results   Component Value Date/Time    SODIUM 135 05/02/2024 09:48 AM    POTASSIUM 4.5 05/02/2024 09:48 AM    CHLORIDE 101 05/02/2024 09:48 AM    CO2 22 05/02/2024 09:48 AM    GLUCOSE 102 (H) 05/02/2024 09:48 AM    BUN 20 05/02/2024 09:48 AM    CREATININE 1.15 05/02/2024 09:48 AM     Lab Results   Component Value Date/Time    ALKPHOSPHAT 78 05/02/2024 09:48 AM    ASTSGOT 24 05/02/2024 09:48 AM    ALTSGPT 29 05/02/2024 09:48 AM    TBILIRUBIN 0.6 05/02/2024 09:48 AM      No results found for: \"HGB\"     Past Medical History:   Diagnosis Date    Back pain     Chickenpox     Cough 7/3/2023    Depression     Dyspnea on exertion 5/22/2024    GERD (gastroesophageal reflux disease)     Swiss measles     Gout     Hyperlipidemia     Influenza     Panic disorder     Sleep apnea     Sputum production     Tobacco use 11/26/2014    Wheezing      Past Surgical History:   Procedure Laterality Date    APPENDECTOMY      ARTHROSCOPY, KNEE      CARPAL TUNNEL ENDOSCOPIC      bilateral    CARPAL TUNNEL RELEASE      SHOULDER DECOMPRESSION ARTHROSCOPIC      right    TONSILLECTOMY       Allergies   Allergen Reactions    Augmentin Hives and Diarrhea    Metformin Diarrhea     Not extended release gave diarrhea    Zyban [Bupropion] Hives and Itching     Outpatient Encounter Medications as of 6/3/2024   Medication Sig Dispense Refill    atorvastatin (LIPITOR) 20 MG Tab Take 1 Tablet by mouth every day. 90 Tablet 3    DULoxetine (CYMBALTA) 60 MG Cap DR Particles delayed-release capsule Take 1 Capsule by mouth every day. 90 Capsule 0    omeprazole (PRILOSEC) 40 MG delayed-release capsule " Take 1 Capsule by mouth every day. 90 Capsule 0    ipratropium-albuterol (DUONEB) 0.5-2.5 (3) MG/3ML nebulizer solution Take 3 mL by nebulization every four hours as needed for Shortness of Breath (Wheezing). 180 mL 3    fluocinonide (LIDEX) 0.05 % Cream AAA arms/legs BID PRN rash/itching. Do not use on face, axilla, groin 60 g 1    triamcinolone acetonide (KENALOG) 0.5 % Cream APPLY TOPICALLY TWICE DAILY AS NEEDED FOR ITCHING, BODY SITES ONLY. DO NOT USE ON FACE, AXILLA, OR GROIN 15 g 1    umeclidinium-vilanterol (ANORO ELLIPTA) 62.5-25 MCG/ACT AEROSOL POWDER, BREATH ACTIVATED inhaler Inhale 1 Puff every day. 60 Each 3    albuterol 108 (90 Base) MCG/ACT Aero Soln inhalation aerosol Inhale 2 Puffs every 6 hours as needed for Shortness of Breath. 8.5 g 3    PREVIDENT 5000 DRY MOUTH 1.1 % Gel       nystatin (MYCOSTATIN) 553996 UNIT/GM Cream topical cream AAA groin BID for rash. Use for 7-10days when present 30 g 3    lisinopril (PRINIVIL) 2.5 MG Tab Take 1 Tablet by mouth every day. 90 Tablet 3    metFORMIN ER (GLUCOPHAGE XR) 500 MG TABLET SR 24 HR Take 2 Tablets by mouth every day. 180 Tablet 1    Cholecalciferol (VITAMIN D) 125 MCG (5000 UT) Cap Take 5,000 Units by mouth every day.       No facility-administered encounter medications on file as of 6/3/2024.     Social History     Socioeconomic History    Marital status:      Spouse name: Not on file    Number of children: Not on file    Years of education: Not on file    Highest education level: Not on file   Occupational History    Not on file   Tobacco Use    Smoking status: Every Day     Current packs/day: 0.50     Average packs/day: 0.5 packs/day for 53.0 years (26.5 ttl pk-yrs)     Types: Cigarettes    Smokeless tobacco: Never    Tobacco comments:     started smoking at age 15   Vaping Use    Vaping status: Never Used   Substance and Sexual Activity    Alcohol use: Not Currently     Alcohol/week: 0.0 oz    Drug use: No    Sexual activity: Yes      Partners: Female   Other Topics Concern    Not on file   Social History Narrative    Not on file     Social Determinants of Health     Financial Resource Strain: Not on file   Food Insecurity: Not on file   Transportation Needs: Not on file   Physical Activity: Not on file   Stress: Not on file   Social Connections: Not on file   Intimate Partner Violence: Not on file   Housing Stability: Not on file     Family History   Problem Relation Age of Onset    Cancer Mother     Cancer Father     Stroke Father     Diabetes Neg Hx     Heart Disease Neg Hx     Hypertension Neg Hx        Chief Complaint   Patient presents with    Dyslipidemia       ROS:   10 point review systems is otherwise negative except as per the HPI

## 2024-06-05 ENCOUNTER — HOSPITAL ENCOUNTER (OUTPATIENT)
Dept: CARDIOLOGY | Facility: MEDICAL CENTER | Age: 74
End: 2024-06-05
Attending: INTERNAL MEDICINE
Payer: MEDICARE

## 2024-06-05 DIAGNOSIS — R06.09 DOE (DYSPNEA ON EXERTION): ICD-10-CM

## 2024-06-05 LAB
LV EJECT FRACT  99904: 59
LV EJECT FRACT MOD 2C 99903: 60.23
LV EJECT FRACT MOD 4C 99902: 59.52
LV EJECT FRACT MOD BP 99901: 59.03

## 2024-06-05 PROCEDURE — 93306 TTE W/DOPPLER COMPLETE: CPT | Mod: 26 | Performed by: INTERNAL MEDICINE

## 2024-06-05 PROCEDURE — 93306 TTE W/DOPPLER COMPLETE: CPT

## 2024-06-08 DIAGNOSIS — J44.9 CHRONIC OBSTRUCTIVE PULMONARY DISEASE, UNSPECIFIED COPD TYPE (HCC): ICD-10-CM

## 2024-06-10 RX ORDER — ALBUTEROL SULFATE 90 UG/1
2 AEROSOL, METERED RESPIRATORY (INHALATION) EVERY 6 HOURS PRN
Qty: 8.5 G | Refills: 3 | Status: SHIPPED | OUTPATIENT
Start: 2024-06-10

## 2024-06-10 NOTE — TELEPHONE ENCOUNTER
Have we ever prescribed this med? Yes.  If yes, what date? 01/15/24    Last OV: 01/15/24 with Dr Sánchez    Next OV: No Pending appt.     DX: COPD    Medications:   Requested Prescriptions     Pending Prescriptions Disp Refills    albuterol 108 (90 Base) MCG/ACT Aero Soln inhalation aerosol 8.5 g 3     Sig: Inhale 2 Puffs every 6 hours as needed for Shortness of Breath.

## 2024-06-19 NOTE — PROGRESS NOTES
RN-CDE Note    Subjective:   Endocrinology Clinic Progress Note  PCP: Aris Aldana M.D.    HPI:  Micheal Brothers Jr. is a 73 y.o. old patient who is seen today by the Diabetes Nurse Specialist for review of controlled type 2 diabetes.    Recent changes in health: no changes  DM:   Last A1c:   Lab Results   Component Value Date/Time    HBA1C 6.2 (H) 05/02/2024 09:40 AM      Previous A1c was 6.4 on 11/28/2023  A1C GOAL: < 6.5    Diabetes Medications:   Metformin 500 mg daily (stopped taking about 3-4 weeks ago due to side effects)      Exercise: sporadic irregular exercise, <half hour walking weekly  Diet: 2 meals per day  Breakfast:  eggs and toast or english muffin  Dinner:  varies  1 pepsi per day (regular)    Patient's body mass index is unknown because there is no height or weight on file. Exercise and nutrition counseling were performed at this visit.    Glucose monitoring frequency: not testing     Hypoglycemic episodes: na  Last Retinal Exam: on file and up-to-date  Daily Foot Exam: Yes  denies problems.   Foot Exam:  Monofilament: current.   Lab Results   Component Value Date/Time    MALBCRT 59 (H) 05/02/2024 09:48 AM    MICROALBUR 6.8 05/02/2024 09:48 AM        ACR Albumin/Creatinine Ratio goal <30     HTN:   Blood pressure goal <130/<80 .   Currently Rx ACE/ARB: Yes     Dyslipidemia:    Lab Results   Component Value Date/Time    CHOLSTRLTOT 123 05/02/2024 09:48 AM    LDL 71 05/02/2024 09:48 AM    HDL 40 05/02/2024 09:48 AM    TRIGLYCERIDE 60 05/02/2024 09:48 AM         Currently Rx Statin:  yes    He  reports that he has been smoking cigarettes. He has a 26.5 pack-year smoking history. He has been exposed to tobacco smoke. He has never used smokeless tobacco.      Plan:     Discussed and educated on:   - All medications, side effects and compliance (discussed carefully)  - Annual eye examinations at Ophthalmology  - HbA1C: target  - Home glucose monitoring emphasized  - Weight control and daily  exercise    Recommended medication changes: none.

## 2024-06-20 ENCOUNTER — OFFICE VISIT (OUTPATIENT)
Dept: ENDOCRINOLOGY | Facility: MEDICAL CENTER | Age: 74
End: 2024-06-20
Attending: INTERNAL MEDICINE
Payer: MEDICARE

## 2024-06-20 ENCOUNTER — PHARMACY VISIT (OUTPATIENT)
Dept: PHARMACY | Facility: MEDICAL CENTER | Age: 74
End: 2024-06-20
Payer: COMMERCIAL

## 2024-06-20 VITALS
WEIGHT: 218.5 LBS | HEIGHT: 67 IN | OXYGEN SATURATION: 97 % | RESPIRATION RATE: 17 BRPM | HEART RATE: 96 BPM | BODY MASS INDEX: 34.29 KG/M2 | DIASTOLIC BLOOD PRESSURE: 74 MMHG | SYSTOLIC BLOOD PRESSURE: 126 MMHG

## 2024-06-20 DIAGNOSIS — E78.5 DYSLIPIDEMIA: ICD-10-CM

## 2024-06-20 DIAGNOSIS — E11.29 CONTROLLED TYPE 2 DIABETES MELLITUS WITH MICROALBUMINURIA, WITHOUT LONG-TERM CURRENT USE OF INSULIN (HCC): ICD-10-CM

## 2024-06-20 DIAGNOSIS — D35.02 BILATERAL ADRENAL ADENOMAS: ICD-10-CM

## 2024-06-20 DIAGNOSIS — Z79.84 LONG TERM (CURRENT) USE OF ORAL HYPOGLYCEMIC DRUGS: ICD-10-CM

## 2024-06-20 DIAGNOSIS — R80.9 CONTROLLED TYPE 2 DIABETES MELLITUS WITH MICROALBUMINURIA, WITHOUT LONG-TERM CURRENT USE OF INSULIN (HCC): ICD-10-CM

## 2024-06-20 DIAGNOSIS — E55.9 VITAMIN D DEFICIENCY: ICD-10-CM

## 2024-06-20 DIAGNOSIS — D35.01 BILATERAL ADRENAL ADENOMAS: ICD-10-CM

## 2024-06-20 PROCEDURE — 3074F SYST BP LT 130 MM HG: CPT | Performed by: INTERNAL MEDICINE

## 2024-06-20 PROCEDURE — 99214 OFFICE O/P EST MOD 30 MIN: CPT | Performed by: INTERNAL MEDICINE

## 2024-06-20 PROCEDURE — 3078F DIAST BP <80 MM HG: CPT | Performed by: INTERNAL MEDICINE

## 2024-06-20 PROCEDURE — RXMED WILLOW AMBULATORY MEDICATION CHARGE: Performed by: INTERNAL MEDICINE

## 2024-06-20 PROCEDURE — 99213 OFFICE O/P EST LOW 20 MIN: CPT | Performed by: INTERNAL MEDICINE

## 2024-06-20 RX ORDER — EMPAGLIFLOZIN 25 MG/1
1 TABLET, FILM COATED ORAL DAILY
Qty: 30 TABLET | Refills: 0 | Status: SHIPPED | OUTPATIENT
Start: 2024-06-20

## 2024-06-20 RX ORDER — EMPAGLIFLOZIN 25 MG/1
1 TABLET, FILM COATED ORAL DAILY
Qty: 90 TABLET | Refills: 3 | Status: SHIPPED | OUTPATIENT
Start: 2024-06-20

## 2024-06-20 NOTE — PROGRESS NOTES
CHIEF COMPLAINT: Patient is here for follow up of Type 2 Diabetes Mellitus    HPI:     Micheal Brothers Jr. is a 73 y.o. male with Type 2 Diabetes Mellitus here for follow up.    Labs from 5/2/2024 show A1c is 6.2%  Labs from 5/23/2023 show A1c is 6.2%  Labs from 11/15/2022 show A1c is 6.2%      He was previously seen by the nurse practitioner  He has history of bilateral 1 cm adrenal adenomas with calcification seen on CT scan in April 2021.     He denies a personal history of malignancy      Baseline work-up by the nurse practitioner for hormonal hypersecretion was negative for Cushing's disease, pheochromocytoma and hyperaldosteronism.  All of his hormonal work-up for hypersecretion of August 2022 came back unremarkable.    And repeat work-up on November 2022 showed normal plasma fractionated metanephrines  Normal overnight low-dose DST with morning cortisol of 0.9.  Dexamethasone drug level was measured  Aldosterone was normal 8 renin was normal at 6.9 ruling out hyper Hugh      CT of adrenals on November 20, 2022 showed a stable right adrenal gland measuring 1 cm with negative precontrast Hounsfield units of -10 COMPATIBLE with lipophilic benign adenoma  It also showed a stable right adrenal adenoma with negative precontrast tonsil units of 7 measurement was not provided          Aside from the bilateral adrenal adenomas he has controlled type 2 diabetes with renal complications particularly persistent albuminuria.  He is being followed by Dr. [facility].  Diabeyes was diagnosed on Feb 2022 ( a1c was 6.7%)  He reports intolerance of Metformin.          He is now on Metformin ER but he stopped the medication 3-4 weeks ago due to diarrhea  Because he does have diabetic kidney disease we talked about Jardiance.  Previously he took Farxiga but stopped it because of cost related issues      He does have diabetic kidney disease  He is on an ACE inhibitor  UACR  is 56 on 5/2024  UACR was 66 on  5/2023  UACR was 93 on 8/2022        He does have hyperlipidemia and is on atorvastatin 20 mg daily  He does not have CAD  He denies    Latest Reference Range & Units 05/02/24 09:48   Cholesterol,Tot 100 - 199 mg/dL 123   Triglycerides 0 - 149 mg/dL 60   HDL >=40 mg/dL 40   LDL <100 mg/dL 71         He had an eye exam on 5/22/2024        BG Diary:  Not required     Weight has been stable    Diabetes Complications   Retinopathy: No known retinopathy.  Last eye exam: 5/2024  Neuropathy: Denies paresthesias or numbness in hands or feet. Denies any foot wounds.  Exercise: Minimal.  Diet: Fair.  Patient's medications, allergies, and social histories were reviewed and updated as appropriate.    ROS:     CONS:     No fever, no chills   EYES:     No diplopia, no blurry vision   CV:           No chest pain, no palpitations   PULM:     No SOB, no cough, no hemoptysis.   GI:            No nausea, no vomiting, no diarrhea, no constipation   ENDO:     No polyuria, no polydipsia, no heat intolerance, no cold intolerance       Past Medical History:  Problem List:  2024-05: Dyspnea on exertion  2023-10: Anxiety  2023-07: Cough  2023-02: Sore throat  2022-11: Long term (current) use of oral hypoglycemic drugs  2022-10: Iron deficiency anemia  2022-08: Severe obesity ([facility])  2022-08: CKD stage 2 due to type 2 diabetes mellitus ([facility])  2021-11: Thomas's esophagus  2021-04: Calcification of gallbladder  2021-04: Bilateral adrenal adenomas  2021-04: Abnormal finding on GI tract imaging  2021-04: Bilateral inguinal hernia without obstruction or gangrene  2021-04: Pelvic pain  2021-04: Umbilical hernia  2021-01: Thrombocytopenia ([facility])  2021-01: Proteinuria  2021-01: Colonic polyp  2019-05: Chronic obstructive pulmonary disease ([facility])  2019-04: Type 2 diabetes mellitus with hyperlipidemia ([facility])  2018-08: Obesity (BMI 35.0-39.9 without comorbidity) ([facility])  2018-06: Anxiety and depression  2016-08: NAHUM on CPAP  2014-11: Dyslipidemia  2014-11:  "GERD (gastroesophageal reflux disease)  2014-11: Depression  2014-11: Dependence on nicotine from cigarettes  2014-11: Right wrist pain      Past Surgical History:  Past Surgical History:   Procedure Laterality Date    APPENDECTOMY      ARTHROSCOPY, KNEE      CARPAL TUNNEL ENDOSCOPIC      bilateral    CARPAL TUNNEL RELEASE      SHOULDER DECOMPRESSION ARTHROSCOPIC      right    TONSILLECTOMY          Allergies:  Augmentin, Metformin, and Zyban [bupropion]     Social History:  Social History     Tobacco Use    Smoking status: Every Day     Current packs/day: 0.50     Average packs/day: 0.5 packs/day for 53.0 years (26.5 ttl pk-yrs)     Types: Cigarettes     Passive exposure: Past    Smokeless tobacco: Never    Tobacco comments:     started smoking at age 15   Vaping Use    Vaping status: Never Used   Substance Use Topics    Alcohol use: Not Currently     Alcohol/week: 0.0 oz    Drug use: No        Family History:   family history includes Cancer in his father and mother; Stroke in his father.      PHYSICAL EXAM:   OBJECTIVE:  Vital signs: /74 (BP Location: Left arm, Patient Position: Sitting)   Pulse 96   Resp 17   Ht 1.702 m (5' 7\")   Wt 99.1 kg (218 lb 8 oz)   SpO2 97%   BMI 34.22 kg/m²   GENERAL: Well-developed, well-nourished in no apparent distress.   EYE:  No ocular asymmetry, PERRLA  HENT: Pink, moist mucous membranes.    NECK: No thyromegaly.   CARDIOVASCULAR:  No murmurs  LUNGS: Clear breath sounds  ABDOMEN: Soft, nontender   EXTREMITIES: No clubbing, cyanosis, or edema.   NEUROLOGICAL: No gross focal motor abnormalities   LYMPH: No cervical adenopathy palpated.   SKIN: No rashes, lesions.     Labs:  Lab Results   Component Value Date/Time    HBA1C 6.2 (H) 05/02/2024 09:40 AM        Lab Results   Component Value Date/Time    WBC 7.7 05/06/2024 09:47 AM    RBC 4.44 (L) 05/06/2024 09:47 AM    HEMOGLOBIN 14.1 05/06/2024 09:47 AM    MCV 92.1 05/06/2024 09:47 AM    MCH 31.8 05/06/2024 09:47 AM    MCHC " 34.5 2024 09:47 AM    RDW 45.7 2024 09:47 AM    MPV 11.2 2024 09:47 AM       Lab Results   Component Value Date/Time    SODIUM 135 2024 09:48 AM    POTASSIUM 4.5 2024 09:48 AM    CHLORIDE 101 2024 09:48 AM    CO2 22 2024 09:48 AM    ANION 12.0 2024 09:48 AM    GLUCOSE 102 (H) 2024 09:48 AM    BUN 20 2024 09:48 AM    CREATININE 1.15 2024 09:48 AM    CALCIUM 9.5 2024 09:48 AM    ASTSGOT 24 2024 09:48 AM    ALTSGPT 29 2024 09:48 AM    TBILIRUBIN 0.6 2024 09:48 AM    ALBUMIN 4.3 2024 09:48 AM    TOTPROTEIN 7.2 2024 09:48 AM    GLOBULIN 2.9 2024 09:48 AM    AGRATIO 1.5 2024 09:48 AM       Lab Results   Component Value Date/Time    CHOLSTRLTOT 144 2022 0813    TRIGLYCERIDE 72 2022 0813    HDL 40 2022 0813    LDL 90 2022 0813       Lab Results   Component Value Date/Time    MALBCRT 59 (H) 2024 09:48 AM    MICROALBUR 6.8 2024 09:48 AM        Lab Results   Component Value Date/Time    TSHULTRASEN 1.910 2022 0918     No results found for: FREEDIR  No results found for: FREET3  No results found for: THYSTIMIG    IMAGIN2021 9:21 AM     HISTORY/REASON FOR EXAM:  Lower abdominal and pelvic pain        TECHNIQUE/EXAM DESCRIPTION:  CT abdomen and pelvis with contrast, enterography protocol.     Following oral administration of VoLumen oral contrast and water as well as intravenous administration of 100 mL of Omnipaque 350 nonionic contrast, thin section helical CT is performed from the level of the diaphragm through the ischial tuberosities.   Axial, coronal, and sagittal images are sent to PACS.     Low dose optimization technique was utilized for this CT exam including automated exposure control and adjustment of the mA and/or kV according to patient size.     COMPARISON:  None.     FINDINGS:  Visualized lung bases are clear.     Abdomen: Calcifications are noted in  the spleen. No focal mass is identified in the liver. Liver is smoothly marginated. Small calcification is noted at the inferior edge of the gallbladder. 1 cm nodule is identified within each adrenal gland. The   kidneys enhance symmetrically. Renal cysts are noted bilaterally. No follow-up recommended. There is no adenopathy or free fluid. Umbilical hernia contains abdominal fat.     Pelvis: There is no free fluid or lymphadenopathy.     Bowel: There is wall thickening versus normal contraction between the first and second portion of the duodenum. There is extensive diverticulosis. Wall thickening in the proximal half of the sigmoid colon is identified. No other stricture or filling   defect or mass is identified within the bowel.        1.  Possible normal contraction versus abnormal wall thickening possibly caused by tumor located between the first and second portions of the duodenum.     2.  There is diverticulosis.     3.  Wall thickening in the sigmoid colon is noted which could be caused by normal contraction or spasm versus neoplasm.     4.  Otherwise no mass wall thickening or filling defect noted in the small bowel.     5.  Calcification noted in the gallbladder.     6.  Umbilical hernia contains abdominal fat.     7.  Inguinal hernias bilaterally containing abdominal fat.     8.  1 cm nodule is identified within each adrenal gland. These likely represent adrenal adenomas.  Signed by Pelon Lyons M.D. on 4/22/2021 12:02 PM  Narrative & Impression     4/22/2021 9:21 AM     HISTORY/REASON FOR EXAM:  Lower abdominal and pelvic pain        TECHNIQUE/EXAM DESCRIPTION:  CT abdomen and pelvis with contrast, enterography protocol.     Following oral administration of VoLumen oral contrast and water as well as intravenous administration of 100 mL of Omnipaque 350 nonionic contrast, thin section helical CT is performed from the level of the diaphragm through the ischial tuberosities.   Axial, coronal, and  sagittal images are sent to PACS.     Low dose optimization technique was utilized for this CT exam including automated exposure control and adjustment of the mA and/or kV according to patient size.     COMPARISON:  None.     FINDINGS:  Visualized lung bases are clear.     Abdomen: Calcifications are noted in the spleen. No focal mass is identified in the liver. Liver is smoothly marginated. Small calcification is noted at the inferior edge of the gallbladder. 1 cm nodule is identified within each adrenal gland. The   kidneys enhance symmetrically. Renal cysts are noted bilaterally. No follow-up recommended. There is no adenopathy or free fluid. Umbilical hernia contains abdominal fat.     Pelvis: There is no free fluid or lymphadenopathy.     Bowel: There is wall thickening versus normal contraction between the first and second portion of the duodenum. There is extensive diverticulosis. Wall thickening in the proximal half of the sigmoid colon is identified. No other stricture or filling   defect or mass is identified within the bowel.     IMPRESSION:     1.  Possible normal contraction versus abnormal wall thickening possibly caused by tumor located between the first and second portions of the duodenum.     2.  There is diverticulosis.     3.  Wall thickening in the sigmoid colon is noted which could be caused by normal contraction or spasm versus neoplasm.     4.  Otherwise no mass wall thickening or filling defect noted in the small bowel.     5.  Calcification noted in the gallbladder.     6.  Umbilical hernia contains abdominal fat.     7.  Inguinal hernias bilaterally containing abdominal fat.        ASSESSMENT/PLAN:     1. Controlled type 2 diabetes mellitus without complication, without long-term current use of insulin (HCC)  Controlled  He tried and failed metformin due to GI side effects.  Start Jardiance 25 mg daily recommend adequate hydration ordered samples and ordered prescription to his regular  pharmacy.  He is up-to-date with his labs and eye exam  Recommend follow-up with [facility] in 3 months for repeat A1c    2. Bilateral adrenal adenomas  Stable  He has stable noncancerous fat filled bilateral adrenal adenomas with no evidence of hormone hypersecretion  Additional imaging is not recommended at this time  Additional hormonal workup is not indicated at this time    3. Dyslipidemia  Stable  Continue atorvastatin  Continue monitoring repeat fasting lipids in 12 months    4. Vitamin D deficiency  Stable  Continue monitoring    5. Long term (current) use of oral hypoglycemic drugs  Patient is on multiple oral agents for type 2 diabetes management      Return in about 3 months (around 9/20/2024).      Thank you kindly for allowing me to participate in the diabetes care plan for this patient.    Tj Monsalve MD, FACE, [facility]      CC:   Aris Aldana M.D.

## 2024-06-26 ENCOUNTER — PATIENT MESSAGE (OUTPATIENT)
Dept: CARDIOLOGY | Facility: MEDICAL CENTER | Age: 74
End: 2024-06-26
Payer: MEDICARE

## 2024-06-28 ENCOUNTER — OFFICE VISIT (OUTPATIENT)
Dept: DERMATOLOGY | Facility: IMAGING CENTER | Age: 74
End: 2024-06-28
Payer: MEDICARE

## 2024-06-28 DIAGNOSIS — L92.0 GRANULOMA ANNULARE: ICD-10-CM

## 2024-06-28 DIAGNOSIS — L30.9 ECZEMA, UNSPECIFIED TYPE: ICD-10-CM

## 2024-06-28 NOTE — PROGRESS NOTES
CC: Follow-Up      Subjective:  Previously seen patient here for rash Patient states medication has been improving. Desires additional injections on right leg      ROS: no fevers/chills. +itch.  No cough  Relevant PMH:GA - occ [place] tac trx  Social:smoker    PE: Gen:WDWN male in NAD. Skin:  dermal/annular plaques on right lower leg.  Arms appearing spared    A/P: Eczema/itching;mild may be evolving right leg or showing more signs/sx of GA:  -r/b/a reviewed prior to [place] TAC   -10mg/ml, 2.5 ML total treated in 7+ sites on right lower leg     F/u 12 weeks/PRN      I have reviewed medications relevant to my specialty.

## 2024-07-01 ENCOUNTER — APPOINTMENT (OUTPATIENT)
Dept: MEDICAL GROUP | Facility: MEDICAL CENTER | Age: 74
End: 2024-07-01
Payer: MEDICARE

## 2024-07-01 VITALS
RESPIRATION RATE: 16 BRPM | SYSTOLIC BLOOD PRESSURE: 104 MMHG | TEMPERATURE: 97 F | HEART RATE: 92 BPM | HEIGHT: 67 IN | BODY MASS INDEX: 34.03 KG/M2 | DIASTOLIC BLOOD PRESSURE: 58 MMHG | WEIGHT: 216.8 LBS | OXYGEN SATURATION: 97 %

## 2024-07-01 DIAGNOSIS — E78.5 TYPE 2 DIABETES MELLITUS WITH HYPERLIPIDEMIA (HCC): Chronic | ICD-10-CM

## 2024-07-01 DIAGNOSIS — Z12.5 SCREENING PSA (PROSTATE SPECIFIC ANTIGEN): ICD-10-CM

## 2024-07-01 DIAGNOSIS — J42 CHRONIC BRONCHITIS, UNSPECIFIED CHRONIC BRONCHITIS TYPE (HCC): ICD-10-CM

## 2024-07-01 DIAGNOSIS — E11.69 TYPE 2 DIABETES MELLITUS WITH HYPERLIPIDEMIA (HCC): Chronic | ICD-10-CM

## 2024-07-01 PROBLEM — E11.29 MICROALBUMINURIA DUE TO TYPE 2 DIABETES MELLITUS (HCC): Status: ACTIVE | Noted: 2021-01-22

## 2024-07-01 PROBLEM — E11.22 CKD STAGE 2 DUE TO TYPE 2 DIABETES MELLITUS (HCC): Chronic | Status: RESOLVED | Noted: 2022-08-06 | Resolved: 2024-07-01

## 2024-07-01 PROBLEM — N18.2 CKD STAGE 2 DUE TO TYPE 2 DIABETES MELLITUS (HCC): Chronic | Status: RESOLVED | Noted: 2022-08-06 | Resolved: 2024-07-01

## 2024-07-01 PROBLEM — D50.9 IRON DEFICIENCY ANEMIA: Status: RESOLVED | Noted: 2022-10-13 | Resolved: 2024-07-01

## 2024-07-01 PROBLEM — E66.01 SEVERE OBESITY (HCC): Status: RESOLVED | Noted: 2022-08-15 | Resolved: 2024-07-01

## 2024-07-01 PROBLEM — K42.9 UMBILICAL HERNIA: Status: RESOLVED | Noted: 2021-04-13 | Resolved: 2024-07-01

## 2024-07-01 PROBLEM — R10.2 PELVIC PAIN: Status: RESOLVED | Noted: 2021-04-13 | Resolved: 2024-07-01

## 2024-07-01 PROCEDURE — 99214 OFFICE O/P EST MOD 30 MIN: CPT | Performed by: PHYSICIAN ASSISTANT

## 2024-07-01 PROCEDURE — 3078F DIAST BP <80 MM HG: CPT | Performed by: PHYSICIAN ASSISTANT

## 2024-07-01 PROCEDURE — 3074F SYST BP LT 130 MM HG: CPT | Performed by: PHYSICIAN ASSISTANT

## 2024-07-01 RX ORDER — FLUTICASONE PROPIONATE AND SALMETEROL 250; 50 UG/1; UG/1
1 POWDER RESPIRATORY (INHALATION) EVERY 12 HOURS
Qty: 1 EACH | Refills: 5 | Status: SHIPPED | OUTPATIENT
Start: 2024-07-01 | End: 2024-07-03

## 2024-07-02 ENCOUNTER — PATIENT MESSAGE (OUTPATIENT)
Dept: MEDICAL GROUP | Facility: MEDICAL CENTER | Age: 74
End: 2024-07-02
Payer: MEDICARE

## 2024-07-03 ENCOUNTER — HOSPITAL ENCOUNTER (OUTPATIENT)
Dept: LAB | Facility: MEDICAL CENTER | Age: 74
End: 2024-07-03
Attending: PHYSICIAN ASSISTANT
Payer: MEDICARE

## 2024-07-03 DIAGNOSIS — Z12.5 SCREENING PSA (PROSTATE SPECIFIC ANTIGEN): ICD-10-CM

## 2024-07-03 LAB — PSA SERPL-MCNC: 0.48 NG/ML (ref 0–4)

## 2024-07-03 PROCEDURE — 36415 COLL VENOUS BLD VENIPUNCTURE: CPT | Mod: GA

## 2024-07-03 PROCEDURE — 84153 ASSAY OF PSA TOTAL: CPT | Mod: GA

## 2024-07-03 RX ORDER — FLUTICASONE PROPIONATE AND SALMETEROL XINAFOATE 230; 21 UG/1; UG/1
2 AEROSOL, METERED RESPIRATORY (INHALATION) 2 TIMES DAILY
Qty: 12 G | Refills: 0 | Status: SHIPPED | OUTPATIENT
Start: 2024-07-03

## 2024-07-03 RX ORDER — FLUTICASONE PROPIONATE AND SALMETEROL XINAFOATE 230; 21 UG/1; UG/1
2 AEROSOL, METERED RESPIRATORY (INHALATION) 2 TIMES DAILY
COMMUNITY
End: 2024-07-03 | Stop reason: SDUPTHER

## 2024-08-08 ENCOUNTER — OFFICE VISIT (OUTPATIENT)
Dept: SLEEP MEDICINE | Facility: MEDICAL CENTER | Age: 74
End: 2024-08-08
Attending: INTERNAL MEDICINE
Payer: MEDICARE

## 2024-08-08 VITALS
WEIGHT: 209 LBS | HEART RATE: 94 BPM | BODY MASS INDEX: 32.8 KG/M2 | SYSTOLIC BLOOD PRESSURE: 122 MMHG | OXYGEN SATURATION: 97 % | HEIGHT: 67 IN | DIASTOLIC BLOOD PRESSURE: 60 MMHG

## 2024-08-08 DIAGNOSIS — J44.9 CHRONIC OBSTRUCTIVE PULMONARY DISEASE, UNSPECIFIED COPD TYPE (HCC): ICD-10-CM

## 2024-08-08 DIAGNOSIS — K21.9 GASTROESOPHAGEAL REFLUX DISEASE WITHOUT ESOPHAGITIS: Chronic | ICD-10-CM

## 2024-08-08 DIAGNOSIS — F41.9 ANXIETY AND DEPRESSION: Chronic | ICD-10-CM

## 2024-08-08 DIAGNOSIS — G47.33 OSA ON CPAP: ICD-10-CM

## 2024-08-08 DIAGNOSIS — F32.A ANXIETY AND DEPRESSION: Chronic | ICD-10-CM

## 2024-08-08 DIAGNOSIS — Z72.0 TOBACCO USE: ICD-10-CM

## 2024-08-08 PROCEDURE — 3078F DIAST BP <80 MM HG: CPT | Performed by: INTERNAL MEDICINE

## 2024-08-08 PROCEDURE — 3074F SYST BP LT 130 MM HG: CPT | Performed by: INTERNAL MEDICINE

## 2024-08-08 PROCEDURE — 99212 OFFICE O/P EST SF 10 MIN: CPT | Performed by: INTERNAL MEDICINE

## 2024-08-08 PROCEDURE — 99214 OFFICE O/P EST MOD 30 MIN: CPT | Performed by: INTERNAL MEDICINE

## 2024-08-08 RX ORDER — ALBUTEROL SULFATE 90 UG/1
2 AEROSOL, METERED RESPIRATORY (INHALATION) EVERY 6 HOURS PRN
Qty: 8.5 G | Refills: 3 | Status: SHIPPED | OUTPATIENT
Start: 2024-08-08

## 2024-08-08 RX ORDER — TIOTROPIUM BROMIDE INHALATION SPRAY 3.12 UG/1
5 SPRAY, METERED RESPIRATORY (INHALATION) DAILY
Qty: 2 EACH | Refills: 0 | COMMUNITY
Start: 2024-08-08

## 2024-08-08 ASSESSMENT — ENCOUNTER SYMPTOMS
EYE DISCHARGE: 0
PHOTOPHOBIA: 0
VOMITING: 0
HEMOPTYSIS: 0
BLURRED VISION: 0
BACK PAIN: 0
PND: 0
FEVER: 0
DEPRESSION: 0
NAUSEA: 0
WHEEZING: 0
DIZZINESS: 0
EYE REDNESS: 0
CHILLS: 0
COUGH: 0
FOCAL WEAKNESS: 0
WEAKNESS: 0
DIARRHEA: 0
WEIGHT LOSS: 0
ORTHOPNEA: 0
TREMORS: 0
HEADACHES: 0
ABDOMINAL PAIN: 0
FALLS: 0
STRIDOR: 0
MYALGIAS: 0
EYE PAIN: 0
CLAUDICATION: 0
DOUBLE VISION: 0
SINUS PAIN: 0
DIAPHORESIS: 0
SHORTNESS OF BREATH: 0
SPEECH CHANGE: 0
CONSTIPATION: 0
HEARTBURN: 0
PALPITATIONS: 0
NECK PAIN: 0
SORE THROAT: 0
SPUTUM PRODUCTION: 0

## 2024-08-08 NOTE — PROGRESS NOTES
Chief Complaint   Patient presents with    Follow-Up     LAST SEEN 1/15/24 BY DR. SÁNCHEZ, CXR 5/22/24         HPI: This patient is a 73 y.o. male whom is followed in our clinic for COPD last seen by Dr Sánchez on 1/15/24.  He is a current tobacco smoker, <1/2ppd with estimated 50+ pk yr hx. He is also followed by sleep medicine. PFT from 2/2023 showed mild air flow obstruction with normal TLC. DLCO not performed. He is not SOB but does have excess mucous production. He was prescribed spiriva last  visit but cost was $600 and anoro $419 so he is currently on albuterol only which he will use daily with activity. No exacerbations. Cxr from May showed calcified granuloma. No acute concerns today. He has declined lung Ca screening.    Past Medical History:   Diagnosis Date    Back pain     Chickenpox     Cough 7/3/2023    Depression     Dyspnea on exertion 5/22/2024    GERD (gastroesophageal reflux disease)     [place] measles     Gout     Hyperlipidemia     Influenza     Panic disorder     Sleep apnea     Sputum production     Tobacco use 11/26/2014    Wheezing        Social History     Socioeconomic History    Marital status:      Spouse name: Not on file    Number of children: Not on file    Years of education: Not on file    Highest education level: Not on file   Occupational History    Not on file   Tobacco Use    Smoking status: Every Day     Current packs/day: 0.50     Average packs/day: 0.5 packs/day for 53.0 years (26.5 ttl pk-yrs)     Types: Cigarettes     Passive exposure: Past    Smokeless tobacco: Never    Tobacco comments:     started smoking at age 15   Vaping Use    Vaping status: Never Used   Substance and Sexual Activity    Alcohol use: Not Currently     Alcohol/week: 0.0 oz    Drug use: Never    Sexual activity: Yes     Partners: Female   Other Topics Concern    Not on file   Social History Narrative    Not on file     Social Determinants of Health     Financial Resource Strain: Not on  file   Food Insecurity: Not on file   Transportation Needs: Not on file   Physical Activity: Not on file   Stress: Not on file   Social Connections: Not on file   Intimate Partner Violence: Not on file   Housing Stability: Not on file       Family History   Problem Relation Age of Onset    Cancer Mother     Cancer Father     Stroke Father     Diabetes Neg Hx     Heart Disease Neg Hx     Hypertension Neg Hx        Current Outpatient Medications on File Prior to Visit   Medication Sig Dispense Refill    fluticasone-salmeterol (ADVAIR HFA) 230-21 MCG/ACT inhaler Inhale 2 Puffs 2 times a day. 12 g 0    albuterol 108 (90 Base) MCG/ACT Aero Soln inhalation aerosol Inhale 2 Puffs every 6 hours as needed for Shortness of Breath. 8.5 g 3    atorvastatin (LIPITOR) 20 MG Tab Take 1 Tablet by mouth every day. 90 Tablet 3    DULoxetine (CYMBALTA) 60 MG Cap DR Particles delayed-release capsule Take 1 Capsule by mouth every day. 90 Capsule 0    omeprazole (PRILOSEC) 40 MG delayed-release capsule Take 1 Capsule by mouth every day. 90 Capsule 0    ipratropium-albuterol (DUONEB) 0.5-2.5 (3) MG/3ML nebulizer solution Take 3 mL by nebulization every four hours as needed for Shortness of Breath (Wheezing). 180 mL 3    nystatin (MYCOSTATIN) 737916 UNIT/GM Cream topical cream AAA groin BID for rash. Use for 7-10days when present 30 g 3    lisinopril (PRINIVIL) 2.5 MG Tab Take 1 Tablet by mouth every day. 90 Tablet 3    Iron Combinations (IRON COMPLEX PO) Take  by mouth.      fluocinonide (LIDEX) 0.05 % Cream AAA arms/legs BID PRN rash/itching. Do not use on face, axilla, groin 60 g 1    triamcinolone acetonide (KENALOG) 0.5 % Cream APPLY TOPICALLY TWICE DAILY AS NEEDED FOR ITCHING, BODY SITES ONLY. DO NOT USE ON FACE, AXILLA, OR GROIN 15 g 1    PREVIDENT 5000 DRY MOUTH 1.1 % Gel       Cholecalciferol (VITAMIN D) 125 MCG (5000 UT) Cap Take 5,000 Units by mouth every day.       No current facility-administered medications on file prior to  "visit.       Augmentin, Metformin, and Zyban [bupropion]      ROS:   Review of Systems   Constitutional:  Negative for chills, diaphoresis, fever, malaise/fatigue and weight loss.   HENT:  Negative for congestion, ear discharge, ear pain, hearing loss, nosebleeds, sinus pain, sore throat and tinnitus.    Eyes:  Negative for blurred vision, double vision, photophobia, pain, discharge and redness.   Respiratory:  Negative for cough, hemoptysis, sputum production, shortness of breath, wheezing and stridor.    Cardiovascular:  Negative for chest pain, palpitations, orthopnea, claudication, leg swelling and PND.   Gastrointestinal:  Negative for abdominal pain, constipation, diarrhea, heartburn, nausea and vomiting.   Genitourinary:  Negative for dysuria and urgency.   Musculoskeletal:  Negative for back pain, falls, joint pain, myalgias and neck pain.   Skin:  Negative for itching and rash.   Neurological:  Negative for dizziness, tremors, speech change, focal weakness, weakness and headaches.   Endo/Heme/Allergies:  Negative for environmental allergies.   Psychiatric/Behavioral:  Negative for depression.        /60 (BP Location: Right arm, Patient Position: Sitting, BP Cuff Size: Adult)   Pulse 94   Ht 1.702 m (5' 7\")   Wt 94.8 kg (209 lb)   SpO2 97%   Physical Exam  Vitals reviewed.   Constitutional:       General: He is not in acute distress.     Appearance: Normal appearance. He is normal weight.   HENT:      Head: Normocephalic and atraumatic.      Right Ear: External ear normal.      Left Ear: External ear normal.      Nose: Nose normal. No congestion.      Mouth/Throat:      Mouth: Mucous membranes are moist.      Pharynx: Oropharynx is clear. No oropharyngeal exudate.   Eyes:      General: No scleral icterus.     Extraocular Movements: Extraocular movements intact.      Conjunctiva/sclera: Conjunctivae normal.      Pupils: Pupils are equal, round, and reactive to light.   Cardiovascular:      Rate and " Rhythm: Normal rate and regular rhythm.      Heart sounds: Normal heart sounds. No murmur heard.     No gallop.   Pulmonary:      Effort: Pulmonary effort is normal. No respiratory distress.      Breath sounds: Normal breath sounds. No wheezing or rales.   Abdominal:      General: There is no distension.      Palpations: Abdomen is soft.   Musculoskeletal:         General: Normal range of motion.      Cervical back: Normal range of motion and neck supple.      Right lower leg: No edema.      Left lower leg: No edema.   Skin:     General: Skin is warm and dry.      Findings: No rash.   Neurological:      Mental Status: He is alert and oriented to person, place, and time.      Cranial Nerves: No cranial nerve deficit.   Psychiatric:         Mood and Affect: Mood normal.         Behavior: Behavior normal.       PFTs as reviewed by me personally: as per hPI    Imaging as reviewed by me personally:  as per hPI    Assessment:  1. Chronic obstructive pulmonary disease, unspecified COPD type (HCC)  PULMONARY FUNCTION TESTS -Test requested: Complete Pulmonary Function Test      2. NAHUM on CPAP        3. Tobacco use            Plan:  Chronic, mild, stable. MMRC class I. Counseled on tobacco cessation. Sample of spiriva given today LOT [id number] exp 1/2026x2  Followed by sleep medicine  Counseled on complete tobacco cessation  Return in about 6 months (around 2/8/2025) for [facility] pt; PFT at f/u.

## 2024-08-08 NOTE — TELEPHONE ENCOUNTER
Received request via: Patient    Was the patient seen in the last year in this department? Yes    Does the patient have an active prescription (recently filled or refills available) for medication(s) requested? No    Pharmacy Name: [facility]     Does the patient have [facility] Plus and need 100-day supply? (This applies to ALL medications) Patient does not have SCP

## 2024-08-08 NOTE — TELEPHONE ENCOUNTER
Have we ever prescribed this med? Yes.  If yes, what date? 06/10/24    Last OV: 08/08/24 with Dr Ibarra     Next OV: No pending appt.     DX:     Medications:   Requested Prescriptions     Pending Prescriptions Disp Refills    albuterol 108 (90 Base) MCG/ACT Aero Soln inhalation aerosol 8.5 g 3     Sig: Inhale 2 Puffs every 6 hours as needed for Shortness of Breath.

## 2024-08-09 RX ORDER — OMEPRAZOLE 40 MG/1
40 CAPSULE, DELAYED RELEASE ORAL DAILY
Qty: 90 CAPSULE | Refills: 0 | Status: SHIPPED | OUTPATIENT
Start: 2024-08-09

## 2024-08-09 RX ORDER — DULOXETIN HYDROCHLORIDE 60 MG/1
60 CAPSULE, DELAYED RELEASE ORAL DAILY
Qty: 90 CAPSULE | Refills: 0 | Status: SHIPPED | OUTPATIENT
Start: 2024-08-09

## 2024-09-03 NOTE — TELEPHONE ENCOUNTER
Received request via: Pharmacy    Was the patient seen in the last year in this department? Yes    Does the patient have an active prescription (recently filled or refills available) for medication(s) requested? No    Does the patient have [facility] Plus and need 100 day supply (blood pressure, diabetes and cholesterol meds only)? Patient does not have SCP   no

## 2024-09-23 ENCOUNTER — NON-PROVIDER VISIT (OUTPATIENT)
Dept: ENDOCRINOLOGY | Facility: MEDICAL CENTER | Age: 74
End: 2024-09-23
Attending: INTERNAL MEDICINE
Payer: MEDICARE

## 2024-09-23 VITALS
DIASTOLIC BLOOD PRESSURE: 62 MMHG | WEIGHT: 222 LBS | BODY MASS INDEX: 34.84 KG/M2 | SYSTOLIC BLOOD PRESSURE: 108 MMHG | HEIGHT: 67 IN | HEART RATE: 90 BPM | OXYGEN SATURATION: 97 %

## 2024-09-23 DIAGNOSIS — R80.9 CONTROLLED TYPE 2 DIABETES MELLITUS WITH MICROALBUMINURIA, WITHOUT LONG-TERM CURRENT USE OF INSULIN (HCC): ICD-10-CM

## 2024-09-23 DIAGNOSIS — E11.29 CONTROLLED TYPE 2 DIABETES MELLITUS WITH MICROALBUMINURIA, WITHOUT LONG-TERM CURRENT USE OF INSULIN (HCC): ICD-10-CM

## 2024-09-23 DIAGNOSIS — E55.9 VITAMIN D DEFICIENCY: ICD-10-CM

## 2024-09-23 DIAGNOSIS — E78.5 DYSLIPIDEMIA: ICD-10-CM

## 2024-09-23 LAB
HBA1C MFR BLD: 6.5 % (ref ?–5.8)
POCT INT CON NEG: NEGATIVE
POCT INT CON POS: POSITIVE

## 2024-09-23 PROCEDURE — 83036 HEMOGLOBIN GLYCOSYLATED A1C: CPT

## 2024-09-23 PROCEDURE — 99212 OFFICE O/P EST SF 10 MIN: CPT | Performed by: INTERNAL MEDICINE

## 2024-09-23 RX ORDER — METFORMIN HCL 500 MG
1000 TABLET, EXTENDED RELEASE 24 HR ORAL 2 TIMES DAILY
Qty: 180 TABLET | Refills: 1 | Status: SHIPPED | OUTPATIENT
Start: 2024-09-23 | End: 2024-09-23

## 2024-09-23 RX ORDER — METFORMIN HCL 500 MG
1000 TABLET, EXTENDED RELEASE 24 HR ORAL 2 TIMES DAILY
Qty: 360 TABLET | Refills: 2 | Status: SHIPPED | OUTPATIENT
Start: 2024-09-23

## 2024-09-23 NOTE — PROGRESS NOTES
RN-CDE Note    Subjective:   Endocrinology Clinic Progress Note  PCP: Bill Jeffery P.A.-C.    HPI:  Micheal Brothers Jr. is a 73 y.o. old patient who is seen today by the Diabetes Nurse Specialist for review of Type 2 Diabetes.  Recent changes in health: Health good.  DM:   Last A1c:   Lab Results   Component Value Date/Time    HBA1C 6.5 (A) 09/23/2024 10:44 AM      Previous A1c was 6.2 on 5/2/24  A1C GOAL: < 7    Diabetes Medications:   No medications    Potential Barriers to Care:      Adherence :  good      Side effects: no      Affordability: He could not afford his Jardiance             Exercise: Yard work  Diet: Cherry turnover for breakfast.  Skips lunch.  Dinner is protein and vegetables.  1 regular soda daily  Patient's body mass index is 34.77 kg/m². Exercise and nutrition counseling were performed at this visit.    Glucose monitoring frequency: Not testing blood sugars    Hypoglycemic episodes: no      *Last Retinal Exam: on file and up-to-date  Daily Foot Exam: Yes   Foot Exam:  Monofilament: not done, going every 8 weeks and having a pedicure.      Lab Results   Component Value Date/Time    MALBCRT 59 (H) 05/02/2024 09:48 AM    MICROALBUR 6.8 05/02/2024 09:48 AM        ACR Albumin/Creatinine Ratio goal <30     HTN:   Blood pressure goal <130/<80   Currently Rx ACE/ARB:  yes    Dyslipidemia:    Lab Results   Component Value Date/Time    CHOLSTRLTOT 123 05/02/2024 09:48 AM    LDL 71 05/02/2024 09:48 AM    HDL 40 05/02/2024 09:48 AM    TRIGLYCERIDE 60 05/02/2024 09:48 AM         Currently Rx Statin:  yes    He  reports that he has been smoking cigarettes. He has a 26.5 pack-year smoking history. He has been exposed to tobacco smoke. He has never used smokeless tobacco.    Plan:     Discussed and educated on:   - All medications, side effects and compliance (discussed carefully)  - Annual eye examinations at Ophthalmology  - Home glucose monitoring emphasized  - Weight control and daily  exercise    Recommended medication changes: He will start Metformin  mg and take after dinner.  He will go up to 1000 mg daily if tolerated.  He will follow up with Dr. Monsalve in 6 months.  He will let us know if he doesn't tolerate the Metformin.

## 2024-11-04 ENCOUNTER — APPOINTMENT (OUTPATIENT)
Dept: MEDICAL GROUP | Facility: MEDICAL CENTER | Age: 74
End: 2024-11-04
Payer: MEDICARE

## 2024-11-04 ENCOUNTER — PATIENT MESSAGE (OUTPATIENT)
Dept: MEDICAL GROUP | Facility: PHYSICIAN GROUP | Age: 74
End: 2024-11-04

## 2024-11-04 DIAGNOSIS — F41.9 ANXIETY AND DEPRESSION: Chronic | ICD-10-CM

## 2024-11-04 DIAGNOSIS — K21.9 GASTROESOPHAGEAL REFLUX DISEASE WITHOUT ESOPHAGITIS: Chronic | ICD-10-CM

## 2024-11-04 DIAGNOSIS — F32.A ANXIETY AND DEPRESSION: Chronic | ICD-10-CM

## 2024-11-04 DIAGNOSIS — E78.5 DYSLIPIDEMIA: ICD-10-CM

## 2024-11-04 RX ORDER — OMEPRAZOLE 40 MG/1
40 CAPSULE, DELAYED RELEASE ORAL DAILY
Qty: 90 CAPSULE | Refills: 3 | Status: SHIPPED | OUTPATIENT
Start: 2024-11-04

## 2024-11-04 RX ORDER — ATORVASTATIN CALCIUM 20 MG/1
20 TABLET, FILM COATED ORAL DAILY
Qty: 90 TABLET | Refills: 3 | Status: SHIPPED | OUTPATIENT
Start: 2024-11-04

## 2024-11-04 RX ORDER — DULOXETIN HYDROCHLORIDE 60 MG/1
CAPSULE, DELAYED RELEASE ORAL
Qty: 90 CAPSULE | Refills: 3 | Status: SHIPPED | OUTPATIENT
Start: 2024-11-04

## 2024-11-04 RX ORDER — OMEPRAZOLE 40 MG/1
40 CAPSULE, DELAYED RELEASE ORAL DAILY
Qty: 90 CAPSULE | Refills: 0 | Status: SHIPPED | OUTPATIENT
Start: 2024-11-04

## 2024-11-05 NOTE — TELEPHONE ENCOUNTER
Received request via: Pharmacy    Was the patient seen in the last year in this department? Yes    Does the patient have an active prescription (recently filled or refills available) for medication(s) requested? No        Does the patient have assisted Plus and need 100-day supply? (This applies to ALL medications) Patient does not have SCP

## 2024-11-18 DIAGNOSIS — D35.02 BILATERAL ADRENAL ADENOMAS: ICD-10-CM

## 2024-11-18 DIAGNOSIS — D35.01 BILATERAL ADRENAL ADENOMAS: ICD-10-CM

## 2024-11-18 RX ORDER — LISINOPRIL 2.5 MG/1
2.5 TABLET ORAL DAILY
Qty: 90 TABLET | Refills: 3 | Status: SHIPPED | OUTPATIENT
Start: 2024-11-18

## 2024-11-25 DIAGNOSIS — D69.6 THROMBOCYTOPENIA (HCC): Chronic | ICD-10-CM

## 2024-11-25 DIAGNOSIS — E11.69 TYPE 2 DIABETES MELLITUS WITH HYPERLIPIDEMIA (HCC): Chronic | ICD-10-CM

## 2024-11-25 DIAGNOSIS — E78.5 DYSLIPIDEMIA: Chronic | ICD-10-CM

## 2024-11-25 DIAGNOSIS — E11.29 MICROALBUMINURIA DUE TO TYPE 2 DIABETES MELLITUS (HCC): ICD-10-CM

## 2024-11-25 DIAGNOSIS — R53.82 CHRONIC FATIGUE: ICD-10-CM

## 2024-11-25 DIAGNOSIS — E78.5 TYPE 2 DIABETES MELLITUS WITH HYPERLIPIDEMIA (HCC): Chronic | ICD-10-CM

## 2024-11-25 DIAGNOSIS — R80.9 MICROALBUMINURIA DUE TO TYPE 2 DIABETES MELLITUS (HCC): ICD-10-CM

## 2024-11-27 DIAGNOSIS — J44.9 CHRONIC OBSTRUCTIVE PULMONARY DISEASE, UNSPECIFIED COPD TYPE (HCC): ICD-10-CM

## 2024-11-28 RX ORDER — ALBUTEROL SULFATE 90 UG/1
2 INHALANT RESPIRATORY (INHALATION) EVERY 6 HOURS PRN
Qty: 8.5 G | Refills: 6 | Status: SHIPPED | OUTPATIENT
Start: 2024-11-28

## 2024-11-28 NOTE — TELEPHONE ENCOUNTER
Caller Name: Micheal Rolandailyn Kiser                 Call Back Number: There are no phone numbers on file.        Patient approves a detailed voicemail message: N\A    Have we ever prescribed this med? Yes.  If yes, what date? 8/8/24 GRICELDA Ibarra     Last OV: 8/8/24 Dr. Ibarra     Next OV: NA    DX: Chronic obstructive pulmonary disease, unspecified COPD type (HCC) [J44.9]     Medications:  Requested Prescriptions     Pending Prescriptions Disp Refills    albuterol 108 (90 Base) MCG/ACT Aero Soln inhalation aerosol [Pharmacy Med Name: ALBUTEROL HFA INH (200 PUFFS) 8.5GM] 8.5 g 3     Sig: INHALE 2 PUFFS BY MOUTH EVERY 6 HOURS AS NEEDED FOR SHORTNESS OF BREATH

## 2024-12-03 ENCOUNTER — HOSPITAL ENCOUNTER (OUTPATIENT)
Dept: LAB | Facility: MEDICAL CENTER | Age: 74
End: 2024-12-03
Attending: INTERNAL MEDICINE
Payer: MEDICARE

## 2024-12-03 DIAGNOSIS — R80.9 MICROALBUMINURIA DUE TO TYPE 2 DIABETES MELLITUS (HCC): ICD-10-CM

## 2024-12-03 DIAGNOSIS — E11.69 TYPE 2 DIABETES MELLITUS WITH HYPERLIPIDEMIA (HCC): Chronic | ICD-10-CM

## 2024-12-03 DIAGNOSIS — E55.9 VITAMIN D DEFICIENCY: ICD-10-CM

## 2024-12-03 DIAGNOSIS — E11.29 MICROALBUMINURIA DUE TO TYPE 2 DIABETES MELLITUS (HCC): ICD-10-CM

## 2024-12-03 DIAGNOSIS — E78.5 TYPE 2 DIABETES MELLITUS WITH HYPERLIPIDEMIA (HCC): Chronic | ICD-10-CM

## 2024-12-03 DIAGNOSIS — R80.9 CONTROLLED TYPE 2 DIABETES MELLITUS WITH MICROALBUMINURIA, WITHOUT LONG-TERM CURRENT USE OF INSULIN (HCC): ICD-10-CM

## 2024-12-03 DIAGNOSIS — D69.6 THROMBOCYTOPENIA (HCC): Chronic | ICD-10-CM

## 2024-12-03 DIAGNOSIS — E11.29 CONTROLLED TYPE 2 DIABETES MELLITUS WITH MICROALBUMINURIA, WITHOUT LONG-TERM CURRENT USE OF INSULIN (HCC): ICD-10-CM

## 2024-12-03 DIAGNOSIS — E78.5 DYSLIPIDEMIA: Chronic | ICD-10-CM

## 2024-12-03 DIAGNOSIS — N18.2 CKD (CHRONIC KIDNEY DISEASE), STAGE II: ICD-10-CM

## 2024-12-03 DIAGNOSIS — R53.82 CHRONIC FATIGUE: ICD-10-CM

## 2024-12-03 DIAGNOSIS — E78.5 DYSLIPIDEMIA: ICD-10-CM

## 2024-12-03 LAB
25(OH)D3 SERPL-MCNC: 86 NG/ML (ref 30–100)
ALBUMIN SERPL BCP-MCNC: 4.3 G/DL (ref 3.2–4.9)
ALBUMIN/GLOB SERPL: 1.4 G/DL
ALP SERPL-CCNC: 68 U/L (ref 30–99)
ALT SERPL-CCNC: 37 U/L (ref 2–50)
ALT SERPL-CCNC: 39 U/L (ref 2–50)
ANION GAP SERPL CALC-SCNC: 12 MMOL/L (ref 7–16)
ANION GAP SERPL CALC-SCNC: 13 MMOL/L (ref 7–16)
ANION GAP SERPL CALC-SCNC: 14 MMOL/L (ref 7–16)
AST SERPL-CCNC: 33 U/L (ref 12–45)
BASOPHILS # BLD AUTO: 1 % (ref 0–1.8)
BASOPHILS # BLD: 0.08 K/UL (ref 0–0.12)
BILIRUB SERPL-MCNC: 0.4 MG/DL (ref 0.1–1.5)
BUN SERPL-MCNC: 16 MG/DL (ref 8–22)
CALCIUM ALBUM COR SERPL-MCNC: 9.2 MG/DL (ref 8.5–10.5)
CALCIUM SERPL-MCNC: 9.4 MG/DL (ref 8.5–10.5)
CALCIUM SERPL-MCNC: 9.4 MG/DL (ref 8.5–10.5)
CALCIUM SERPL-MCNC: 9.5 MG/DL (ref 8.5–10.5)
CHLORIDE SERPL-SCNC: 102 MMOL/L (ref 96–112)
CHLORIDE SERPL-SCNC: 102 MMOL/L (ref 96–112)
CHLORIDE SERPL-SCNC: 103 MMOL/L (ref 96–112)
CHOLEST SERPL-MCNC: 126 MG/DL (ref 100–199)
CHOLEST SERPL-MCNC: 128 MG/DL (ref 100–199)
CO2 SERPL-SCNC: 21 MMOL/L (ref 20–33)
CO2 SERPL-SCNC: 21 MMOL/L (ref 20–33)
CO2 SERPL-SCNC: 22 MMOL/L (ref 20–33)
CREAT SERPL-MCNC: 1.18 MG/DL (ref 0.5–1.4)
CREAT SERPL-MCNC: 1.2 MG/DL (ref 0.5–1.4)
CREAT SERPL-MCNC: 1.23 MG/DL (ref 0.5–1.4)
EOSINOPHIL # BLD AUTO: 0.18 K/UL (ref 0–0.51)
EOSINOPHIL NFR BLD: 2.3 % (ref 0–6.9)
ERYTHROCYTE [DISTWIDTH] IN BLOOD BY AUTOMATED COUNT: 44.5 FL (ref 35.9–50)
GFR SERPLBLD CREATININE-BSD FMLA CKD-EPI: 61 ML/MIN/1.73 M 2
GFR SERPLBLD CREATININE-BSD FMLA CKD-EPI: 63 ML/MIN/1.73 M 2
GFR SERPLBLD CREATININE-BSD FMLA CKD-EPI: 64 ML/MIN/1.73 M 2
GLOBULIN SER CALC-MCNC: 3 G/DL (ref 1.9–3.5)
GLUCOSE SERPL-MCNC: 101 MG/DL (ref 65–99)
GLUCOSE SERPL-MCNC: 102 MG/DL (ref 65–99)
GLUCOSE SERPL-MCNC: 104 MG/DL (ref 65–99)
HCT VFR BLD AUTO: 41.7 % (ref 42–52)
HDLC SERPL-MCNC: 40 MG/DL
HDLC SERPL-MCNC: 40 MG/DL
HGB BLD-MCNC: 14 G/DL (ref 14–18)
IMM GRANULOCYTES # BLD AUTO: 0.04 K/UL (ref 0–0.11)
IMM GRANULOCYTES NFR BLD AUTO: 0.5 % (ref 0–0.9)
LDLC SERPL CALC-MCNC: 76 MG/DL
LDLC SERPL CALC-MCNC: 77 MG/DL
LYMPHOCYTES # BLD AUTO: 2.35 K/UL (ref 1–4.8)
LYMPHOCYTES NFR BLD: 30.5 % (ref 22–41)
MCH RBC QN AUTO: 30.9 PG (ref 27–33)
MCHC RBC AUTO-ENTMCNC: 33.6 G/DL (ref 32.3–36.5)
MCV RBC AUTO: 92.1 FL (ref 81.4–97.8)
MONOCYTES # BLD AUTO: 0.55 K/UL (ref 0–0.85)
MONOCYTES NFR BLD AUTO: 7.1 % (ref 0–13.4)
NEUTROPHILS # BLD AUTO: 4.5 K/UL (ref 1.82–7.42)
NEUTROPHILS NFR BLD: 58.6 % (ref 44–72)
NRBC # BLD AUTO: 0.03 K/UL
NRBC BLD-RTO: 0.4 /100 WBC (ref 0–0.2)
PLATELET # BLD AUTO: 133 K/UL (ref 164–446)
PLATELET # BLD AUTO: 211 K/UL (ref 164–446)
PLATELET BLD QL SMEAR: NORMAL
PLATELETS.RETICULATED NFR BLD AUTO: 13.2 % (ref 0.6–13.1)
PMV BLD AUTO: 11.2 FL (ref 9–12.9)
POTASSIUM SERPL-SCNC: 4.5 MMOL/L (ref 3.6–5.5)
PROT SERPL-MCNC: 7.3 G/DL (ref 6–8.2)
RBC # BLD AUTO: 4.53 M/UL (ref 4.7–6.1)
SODIUM SERPL-SCNC: 136 MMOL/L (ref 135–145)
SODIUM SERPL-SCNC: 137 MMOL/L (ref 135–145)
SODIUM SERPL-SCNC: 137 MMOL/L (ref 135–145)
T3FREE SERPL-MCNC: 3.28 PG/ML (ref 2–4.4)
T4 FREE SERPL-MCNC: 0.85 NG/DL (ref 0.93–1.7)
TRIGL SERPL-MCNC: 52 MG/DL (ref 0–149)
TRIGL SERPL-MCNC: 53 MG/DL (ref 0–149)
TSH SERPL-ACNC: 2.01 UIU/ML (ref 0.35–5.5)
TSH SERPL-ACNC: 2.08 UIU/ML (ref 0.35–5.5)
WBC # BLD AUTO: 7.7 K/UL (ref 4.8–10.8)

## 2024-12-03 PROCEDURE — 84443 ASSAY THYROID STIM HORMONE: CPT | Mod: 91

## 2024-12-03 PROCEDURE — 82043 UR ALBUMIN QUANTITATIVE: CPT | Mod: 91

## 2024-12-03 PROCEDURE — 82570 ASSAY OF URINE CREATININE: CPT | Mod: 91

## 2024-12-03 PROCEDURE — 82043 UR ALBUMIN QUANTITATIVE: CPT

## 2024-12-03 PROCEDURE — 80048 BASIC METABOLIC PNL TOTAL CA: CPT | Mod: 91

## 2024-12-03 PROCEDURE — 80053 COMPREHEN METABOLIC PANEL: CPT

## 2024-12-03 PROCEDURE — 84443 ASSAY THYROID STIM HORMONE: CPT

## 2024-12-03 PROCEDURE — 80061 LIPID PANEL: CPT

## 2024-12-03 PROCEDURE — 82570 ASSAY OF URINE CREATININE: CPT

## 2024-12-03 PROCEDURE — 82306 VITAMIN D 25 HYDROXY: CPT

## 2024-12-03 PROCEDURE — 85025 COMPLETE CBC W/AUTO DIFF WBC: CPT

## 2024-12-03 PROCEDURE — 85055 RETICULATED PLATELET ASSAY: CPT

## 2024-12-03 PROCEDURE — 36415 COLL VENOUS BLD VENIPUNCTURE: CPT

## 2024-12-03 PROCEDURE — 84481 FREE ASSAY (FT-3): CPT

## 2024-12-03 PROCEDURE — 84460 ALANINE AMINO (ALT) (SGPT): CPT

## 2024-12-03 PROCEDURE — 80048 BASIC METABOLIC PNL TOTAL CA: CPT

## 2024-12-03 PROCEDURE — 84439 ASSAY OF FREE THYROXINE: CPT

## 2024-12-03 PROCEDURE — 80061 LIPID PANEL: CPT | Mod: 91

## 2024-12-04 LAB
CREAT UR-MCNC: 210.52 MG/DL
CREAT UR-MCNC: 212.21 MG/DL
CREAT UR-MCNC: 213.95 MG/DL
MICROALBUMIN UR-MCNC: 11 MG/DL
MICROALBUMIN UR-MCNC: 11.1 MG/DL
MICROALBUMIN UR-MCNC: 11.3 MG/DL
MICROALBUMIN/CREAT UR: 51 MG/G (ref 0–30)
MICROALBUMIN/CREAT UR: 53 MG/G (ref 0–30)
MICROALBUMIN/CREAT UR: 53 MG/G (ref 0–30)

## 2024-12-12 ENCOUNTER — APPOINTMENT (OUTPATIENT)
Dept: MEDICAL GROUP | Facility: PHYSICIAN GROUP | Age: 74
End: 2024-12-12
Payer: MEDICARE

## 2024-12-12 VITALS
TEMPERATURE: 97.3 F | DIASTOLIC BLOOD PRESSURE: 68 MMHG | BODY MASS INDEX: 36.94 KG/M2 | SYSTOLIC BLOOD PRESSURE: 122 MMHG | OXYGEN SATURATION: 98 % | HEIGHT: 65 IN | HEART RATE: 92 BPM

## 2024-12-12 DIAGNOSIS — E78.5 DYSLIPIDEMIA DUE TO TYPE 2 DIABETES MELLITUS (HCC): Chronic | ICD-10-CM

## 2024-12-12 DIAGNOSIS — J42 CHRONIC BRONCHITIS, UNSPECIFIED CHRONIC BRONCHITIS TYPE (HCC): ICD-10-CM

## 2024-12-12 DIAGNOSIS — E11.69 DYSLIPIDEMIA DUE TO TYPE 2 DIABETES MELLITUS (HCC): Chronic | ICD-10-CM

## 2024-12-12 DIAGNOSIS — E11.29 MICROALBUMINURIA DUE TO TYPE 2 DIABETES MELLITUS (HCC): ICD-10-CM

## 2024-12-12 DIAGNOSIS — D69.6 THROMBOCYTOPENIA (HCC): Chronic | ICD-10-CM

## 2024-12-12 DIAGNOSIS — K21.9 GASTROESOPHAGEAL REFLUX DISEASE WITHOUT ESOPHAGITIS: Chronic | ICD-10-CM

## 2024-12-12 DIAGNOSIS — K22.70 BARRETT'S ESOPHAGUS WITHOUT DYSPLASIA: Chronic | ICD-10-CM

## 2024-12-12 DIAGNOSIS — F41.9 ANXIETY AND DEPRESSION: Chronic | ICD-10-CM

## 2024-12-12 DIAGNOSIS — R80.9 MICROALBUMINURIA DUE TO TYPE 2 DIABETES MELLITUS (HCC): ICD-10-CM

## 2024-12-12 DIAGNOSIS — F17.200 TOBACCO DEPENDENCE: ICD-10-CM

## 2024-12-12 DIAGNOSIS — D35.02 BILATERAL ADRENAL ADENOMAS: Chronic | ICD-10-CM

## 2024-12-12 DIAGNOSIS — F32.A ANXIETY AND DEPRESSION: Chronic | ICD-10-CM

## 2024-12-12 DIAGNOSIS — N52.9 ERECTILE DYSFUNCTION, UNSPECIFIED ERECTILE DYSFUNCTION TYPE: ICD-10-CM

## 2024-12-12 DIAGNOSIS — D35.01 BILATERAL ADRENAL ADENOMAS: Chronic | ICD-10-CM

## 2024-12-12 DIAGNOSIS — E11.69 TYPE 2 DIABETES MELLITUS WITH HYPERLIPIDEMIA (HCC): Chronic | ICD-10-CM

## 2024-12-12 DIAGNOSIS — E78.5 TYPE 2 DIABETES MELLITUS WITH HYPERLIPIDEMIA (HCC): Chronic | ICD-10-CM

## 2024-12-12 PROCEDURE — 3078F DIAST BP <80 MM HG: CPT | Performed by: INTERNAL MEDICINE

## 2024-12-12 PROCEDURE — 99215 OFFICE O/P EST HI 40 MIN: CPT | Performed by: INTERNAL MEDICINE

## 2024-12-12 PROCEDURE — 3074F SYST BP LT 130 MM HG: CPT | Performed by: INTERNAL MEDICINE

## 2024-12-12 RX ORDER — PREDNISONE 10 MG/1
TABLET ORAL
Qty: 20 TABLET | Refills: 0 | Status: SHIPPED | OUTPATIENT
Start: 2024-12-12 | End: 2024-12-20

## 2024-12-12 RX ORDER — SILDENAFIL 50 MG/1
50 TABLET, FILM COATED ORAL
Qty: 10 TABLET | Refills: 3 | Status: SHIPPED | OUTPATIENT
Start: 2024-12-12

## 2024-12-12 NOTE — ASSESSMENT & PLAN NOTE
Chronic condition.  Followed by pulmonology service.  Patient has been using Advair and albuterol as needed.  No new symptoms reported.

## 2024-12-12 NOTE — ASSESSMENT & PLAN NOTE
Chronic condition.  Previous lab test showed mildly low platelet.  Patient denies any history of bleeding.

## 2024-12-12 NOTE — ASSESSMENT & PLAN NOTE
Chronic condition.  Patient followed by GI service.  Patient denies nausea vomiting or dysphagia.  Denies GI bleeding.

## 2024-12-12 NOTE — PROGRESS NOTES
PRIMARY CARE CLINIC VISIT        Chief Complaint   Patient presents with    Follow-Up      Follow-up diabetes  COPD  Erectile dysfunction  Thrombocytopenia  Adrenal adenomas  Acid reflux  Anxiety/depression  Hyperlipidemia  Tobacco dependence  Microalbuminuria  Thomas's esophagus  Lab test result  Medication review and refills        History of Present Illness     Type 2 diabetes mellitus with hyperlipidemia (HCC)  Chronic condition.  Has been followed by endocrinology service.  Patient currently taking metformin.  He also has been taking sample of Jardiance.  Denies significant hypoglycemia.  Recent A1c 6.5%.    Thrombocytopenia (HCC)  Chronic condition.  Previous lab test showed mildly low platelet.  Patient denies any history of bleeding.    Chronic obstructive pulmonary disease (HCC)  Chronic condition.  Followed by pulmonology service.  Patient has been using Advair and albuterol as needed.  No new symptoms reported.    Microalbuminuria due to type 2 diabetes mellitus (HCC)  Chronic condition.  Followed by nephrology service.  Patient asymptomatic.    Dyslipidemia due to type 2 diabetes mellitus (HCC)  Chronic condition.  Patient being treated with atorvastatin.  Tolerating medication well.    GERD (gastroesophageal reflux disease)  Chronic ongoing condition.  The patient is taking postoperative the patient denies nausea vomiting or dysphagia.    Anxiety and depression  Chronic condition.  Patient being treated with duloxetine.  Patient denies SI.  Symptoms are well-controlled.    Bilateral adrenal adenomas  This is a chronic condition.  Followed by endocrinology service.  Stable condition.  According to the endocrinologist the patient has stable noncancerous bilateral adrenal adenomas with no evidence of hormonal hypersecretion.  No imaging/workup at this time.    Thomas's esophagus  Chronic condition.  Patient followed by GI service.  Patient denies nausea vomiting or dysphagia.  Denies GI  bleeding.    Tobacco dependence  Chronic condition.   Discussed w pt and counseling on harmful effects of nicotine.     Strongly advised pt to quit.     Erectile dysfunction  This is a chronic condition.  Patient reported intermittent episode of inability to maintain erection.  Patient request prescription for Viagra.    Current Outpatient Medications on File Prior to Visit   Medication Sig Dispense Refill    albuterol 108 (90 Base) MCG/ACT Aero Soln inhalation aerosol INHALE 2 PUFFS BY MOUTH EVERY 6 HOURS AS NEEDED FOR SHORTNESS OF BREATH 8.5 g 6    lisinopril (PRINIVIL) 2.5 MG Tab TAKE 1 TABLET BY MOUTH EVERY DAY 90 Tablet 3    omeprazole (PRILOSEC) 40 MG delayed-release capsule Take 1 Capsule by mouth every day. 90 Capsule 0    atorvastatin (LIPITOR) 20 MG Tab Take 1 Tablet by mouth every day. 90 Tablet 3    DULoxetine (CYMBALTA) 60 MG Cap DR Particles delayed-release capsule TAKE 1 CAPSULE DAILY (SCHEDULE APPOINTMENT TO SEE PRIMARY CARE PHYSICIAN FOR ADDITIONAL REFILLS) 90 Capsule 3    metFORMIN ER (GLUCOPHAGE XR) 500 MG TABLET SR 24 HR TAKE 2 TABLETS BY MOUTH TWICE DAILY 360 Tablet 2    tiotropium (SPIRIVA RESPIMAT) 2.5 mcg/Act Aero Soln Inhale 2 Inhalations every day. Assemble and prime. 2 Each 0    fluticasone-salmeterol (ADVAIR HFA) 230-21 MCG/ACT inhaler Inhale 2 Puffs 2 times a day. 12 g 0    ipratropium-albuterol (DUONEB) 0.5-2.5 (3) MG/3ML nebulizer solution Take 3 mL by nebulization every four hours as needed for Shortness of Breath (Wheezing). 180 mL 3    triamcinolone acetonide (KENALOG) 0.5 % Cream APPLY TOPICALLY TWICE DAILY AS NEEDED FOR ITCHING, BODY SITES ONLY. DO NOT USE ON FACE, AXILLA, OR GROIN 15 g 1    Cholecalciferol (VITAMIN D) 125 MCG (5000 UT) Cap Take 5,000 Units by mouth every day.      Iron Combinations (IRON COMPLEX PO) Take  by mouth.      fluocinonide (LIDEX) 0.05 % Cream AAA arms/legs BID PRN rash/itching. Do not use on face, axilla, groin 60 g 1    PREVIDENT 5000 DRY MOUTH 1.1 %  Gel       nystatin (MYCOSTATIN) 361143 UNIT/GM Cream topical cream AAA groin BID for rash. Use for 7-10days when present 30 g 3     No current facility-administered medications on file prior to visit.        Allergies: Augmentin, Metformin, and Zyban [bupropion]    Current Outpatient Medications Ordered in Epic   Medication Sig Dispense Refill    predniSONE (DELTASONE) 10 MG Tab Take 4 Tablets by mouth every day for 2 days, THEN 3 Tablets every day for 2 days, THEN 2 Tablets every day for 2 days, THEN 1 Tablet every day for 2 days. 20 Tablet 0    sildenafil citrate (VIAGRA) 50 MG tablet Take 1 Tablet by mouth 1 time a day as needed for Erectile Dysfunction. 10 Tablet 3    albuterol 108 (90 Base) MCG/ACT Aero Soln inhalation aerosol INHALE 2 PUFFS BY MOUTH EVERY 6 HOURS AS NEEDED FOR SHORTNESS OF BREATH 8.5 g 6    lisinopril (PRINIVIL) 2.5 MG Tab TAKE 1 TABLET BY MOUTH EVERY DAY 90 Tablet 3    omeprazole (PRILOSEC) 40 MG delayed-release capsule Take 1 Capsule by mouth every day. 90 Capsule 0    atorvastatin (LIPITOR) 20 MG Tab Take 1 Tablet by mouth every day. 90 Tablet 3    DULoxetine (CYMBALTA) 60 MG Cap DR Particles delayed-release capsule TAKE 1 CAPSULE DAILY (SCHEDULE APPOINTMENT TO SEE PRIMARY CARE PHYSICIAN FOR ADDITIONAL REFILLS) 90 Capsule 3    metFORMIN ER (GLUCOPHAGE XR) 500 MG TABLET SR 24 HR TAKE 2 TABLETS BY MOUTH TWICE DAILY 360 Tablet 2    tiotropium (SPIRIVA RESPIMAT) 2.5 mcg/Act Aero Soln Inhale 2 Inhalations every day. Assemble and prime. 2 Each 0    fluticasone-salmeterol (ADVAIR HFA) 230-21 MCG/ACT inhaler Inhale 2 Puffs 2 times a day. 12 g 0    ipratropium-albuterol (DUONEB) 0.5-2.5 (3) MG/3ML nebulizer solution Take 3 mL by nebulization every four hours as needed for Shortness of Breath (Wheezing). 180 mL 3    triamcinolone acetonide (KENALOG) 0.5 % Cream APPLY TOPICALLY TWICE DAILY AS NEEDED FOR ITCHING, BODY SITES ONLY. DO NOT USE ON FACE, AXILLA, OR GROIN 15 g 1    Cholecalciferol (VITAMIN  D) 125 MCG (5000 UT) Cap Take 5,000 Units by mouth every day.      Iron Combinations (IRON COMPLEX PO) Take  by mouth.      fluocinonide (LIDEX) 0.05 % Cream AAA arms/legs BID PRN rash/itching. Do not use on face, axilla, groin 60 g 1    PREVIDENT 5000 DRY MOUTH 1.1 % Gel       nystatin (MYCOSTATIN) 459475 UNIT/GM Cream topical cream AAA groin BID for rash. Use for 7-10days when present 30 g 3     No current [facility]-ordered facility-administered medications on file.       Past Medical History:   Diagnosis Date    Back pain     Chickenpox     Cough 7/3/2023    Depression     Dyspnea on exertion 5/22/2024    GERD (gastroesophageal reflux disease)     [place] citizen measles     Gout     Hyperlipidemia     Influenza     Panic disorder     Sleep apnea     Sputum production     Tobacco use 11/26/2014    Wheezing        Past Surgical History:   Procedure Laterality Date    APPENDECTOMY      ARTHROSCOPY, KNEE      CARPAL TUNNEL ENDOSCOPIC      bilateral    CARPAL TUNNEL RELEASE      SHOULDER DECOMPRESSION ARTHROSCOPIC      right    TONSILLECTOMY         Family History   Problem Relation Age of Onset    Cancer Mother     Cancer Father     Stroke Father     Diabetes Neg Hx     Heart Disease Neg Hx     Hypertension Neg Hx        Social History     Tobacco Use   Smoking Status Every Day    Current packs/day: 0.50    Average packs/day: 0.5 packs/day for 53.0 years (26.5 ttl pk-yrs)    Types: Cigarettes    Passive exposure: Past   Smokeless Tobacco Never   Tobacco Comments    started smoking at age 15       Social History     Substance and Sexual Activity   Alcohol Use Not Currently    Alcohol/week: 0.0 oz       Review of systems  As per HPI above. All other systems reviewed and negative.      Past Medical, Social, and Family history reviewed and updated in [facility]       LAB DATA:     I have independently reviewed / interpreted labs    Lab Results   Component Value Date/Time    HBA1C 6.5 (A) 09/23/2024 10:44 AM    HBA1C 6.2 (H) 05/02/2024  "09:40 AM    HBA1C 6.4 (A) 11/28/2023 01:52 PM        Lab Results   Component Value Date/Time    WBC 7.7 12/03/2024 09:11 AM    HEMOGLOBIN 14.0 12/03/2024 09:11 AM    HEMATOCRIT 41.7 (L) 12/03/2024 09:11 AM    MCV 92.1 12/03/2024 09:11 AM    PLATELETCT 133 (L) 12/03/2024 09:11 AM       Lab Results   Component Value Date/Time    SODIUM 136 12/03/2024 09:21 AM    POTASSIUM 4.5 12/03/2024 09:21 AM    GLUCOSE 101 (H) 12/03/2024 09:21 AM    BUN 16 12/03/2024 09:21 AM    CREATININE 1.18 12/03/2024 09:21 AM       Lab Results   Component Value Date/Time    CHOLSTRLTOT 128 12/03/2024 09:19 AM    TRIGLYCERIDE 53 12/03/2024 09:19 AM    HDL 40 12/03/2024 09:19 AM    LDL 77 12/03/2024 09:19 AM       Lab Results   Component Value Date/Time    ALTSGPT 37 12/03/2024 09:19 AM          Objective     /68 (BP Location: Left arm, Patient Position: Sitting, BP Cuff Size: Adult)   Pulse 92   Temp 36.3 °C (97.3 °F) (Temporal)   Ht 1.651 m (5' 5\")   SpO2 98%    Body mass index is 36.94 kg/m².    General: alert in no apparent distress.  Cardiovascular: regular rate and rhythm  Pulmonary: lungs : no wheezing   Gastrointestinal: BS present.   Monofilament testing with a 10 gram force: sensation intact: decreased bilaterally  Visual Inspection: Feet without maceration, ulcers, fissures.  Pedal pulses: decreased bilaterally     Assessment and Plan     1. Type 2 diabetes mellitus with hyperlipidemia (HCC)  Chronic Stable condition.  A1c 6.5%.  Continue metformin 1000 Mg twice daily.  Clinical notes:   -Discussed with the patient goals for Diabetes management:   HbA1C < 7% /  Fasting BG   /  2 hrs post meal BG below 160  -Reviewed pathophysiology of diabetes and the importance of glycemic control to reduce the risk of diabetes related complications   Microvascular: retinopathy, neuropathy, nephropathy  Macrovascular: heart attack, stroke, peripheral vascular dz  -Counseled the importance of recognizing and treating " hypoglycemia  -The importance of increasing physical activity to improve diabetes control  discussed with the patient.   -Patient was also educated on changing diet and making better choices to help control blood sugar.          - Diabetic Monofilament LE Exam  - HEMOGLOBIN A1C; Future  - Basic Metabolic Panel; Future    2. Chronic bronchitis, unspecified chronic bronchitis type (HCC)  - predniSONE (DELTASONE) 10 MG Tab; Take 4 Tablets by mouth every day for 2 days, THEN 3 Tablets every day for 2 days, THEN 2 Tablets every day for 2 days, THEN 1 Tablet every day for 2 days.  Dispense: 20 Tablet; Refill: 0  Chronic stable condition.  Continue Advair 1 puff twice daily.  Rinse mouth after use.  Patient may use albuterol as needed for rescue treatment    3. Erectile dysfunction, unspecified erectile dysfunction type  - sildenafil citrate (VIAGRA) 50 MG tablet; Take 1 Tablet by mouth 1 time a day as needed for Erectile Dysfunction.  Dispense: 10 Tablet; Refill: 3  Condition.  Uncontrolled.  Prescribed Viagra.  Potential side effect of the medication discussed with the patient    4. Thrombocytopenia (HCC)  Chronic condition.  Patient asymptomatic.  No history of bleeding reported.  Recommend to continue to monitor blood test at least once a year    5. Bilateral adrenal adenomas  Chronic condition.  According to the endocrinologist no further workup recommended    6. Gastroesophageal reflux disease without esophagitis  Chronic stable.  Continue Meprazole 40 Mg daily.    7. Anxiety and depression  Chronic stable.  Continue duloxetine 60 Mg daily    8. Dyslipidemia due to type 2 diabetes mellitus (HCC)  - ALANINE AMINO-TRANS; Future  - Lipid Profile; Future  Chronic condition.  Stable.  Lipid panel normal.  Continue atorvastatin 20 Mg daily    9. Tobacco dependence  Chronic condition.  Uncontrolled.  Systolic by the patient to quit smoking completely    10. Microalbuminuria due to type 2 diabetes mellitus (HCC)  Chronic  condition.  Stable.  Recommend low protein diet.  Continue lisinopril 2.5 Mg daily.  Continue follow-up with nephrology service    11. Thomas's esophagus without dysplasia  Chronic condition.  Current status unclear.  Patient to follow-up with GI specialist as directed.  Currently the patient asymptomatic.              Time spent:   41  minutes     Reviewed medical records including:  August 8, 2024 pulmonology's note  July 1, 2024 medical office note  June 28, 2024 dermatology note  June 20, 2024 endocrinology note  Ashley 3, 2024 cardiology note  May 9, 2024 nephrology note    That includes face to face time and non-face to face time.  Chart review before the visit, the actual patient visit, and time spent on documentation in the EMR after the visit.  Chart review/prep, review of other providers' records, Independent review of imagings/labs ,  time for history/examination , pt's counseling/education, ordering, prescribing, treatment plan discussed with patient, and care coordination.                Please note that this dictation was created using voice recognition software. I have made every reasonable attempt to correct obvious errors, but I expect that there are errors of grammar and possibly content that I did not discover before finalizing the note.    Aris Aldana MD  Internal Medicine  [facility]

## 2024-12-12 NOTE — ASSESSMENT & PLAN NOTE
Chronic condition.  Has been followed by endocrinology service.  Patient currently taking metformin.  He also has been taking sample of Jardiance.  Denies significant hypoglycemia.  Recent A1c 6.5%.

## 2024-12-12 NOTE — ASSESSMENT & PLAN NOTE
Chronic condition.  Patient being treated with duloxetine.  Patient denies SI.  Symptoms are well-controlled.

## 2024-12-12 NOTE — ASSESSMENT & PLAN NOTE
Chronic ongoing condition.  The patient is taking postoperative the patient denies nausea vomiting or dysphagia.

## 2024-12-12 NOTE — ASSESSMENT & PLAN NOTE
This is a chronic condition.  Patient reported intermittent episode of inability to maintain erection.  Patient request prescription for Viagra.

## 2024-12-12 NOTE — ASSESSMENT & PLAN NOTE
Chronic condition.   Discussed w pt and counseling on harmful effects of nicotine.     Strongly advised pt to quit.

## 2024-12-12 NOTE — ASSESSMENT & PLAN NOTE
This is a chronic condition.  Followed by endocrinology service.  Stable condition.  According to the endocrinologist the patient has stable noncancerous bilateral adrenal adenomas with no evidence of hormonal hypersecretion.  No imaging/workup at this time.

## 2025-01-10 ENCOUNTER — PATIENT MESSAGE (OUTPATIENT)
Dept: HEALTH INFORMATION MANAGEMENT | Facility: OTHER | Age: 75
End: 2025-01-10

## 2025-01-16 ENCOUNTER — PATIENT MESSAGE (OUTPATIENT)
Dept: MEDICAL GROUP | Facility: PHYSICIAN GROUP | Age: 75
End: 2025-01-16
Payer: MEDICARE

## 2025-01-17 ENCOUNTER — TELEPHONE (OUTPATIENT)
Dept: HEALTH INFORMATION MANAGEMENT | Facility: OTHER | Age: 75
End: 2025-01-17
Payer: MEDICARE

## 2025-01-20 DIAGNOSIS — F41.9 ANXIETY AND DEPRESSION: Chronic | ICD-10-CM

## 2025-01-20 DIAGNOSIS — E78.5 DYSLIPIDEMIA: ICD-10-CM

## 2025-01-20 DIAGNOSIS — F32.A ANXIETY AND DEPRESSION: Chronic | ICD-10-CM

## 2025-01-20 DIAGNOSIS — K21.9 GASTROESOPHAGEAL REFLUX DISEASE WITHOUT ESOPHAGITIS: Chronic | ICD-10-CM

## 2025-01-20 RX ORDER — ATORVASTATIN CALCIUM 20 MG/1
20 TABLET, FILM COATED ORAL DAILY
Qty: 100 TABLET | Refills: 3 | Status: SHIPPED | OUTPATIENT
Start: 2025-01-20 | End: 2025-01-21 | Stop reason: SDUPTHER

## 2025-01-20 RX ORDER — DULOXETIN HYDROCHLORIDE 60 MG/1
60 CAPSULE, DELAYED RELEASE ORAL DAILY
Qty: 90 CAPSULE | Refills: 3 | Status: SHIPPED | OUTPATIENT
Start: 2025-01-20 | End: 2025-01-21 | Stop reason: SDUPTHER

## 2025-01-20 RX ORDER — OMEPRAZOLE 40 MG/1
40 CAPSULE, DELAYED RELEASE ORAL DAILY
Qty: 100 CAPSULE | Refills: 3 | Status: SHIPPED | OUTPATIENT
Start: 2025-01-20 | End: 2025-01-21 | Stop reason: SDUPTHER

## 2025-01-20 NOTE — TELEPHONE ENCOUNTER
Received request via: Patient    Was the patient seen in the last year in this department? Yes    Does the patient have an active prescription (recently filled or refills available) for medication(s) requested? No      Does the patient have group home Plus and need 100-day supply? (This applies to ALL medications) Yes, quantity updated to 100 days

## 2025-01-21 DIAGNOSIS — F41.9 ANXIETY AND DEPRESSION: Chronic | ICD-10-CM

## 2025-01-21 DIAGNOSIS — K21.9 GASTROESOPHAGEAL REFLUX DISEASE WITHOUT ESOPHAGITIS: Chronic | ICD-10-CM

## 2025-01-21 DIAGNOSIS — F32.A ANXIETY AND DEPRESSION: Chronic | ICD-10-CM

## 2025-01-21 DIAGNOSIS — E78.5 DYSLIPIDEMIA: ICD-10-CM

## 2025-01-21 RX ORDER — DULOXETIN HYDROCHLORIDE 60 MG/1
60 CAPSULE, DELAYED RELEASE ORAL DAILY
Qty: 90 CAPSULE | Refills: 3 | Status: SHIPPED | OUTPATIENT
Start: 2025-01-21

## 2025-01-21 RX ORDER — OMEPRAZOLE 40 MG/1
40 CAPSULE, DELAYED RELEASE ORAL DAILY
Qty: 100 CAPSULE | Refills: 3 | Status: SHIPPED | OUTPATIENT
Start: 2025-01-21

## 2025-01-21 RX ORDER — ATORVASTATIN CALCIUM 20 MG/1
20 TABLET, FILM COATED ORAL DAILY
Qty: 100 TABLET | Refills: 3 | Status: SHIPPED | OUTPATIENT
Start: 2025-01-21

## 2025-01-21 NOTE — TELEPHONE ENCOUNTER
Received request via: Patient    Was the patient seen in the last year in this department? Yes    Does the patient have an active prescription (recently filled or refills available) for medication(s) requested? Yes. Pharmacy change    Pharmacy Name:     [facility] - [place], [place] - [address]  [address] [address]  Phone: [phone] Fax: [phone]        Does the patient have senior living Plus and need 100-day supply? (This applies to ALL medications) Yes, quantity updated to 100 days

## 2025-01-24 ENCOUNTER — OFFICE VISIT (OUTPATIENT)
Dept: SLEEP MEDICINE | Facility: MEDICAL CENTER | Age: 75
End: 2025-01-24
Attending: PHYSICIAN ASSISTANT
Payer: MEDICARE

## 2025-01-24 VITALS
WEIGHT: 214 LBS | SYSTOLIC BLOOD PRESSURE: 124 MMHG | RESPIRATION RATE: 18 BRPM | OXYGEN SATURATION: 96 % | HEART RATE: 100 BPM | BODY MASS INDEX: 35.65 KG/M2 | DIASTOLIC BLOOD PRESSURE: 72 MMHG | HEIGHT: 65 IN

## 2025-01-24 DIAGNOSIS — G47.33 OSA ON CPAP: ICD-10-CM

## 2025-01-24 DIAGNOSIS — G47.00 INSOMNIA, UNSPECIFIED TYPE: ICD-10-CM

## 2025-01-24 PROCEDURE — 3078F DIAST BP <80 MM HG: CPT | Performed by: PHYSICIAN ASSISTANT

## 2025-01-24 PROCEDURE — 3074F SYST BP LT 130 MM HG: CPT | Performed by: PHYSICIAN ASSISTANT

## 2025-01-24 PROCEDURE — 99213 OFFICE O/P EST LOW 20 MIN: CPT | Performed by: PHYSICIAN ASSISTANT

## 2025-01-24 ASSESSMENT — ENCOUNTER SYMPTOMS
FEVER: 0
CHILLS: 0
DIZZINESS: 0
SPUTUM PRODUCTION: 1
WEIGHT LOSS: 0
PALPITATIONS: 0
SHORTNESS OF BREATH: 1
HEARTBURN: 0
SINUS PAIN: 0
COUGH: 1
SORE THROAT: 0
INSOMNIA: 0
ORTHOPNEA: 0
WHEEZING: 0
TREMORS: 0
HEADACHES: 0

## 2025-01-24 ASSESSMENT — FIBROSIS 4 INDEX: FIB4 SCORE: 3.06

## 2025-01-24 NOTE — PATIENT INSTRUCTIONS
1-reviewed sleep compliance  2-demonstrating use and benefit  3-switch vendors for insurance purposes to [employer]  4-send order for ask and supplies  5-sleep maintenance issues using zzquil most nights  6-consider extended release melatonin (pure encapsulation or Remfresh) at 3 -5 mg   7-As a reminder use distilled water only in humidifier chamber.  Fresh fill daily  8-reduce temp/humidity and resume humidifier use  9-Today we reviewed equipment cleaning  once weekly minimum  mask, tubing and water chamber  use dedicated container  use mild soap and water  SoClean or other ozone  are not recommended  white vinegar and water solution is no longer recommended  hang tubing to dry  mask sanitizing wipes are an option for use   10Equipment replacement schedule : Mask cushion every month, Head gear every 6 months, Tubing every 3 months, Ultra-fine filters 2 times per month, Humidifier chamber every 6 months   11-follow up in one year, sooner if needed

## 2025-01-24 NOTE — PROGRESS NOTES
"Chief Complaint   Patient presents with    Apnea     Last Office Visit 2/28/24 with     PAP/O2/OAT: Auto CPAP 10-15    Set up: 2/7/22       HPI:  Micheal Brothers Jr. is a 74 y.o. year old male here today for follow-up on obstructive sleep apnea.  Patient last seen in clinic 2/28/2024 by Dr. Rodrigo Marshall.  Patient is also followed by pulmonary clinic for his COPD.    Past Medical History: NAHUM, current smoker, insomnia, GERD, anxiety and depression, type 2 diabetes, bilateral adrenal adenomas, Thomas's esophagus, obesity, bilateral inguinal hernias, exertional dyspnea, COPD.    Vitals:  /72 (BP Location: Left arm, Patient Position: Sitting, BP Cuff Size: Adult)   Pulse 100   Resp 18   Ht 1.651 m (5' 5\")   Wt 97.1 kg (214 lb)   SpO2 96% BMI of 35.61 kg/m².    Recent Imaging: Echocardiogram obtained 6/5/2024 demonstrating normal left ventricular chamber size, systolic and diastolic function.  Mild concentric left ventricular hypertrophy.  LVEF estimated at 59%.  Normal right ventricle size and systolic function.  Trace mitral regurgitation, unable to estimate RVSP due to inadequate tricuspid regurgitant jet.  Trace pulmonic insufficiency.    Currently using  Resmed auto CPAP @10-15 cm H20 pressure; compliance reviewed for 12/25/2024 through 1/23/2025, days used 30/30, average daily usage 9 hours 11 minutes, 97% of days greater than or equal to 4 hours, mask leak at 19.2 LPM at 95th percentile, AHI 2.3 per hour.  See media for full report.      Device obtained 2/7/2022  DME provider [facility]  Mask interface nasal mask    Polysomnogram obtained in 2008 demonstrated findings consistent with severe sleep apnea with overall AHI of 85 events per hour.  Patient was initiated on CPAP therapy.    Sleep schedule goes to bed 9:30 PM, wakens 6-6 30 a.m. , and gets up during the night bathroom x 1-2   Symptoms denies day time somnolence and denies morning headache    [place] Sleepiness Scale reported " as 2/24 on 11/6/2019      Review of Systems   Constitutional:  Negative for chills, fever, malaise/fatigue and weight loss.   HENT:  Negative for congestion, hearing loss, nosebleeds, sinus pain, sore throat and tinnitus.    Eyes:         Presc glasses    Respiratory:  Positive for cough, sputum production (clear sometimes) and shortness of breath. Negative for wheezing.    Cardiovascular:  Negative for chest pain, palpitations, orthopnea and leg swelling.   Gastrointestinal:  Negative for heartburn.        No dentures, no missing teeth, or swallowing issues   Neurological:  Negative for dizziness, tremors and headaches.   Psychiatric/Behavioral:  The patient does not have insomnia.        Past Medical History:   Diagnosis Date    Back pain     Chickenpox     Cough 7/3/2023    Depression     Dyspnea on exertion 5/22/2024    GERD (gastroesophageal reflux disease)     [place] measles     Gout     Hyperlipidemia     Influenza     Panic disorder     Sleep apnea     Sputum production     Tobacco use 11/26/2014    Wheezing        Past Surgical History:   Procedure Laterality Date    APPENDECTOMY      ARTHROSCOPY, KNEE      CARPAL TUNNEL ENDOSCOPIC      bilateral    CARPAL TUNNEL RELEASE      SHOULDER DECOMPRESSION ARTHROSCOPIC      right    TONSILLECTOMY         Family History   Problem Relation Age of Onset    Cancer Mother     Cancer Father     Stroke Father     Diabetes Neg Hx     Heart Disease Neg Hx     Hypertension Neg Hx        Social History     Socioeconomic History    Marital status:      Spouse name: Not on file    Number of children: Not on file    Years of education: Not on file    Highest education level: Not on file   Occupational History    Not on file   Tobacco Use    Smoking status: Every Day     Current packs/day: 0.50     Average packs/day: 0.5 packs/day for 53.0 years (26.5 ttl pk-yrs)     Types: Cigarettes     Passive exposure: Past    Smokeless tobacco: Never    Tobacco comments:     started  smoking at age 15   Vaping Use    Vaping status: Never Used   Substance and Sexual Activity    Alcohol use: Not Currently     Alcohol/week: 0.0 oz    Drug use: Never    Sexual activity: Yes     Partners: Female   Other Topics Concern    Not on file   Social History Narrative    Not on file     Social Drivers of Health     Financial Resource Strain: Not on file   Food Insecurity: Not on file   Transportation Needs: Not on file   Physical Activity: Not on file   Stress: Not on file   Social Connections: Not on file   Intimate Partner Violence: Not on file   Housing Stability: Not on file       Allergies as of 01/24/2025 - Reviewed 01/24/2025   Allergen Reaction Noted    Augmentin Hives and Diarrhea 03/06/2016    Metformin Diarrhea 05/01/2019    Zyban [bupropion] Hives and Itching 10/21/2014          Current medications as of today   Current Outpatient Medications   Medication Sig Dispense Refill    DULoxetine (CYMBALTA) 60 MG Cap DR Particles delayed-release capsule Take 1 Capsule by mouth every day. 90 Capsule 3    atorvastatin (LIPITOR) 20 MG Tab Take 1 Tablet by mouth every day. 100 Tablet 3    omeprazole (PRILOSEC) 40 MG delayed-release capsule Take 1 Capsule by mouth every day. 100 Capsule 3    sildenafil citrate (VIAGRA) 50 MG tablet Take 1 Tablet by mouth 1 time a day as needed for Erectile Dysfunction. 10 Tablet 3    albuterol 108 (90 Base) MCG/ACT Aero Soln inhalation aerosol INHALE 2 PUFFS BY MOUTH EVERY 6 HOURS AS NEEDED FOR SHORTNESS OF BREATH 8.5 g 6    lisinopril (PRINIVIL) 2.5 MG Tab TAKE 1 TABLET BY MOUTH EVERY DAY 90 Tablet 3    metFORMIN ER (GLUCOPHAGE XR) 500 MG TABLET SR 24 HR TAKE 2 TABLETS BY MOUTH TWICE DAILY 360 Tablet 2    tiotropium (SPIRIVA RESPIMAT) 2.5 mcg/Act Aero Soln Inhale 2 Inhalations every day. Assemble and prime. 2 Each 0    fluticasone-salmeterol (ADVAIR HFA) 230-21 MCG/ACT inhaler Inhale 2 Puffs 2 times a day. 12 g 0    Iron Combinations (IRON COMPLEX PO) Take  by mouth.       ipratropium-albuterol (DUONEB) 0.5-2.5 (3) MG/3ML nebulizer solution Take 3 mL by nebulization every four hours as needed for Shortness of Breath (Wheezing). 180 mL 3    fluocinonide (LIDEX) 0.05 % Cream AAA arms/legs BID PRN rash/itching. Do not use on face, axilla, groin 60 g 1    triamcinolone acetonide (KENALOG) 0.5 % Cream APPLY TOPICALLY TWICE DAILY AS NEEDED FOR ITCHING, BODY SITES ONLY. DO NOT USE ON FACE, AXILLA, OR GROIN 15 g 1    PREVIDENT 5000 DRY MOUTH 1.1 % Gel       nystatin (MYCOSTATIN) 501385 UNIT/GM Cream topical cream AAA groin BID for rash. Use for 7-10days when present 30 g 3    Cholecalciferol (VITAMIN D) 125 MCG (5000 UT) Cap Take 5,000 Units by mouth every day.       No current facility-administered medications for this visit.         Physical Exam:   Gen:           Alert and oriented, No apparent distress. Mood and affect appropriate, normal interaction with examiner.   Hearing:     Grossly intact.  Nose:          Normal, no lesions or deformities.  Dentition:    fair dentition.   Oropharynx:   Tongue normal, posterior pharynx without erythema or exudate.  Mallampati Classification: IV  Neck:        Supple, trachea midline, no masses.  Respiratory Effort: No intercostal retractions or use of accessory muscles.   Gait and Station: Grossly normal.  Digits and Nails: No clubbing, cyanosis, petechiae, or nodes.   Skin:        No rashes, lesions or ulcers noted.               Ext:           No cyanosis or edema.      Immunizations:  Flu: 10/18/2024  Pneumovax 23: 11/26/2014  Prevnar 13: 10/17/2016  Moderna SARS CoV2 Vaccine: 8/16/2022, 3/31/2021, 3/3/2021  Pfizer SARS-CoV-2 vaccine: 12/1/2021, COVID-19 mRNA vaccine: 10/18/2024    Assessment / Plan:  1. NAHUM on CPAP  - DME Mask and Supplies    Reviewed compliance, demonstrating use and benefit.  Patient is requesting switch in vendor to [facility] due to insurance change.  Will send order for mask and supplies.  Patient was reminded to use distilled  water only in humidifier chamber.  Patient not currently using humidifier but advised to do so.  Will reduce temperature/humidity and resume humidifier use.  Reviewed equipment cleaning and equipment replacement schedule.  Follow-up in 1 year, sooner if needed.    2. Insomnia    Patient reports having sleep maintenance issues, currently using ZzzQuil most nights.  Discussion regarding sleep hygiene.  Consider extended release melatonin at 3-5 mg rather than Benadryl or ZzzQuil.  This should be taken 1 hour before desired bedtime and assess for benefit.    Follow-up:   Return in about 1 year (around 1/24/2026) for Return with Grace Ma PA-C.    Please note that this dictation was created using voice recognition software. I have made every reasonable attempt to correct obvious errors, but it is possible there are errors of grammar and possibly content that I did not discover before finalizing the note.

## 2025-02-11 ENCOUNTER — NON-PROVIDER VISIT (OUTPATIENT)
Dept: SLEEP MEDICINE | Facility: MEDICAL CENTER | Age: 75
End: 2025-02-11
Attending: INTERNAL MEDICINE
Payer: MEDICARE

## 2025-02-11 VITALS — BODY MASS INDEX: 36.65 KG/M2 | WEIGHT: 220 LBS | HEIGHT: 65 IN

## 2025-02-11 DIAGNOSIS — J44.9 CHRONIC OBSTRUCTIVE PULMONARY DISEASE, UNSPECIFIED COPD TYPE (HCC): ICD-10-CM

## 2025-02-11 PROCEDURE — 94726 PLETHYSMOGRAPHY LUNG VOLUMES: CPT | Mod: 26 | Performed by: STUDENT IN AN ORGANIZED HEALTH CARE EDUCATION/TRAINING PROGRAM

## 2025-02-11 PROCEDURE — 94729 DIFFUSING CAPACITY: CPT | Mod: 26 | Performed by: STUDENT IN AN ORGANIZED HEALTH CARE EDUCATION/TRAINING PROGRAM

## 2025-02-11 PROCEDURE — 94060 EVALUATION OF WHEEZING: CPT | Performed by: INTERNAL MEDICINE

## 2025-02-11 PROCEDURE — 94729 DIFFUSING CAPACITY: CPT | Performed by: INTERNAL MEDICINE

## 2025-02-11 PROCEDURE — 94060 EVALUATION OF WHEEZING: CPT | Mod: 26 | Performed by: STUDENT IN AN ORGANIZED HEALTH CARE EDUCATION/TRAINING PROGRAM

## 2025-02-11 PROCEDURE — 94726 PLETHYSMOGRAPHY LUNG VOLUMES: CPT | Performed by: INTERNAL MEDICINE

## 2025-02-11 ASSESSMENT — FIBROSIS 4 INDEX: FIB4 SCORE: 3.06

## 2025-02-11 NOTE — PROCEDURES
Technician: CORRINA Tidwell    Technician Comment:  Good patient effort & cooperation.  The results of this test meet the ATS/ERS standards for acceptability & reproducibility.  Test was performed on the [employer] Body Plethysmograph-Elite DX system.  Predicted values were GLI-2012 for spirometry, GLI-2020 for DLCO, ITS for Lung Volumes.  The DLCO was uncorrected for Hgb.  A bronchodilator of Albuterol HFA -2puffs via spacer administered.  DLCO performed during dilation period.    Interpretation:    There is a mild obstructive ventilatory defect without a significant bronchodilator response.  Reduced DLCO with normal DL/VA is consistent with obstructive lung disease.

## 2025-03-13 ENCOUNTER — OFFICE VISIT (OUTPATIENT)
Dept: DERMATOLOGY | Facility: IMAGING CENTER | Age: 75
End: 2025-03-13
Payer: MEDICARE

## 2025-03-13 DIAGNOSIS — L30.9 ECZEMA, UNSPECIFIED TYPE: ICD-10-CM

## 2025-03-13 DIAGNOSIS — L92.0 GRANULOMA ANNULARE: ICD-10-CM

## 2025-03-13 PROCEDURE — 11900 INJECT SKIN LESIONS </W 7: CPT | Performed by: DERMATOLOGY

## 2025-03-13 NOTE — PROGRESS NOTES
CC: Skin Lesion      Subjective: Patient here today due to a spot on concern on his legs. Previously seen patient here for rash Patient states medication has been improving. Desires additional injections on right leg    HPI/location: LLE on calf   Time present: x 1 month   Painful lesion: No  Itching lesion: Yes slightly   Enlarging lesion: Yes  Anything make it better or worse? Kenalog helps     ROS: no fevers/chills. +itch.  No cough  Relevant PMH:GA - occ [place] tac trx  Social:smoker    PE: Gen:WDWN male in NAD. Skin:  dermal/annular plaques on left lower leg.  Eczematous plaques on left lower leg. Arms appearing spared    A/P: Eczema + GA/itching;mild  -r/b/a reviewed prior to [place] TAC   -10mg/ml, 1ML total treated in 7+ sites on left lower leg     F/u 6-8 weeks/PRN      I have reviewed medications relevant to my specialty.

## 2025-03-18 ENCOUNTER — OFFICE VISIT (OUTPATIENT)
Dept: URGENT CARE | Facility: PHYSICIAN GROUP | Age: 75
End: 2025-03-18
Payer: MEDICARE

## 2025-03-18 VITALS
TEMPERATURE: 97.2 F | BODY MASS INDEX: 37.21 KG/M2 | OXYGEN SATURATION: 96 % | HEART RATE: 87 BPM | HEIGHT: 65 IN | WEIGHT: 223.33 LBS | DIASTOLIC BLOOD PRESSURE: 72 MMHG | RESPIRATION RATE: 14 BRPM | SYSTOLIC BLOOD PRESSURE: 116 MMHG

## 2025-03-18 DIAGNOSIS — H00.031 CELLULITIS OF RIGHT UPPER EYELID: ICD-10-CM

## 2025-03-18 PROCEDURE — 3078F DIAST BP <80 MM HG: CPT | Performed by: PHYSICIAN ASSISTANT

## 2025-03-18 PROCEDURE — 3074F SYST BP LT 130 MM HG: CPT | Performed by: PHYSICIAN ASSISTANT

## 2025-03-18 PROCEDURE — 99213 OFFICE O/P EST LOW 20 MIN: CPT | Performed by: PHYSICIAN ASSISTANT

## 2025-03-18 RX ORDER — PREDNISONE 20 MG/1
20 TABLET ORAL 2 TIMES DAILY
Qty: 10 TABLET | Refills: 0 | Status: SHIPPED | OUTPATIENT
Start: 2025-03-18 | End: 2025-03-23

## 2025-03-18 RX ORDER — CLINDAMYCIN HYDROCHLORIDE 300 MG/1
300 CAPSULE ORAL 3 TIMES DAILY
Qty: 21 CAPSULE | Refills: 0 | Status: SHIPPED | OUTPATIENT
Start: 2025-03-18 | End: 2025-03-25

## 2025-03-18 ASSESSMENT — VISUAL ACUITY: OU: 1

## 2025-03-18 ASSESSMENT — ENCOUNTER SYMPTOMS
PHOTOPHOBIA: 0
CHILLS: 0
DOUBLE VISION: 0
EYE PAIN: 0
EYE DISCHARGE: 0
FEVER: 0
EYE REDNESS: 1

## 2025-03-18 ASSESSMENT — FIBROSIS 4 INDEX: FIB4 SCORE: 3.06

## 2025-03-18 NOTE — PROGRESS NOTES
Subjective     Jak Brothers Jr. is a 74 y.o. male who presents with Eye Problem (Right eye swollen, red and painful x 3 days.)    PMH:  has a past medical history of Back pain, Chickenpox, Cough (7/3/2023), Depression, Dyspnea on exertion (5/22/2024), GERD (gastroesophageal reflux disease), [place] measles, Gout, Hyperlipidemia, Influenza, Panic disorder, Sleep apnea, Sputum production, Tobacco use (11/26/2014), and Wheezing.    He has no past medical history of Painful breathing.  MEDS:   Current Outpatient Medications:     predniSONE (DELTASONE) 20 MG Tab, Take 1 Tablet by mouth 2 times a day for 5 days., Disp: 10 Tablet, Rfl: 0    clindamycin (CLEOCIN) 300 MG Cap, Take 1 Capsule by mouth 3 times a day for 7 days., Disp: 21 Capsule, Rfl: 0    DULoxetine (CYMBALTA) 60 MG Cap DR Particles delayed-release capsule, Take 1 Capsule by mouth every day., Disp: 90 Capsule, Rfl: 3    atorvastatin (LIPITOR) 20 MG Tab, Take 1 Tablet by mouth every day., Disp: 100 Tablet, Rfl: 3    omeprazole (PRILOSEC) 40 MG delayed-release capsule, Take 1 Capsule by mouth every day., Disp: 100 Capsule, Rfl: 3    sildenafil citrate (VIAGRA) 50 MG tablet, Take 1 Tablet by mouth 1 time a day as needed for Erectile Dysfunction., Disp: 10 Tablet, Rfl: 3    albuterol 108 (90 Base) MCG/ACT Aero Soln inhalation aerosol, INHALE 2 PUFFS BY MOUTH EVERY 6 HOURS AS NEEDED FOR SHORTNESS OF BREATH, Disp: 8.5 g, Rfl: 6    lisinopril (PRINIVIL) 2.5 MG Tab, TAKE 1 TABLET BY MOUTH EVERY DAY, Disp: 90 Tablet, Rfl: 3    metFORMIN ER (GLUCOPHAGE XR) 500 MG TABLET SR 24 HR, TAKE 2 TABLETS BY MOUTH TWICE DAILY, Disp: 360 Tablet, Rfl: 2    tiotropium (SPIRIVA RESPIMAT) 2.5 mcg/Act Aero Soln, Inhale 2 Inhalations every day. Assemble and prime., Disp: 2 Each, Rfl: 0    fluticasone-salmeterol (ADVAIR HFA) 230-21 MCG/ACT inhaler, Inhale 2 Puffs 2 times a day., Disp: 12 g, Rfl: 0    Iron Combinations (IRON COMPLEX PO), Take  by mouth., Disp: , Rfl:      ipratropium-albuterol (DUONEB) 0.5-2.5 (3) MG/3ML nebulizer solution, Take 3 mL by nebulization every four hours as needed for Shortness of Breath (Wheezing)., Disp: 180 mL, Rfl: 3    fluocinonide (LIDEX) 0.05 % Cream, AAA arms/legs BID PRN rash/itching. Do not use on face, axilla, groin, Disp: 60 g, Rfl: 1    triamcinolone acetonide (KENALOG) 0.5 % Cream, APPLY TOPICALLY TWICE DAILY AS NEEDED FOR ITCHING, BODY SITES ONLY. DO NOT USE ON FACE, AXILLA, OR GROIN, Disp: 15 g, Rfl: 1    PREVIDENT 5000 DRY MOUTH 1.1 % Gel, , Disp: , Rfl:     nystatin (MYCOSTATIN) [id number] UNIT/GM Cream topical cream, AAA groin BID for rash. Use for 7-10days when present, Disp: 30 g, Rfl: 3    Cholecalciferol (VITAMIN D) 125 MCG (5000 UT) Cap, Take 5,000 Units by mouth every day., Disp: , Rfl:   ALLERGIES:   Allergies   Allergen Reactions    Augmentin Hives and Diarrhea    Metformin Diarrhea     Not extended release gave diarrhea    Zyban [Bupropion] Hives and Itching     SURGHX:   Past Surgical History:   Procedure Laterality Date    APPENDECTOMY      ARTHROSCOPY, KNEE      CARPAL TUNNEL ENDOSCOPIC      bilateral    CARPAL TUNNEL RELEASE      SHOULDER DECOMPRESSION ARTHROSCOPIC      right    TONSILLECTOMY       SOCHX:  reports that he has been smoking cigarettes. He has a 26.5 pack-year smoking history. He has been exposed to tobacco smoke. He has never used smokeless tobacco. He reports that he does not currently use alcohol. He reports that he does not use drugs.  FH: Reviewed with patient, not pertinent to this visit.             Patient presents with:  Eye Problem: Right eye lid swollen, red and painful x 3 days.  Patient denies blisters or lesions to eyelid, cheek or nose prior to onset of red eyelid.  Patient denies vision changes, discharge or foreign body to eye or eyelid.  Patient thought it was a stye, has been using some warm compress which did not help at all.  Patient denies any other complaint.        Eye Problem   Associated  "symptoms include eye redness. Pertinent negatives include no eye discharge, double vision, fever or photophobia.       Review of Systems   Constitutional:  Negative for chills and fever.   Eyes:  Positive for redness. Negative for double vision, photophobia, pain and discharge.   All other systems reviewed and are negative.             Objective     /72 (BP Location: Left arm, Patient Position: Sitting, BP Cuff Size: Large adult)   Pulse 87   Temp 36.2 °C (97.2 °F) (Temporal)   Resp 14   Ht 1.651 m (5' 5\")   Wt 101 kg (223 lb 5.2 oz)   SpO2 96%   BMI 37.16 kg/m²      Physical Exam  Vitals and nursing note reviewed.   Constitutional:       General: He is not in acute distress.     Appearance: Normal appearance. He is well-developed. He is not ill-appearing or toxic-appearing.   HENT:      Head: Normocephalic and atraumatic.      Right Ear: Tympanic membrane normal.      Left Ear: Tympanic membrane normal.      Nose: Nose normal.      Mouth/Throat:      Lips: Pink.      Mouth: Mucous membranes are moist.      Pharynx: Oropharynx is clear. Uvula midline.   Eyes:      General: Lids are everted, no foreign bodies appreciated. Vision grossly intact. No visual field deficit.        Right eye: No foreign body, discharge or hordeolum.      Extraocular Movements: Extraocular movements intact.      Conjunctiva/sclera: Conjunctivae normal.      Right eye: Right conjunctiva is not injected. No chemosis or exudate.     Pupils: Pupils are equal, round, and reactive to light.     Cardiovascular:      Rate and Rhythm: Normal rate and regular rhythm.      Pulses: Normal pulses.      Heart sounds: Normal heart sounds.   Pulmonary:      Effort: Pulmonary effort is normal.      Breath sounds: Normal breath sounds.   Abdominal:      General: Bowel sounds are normal.      Palpations: Abdomen is soft.   Musculoskeletal:         General: Normal range of motion.      Cervical back: Normal range of motion and neck supple. "   Skin:     General: Skin is warm and dry.      Capillary Refill: Capillary refill takes less than 2 seconds.   Neurological:      General: No focal deficit present.      Mental Status: He is alert and oriented to person, place, and time.      Cranial Nerves: No cranial nerve deficit.      Motor: Motor function is intact.      Coordination: Coordination is intact.      Gait: Gait normal.   Psychiatric:         Mood and Affect: Mood normal.                                  Assessment & Plan  Cellulitis of right upper eyelid    Orders:    predniSONE (DELTASONE) 20 MG Tab; Take 1 Tablet by mouth 2 times a day for 5 days.    clindamycin (CLEOCIN) 300 MG Cap; Take 1 Capsule by mouth 3 times a day for 7 days.       Patient HPI physical exam consistent with developing cellulitis of right upper eyelid.  No lesions to skin of eyelid, nose, cheek, ear, scalp.  Patient states he thinks he may have a stye in his eyelashes as this is sort of tender but has not seen a typical stye develop.  Based on exam and his history, I am going to treat with oral antibiotics, clindamycin 3 times daily x 7 days.  Patient gets hives from Augmentin, and he is unaware if he has ever been given Keflex, cefdinir or cephalosporin family so I I will treat with clindamycin.    PT can use cool/warm compress on affected area for relief of symptoms.     Differential diagnosis, supportive care, and indications for immediate follow-up discussed with patient.  Instructed to return to clinic or nearest emergency department for any change in condition, further concerns, or worsening of symptoms.    I personally reviewed prior external notes and test results pertinent to today's visit.  I have independently reviewed and interpreted all diagnostics ordered during this urgent care visit.    PT should follow up with PCP in 1-2 days for re-evaluation if symptoms have not improved.      Discussed red flags and reasons to return to UC or ED.      Pt and/or family  verbalized understanding of diagnosis and follow up instructions and was offered informational handout on diagnosis.  PT discharged.     Please note that this dictation was created using voice recognition software. I have made every reasonable attempt to correct obvious errors, but I expect that there may be errors of grammar and possibly content that I did not discover before finalizing the note.

## 2025-03-19 ENCOUNTER — OFFICE VISIT (OUTPATIENT)
Dept: MEDICAL GROUP | Facility: PHYSICIAN GROUP | Age: 75
End: 2025-03-19
Payer: MEDICARE

## 2025-03-19 VITALS
SYSTOLIC BLOOD PRESSURE: 118 MMHG | WEIGHT: 224 LBS | TEMPERATURE: 98.6 F | HEART RATE: 94 BPM | BODY MASS INDEX: 37.32 KG/M2 | RESPIRATION RATE: 16 BRPM | DIASTOLIC BLOOD PRESSURE: 70 MMHG | HEIGHT: 65 IN | OXYGEN SATURATION: 97 %

## 2025-03-19 DIAGNOSIS — F17.200 TOBACCO DEPENDENCE: Chronic | ICD-10-CM

## 2025-03-19 DIAGNOSIS — K63.5 POLYP OF COLON, UNSPECIFIED PART OF COLON, UNSPECIFIED TYPE: ICD-10-CM

## 2025-03-19 DIAGNOSIS — F32.A ANXIETY AND DEPRESSION: Chronic | ICD-10-CM

## 2025-03-19 DIAGNOSIS — F41.9 ANXIETY AND DEPRESSION: Chronic | ICD-10-CM

## 2025-03-19 DIAGNOSIS — E78.5 DYSLIPIDEMIA DUE TO TYPE 2 DIABETES MELLITUS (HCC): Chronic | ICD-10-CM

## 2025-03-19 DIAGNOSIS — R80.9 MICROALBUMINURIA DUE TO TYPE 2 DIABETES MELLITUS (HCC): ICD-10-CM

## 2025-03-19 DIAGNOSIS — J42 CHRONIC BRONCHITIS, UNSPECIFIED CHRONIC BRONCHITIS TYPE (HCC): ICD-10-CM

## 2025-03-19 DIAGNOSIS — E11.29 MICROALBUMINURIA DUE TO TYPE 2 DIABETES MELLITUS (HCC): ICD-10-CM

## 2025-03-19 DIAGNOSIS — E78.5 TYPE 2 DIABETES MELLITUS WITH HYPERLIPIDEMIA (HCC): Chronic | ICD-10-CM

## 2025-03-19 DIAGNOSIS — D69.6 THROMBOCYTOPENIA (HCC): Chronic | ICD-10-CM

## 2025-03-19 DIAGNOSIS — E66.01 SEVERE OBESITY (HCC): ICD-10-CM

## 2025-03-19 DIAGNOSIS — K22.70 BARRETT'S ESOPHAGUS WITHOUT DYSPLASIA: Chronic | ICD-10-CM

## 2025-03-19 DIAGNOSIS — N52.9 ERECTILE DYSFUNCTION, UNSPECIFIED ERECTILE DYSFUNCTION TYPE: Chronic | ICD-10-CM

## 2025-03-19 DIAGNOSIS — K21.9 GASTROESOPHAGEAL REFLUX DISEASE WITHOUT ESOPHAGITIS: Chronic | ICD-10-CM

## 2025-03-19 DIAGNOSIS — E11.69 TYPE 2 DIABETES MELLITUS WITH HYPERLIPIDEMIA (HCC): Chronic | ICD-10-CM

## 2025-03-19 DIAGNOSIS — G47.33 OSA ON CPAP: Chronic | ICD-10-CM

## 2025-03-19 DIAGNOSIS — E11.69 DYSLIPIDEMIA DUE TO TYPE 2 DIABETES MELLITUS (HCC): Chronic | ICD-10-CM

## 2025-03-19 PROBLEM — R06.09 DYSPNEA ON EXERTION: Status: RESOLVED | Noted: 2024-05-22 | Resolved: 2025-03-19

## 2025-03-19 PROCEDURE — 3078F DIAST BP <80 MM HG: CPT | Performed by: INTERNAL MEDICINE

## 2025-03-19 PROCEDURE — 3074F SYST BP LT 130 MM HG: CPT | Performed by: INTERNAL MEDICINE

## 2025-03-19 PROCEDURE — 99215 OFFICE O/P EST HI 40 MIN: CPT | Performed by: INTERNAL MEDICINE

## 2025-03-19 RX ORDER — FUROSEMIDE 20 MG/1
20 TABLET ORAL
Qty: 30 TABLET | Refills: 3 | Status: SHIPPED | OUTPATIENT
Start: 2025-03-19

## 2025-03-19 RX ORDER — EMPAGLIFLOZIN 10 MG/1
10 TABLET, FILM COATED ORAL DAILY
Qty: 100 TABLET | Refills: 3 | Status: SHIPPED | OUTPATIENT
Start: 2025-03-19

## 2025-03-19 RX ORDER — OMEPRAZOLE 40 MG/1
CAPSULE, DELAYED RELEASE ORAL
COMMUNITY
End: 2025-03-19

## 2025-03-19 RX ORDER — LISINOPRIL 2.5 MG/1
TABLET ORAL
COMMUNITY
End: 2025-03-19

## 2025-03-19 RX ORDER — ALBUTEROL SULFATE 90 UG/1
INHALANT RESPIRATORY (INHALATION)
COMMUNITY
End: 2025-03-19

## 2025-03-19 RX ORDER — HYDROXYZINE HYDROCHLORIDE 25 MG/1
25 TABLET, FILM COATED ORAL
Qty: 30 TABLET | Refills: 1 | Status: SHIPPED | OUTPATIENT
Start: 2025-03-19

## 2025-03-19 ASSESSMENT — FIBROSIS 4 INDEX: FIB4 SCORE: 3.06

## 2025-03-19 ASSESSMENT — PATIENT HEALTH QUESTIONNAIRE - PHQ9: CLINICAL INTERPRETATION OF PHQ2 SCORE: 0

## 2025-03-19 NOTE — ASSESSMENT & PLAN NOTE
Chronic condition.  Previous platelet level slightly low.  Denies history of bleeding.  The patient was seen by hematologist previously.  No new symptoms reported.

## 2025-03-19 NOTE — ASSESSMENT & PLAN NOTE
Chronic condition.  Patient followed by GI specialist.  Patient denies nausea vomiting or dysphagia.  Patient reported that he had EGD done November 2024  Patient declined further EGD

## 2025-03-19 NOTE — ASSESSMENT & PLAN NOTE
Chronic condition.  Patient currently taking duloxetine.  Patient denies SI.  No new symptoms reported.  Patient also requesting refill for hydroxyzine to take as needed for anxiety.

## 2025-03-19 NOTE — PROGRESS NOTES
PRIMARY CARE CLINIC VISIT        Chief Complaint   Patient presents with    Other     Flight for 12 hrs      Patient request refill for hydroxyzine for anxiety  Also request refill for Lasix to take as needed for leg swelling  Diabetes  Thrombocytopenia  COPD  Proteinuria  Hyperlipidemia  Acid reflux  Tobacco dependence  Thomas's esophagus  Sleep apnea  Colon polyp  Erectile  dysfunction    History of Present Illness     Type 2 diabetes mellitus with hyperlipidemia (HCC)  This is a chronic condition.  Patient followed by endocrinology service.    Previous A1c 6.5%.  Denies significant hypo or hyperglycemia.  Patient intolerant to metformin.  Patient request refill for Jardiance was not covered by his insurance previously.    Thrombocytopenia (HCC)  Chronic condition.  Previous platelet level slightly low.  Denies history of bleeding.  The patient was seen by hematologist previously.  No new symptoms reported.    Chronic obstructive pulmonary disease (HCC)  Chronic condition.  Patient currently using Advair, Spiriva and albuterol as needed.  Patient denies shortness of breath or wheezing.    Microalbuminuria due to type 2 diabetes mellitus (HCC)  Chronic condition.  Patient followed by nephrology service.  No new symptoms reported.    Dyslipidemia due to type 2 diabetes mellitus (HCC)  Chronic condition.  Patient is being treated with atorvastatin.  Tolerating medication well.  Previous lab test result discussed with the patient.    GERD (gastroesophageal reflux disease)  Chronic condition.  The patient is taking omeprazole.  He denies nausea vomiting or dysphagia.    Tobacco dependence  Chronic condition.   Discussed w pt and counseling on harmful effects of nicotine.     Strongly advised pt to quit.     Anxiety and depression  Chronic condition.  Patient currently taking duloxetine.  Patient denies SI.  No new symptoms reported.  Patient also requesting refill for hydroxyzine to take as needed for  anxiety.    Thomas's esophagus  Chronic condition.  Patient followed by GI specialist.  Patient denies nausea vomiting or dysphagia.  Patient reported that he had EGD done November 2024  Patient declined further EGD    NAHUM on CPAP  Chronic condition.  The patient using CPAP nightly.  Patient followed by sleep specialist.  No new symptoms reported.    Colonic polyp  Chronic condition for the patient was seen by GI specialist.  The patient stated that he had both EGD and colonoscopy done November 2024.  No acute finding noted.  The patient declined further colonoscopy.    Erectile dysfunction  Chronic condition.  The patient currently taking Viagra as needed.  No new symptoms reported.  Denies significant side effects    Severe obesity (HCC)  Body mass index is 37.28 kg/m².   Chronic condition.  Recommend pt to follow a healthy , well balance diet  Pt to continue with regular exercise activity and to maintain ideal weight.        Current Outpatient Medications on File Prior to Visit   Medication Sig Dispense Refill    clindamycin (CLEOCIN) 300 MG Cap Take 1 Capsule by mouth 3 times a day for 7 days. 21 Capsule 0    DULoxetine (CYMBALTA) 60 MG Cap DR Particles delayed-release capsule Take 1 Capsule by mouth every day. 90 Capsule 3    atorvastatin (LIPITOR) 20 MG Tab Take 1 Tablet by mouth every day. 100 Tablet 3    omeprazole (PRILOSEC) 40 MG delayed-release capsule Take 1 Capsule by mouth every day. 100 Capsule 3    sildenafil citrate (VIAGRA) 50 MG tablet Take 1 Tablet by mouth 1 time a day as needed for Erectile Dysfunction. 10 Tablet 3    albuterol 108 (90 Base) MCG/ACT Aero Soln inhalation aerosol INHALE 2 PUFFS BY MOUTH EVERY 6 HOURS AS NEEDED FOR SHORTNESS OF BREATH 8.5 g 6    lisinopril (PRINIVIL) 2.5 MG Tab TAKE 1 TABLET BY MOUTH EVERY DAY 90 Tablet 3    tiotropium (SPIRIVA RESPIMAT) 2.5 mcg/Act Aero Soln Inhale 2 Inhalations every day. Assemble and prime. 2 Each 0    fluticasone-salmeterol (ADVAIR HFA)  230-21 MCG/ACT inhaler Inhale 2 Puffs 2 times a day. 12 g 0    Iron Combinations (IRON COMPLEX PO) Take  by mouth.      ipratropium-albuterol (DUONEB) 0.5-2.5 (3) MG/3ML nebulizer solution Take 3 mL by nebulization every four hours as needed for Shortness of Breath (Wheezing). 180 mL 3    fluocinonide (LIDEX) 0.05 % Cream AAA arms/legs BID PRN rash/itching. Do not use on face, axilla, groin 60 g 1    triamcinolone acetonide (KENALOG) 0.5 % Cream APPLY TOPICALLY TWICE DAILY AS NEEDED FOR ITCHING, BODY SITES ONLY. DO NOT USE ON FACE, AXILLA, OR GROIN 15 g 1    nystatin (MYCOSTATIN) 036263 UNIT/GM Cream topical cream AAA groin BID for rash. Use for 7-10days when present 30 g 3    Cholecalciferol (VITAMIN D) 125 MCG (5000 UT) Cap Take 5,000 Units by mouth every day.      predniSONE (DELTASONE) 20 MG Tab Take 1 Tablet by mouth 2 times a day for 5 days. 10 Tablet 0    PREVIDENT 5000 DRY MOUTH 1.1 % Gel        No current facility-administered medications on file prior to visit.        Allergies: Augmentin, Metformin, and Zyban [bupropion]    Current Outpatient Medications Ordered in Epic   Medication Sig Dispense Refill    furosemide (LASIX) 20 MG Tab Take 1 Tablet by mouth 1 time a day as needed (leg swelling). 30 Tablet 3    hydrOXYzine HCl (ATARAX) 25 MG Tab Take 1 Tablet by mouth at bedtime as needed for Anxiety. 30 Tablet 1    Empagliflozin (JARDIANCE) 10 MG Tab tablet Take 1 Tablet by mouth every day. 100 Tablet 3    clindamycin (CLEOCIN) 300 MG Cap Take 1 Capsule by mouth 3 times a day for 7 days. 21 Capsule 0    DULoxetine (CYMBALTA) 60 MG Cap DR Particles delayed-release capsule Take 1 Capsule by mouth every day. 90 Capsule 3    atorvastatin (LIPITOR) 20 MG Tab Take 1 Tablet by mouth every day. 100 Tablet 3    omeprazole (PRILOSEC) 40 MG delayed-release capsule Take 1 Capsule by mouth every day. 100 Capsule 3    sildenafil citrate (VIAGRA) 50 MG tablet Take 1 Tablet by mouth 1 time a day as needed for Erectile  Dysfunction. 10 Tablet 3    albuterol 108 (90 Base) MCG/ACT Aero Soln inhalation aerosol INHALE 2 PUFFS BY MOUTH EVERY 6 HOURS AS NEEDED FOR SHORTNESS OF BREATH 8.5 g 6    lisinopril (PRINIVIL) 2.5 MG Tab TAKE 1 TABLET BY MOUTH EVERY DAY 90 Tablet 3    tiotropium (SPIRIVA RESPIMAT) 2.5 mcg/Act Aero Soln Inhale 2 Inhalations every day. Assemble and prime. 2 Each 0    fluticasone-salmeterol (ADVAIR HFA) 230-21 MCG/ACT inhaler Inhale 2 Puffs 2 times a day. 12 g 0    Iron Combinations (IRON COMPLEX PO) Take  by mouth.      ipratropium-albuterol (DUONEB) 0.5-2.5 (3) MG/3ML nebulizer solution Take 3 mL by nebulization every four hours as needed for Shortness of Breath (Wheezing). 180 mL 3    fluocinonide (LIDEX) 0.05 % Cream AAA arms/legs BID PRN rash/itching. Do not use on face, axilla, groin 60 g 1    triamcinolone acetonide (KENALOG) 0.5 % Cream APPLY TOPICALLY TWICE DAILY AS NEEDED FOR ITCHING, BODY SITES ONLY. DO NOT USE ON FACE, AXILLA, OR GROIN 15 g 1    nystatin (MYCOSTATIN) 782690 UNIT/GM Cream topical cream AAA groin BID for rash. Use for 7-10days when present 30 g 3    Cholecalciferol (VITAMIN D) 125 MCG (5000 UT) Cap Take 5,000 Units by mouth every day.      predniSONE (DELTASONE) 20 MG Tab Take 1 Tablet by mouth 2 times a day for 5 days. 10 Tablet 0    PREVIDENT 5000 DRY MOUTH 1.1 % Gel        No current Epic-ordered facility-administered medications on file.       Past Medical History:   Diagnosis Date    Back pain     Chickenpox     Cough 7/3/2023    Depression     Dyspnea on exertion 5/22/2024    GERD (gastroesophageal reflux disease)     [place] measles     Gout     Hyperlipidemia     Influenza     Panic disorder     Sleep apnea     Sputum production     Tobacco use 11/26/2014    Wheezing        Past Surgical History:   Procedure Laterality Date    APPENDECTOMY      ARTHROSCOPY, KNEE      CARPAL TUNNEL ENDOSCOPIC      bilateral    CARPAL TUNNEL RELEASE      SHOULDER DECOMPRESSION ARTHROSCOPIC      right     "TONSILLECTOMY         Family History   Problem Relation Age of Onset    Cancer Mother     Cancer Father     Stroke Father     Diabetes Neg Hx     Heart Disease Neg Hx     Hypertension Neg Hx        Social History     Tobacco Use   Smoking Status Every Day    Current packs/day: 0.50    Average packs/day: 0.5 packs/day for 53.0 years (26.5 ttl pk-yrs)    Types: Cigarettes    Passive exposure: Past   Smokeless Tobacco Never   Tobacco Comments    started smoking at age 15       Social History     Substance and Sexual Activity   Alcohol Use Not Currently    Alcohol/week: 0.0 oz       Review of systems  As per HPI above. All other systems reviewed and negative.      Past Medical, Social, and Family history reviewed and updated [facility]       LAB DATA:     I have independently reviewed / interpreted labs    Lab Results   Component Value Date/Time    HBA1C 6.5 (A) 09/23/2024 10:44 AM    HBA1C 6.2 (H) 05/02/2024 09:40 AM    HBA1C 6.4 (A) 11/28/2023 01:52 PM        Lab Results   Component Value Date/Time    WBC 7.7 12/03/2024 09:11 AM    HEMOGLOBIN 14.0 12/03/2024 09:11 AM    HEMATOCRIT 41.7 (L) 12/03/2024 09:11 AM    MCV 92.1 12/03/2024 09:11 AM    PLATELETCT 133 (L) 12/03/2024 09:11 AM       Lab Results   Component Value Date/Time    SODIUM 136 12/03/2024 09:21 AM    POTASSIUM 4.5 12/03/2024 09:21 AM    GLUCOSE 101 (H) 12/03/2024 09:21 AM    BUN 16 12/03/2024 09:21 AM    CREATININE 1.18 12/03/2024 09:21 AM       Lab Results   Component Value Date/Time    CHOLSTRLTOT 128 12/03/2024 09:19 AM    TRIGLYCERIDE 53 12/03/2024 09:19 AM    HDL 40 12/03/2024 09:19 AM    LDL 77 12/03/2024 09:19 AM       Lab Results   Component Value Date/Time    ALTSGPT 37 12/03/2024 09:19 AM          Objective     /70 (BP Location: Left arm, Patient Position: Sitting, BP Cuff Size: Large adult)   Pulse 94   Temp 37 °C (98.6 °F) (Temporal)   Resp 16   Ht 1.651 m (5' 5\")   Wt 102 kg (224 lb)   SpO2 97%    Body mass index is 37.28 " kg/m².    General: alert in no apparent distress.  Cardiovascular: regular rate and rhythm  Pulmonary: lungs : no wheezing   Gastrointestinal: BS present.       Assessment and Plan     1. Type 2 diabetes mellitus with hyperlipidemia (HCC)  Chronic stable condition.  Advised the patient to take Jardiance 10 mg daily.  Previous A1c 6.5%.  Clinical notes:   -Discussed with the patient goals for Diabetes management:   HbA1C < 7% /  Fasting BG   /  2 hrs post meal BG below 160  -Reviewed pathophysiology of diabetes and the importance of glycemic control to reduce the risk of diabetes related complications   Microvascular: retinopathy, neuropathy, nephropathy  Macrovascular: heart attack, stroke, peripheral vascular dz  -Advised pt to monitor sugar closely  -Counseled pt the importance of recognizing and treating hypoglycemia.   -The importance of increasing physical activity to improve diabetes control  discussed with the patient.   -Patient was also educated on changing diet and making better choices to help control blood sugar.          - Empagliflozin (JARDIANCE) 10 MG Tab tablet; Take 1 Tablet by mouth every day.  Dispense: 100 Tablet; Refill: 3  - HEMOGLOBIN A1C; Future  - Basic Metabolic Panel; Future    2. Thrombocytopenia (HCC)  Chronic condition.  This lab test showed platelet 133.  He denied history of bleeding.  Continue to monitor    3. Chronic bronchitis, unspecified chronic bronchitis type (HCC)  Chronic condition.  Continue Advair 1 puff daily rinse mouth after use.  Spiriva 1 inhalation daily and albuterol as needed.    4. Microalbuminuria due to type 2 diabetes mellitus (HCC)  Chronic condition.  Current status unclear.  Urine protein to creatinine ratio ordered.  Recommend to continue with low protein diet.  Continue lisinopril 2.5 Mg daily  - PROTEIN/CREAT RATIO URINE; Future    5. Dyslipidemia due to type 2 diabetes mellitus (HCC)  - ALANINE AMINO-TRANS; Future  - Lipid Profile; Future  Chronic  condition.  Stable condition.  Continue atorvastatin 20 Mg daily    6. Gastroesophageal reflux disease without esophagitis  Chronic stable.  Continue omeprazole 40 Mg daily.    7. Tobacco dependence  This is a chronic condition.  Uncontrolled.  Strongly advised patient to quit smoking.  The patient is not ready.      8. Anxiety and depression  Chronic condition.  Stable.  Continue duloxetine 60 Mg daily.  Patient may take hydroxyzine as needed for anxiety  - hydrOXYzine HCl (ATARAX) 25 MG Tab; Take 1 Tablet by mouth at bedtime as needed for Anxiety.  Dispense: 30 Tablet; Refill: 1    9. Thomas's esophagus without dysplasia  Chronic condition.  The patient completed the EGD recently.  Patient declined further EGD in the future.    10. NAHUM on CPAP  Stable.  Continue CPAP use nightly.  Continue follow-up with sleep specialist as directed    11. Polyp of colon, unspecified part of colon, unspecified type  Chronic stable.  Patient declined further colonoscopy.    12. Erectile dysfunction, unspecified erectile dysfunction type  Chronic condition.  Stable.  The patient may take Viagra 50 mg 1 tab daily as needed.    13. Severe obesity  Chronic  Rec diet and lifestyle modification    Other orders  - furosemide (LASIX) 20 MG Tab; Take 1 Tablet by mouth 1 time a day as needed (leg swelling).  Dispense: 30 Tablet; Refill: 3                Time spent:   41  minutes     Reviewed medical records including:  March 18, 2025 care note  March 13, 2025 dermatology note  January 24, 2025 pulmonary note  December 12, 2024 medical office note  September 24, 2024 medical office note  August 8, 2024 pulmonary note  Laboratory data December 3, 2024, November 26 2024  Echocardiogram June 5, 2024 chest x-ray May 22, 2024, CT scan abdomen November 28, 2022    Total time includes face to face time and non-face to face time.  Chart review before the visit, the actual patient visit, and time spent on documentation in the EMR after the  visit.  Chart review/prep, review of other providers' records, Independent review of imagings/labs ,  time for history/examination , pt's counseling/education, ordering, prescribing, treatment plan discussed with patient, and care coordination.      Please note that this dictation was created using voice recognition software. I have made every reasonable attempt to correct obvious errors, but I expect that there are errors of grammar and possibly content that I did not discover before finalizing the note.    Aris Aldana MD  Internal Medicine  [facility]

## 2025-03-19 NOTE — ASSESSMENT & PLAN NOTE
This is a chronic condition.  Patient followed by endocrinology service.    Previous A1c 6.5%.  Denies significant hypo or hyperglycemia.  Patient intolerant to metformin.  Patient request refill for Jardiance was not covered by his insurance previously.

## 2025-03-19 NOTE — ASSESSMENT & PLAN NOTE
Chronic condition.  The patient currently taking Viagra as needed.  No new symptoms reported.  Denies significant side effects

## 2025-03-19 NOTE — ASSESSMENT & PLAN NOTE
Chronic condition.  Patient is being treated with atorvastatin.  Tolerating medication well.  Previous lab test result discussed with the patient.

## 2025-03-19 NOTE — ASSESSMENT & PLAN NOTE
Chronic condition for the patient was seen by GI specialist.  The patient stated that he had both EGD and colonoscopy done November 2024.  No acute finding noted.  The patient declined further colonoscopy.

## 2025-03-19 NOTE — ASSESSMENT & PLAN NOTE
Chronic condition.  Patient currently using Advair, Spiriva and albuterol as needed.  Patient denies shortness of breath or wheezing.

## 2025-03-19 NOTE — ASSESSMENT & PLAN NOTE
Chronic condition.  The patient using CPAP nightly.  Patient followed by sleep specialist.  No new symptoms reported.

## 2025-03-20 NOTE — ASSESSMENT & PLAN NOTE
Body mass index is 37.28 kg/m².   Chronic condition.  Recommend pt to follow a healthy , well balance diet  Pt to continue with regular exercise activity and to maintain ideal weight.

## 2025-04-18 DIAGNOSIS — J44.9 CHRONIC OBSTRUCTIVE PULMONARY DISEASE, UNSPECIFIED COPD TYPE (HCC): ICD-10-CM

## 2025-04-18 RX ORDER — TIOTROPIUM BROMIDE AND OLODATEROL 3.124; 2.736 UG/1; UG/1
2 SPRAY, METERED RESPIRATORY (INHALATION) DAILY
Qty: 4 G | Refills: 11 | Status: SHIPPED | OUTPATIENT
Start: 2025-04-18

## 2025-04-23 ENCOUNTER — PATIENT MESSAGE (OUTPATIENT)
Dept: SLEEP MEDICINE | Facility: MEDICAL CENTER | Age: 75
End: 2025-04-23
Payer: MEDICARE

## 2025-04-24 NOTE — PATIENT COMMUNICATION
No new symptoms. When patient stated fluid in the chest, he was referring to the mucous production he has. Patient states that the Spiriva samples he was given did not work and was requesting a new inhaler.    I informed patient that [facility]ae[facility] sent an Rx for Stiolto Respimat on 4/18/25.    Patient will pick that up today and try that.

## 2025-05-14 DIAGNOSIS — J44.9 CHRONIC OBSTRUCTIVE PULMONARY DISEASE, UNSPECIFIED COPD TYPE (HCC): ICD-10-CM

## 2025-05-15 RX ORDER — ALBUTEROL SULFATE 90 UG/1
2 INHALANT RESPIRATORY (INHALATION) EVERY 6 HOURS PRN
Qty: 8.5 G | Refills: 6 | Status: SHIPPED | OUTPATIENT
Start: 2025-05-15

## 2025-05-15 NOTE — TELEPHONE ENCOUNTER
Caller Name: Micheal Rolandailyn Kiser                 Call Back Number: There are no phone numbers on file.        Patient approves a detailed voicemail message:     Have we ever prescribed this med? .  If yes, what date?     Last OV: 8/24/24 w/ Dr. Ibarra     Next OV: 7/2/25 w/ Dr. Ibarra     DX:  Chronic obstructive pulmonary disease, unspecified COPD type      Medications:ALBUTEROL HFA INH (200 PUFFS) 8.5GM

## 2025-05-19 ENCOUNTER — TELEPHONE (OUTPATIENT)
Dept: NEPHROLOGY | Facility: MEDICAL CENTER | Age: 75
End: 2025-05-19

## 2025-05-20 DIAGNOSIS — N18.2 CKD (CHRONIC KIDNEY DISEASE), STAGE II: Primary | ICD-10-CM

## 2025-05-20 DIAGNOSIS — D64.9 ANEMIA, UNSPECIFIED TYPE: ICD-10-CM

## 2025-05-20 DIAGNOSIS — E55.9 VITAMIN D DEFICIENCY: ICD-10-CM

## 2025-05-20 DIAGNOSIS — R80.9 MICROALBUMINURIA DUE TO TYPE 2 DIABETES MELLITUS (HCC): ICD-10-CM

## 2025-05-20 DIAGNOSIS — E11.29 MICROALBUMINURIA DUE TO TYPE 2 DIABETES MELLITUS (HCC): ICD-10-CM

## 2025-05-20 DIAGNOSIS — I10 ESSENTIAL HYPERTENSION: ICD-10-CM

## 2025-05-21 ENCOUNTER — HOSPITAL ENCOUNTER (OUTPATIENT)
Dept: LAB | Facility: MEDICAL CENTER | Age: 75
End: 2025-05-21
Attending: INTERNAL MEDICINE
Payer: MEDICARE

## 2025-05-21 DIAGNOSIS — R80.9 MICROALBUMINURIA DUE TO TYPE 2 DIABETES MELLITUS (HCC): ICD-10-CM

## 2025-05-21 DIAGNOSIS — E78.5 TYPE 2 DIABETES MELLITUS WITH HYPERLIPIDEMIA (HCC): Chronic | ICD-10-CM

## 2025-05-21 DIAGNOSIS — E11.69 TYPE 2 DIABETES MELLITUS WITH HYPERLIPIDEMIA (HCC): Chronic | ICD-10-CM

## 2025-05-21 DIAGNOSIS — E78.5 DYSLIPIDEMIA DUE TO TYPE 2 DIABETES MELLITUS (HCC): Chronic | ICD-10-CM

## 2025-05-21 DIAGNOSIS — E11.29 MICROALBUMINURIA DUE TO TYPE 2 DIABETES MELLITUS (HCC): ICD-10-CM

## 2025-05-21 DIAGNOSIS — E11.69 DYSLIPIDEMIA DUE TO TYPE 2 DIABETES MELLITUS (HCC): Chronic | ICD-10-CM

## 2025-05-21 DIAGNOSIS — N18.2 CKD (CHRONIC KIDNEY DISEASE), STAGE II: ICD-10-CM

## 2025-05-21 DIAGNOSIS — D64.9 ANEMIA, UNSPECIFIED TYPE: ICD-10-CM

## 2025-05-21 LAB
APPEARANCE UR: CLEAR
BACTERIA #/AREA URNS HPF: NORMAL /HPF
BILIRUB UR QL STRIP.AUTO: NEGATIVE
CASTS URNS QL MICRO: NORMAL /LPF (ref 0–2)
COLOR UR: YELLOW
CREAT UR-MCNC: 167 MG/DL
CREAT UR-MCNC: 170 MG/DL
EPITHELIAL CELLS 1715: NORMAL /HPF (ref 0–5)
ERYTHROCYTE [DISTWIDTH] IN BLOOD BY AUTOMATED COUNT: 49 FL (ref 35.9–50)
EST. AVERAGE GLUCOSE BLD GHB EST-MCNC: 140 MG/DL
GLUCOSE UR STRIP.AUTO-MCNC: NEGATIVE MG/DL
HBA1C MFR BLD: 6.5 % (ref 4–5.6)
HCT VFR BLD AUTO: 40.8 % (ref 42–52)
HGB BLD-MCNC: 13.7 G/DL (ref 14–18)
KETONES UR STRIP.AUTO-MCNC: NEGATIVE MG/DL
LEUKOCYTE ESTERASE UR QL STRIP.AUTO: NEGATIVE
MCH RBC QN AUTO: 32.3 PG (ref 27–33)
MCHC RBC AUTO-ENTMCNC: 33.6 G/DL (ref 32.3–36.5)
MCV RBC AUTO: 96.2 FL (ref 81.4–97.8)
MICRO URNS: ABNORMAL
MICROALBUMIN UR-MCNC: 9.9 MG/DL
MICROALBUMIN/CREAT UR: 59 MG/G (ref 0–30)
NITRITE UR QL STRIP.AUTO: NEGATIVE
PH UR STRIP.AUTO: 5.5 [PH] (ref 5–8)
PLATELET # BLD AUTO: 191 K/UL (ref 164–446)
PLATELET # BLD AUTO: 222 K/UL (ref 164–446)
PLATELET BLD QL SMEAR: NORMAL
PLATELETS.RETICULATED NFR BLD AUTO: 8.8 % (ref 0.6–13.1)
PMV BLD AUTO: 9.8 FL (ref 9–12.9)
PROT UR QL STRIP: 30 MG/DL
PROT UR-MCNC: 27.6 MG/DL (ref 0–15)
PROT/CREAT UR: 162 MG/G (ref 15–68)
RBC # BLD AUTO: 4.24 M/UL (ref 4.7–6.1)
RBC # URNS HPF: NORMAL /HPF (ref 0–2)
RBC UR QL AUTO: NEGATIVE
SP GR UR STRIP.AUTO: 1.02
UROBILINOGEN UR STRIP.AUTO-MCNC: 0.2 EU/DL
WBC # BLD AUTO: 8.3 K/UL (ref 4.8–10.8)
WBC #/AREA URNS HPF: NORMAL /HPF

## 2025-05-21 PROCEDURE — 84460 ALANINE AMINO (ALT) (SGPT): CPT

## 2025-05-21 PROCEDURE — 85055 RETICULATED PLATELET ASSAY: CPT

## 2025-05-21 PROCEDURE — 36415 COLL VENOUS BLD VENIPUNCTURE: CPT

## 2025-05-21 PROCEDURE — 85027 COMPLETE CBC AUTOMATED: CPT

## 2025-05-21 PROCEDURE — 80061 LIPID PANEL: CPT

## 2025-05-21 PROCEDURE — 81001 URINALYSIS AUTO W/SCOPE: CPT

## 2025-05-21 PROCEDURE — 82570 ASSAY OF URINE CREATININE: CPT

## 2025-05-21 PROCEDURE — 80048 BASIC METABOLIC PNL TOTAL CA: CPT

## 2025-05-21 PROCEDURE — 83036 HEMOGLOBIN GLYCOSYLATED A1C: CPT

## 2025-05-21 PROCEDURE — 82570 ASSAY OF URINE CREATININE: CPT | Mod: 91

## 2025-05-21 PROCEDURE — 80048 BASIC METABOLIC PNL TOTAL CA: CPT | Mod: 91

## 2025-05-21 PROCEDURE — 82043 UR ALBUMIN QUANTITATIVE: CPT

## 2025-05-21 PROCEDURE — 84156 ASSAY OF PROTEIN URINE: CPT

## 2025-05-21 PROCEDURE — 82306 VITAMIN D 25 HYDROXY: CPT

## 2025-05-22 ENCOUNTER — RESULTS FOLLOW-UP (OUTPATIENT)
Dept: MEDICAL GROUP | Facility: PHYSICIAN GROUP | Age: 75
End: 2025-05-22

## 2025-05-22 DIAGNOSIS — N18.2 CKD STAGE 2 DUE TO TYPE 2 DIABETES MELLITUS (HCC): Primary | ICD-10-CM

## 2025-05-22 DIAGNOSIS — D64.9 ANEMIA, UNSPECIFIED TYPE: ICD-10-CM

## 2025-05-22 DIAGNOSIS — E11.22 CKD STAGE 2 DUE TO TYPE 2 DIABETES MELLITUS (HCC): Primary | ICD-10-CM

## 2025-05-22 PROBLEM — E66.01 SEVERE OBESITY (HCC): Chronic | Status: ACTIVE | Noted: 2025-03-19

## 2025-05-22 PROBLEM — E11.29 MICROALBUMINURIA DUE TO TYPE 2 DIABETES MELLITUS (HCC): Chronic | Status: ACTIVE | Noted: 2021-01-22

## 2025-05-22 LAB
25(OH)D3 SERPL-MCNC: 85 NG/ML (ref 30–100)
ALT SERPL-CCNC: 45 U/L (ref 2–50)
ANION GAP SERPL CALC-SCNC: 12 MMOL/L (ref 7–16)
ANION GAP SERPL CALC-SCNC: 14 MMOL/L (ref 7–16)
BUN SERPL-MCNC: 17 MG/DL (ref 8–22)
BUN SERPL-MCNC: 17 MG/DL (ref 8–22)
CALCIUM SERPL-MCNC: 9.6 MG/DL (ref 8.5–10.5)
CALCIUM SERPL-MCNC: 9.7 MG/DL (ref 8.5–10.5)
CHLORIDE SERPL-SCNC: 102 MMOL/L (ref 96–112)
CHLORIDE SERPL-SCNC: 104 MMOL/L (ref 96–112)
CHOLEST SERPL-MCNC: 116 MG/DL (ref 100–199)
CO2 SERPL-SCNC: 20 MMOL/L (ref 20–33)
CO2 SERPL-SCNC: 21 MMOL/L (ref 20–33)
CREAT SERPL-MCNC: 1.22 MG/DL (ref 0.5–1.4)
CREAT SERPL-MCNC: 1.25 MG/DL (ref 0.5–1.4)
FASTING STATUS PATIENT QL REPORTED: NORMAL
FASTING STATUS PATIENT QL REPORTED: NORMAL
GFR SERPLBLD CREATININE-BSD FMLA CKD-EPI: 60 ML/MIN/1.73 M 2
GFR SERPLBLD CREATININE-BSD FMLA CKD-EPI: 62 ML/MIN/1.73 M 2
GLUCOSE SERPL-MCNC: 112 MG/DL (ref 65–99)
GLUCOSE SERPL-MCNC: 113 MG/DL (ref 65–99)
HDLC SERPL-MCNC: 37 MG/DL
LDLC SERPL CALC-MCNC: 63 MG/DL
POTASSIUM SERPL-SCNC: 5 MMOL/L (ref 3.6–5.5)
POTASSIUM SERPL-SCNC: 5.1 MMOL/L (ref 3.6–5.5)
SODIUM SERPL-SCNC: 136 MMOL/L (ref 135–145)
SODIUM SERPL-SCNC: 137 MMOL/L (ref 135–145)
TRIGL SERPL-MCNC: 81 MG/DL (ref 0–149)

## 2025-05-23 ENCOUNTER — TELEPHONE (OUTPATIENT)
Dept: HEALTH INFORMATION MANAGEMENT | Facility: OTHER | Age: 75
End: 2025-05-23
Payer: MEDICARE

## 2025-05-29 ENCOUNTER — OFFICE VISIT (OUTPATIENT)
Dept: NEPHROLOGY | Facility: MEDICAL CENTER | Age: 75
End: 2025-05-29
Payer: MEDICARE

## 2025-05-29 VITALS
HEIGHT: 66 IN | OXYGEN SATURATION: 98 % | BODY MASS INDEX: 34.52 KG/M2 | WEIGHT: 214.8 LBS | RESPIRATION RATE: 12 BRPM | HEART RATE: 92 BPM | TEMPERATURE: 97.5 F | DIASTOLIC BLOOD PRESSURE: 68 MMHG | SYSTOLIC BLOOD PRESSURE: 120 MMHG

## 2025-05-29 DIAGNOSIS — E55.9 VITAMIN D DEFICIENCY: ICD-10-CM

## 2025-05-29 DIAGNOSIS — D64.9 ANEMIA, UNSPECIFIED TYPE: ICD-10-CM

## 2025-05-29 DIAGNOSIS — N18.2 CKD (CHRONIC KIDNEY DISEASE), STAGE II: Primary | ICD-10-CM

## 2025-05-29 DIAGNOSIS — E11.29 MICROALBUMINURIA DUE TO TYPE 2 DIABETES MELLITUS (HCC): ICD-10-CM

## 2025-05-29 DIAGNOSIS — I10 ESSENTIAL HYPERTENSION: ICD-10-CM

## 2025-05-29 DIAGNOSIS — R80.9 MICROALBUMINURIA DUE TO TYPE 2 DIABETES MELLITUS (HCC): ICD-10-CM

## 2025-05-29 ASSESSMENT — ENCOUNTER SYMPTOMS
COUGH: 0
SHORTNESS OF BREATH: 0
CHILLS: 0
PALPITATIONS: 0
EYES NEGATIVE: 1
NAUSEA: 0
HEMOPTYSIS: 0
WHEEZING: 0
DIARRHEA: 0
ABDOMINAL PAIN: 0
WEIGHT LOSS: 0
FEVER: 0
VOMITING: 0
SINUS PAIN: 0
ORTHOPNEA: 0

## 2025-05-29 ASSESSMENT — FIBROSIS 4 INDEX: FIB4 SCORE: 1.93

## 2025-05-29 NOTE — PROGRESS NOTES
Subjective:      Micheal Brothers Jr. is a 74 y.o. male who presents with Chronic Kidney Disease            Chronic Kidney Disease  Pertinent negatives include no abdominal pain, chest pain, chills, congestion, coughing, fever, nausea or vomiting.     Micheal is coming today for f/u of CKD II, microalbuminuria  Doing well, no complaints   No dysuria/hematuria/flank pain  HTN: BP well controlled -compliant to medications, low salt diet  CKD II stable at baseline at 1.0-1.2  Microalbuminuria very mild - on ACEi -to monitor      Review of Systems   Constitutional:  Negative for chills, fever, malaise/fatigue and weight loss.   HENT:  Negative for congestion, hearing loss and sinus pain.    Eyes: Negative.    Respiratory:  Negative for cough, hemoptysis, shortness of breath and wheezing.    Cardiovascular:  Negative for chest pain, palpitations, orthopnea and leg swelling.   Gastrointestinal:  Negative for abdominal pain, diarrhea, nausea and vomiting.   Genitourinary:  Negative for dysuria, frequency, hematuria and urgency.   Skin: Negative.    All other systems reviewed and are negative.    Past Medical History:   Diagnosis Date    Back pain     Chickenpox     Cough 7/3/2023    Depression     Dyspnea on exertion 5/22/2024    GERD (gastroesophageal reflux disease)     [place] measles     Gout     Hyperlipidemia     Influenza     Panic disorder     Sleep apnea     Sputum production     Tobacco use 11/26/2014    Wheezing        Family History   Problem Relation Age of Onset    Cancer Mother     Cancer Father     Stroke Father     Diabetes Neg Hx     Heart Disease Neg Hx     Hypertension Neg Hx        Social History     Socioeconomic History    Marital status:    Tobacco Use    Smoking status: Every Day     Current packs/day: 0.50     Average packs/day: 0.5 packs/day for 53.0 years (26.5 ttl pk-yrs)     Types: Cigarettes     Passive exposure: Past    Smokeless tobacco: Never    Tobacco comments:     started  "smoking at age 15   Vaping Use    Vaping status: Never Used   Substance and Sexual Activity    Alcohol use: Not Currently     Alcohol/week: 0.0 oz    Drug use: Never    Sexual activity: Yes     Partners: Female          Objective:     /68 (BP Location: Right arm, Patient Position: Sitting, BP Cuff Size: Adult)   Pulse 92   Temp 36.4 °C (97.5 °F) (Temporal)   Resp 12   Ht 1.676 m (5' 6\")   Wt 97.4 kg (214 lb 12.8 oz)   SpO2 98%   BMI 34.67 kg/m²      Physical Exam  Vitals reviewed.   Constitutional:       General: He is not in acute distress.     Appearance: Normal appearance. He is well-developed. He is not diaphoretic.   HENT:      Head: Normocephalic and atraumatic.      Nose: Nose normal.      Mouth/Throat:      Mouth: Mucous membranes are moist.      Pharynx: Oropharynx is clear.   Eyes:      Extraocular Movements: Extraocular movements intact.      Conjunctiva/sclera: Conjunctivae normal.      Pupils: Pupils are equal, round, and reactive to light.   Cardiovascular:      Rate and Rhythm: Normal rate and regular rhythm.      Pulses: Normal pulses.      Heart sounds: Normal heart sounds.   Pulmonary:      Effort: Pulmonary effort is normal. No respiratory distress.      Breath sounds: Normal breath sounds. No wheezing or rales.   Abdominal:      General: Bowel sounds are normal. There is no distension.      Palpations: Abdomen is soft. There is no mass.      Tenderness: There is no abdominal tenderness. There is no right CVA tenderness or left CVA tenderness.   Musculoskeletal:      Cervical back: Normal range of motion and neck supple.      Right lower leg: No edema.      Left lower leg: No edema.   Skin:     General: Skin is warm.      Coloration: Skin is not pale.      Findings: No erythema or rash.   Neurological:      General: No focal deficit present.      Mental Status: He is alert and oriented to person, place, and time.      Cranial Nerves: No cranial nerve deficit.      Coordination: " Coordination normal.   Psychiatric:         Mood and Affect: Mood normal.         Behavior: Behavior normal.         Thought Content: Thought content normal.         Judgment: Judgment normal.               Lab results reviewed: d/w Pt     Latest Reference Range & Units 12/03/24 09:11 12/03/24 09:19 12/03/24 09:21 05/21/25 08:19   WBC 4.8 - 10.8 K/uL 7.7   8.3   RBC 4.70 - 6.10 M/uL 4.53 (L)   4.24 (L)   Hemoglobin 14.0 - 18.0 g/dL 14.0   13.7 (L)   Hematocrit 42.0 - 52.0 % 41.7 (L)   40.8 (L)   MCV 81.4 - 97.8 fL 92.1   96.2   MCH 27.0 - 33.0 pg 30.9   32.3   MCHC 32.3 - 36.5 g/dL 33.6   33.6   RDW 35.9 - 50.0 fL 44.5   49.0   Platelet Count 164 - 446 K/uL 133 (L)   191   MPV 9.0 - 12.9 fL 11.2   9.8   Neutrophils-Polys 44.00 - 72.00 % 58.60      Neutrophils (Absolute) 1.82 - 7.42 K/uL 4.50      Lymphocytes 22.00 - 41.00 % 30.50      Lymphs (Absolute) 1.00 - 4.80 K/uL 2.35      Monocytes 0.00 - 13.40 % 7.10      Monos (Absolute) 0.00 - 0.85 K/uL 0.55      Eosinophils 0.00 - 6.90 % 2.30      Eos (Absolute) 0.00 - 0.51 K/uL 0.18      Basophils 0.00 - 1.80 % 1.00      Baso (Absolute) 0.00 - 0.12 K/uL 0.08      Immature Granulocytes 0.00 - 0.90 % 0.50      Immature Granulocytes (abs) 0.00 - 0.11 K/uL 0.04      Nucleated RBC 0.00 - 0.20 /100 WBC 0.40 (H)      NRBC (Absolute) K/uL 0.03      Plt Estimation  Decreased   Normal   Imm. Plt Fraction 0.6 - 13.1 % 13.2 (H)   8.8   Platelet Count, Citrated 164 - 446 K/uL 211   222   Sodium 135 - 145 mmol/L  135 - 145 mmol/L 137 137 136 136  137   Potassium 3.6 - 5.5 mmol/L  3.6 - 5.5 mmol/L 4.5 4.5 4.5 5.0  5.1   Chloride 96 - 112 mmol/L  96 - 112 mmol/L 102 103 102 102  104   Co2 20 - 33 mmol/L  20 - 33 mmol/L 21 22 21 20  21   Anion Gap 7.0 - 16.0   7.0 - 16.0  14.0 12.0 13.0 14.0  12.0   Glucose 65 - 99 mg/dL  65 - 99 mg/dL 102 (H) 104 (H) 101 (H) 112 (H)  113 (H)   Bun 8 - 22 mg/dL  8 - 22 mg/dL 16 16 16 17  17   Creatinine 0.50 - 1.40 mg/dL  0.50 - 1.40 mg/dL 1.23 1.20 1.18  1.25  1.22   GFR (CKD-EPI) >60 mL/min/1.73 m 2  >60 mL/min/1.73 m 2 61 63 64 60  62   Calcium 8.5 - 10.5 mg/dL  8.5 - 10.5 mg/dL 9.5 9.4 9.4 9.6  9.7   Correct Calcium 8.5 - 10.5 mg/dL  9.2     AST(SGOT) 12 - 45 U/L  33     ALT(SGPT) 2 - 50 U/L 39 37  45   Alkaline Phosphatase 30 - 99 U/L  68     Total Bilirubin 0.1 - 1.5 mg/dL  0.4     Albumin 3.2 - 4.9 g/dL  4.3     Total Protein 6.0 - 8.2 g/dL  7.3     Globulin 1.9 - 3.5 g/dL  3.0     A-G Ratio g/dL  1.4     Glycohemoglobin 4.0 - 5.6 %    6.5 (H)   Estim. Avg Glu mg/dL    140   Fasting Status     Non-Fasting  Fasting   Cholesterol,Tot 100 - 199 mg/dL 126 128  116   Triglycerides 0 - 149 mg/dL 52 53  81   HDL >=40 mg/dL 40 40  37 !   LDL <100 mg/dL 76 77  63   Creatinine, Random Urine mg/dL    170.00   Total Protein, Urine 0.0 - 15.0 mg/dL    27.6 (H)   Protein Creatinine Ratio 15 - 68 mg/g    162 (H)   Micro Alb Creat Ratio 0 - 30 mg/g 53 (H) 51 (H) 53 (H) 59 (H)   Creatinine, Urine mg/dL 212.21 213.95 210.52 167.00   Microalbumin, Urine Random mg/dL 11.3 11.0 11.1 9.9   Urobilinogen, Urine <=1.0 EU/dL    0.2   Color     Yellow   Character     Clear   Specific Gravity <1.035     1.022   Ph 5.0 - 8.0     5.5   Glucose Negative mg/dL    Negative   Ketones Negative mg/dL    Negative   Bilirubin Negative     Negative   Occult Blood Negative     Negative   Protein Negative mg/dL    30 !   Nitrite Negative     Negative   Leukocyte Esterase Negative     Negative   Micro Urine Req     Microscopic   WBC /hpf    0-2   RBC 0 - 2 /hpf    0-2   Epithelial Cells 0 - 5 /hpf    0-2   Bacteria None /hpf    None Seen   Urine Casts 0 - 2 /lpf    0-2   25-Hydroxy   Vitamin D 25 30 - 100 ng/mL  86  85   TSH 0.350 - 5.500 uIU/mL 2.010 2.080     Free T-4 0.93 - 1.70 ng/dL  0.85 (L)     T3,Free 2.00 - 4.40 pg/mL  3.28     (L): Data is abnormally low  (H): Data is abnormally high  !: Data is abnormal       Assessment and Plan    1. CKD (chronic kidney disease), stage II  -   Creat stable  at baseline -to monitor    2. Microalbuminuria due to type 2 diabetes mellitus (HCC)      Mild on ACEi    4. Essential hypertension      BP well controlled -to monitor at home    5. Anemia:      Hb WNL -to monitor    6. Vitamin D deficiency      Well controlled      To monitor    Recs:  Continue current treatment  Monitor BP and call clinic if BP > 135/85  Low salt diet  Keep well hydrated  F/u 10 months

## 2025-06-03 ENCOUNTER — HOSPITAL ENCOUNTER (OUTPATIENT)
Dept: LAB | Facility: MEDICAL CENTER | Age: 75
End: 2025-06-03
Attending: INTERNAL MEDICINE
Payer: MEDICARE

## 2025-06-03 DIAGNOSIS — D64.9 ANEMIA, UNSPECIFIED TYPE: ICD-10-CM

## 2025-06-03 LAB
FOLATE SERPL-MCNC: 11.4 NG/ML
HGB RETIC QN AUTO: 36.7 PG/CELL (ref 29–35)
IMM RETICS NFR: 11.5 % (ref 2.6–16.1)
RETICS # AUTO: 0.09 M/UL (ref 0.04–0.12)
RETICS/RBC NFR: 2 % (ref 0.8–2.6)

## 2025-06-03 PROCEDURE — 36415 COLL VENOUS BLD VENIPUNCTURE: CPT

## 2025-06-03 PROCEDURE — 83540 ASSAY OF IRON: CPT

## 2025-06-03 PROCEDURE — 85046 RETICYTE/HGB CONCENTRATE: CPT

## 2025-06-03 PROCEDURE — 82728 ASSAY OF FERRITIN: CPT

## 2025-06-03 PROCEDURE — 82607 VITAMIN B-12: CPT

## 2025-06-03 PROCEDURE — 82746 ASSAY OF FOLIC ACID SERUM: CPT

## 2025-06-04 ENCOUNTER — HOSPITAL ENCOUNTER (OUTPATIENT)
Facility: MEDICAL CENTER | Age: 75
End: 2025-06-04
Attending: INTERNAL MEDICINE
Payer: MEDICARE

## 2025-06-04 ENCOUNTER — RESULTS FOLLOW-UP (OUTPATIENT)
Dept: MEDICAL GROUP | Facility: PHYSICIAN GROUP | Age: 75
End: 2025-06-04

## 2025-06-04 DIAGNOSIS — D64.9 ANEMIA, UNSPECIFIED TYPE: Primary | ICD-10-CM

## 2025-06-04 DIAGNOSIS — D64.9 ANEMIA, UNSPECIFIED TYPE: ICD-10-CM

## 2025-06-04 LAB
FERRITIN SERPL-MCNC: 565 NG/ML (ref 22–322)
IRON SERPL-MCNC: 101 UG/DL (ref 50–180)
VIT B12 SERPL-MCNC: 564 PG/ML (ref 211–911)

## 2025-06-04 PROCEDURE — 82272 OCCULT BLD FECES 1-3 TESTS: CPT

## 2025-06-05 LAB — HEMOCCULT STL QL: NEGATIVE

## 2025-06-24 ENCOUNTER — OFFICE VISIT (OUTPATIENT)
Dept: MEDICAL GROUP | Facility: PHYSICIAN GROUP | Age: 75
End: 2025-06-24
Payer: MEDICARE

## 2025-06-24 VITALS
TEMPERATURE: 97.5 F | HEIGHT: 66 IN | BODY MASS INDEX: 34.13 KG/M2 | SYSTOLIC BLOOD PRESSURE: 128 MMHG | OXYGEN SATURATION: 97 % | DIASTOLIC BLOOD PRESSURE: 70 MMHG | HEART RATE: 88 BPM | WEIGHT: 212.4 LBS

## 2025-06-24 DIAGNOSIS — F17.200 TOBACCO DEPENDENCE: Chronic | ICD-10-CM

## 2025-06-24 DIAGNOSIS — E11.69 DYSLIPIDEMIA DUE TO TYPE 2 DIABETES MELLITUS (HCC): Chronic | ICD-10-CM

## 2025-06-24 DIAGNOSIS — E78.5 TYPE 2 DIABETES MELLITUS WITH HYPERLIPIDEMIA (HCC): Primary | Chronic | ICD-10-CM

## 2025-06-24 DIAGNOSIS — J42 CHRONIC BRONCHITIS, UNSPECIFIED CHRONIC BRONCHITIS TYPE (HCC): Chronic | ICD-10-CM

## 2025-06-24 DIAGNOSIS — F32.A ANXIETY AND DEPRESSION: Chronic | ICD-10-CM

## 2025-06-24 DIAGNOSIS — K21.9 GASTROESOPHAGEAL REFLUX DISEASE WITHOUT ESOPHAGITIS: Chronic | ICD-10-CM

## 2025-06-24 DIAGNOSIS — E78.5 DYSLIPIDEMIA DUE TO TYPE 2 DIABETES MELLITUS (HCC): Chronic | ICD-10-CM

## 2025-06-24 DIAGNOSIS — E11.69 TYPE 2 DIABETES MELLITUS WITH HYPERLIPIDEMIA (HCC): Primary | Chronic | ICD-10-CM

## 2025-06-24 DIAGNOSIS — E66.01 SEVERE OBESITY (HCC): Chronic | ICD-10-CM

## 2025-06-24 DIAGNOSIS — D64.9 ANEMIA, UNSPECIFIED TYPE: Chronic | ICD-10-CM

## 2025-06-24 DIAGNOSIS — N52.9 ERECTILE DYSFUNCTION, UNSPECIFIED ERECTILE DYSFUNCTION TYPE: Chronic | ICD-10-CM

## 2025-06-24 DIAGNOSIS — F41.9 ANXIETY AND DEPRESSION: Chronic | ICD-10-CM

## 2025-06-24 DIAGNOSIS — E11.29 PROTEINURIA DUE TO TYPE 2 DIABETES MELLITUS (HCC): Chronic | ICD-10-CM

## 2025-06-24 DIAGNOSIS — R80.9 PROTEINURIA DUE TO TYPE 2 DIABETES MELLITUS (HCC): Chronic | ICD-10-CM

## 2025-06-24 DIAGNOSIS — E11.22 CKD STAGE 2 DUE TO TYPE 2 DIABETES MELLITUS (HCC): Chronic | ICD-10-CM

## 2025-06-24 DIAGNOSIS — N18.2 CKD STAGE 2 DUE TO TYPE 2 DIABETES MELLITUS (HCC): Chronic | ICD-10-CM

## 2025-06-24 DIAGNOSIS — D69.6 THROMBOCYTOPENIA (HCC): Chronic | ICD-10-CM

## 2025-06-24 DIAGNOSIS — G47.33 OSA ON CPAP: Chronic | ICD-10-CM

## 2025-06-24 ASSESSMENT — FIBROSIS 4 INDEX: FIB4 SCORE: 1.93

## 2025-06-24 NOTE — PROGRESS NOTES
"PRIMARY CARE CLINIC VISIT        Chief Complaint   Patient presents with    Follow-Up     Labs      Follow-up diabetes  COPD  Proteinuria  Thrombocytopenia  CKD 2  Thrombocytopenia  Hyperlipidemia  Acid reflux  Tobacco dependence  Sleep apnea  Obesity  Anxiety and depression  Erectile dysfunction  Anemia  Lab test results      History of Present Illness     Type 2 diabetes mellitus with hyperlipidemia (HCC)  This is chronic.  Patient  intolerant to Jardiance.  Patient reports the medication \"is draining him\".  Patient has stopped taking medication after 4 days and his symptoms resolved.   Pt Also reported that he is intolerant to metformin.  Hemoglobin A1c 6.5%.  Patient denies significant hypo or hyperglycemia.    Chronic obstructive pulmonary disease (HCC)  This is chronic.  Patient followed by pulmonology service.  Patient currently using Stiolto Respimat and albuterol as needed.  Patient denies shortness of breath or wheezing.    Proteinuria due to type 2 diabetes mellitus (HCC)  This is chronic associated with CKD 2.  Patient presently followed by nephrology.  Patient asymptomatic.  Previous lab test result discussed with the patient.    Thrombocytopenia (HCC)  This is chronic.  Patient followed by hematology.  Patient did not history of bleeding.  Lab test result discussed with the patient    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)  Chronic associated with proteinuria.  Previous GFR in the 60s.  Patient asymptomatic.  Patient currently followed by nephrology.    Anemia  Chronic.  Lab test show slightly low hemoglobin 13.7.  Patient denies history of bleeding.  Follow-up lab test including iron vitamin B12 and folate were unremarkable.  Fit test was negative.  Test result discussed with the patient.    Dyslipidemia due to type 2 diabetes mellitus (HCC)  Chronic.  The patient currently taking statin.  Patient tolerating medication well.  Lab test result discussed with the patient.    GERD " (gastroesophageal reflux disease)  Chronic.  Patient currently taking omeprazole.  The patient denies nausea vomiting or dysphagia.    Tobacco dependence  Chronic condition.   Discussed w pt and counseling on harmful effects of nicotine.     Strongly advised pt to quit.     NAHUM on CPAP  Chronic.  The patient has been using CPAP nightly.  Patient followed by sleep specialist.  No new symptoms reported.    Anxiety and depression  Chronic.  The patient is taking Cymbalta 60 mg daily.  Patient tolerating medication well.  Patient denies SI.    Erectile dysfunction  This is chronic.  Patient takes Viagra as needed.  Patient tolerating medication well.  No new symptoms reported.    Severe obesity (HCC)  Body mass index is 34.28 kg/m².   Chronic condition.  Recommend pt to follow a healthy , well balance diet  Pt to continue with regular exercise activity and to maintain ideal weight.        Medications Ordered Prior to Encounter[1]     Allergies: Augmentin, Metformin, Zyban [bupropion], and Jardiance [empagliflozin]    Current Medications and Prescriptions Ordered in Epic[2]    Past Medical History[3]    Past Surgical History[4]    Family History   Problem Relation Age of Onset    Cancer Mother     Cancer Father     Stroke Father     Diabetes Neg Hx     Heart Disease Neg Hx     Hypertension Neg Hx        Tobacco Use History[5]    Social History     Substance and Sexual Activity   Alcohol Use Not Currently    Alcohol/week: 0.0 oz       Review of systems  As per HPI above. All other systems reviewed and negative.      Past Medical, Social, and Family history reviewed and [facility]       LAB DATA:     I have independently reviewed / interpreted labs    Lab Results   Component Value Date/Time    HBA1C 6.5 (H) 05/21/2025 08:19 AM    HBA1C 6.5 (A) 09/23/2024 10:44 AM    HBA1C 6.2 (H) 05/02/2024 09:40 AM        Lab Results   Component Value Date/Time    WBC 8.3 05/21/2025 08:19 AM    HEMOGLOBIN 13.7 (L) 05/21/2025 08:19 AM  "   HEMATOCRIT 40.8 (L) 05/21/2025 08:19 AM    MCV 96.2 05/21/2025 08:19 AM    PLATELETCT 191 05/21/2025 08:19 AM       Lab Results   Component Value Date/Time    SODIUM 136 05/21/2025 08:19 AM    SODIUM 137 05/21/2025 08:19 AM    POTASSIUM 5.0 05/21/2025 08:19 AM    POTASSIUM 5.1 05/21/2025 08:19 AM    GLUCOSE 112 (H) 05/21/2025 08:19 AM    GLUCOSE 113 (H) 05/21/2025 08:19 AM    BUN 17 05/21/2025 08:19 AM    BUN 17 05/21/2025 08:19 AM    CREATININE 1.25 05/21/2025 08:19 AM    CREATININE 1.22 05/21/2025 08:19 AM       Lab Results   Component Value Date/Time    CHOLSTRLTOT 116 05/21/2025 08:19 AM    TRIGLYCERIDE 81 05/21/2025 08:19 AM    HDL 37 (A) 05/21/2025 08:19 AM    LDL 63 05/21/2025 08:19 AM       Lab Results   Component Value Date/Time    ALTSGPT 45 05/21/2025 08:19 AM          Objective     /70 (BP Location: Right arm, Patient Position: Sitting, BP Cuff Size: Adult)   Pulse 88   Temp 36.4 °C (97.5 °F) (Temporal)   Ht 1.676 m (5' 6\")   Wt 96.3 kg (212 lb 6.4 oz)   SpO2 97%    Body mass index is 34.28 kg/m².    General: alert in no apparent distress.  Cardiovascular: regular rate and rhythm  Pulmonary: lungs : no wheezing   Gastrointestinal: BS present.       Assessment and Plan     1. Type 2 diabetes mellitus with hyperlipidemia (HCC)  This is chronic.  Recent A1c stable 6.5%.  However the patient is intolerant to Jardiance.  Recommend patient to try different medication Januvia.  Potential side effect of medication discussed with the patient.  Clinical notes:   -Discussed with the patient goals for Diabetes management:   HbA1C < 7% /  Fasting BG   /  2 hrs post meal BG below 160  -Reviewed pathophysiology of diabetes and the importance of glycemic control to reduce the risk of diabetes related complications   Microvascular: retinopathy, neuropathy, nephropathy  Macrovascular: heart attack, stroke, peripheral vascular dz  -Advised pt to monitor sugar closely  -Counseled pt the importance of " recognizing and treating hypoglycemia.   -The importance of increasing physical activity to improve diabetes control  discussed with the patient.   -Patient was also educated on changing diet and making better choices to help control blood sugar.     Recommend follow-up in 3 months test prior     - SITagliptin (JANUVIA) 25 MG Tab; Take 1 Tablet by mouth every day.  Dispense: 30 Tablet; Refill: 3  - HEMOGLOBIN A1C; Future  - Basic Metabolic Panel; Future    2. Chronic bronchitis, unspecified chronic bronchitis type (HCC)  Chronic stable.  Continue Stiolto Respimat.  The patient may use albuterol as needed for rescue treatment.  Continue follow-up with pulmonology service.  Currently the patient asymptomatic.      3. Proteinuria due to type 2 diabetes mellitus ([facility])  - MICROALBUMIN CREAT RATIO URINE; Future  Chronic.  Recommend low protein diet.  Continue lisinopril 2.5 Mg daily.  Continue follow-up with nephrology service    4. CKD stage 2 due to type 2 diabetes mellitus ([facility])  Chronic stable.  Advised the patient to avoid NSAID.  Continue follow-up with nephrology.  Continue to monitor recent GFR 50      5. Thrombocytopenia ([facility])  - CBC WITHOUT DIFFERENTIAL; Future  Chronic stable.  Recent platelet level normal.  The patient denies history of bleeding.  Continue to monitor.      6. Dyslipidemia due to type 2 diabetes mellitus ([facility])  - ALANINE AMINO-TRANS; Future  - Lipid Profile; Future  Chronic and stable.  Lipid panel normal.  Continue atorvastatin 20 Mg daily    7. Gastroesophageal reflux disease without esophagitis  Chronic stable.  Continue omeprazole 40 Mg daily.  Antireflux precaution discussed with the patient    8. Tobacco dependence  Chronic condition.  Unstable.  Strongly advised the patient to quit smoking completely    9. NAHUM on CPAP  Stable.  Continue CPAP use nightly.  Patient to follow-up with sleep specialist as directed    10. Severe obesity (HCC)  Chronic condition.  Unstable.  Recommend healthy  diet and exercise.  Patient will try to lose weight    11. Anxiety and depression  Chronic stable.  Continue duloxetine 60 Mg daily.    12. Erectile dysfunction, unspecified erectile dysfunction type  Chronic stable.  The patient may take Viagra 50 mg daily as needed.    13. Anemia, unspecified type  Chronic condition.  Patient asymptomatic.  No history of bleeding reported.  Previous iron study B12 folate were unremarkable.  Fit test was negative.  Recommend to repeat lab test next visit.              Time spent:   42   minutes     Reviewed medical records including:  May 29, 2025 nephrology note  March 19, 2025 medical office note  March 18, 2025 urgent care note  Jan 24 2025 pulmonary note  December 12, 2024 medical office note  Lab data:  Ashley 3, 2025, May 21, 2025, December 3, 2024    Total time includes face to face time and non-face to face time.  Chart review before the visit, the actual patient visit, and time spent on documentation in the EMR after the visit.  Chart review/prep, review of other providers' records, Independent review of imagings/labs ,  time for history/examination , pt's counseling/education, ordering, prescribing, treatment plan discussed with patient, and care coordination.                Please note that this dictation was created using voice recognition software. I have made every reasonable attempt to correct obvious errors, but I expect that there are errors of grammar and possibly content that I did not discover before finalizing the note.    Aris Aldana MD  Internal Medicine  [facility]         [1]   Current Outpatient Medications on File Prior to Visit   Medication Sig Dispense Refill    albuterol 108 (90 Base) MCG/ACT Aero Soln inhalation aerosol INHALE 2 PUFFS BY MOUTH EVERY 6 HOURS AS NEEDED FOR SHORTNESS OF BREATH 8.5 g 6    Tiotropium Bromide-Olodaterol (STIOLTO RESPIMAT) 2.5-2.5 MCG/ACT Aero Soln Inhale 2 Puffs every day. 4 g 11    furosemide (LASIX) 20 MG  Tab Take 1 Tablet by mouth 1 time a day as needed (leg swelling). 30 Tablet 3    hydrOXYzine HCl (ATARAX) 25 MG Tab Take 1 Tablet by mouth at bedtime as needed for Anxiety. 30 Tablet 1    DULoxetine (CYMBALTA) 60 MG Cap DR Particles delayed-release capsule Take 1 Capsule by mouth every day. 90 Capsule 3    atorvastatin (LIPITOR) 20 MG Tab Take 1 Tablet by mouth every day. 100 Tablet 3    omeprazole (PRILOSEC) 40 MG delayed-release capsule Take 1 Capsule by mouth every day. 100 Capsule 3    sildenafil citrate (VIAGRA) 50 MG tablet Take 1 Tablet by mouth 1 time a day as needed for Erectile Dysfunction. 10 Tablet 3    lisinopril (PRINIVIL) 2.5 MG Tab TAKE 1 TABLET BY MOUTH EVERY DAY 90 Tablet 3    Iron Combinations (IRON COMPLEX PO) Take  by mouth.      ipratropium-albuterol (DUONEB) 0.5-2.5 (3) MG/3ML nebulizer solution Take 3 mL by nebulization every four hours as needed for Shortness of Breath (Wheezing). 180 mL 3    fluocinonide (LIDEX) 0.05 % Cream AAA arms/legs BID PRN rash/itching. Do not use on face, axilla, groin 60 g 1    triamcinolone acetonide (KENALOG) 0.5 % Cream APPLY TOPICALLY TWICE DAILY AS NEEDED FOR ITCHING, BODY SITES ONLY. DO NOT USE ON FACE, AXILLA, OR GROIN 15 g 1    PREVIDENT 5000 DRY MOUTH 1.1 % Gel       nystatin (MYCOSTATIN) 559122 UNIT/GM Cream topical cream AAA groin BID for rash. Use for 7-10days when present 30 g 3    Cholecalciferol (VITAMIN D) 125 MCG (5000 UT) Cap Take 5,000 Units by mouth every day.       No current facility-administered medications on file prior to visit.   [2]   Current Outpatient Medications Ordered in Epic   Medication Sig Dispense Refill    SITagliptin (JANUVIA) 25 MG Tab Take 1 Tablet by mouth every day. 30 Tablet 3    albuterol 108 (90 Base) MCG/ACT Aero Soln inhalation aerosol INHALE 2 PUFFS BY MOUTH EVERY 6 HOURS AS NEEDED FOR SHORTNESS OF BREATH 8.5 g 6    Tiotropium Bromide-Olodaterol (STIOLTO RESPIMAT) 2.5-2.5 MCG/ACT Aero Soln Inhale 2 Puffs every day. 4  g 11    furosemide (LASIX) 20 MG Tab Take 1 Tablet by mouth 1 time a day as needed (leg swelling). 30 Tablet 3    hydrOXYzine HCl (ATARAX) 25 MG Tab Take 1 Tablet by mouth at bedtime as needed for Anxiety. 30 Tablet 1    DULoxetine (CYMBALTA) 60 MG Cap DR Particles delayed-release capsule Take 1 Capsule by mouth every day. 90 Capsule 3    atorvastatin (LIPITOR) 20 MG Tab Take 1 Tablet by mouth every day. 100 Tablet 3    omeprazole (PRILOSEC) 40 MG delayed-release capsule Take 1 Capsule by mouth every day. 100 Capsule 3    sildenafil citrate (VIAGRA) 50 MG tablet Take 1 Tablet by mouth 1 time a day as needed for Erectile Dysfunction. 10 Tablet 3    lisinopril (PRINIVIL) 2.5 MG Tab TAKE 1 TABLET BY MOUTH EVERY DAY 90 Tablet 3    Iron Combinations (IRON COMPLEX PO) Take  by mouth.      ipratropium-albuterol (DUONEB) 0.5-2.5 (3) MG/3ML nebulizer solution Take 3 mL by nebulization every four hours as needed for Shortness of Breath (Wheezing). 180 mL 3    fluocinonide (LIDEX) 0.05 % Cream AAA arms/legs BID PRN rash/itching. Do not use on face, axilla, groin 60 g 1    triamcinolone acetonide (KENALOG) 0.5 % Cream APPLY TOPICALLY TWICE DAILY AS NEEDED FOR ITCHING, BODY SITES ONLY. DO NOT USE ON FACE, AXILLA, OR GROIN 15 g 1    PREVIDENT 5000 DRY MOUTH 1.1 % Gel       nystatin (MYCOSTATIN) 330380 UNIT/GM Cream topical cream AAA groin BID for rash. Use for 7-10days when present 30 g 3    Cholecalciferol (VITAMIN D) 125 MCG (5000 UT) Cap Take 5,000 Units by mouth every day.       No current [facility]-ordered facility-administered medications on file.   [3]   Past Medical History:  Diagnosis Date    Back pain     Chickenpox     Cough 7/3/2023    Depression     Dyspnea on exertion 5/22/2024    GERD (gastroesophageal reflux disease)     [place] measles     Gout     Hyperlipidemia     Influenza     Panic disorder     Sleep apnea     Sputum production     Tobacco use 11/26/2014    Wheezing    [4]   Past Surgical History:  Procedure  Laterality Date    APPENDECTOMY      ARTHROSCOPY, KNEE      CARPAL TUNNEL ENDOSCOPIC      bilateral    CARPAL TUNNEL RELEASE      SHOULDER DECOMPRESSION ARTHROSCOPIC      right    TONSILLECTOMY     [5]   Social History  Tobacco Use   Smoking Status Every Day    Current packs/day: 0.50    Average packs/day: 0.5 packs/day for 53.0 years (26.5 ttl pk-yrs)    Types: Cigarettes    Passive exposure: Past   Smokeless Tobacco Never   Tobacco Comments    started smoking at age 15

## 2025-06-24 NOTE — ASSESSMENT & PLAN NOTE
This is chronic.  Patient followed by hematology.  Patient did not history of bleeding.  Lab test result discussed with the patient

## 2025-06-24 NOTE — ASSESSMENT & PLAN NOTE
"This is chronic.  Patient intolerant to Jardiance.  Patient reports the medication \"is draining him\".  Patient has stopped taking medication after 4 days and his symptoms resolved.     Also report that he is intolerant to metformin.  Previous A1c below 7%   Patient denies significant hypo or hyperglycemia.  "

## 2025-06-24 NOTE — ASSESSMENT & PLAN NOTE
This is chronic.  Patient followed by pulmonology service.  Patient currently using Stiolto Respimat and albuterol as needed.  Patient denies shortness of breath or wheezing.

## 2025-06-24 NOTE — ASSESSMENT & PLAN NOTE
Body mass index is 34.28 kg/m².   Chronic condition.  Recommend pt to follow a healthy , well balance diet  Pt to continue with regular exercise activity and to maintain ideal weight.

## 2025-06-24 NOTE — ASSESSMENT & PLAN NOTE
Chronic.  The patient is taking Cymbalta 60 mg daily.  Patient tolerating medication well.  Patient denies SI.

## 2025-06-24 NOTE — ASSESSMENT & PLAN NOTE
Chronic.  The patient has been using CPAP nightly.  Patient followed by sleep specialist.  No new symptoms reported.

## 2025-06-24 NOTE — ASSESSMENT & PLAN NOTE
Chronic associated with proteinuria.  Previous GFR in the 60s.  Patient asymptomatic.  Patient currently followed by nephrology.   Spontaneous

## 2025-06-24 NOTE — ASSESSMENT & PLAN NOTE
Chronic.  Lab test show slightly low hemoglobin 13.7.  Patient denies history of bleeding.  Follow-up lab test including iron vitamin B12 and folate were unremarkable.  Fit test was negative.  Test result discussed with the patient.

## 2025-06-24 NOTE — ASSESSMENT & PLAN NOTE
Chronic.  The patient currently taking statin.  Patient tolerating medication well.  Lab test result discussed with the patient.

## 2025-06-24 NOTE — ASSESSMENT & PLAN NOTE
This is chronic.  Patient takes Viagra as needed.  Patient tolerating medication well.  No new symptoms reported.

## 2025-06-24 NOTE — ASSESSMENT & PLAN NOTE
This is chronic associated with CKD 2.  Patient presently followed by nephrology.  Patient asymptomatic.  Previous lab test result discussed with the patient.

## 2025-07-02 ENCOUNTER — TELEPHONE (OUTPATIENT)
Dept: SLEEP MEDICINE | Facility: MEDICAL CENTER | Age: 75
End: 2025-07-02

## 2025-07-02 ENCOUNTER — OFFICE VISIT (OUTPATIENT)
Dept: SLEEP MEDICINE | Facility: MEDICAL CENTER | Age: 75
End: 2025-07-02
Attending: INTERNAL MEDICINE
Payer: MEDICARE

## 2025-07-02 VITALS
DIASTOLIC BLOOD PRESSURE: 68 MMHG | WEIGHT: 212 LBS | BODY MASS INDEX: 34.07 KG/M2 | OXYGEN SATURATION: 96 % | SYSTOLIC BLOOD PRESSURE: 114 MMHG | HEIGHT: 66 IN | HEART RATE: 75 BPM

## 2025-07-02 DIAGNOSIS — J44.9 CHRONIC OBSTRUCTIVE PULMONARY DISEASE, UNSPECIFIED COPD TYPE (HCC): Primary | ICD-10-CM

## 2025-07-02 DIAGNOSIS — Z72.0 TOBACCO USE: ICD-10-CM

## 2025-07-02 DIAGNOSIS — G47.33 OSA ON CPAP: ICD-10-CM

## 2025-07-02 PROCEDURE — 99212 OFFICE O/P EST SF 10 MIN: CPT | Performed by: INTERNAL MEDICINE

## 2025-07-02 PROCEDURE — 3074F SYST BP LT 130 MM HG: CPT | Performed by: INTERNAL MEDICINE

## 2025-07-02 PROCEDURE — RXMED WILLOW AMBULATORY MEDICATION CHARGE: Performed by: INTERNAL MEDICINE

## 2025-07-02 PROCEDURE — 99214 OFFICE O/P EST MOD 30 MIN: CPT | Performed by: INTERNAL MEDICINE

## 2025-07-02 PROCEDURE — 3078F DIAST BP <80 MM HG: CPT | Performed by: INTERNAL MEDICINE

## 2025-07-02 RX ORDER — FLUTICASONE FUROATE, UMECLIDINIUM BROMIDE AND VILANTEROL TRIFENATATE 100; 62.5; 25 UG/1; UG/1; UG/1
1 POWDER RESPIRATORY (INHALATION) DAILY
Qty: 60 EACH | Refills: 3 | Status: SHIPPED | OUTPATIENT
Start: 2025-07-02

## 2025-07-02 ASSESSMENT — ENCOUNTER SYMPTOMS
BACK PAIN: 0
WHEEZING: 0
PND: 0
CHILLS: 0
HEMOPTYSIS: 0
SPUTUM PRODUCTION: 1
COUGH: 1
WEAKNESS: 0
DIZZINESS: 0
DIAPHORESIS: 0
EYE PAIN: 0
SHORTNESS OF BREATH: 0
NAUSEA: 0
FEVER: 0
NECK PAIN: 0
SORE THROAT: 0
MYALGIAS: 0
HEARTBURN: 0
PALPITATIONS: 0
CLAUDICATION: 0
PHOTOPHOBIA: 0
FOCAL WEAKNESS: 0
WEIGHT LOSS: 0
CONSTIPATION: 0
DOUBLE VISION: 0
FALLS: 0
STRIDOR: 0
EYE REDNESS: 0
BLURRED VISION: 0
ABDOMINAL PAIN: 0
EYE DISCHARGE: 0
SPEECH CHANGE: 0
VOMITING: 0
TREMORS: 0
HEADACHES: 0
DEPRESSION: 0
DIARRHEA: 0
SINUS PAIN: 0
ORTHOPNEA: 0

## 2025-07-02 ASSESSMENT — FIBROSIS 4 INDEX: FIB4 SCORE: 1.93

## 2025-07-02 NOTE — PROGRESS NOTES
Chief Complaint   Patient presents with    Follow-Up     LAST SEEN 8/4/24    Results     PFT 2/11/25         HPI: This patient is a 74 y.o. male whom is followed in our clinic for COPD last seen by me on 8/4/24.  He is a current tobacco smoker, <1/2ppd with estimated 50+ pk yr hx. He is also followed by sleep medicine. PFT from 2/2023 showed mild air flow obstruction with normal TLC. DLCO not performed. He is not SOB but does have excess mucous production and cough for which he is currently on stiolo but tells me the effects only last about 8 hours after which cough and mucous increase. He does use albuterol prn which is helpful. CXR from 5/2024 was clear. He declined CT for lung Ca screening. Full PFT from 2/11/25 showed mild air flow obstruction wit h FEV1 of 2.18 or 79% pred, ratio of 65, no BD response, normal lung volumes and DLCO of 73% pred. He had URI sxs in May but did not require abx or steroids. No hx of COPD exacerbation. Functionally he is MMRC class 1-2.      Past Medical History[1]    Social History     Socioeconomic History    Marital status:      Spouse name: Not on file    Number of children: Not on file    Years of education: Not on file    Highest education level: Not on file   Occupational History    Not on file   Tobacco Use    Smoking status: Every Day     Current packs/day: 0.50     Average packs/day: 0.5 packs/day for 53.0 years (26.5 ttl pk-yrs)     Types: Cigarettes     Passive exposure: Past    Smokeless tobacco: Never    Tobacco comments:     started smoking at age 15   Vaping Use    Vaping status: Never Used   Substance and Sexual Activity    Alcohol use: Not Currently     Alcohol/week: 0.0 oz    Drug use: Never    Sexual activity: Yes     Partners: Female   Other Topics Concern    Not on file   Social History Narrative    Not on file     Social Drivers of Health     Financial Resource Strain: Not on file   Food Insecurity: Not on file   Transportation Needs: Not on file  "  Physical Activity: Not on file   Stress: Not on file   Social Connections: Not on file   Intimate Partner Violence: Not on file   Housing Stability: Not on file       Family History   Problem Relation Age of Onset    Cancer Mother     Cancer Father     Stroke Father     Diabetes Neg Hx     Heart Disease Neg Hx     Hypertension Neg Hx        Medications Ordered Prior to Encounter[2]    Augmentin, Metformin, Zyban [bupropion], and Jardiance [empagliflozin]      ROS:   Review of Systems   Constitutional:  Negative for chills, diaphoresis, fever, malaise/fatigue and weight loss.   HENT:  Negative for congestion, ear discharge, ear pain, hearing loss, nosebleeds, sinus pain, sore throat and tinnitus.    Eyes:  Negative for blurred vision, double vision, photophobia, pain, discharge and redness.   Respiratory:  Positive for cough and sputum production. Negative for hemoptysis, shortness of breath, wheezing and stridor.    Cardiovascular:  Negative for chest pain, palpitations, orthopnea, claudication, leg swelling and PND.   Gastrointestinal:  Negative for abdominal pain, constipation, diarrhea, heartburn, nausea and vomiting.   Genitourinary:  Negative for dysuria and urgency.   Musculoskeletal:  Negative for back pain, falls, joint pain, myalgias and neck pain.   Skin:  Negative for itching and rash.   Neurological:  Negative for dizziness, tremors, speech change, focal weakness, weakness and headaches.   Endo/Heme/Allergies:  Negative for environmental allergies.   Psychiatric/Behavioral:  Negative for depression.        /68 (BP Location: Right arm, Patient Position: Sitting, BP Cuff Size: Adult)   Pulse 75   Ht 1.676 m (5' 6\")   Wt 96.2 kg (212 lb)   SpO2 96%   Physical Exam  Vitals reviewed.   Constitutional:       General: He is not in acute distress.     Appearance: Normal appearance.   HENT:      Head: Normocephalic and atraumatic.      Right Ear: External ear normal.      Left Ear: External ear " normal.      Nose: Nose normal. No congestion.      Mouth/Throat:      Mouth: Mucous membranes are moist.      Pharynx: Oropharynx is clear. No oropharyngeal exudate.   Eyes:      General: No scleral icterus.     Extraocular Movements: Extraocular movements intact.      Conjunctiva/sclera: Conjunctivae normal.      Pupils: Pupils are equal, round, and reactive to light.   Cardiovascular:      Rate and Rhythm: Normal rate and regular rhythm.      Heart sounds: Normal heart sounds. No murmur heard.     No gallop.   Pulmonary:      Effort: Pulmonary effort is normal. No respiratory distress.      Breath sounds: Rhonchi present. No wheezing or rales.   Abdominal:      Palpations: Abdomen is soft.   Musculoskeletal:         General: Normal range of motion.      Cervical back: Normal range of motion and neck supple.      Right lower leg: No edema.      Left lower leg: No edema.   Skin:     General: Skin is warm and dry.      Findings: No rash.   Neurological:      General: No focal deficit present.      Mental Status: He is alert and oriented to person, place, and time.      Cranial Nerves: No cranial nerve deficit.   Psychiatric:         Mood and Affect: Mood normal.         Behavior: Behavior normal.         PFTs as reviewed by me personally: as per hPI    Imagaing as reviewed by me personally:  as pe rhPI    Assessment:  1. Chronic obstructive pulmonary disease, unspecified COPD type (HCC)  fluticasone-umeclidinium-vilanterol (TRELEGY ELLIPTA) 100-62.5-25 mcg/PUFF inhaler      2. NAHUM on CPAP        3. Tobacco use            Plan:  Chronic. Mild but sxs of airway inflammation. May be an element of atopy. We discussed a trial of adding low dose ICS to LAMA/LABA with trelegy to see if this reduces mucous production by way of reducing airway inflammation. I did not cancel his stiolto but he knows not to take stiolto with trelegy. If trelegy is more effective, we can stop stiolto. Counseled on complete tobacco cessation.    Followed by sleep medicine.   Counseled on tobacco cessation. He declined lung Ca screening.   Return in about 3 months (around 10/2/2025) for BONNIE Suarez, Princess or Siva .             [1]   Past Medical History:  Diagnosis Date    Back pain     Chickenpox     Cough 7/3/2023    Depression     Dyspnea on exertion 5/22/2024    GERD (gastroesophageal reflux disease)     [place] measles     Gout     Hyperlipidemia     Influenza     Panic disorder     Sleep apnea     Sputum production     Tobacco use 11/26/2014    Wheezing    [2]   Current Outpatient Medications on File Prior to Visit   Medication Sig Dispense Refill    SITagliptin (JANUVIA) 25 MG Tab Take 1 Tablet by mouth every day. 30 Tablet 3    albuterol 108 (90 Base) MCG/ACT Aero Soln inhalation aerosol INHALE 2 PUFFS BY MOUTH EVERY 6 HOURS AS NEEDED FOR SHORTNESS OF BREATH 8.5 g 6    Tiotropium Bromide-Olodaterol (STIOLTO RESPIMAT) 2.5-2.5 MCG/ACT Aero Soln Inhale 2 Puffs every day. 4 g 11    furosemide (LASIX) 20 MG Tab Take 1 Tablet by mouth 1 time a day as needed (leg swelling). 30 Tablet 3    hydrOXYzine HCl (ATARAX) 25 MG Tab Take 1 Tablet by mouth at bedtime as needed for Anxiety. 30 Tablet 1    DULoxetine (CYMBALTA) 60 MG Cap DR Particles delayed-release capsule Take 1 Capsule by mouth every day. 90 Capsule 3    atorvastatin (LIPITOR) 20 MG Tab Take 1 Tablet by mouth every day. 100 Tablet 3    omeprazole (PRILOSEC) 40 MG delayed-release capsule Take 1 Capsule by mouth every day. 100 Capsule 3    sildenafil citrate (VIAGRA) 50 MG tablet Take 1 Tablet by mouth 1 time a day as needed for Erectile Dysfunction. 10 Tablet 3    lisinopril (PRINIVIL) 2.5 MG Tab TAKE 1 TABLET BY MOUTH EVERY DAY 90 Tablet 3    Iron Combinations (IRON COMPLEX PO) Take  by mouth.      fluocinonide (LIDEX) 0.05 % Cream AAA arms/legs BID PRN rash/itching. Do not use on face, axilla, groin 60 g 1    triamcinolone acetonide (KENALOG) 0.5 % Cream APPLY TOPICALLY TWICE DAILY AS NEEDED FOR  ITCHING, BODY SITES ONLY. DO NOT USE ON FACE, AXILLA, OR GROIN 15 g 1    PREVIDENT 5000 DRY MOUTH 1.1 % Gel       nystatin (MYCOSTATIN) 217907 UNIT/GM Cream topical cream AAA groin BID for rash. Use for 7-10days when present 30 g 3    Cholecalciferol (VITAMIN D) 125 MCG (5000 UT) Cap Take 5,000 Units by mouth every day.      ipratropium-albuterol (DUONEB) 0.5-2.5 (3) MG/3ML nebulizer solution Take 3 mL by nebulization every four hours as needed for Shortness of Breath (Wheezing). (Patient not taking: Reported on 7/2/2025) 180 mL 3     No current facility-administered medications on file prior to visit.

## 2025-07-02 NOTE — TELEPHONE ENCOUNTER
Received New Start request via [employer]  for fluticasone-umeclidinium-vilanterol (TRELEGY ELLIPTA) 100-62.5-25 mcg/PUFF inhaler . (Quantity:180, Day Supply:90)     Insurance: [employer] / SCP  Member ID:  [id number]  BIN: [id number]  PCN: [id number]  Group: [facility]     Ran Test claim via [place] [facility] medication Pays for a $94 copay. Will outreach to patient to offer specialty pharmacy services and or release to preferred pharmacy    Thank you,   Ree Garvey, [facility]  Pharmacy Liaison

## 2025-07-03 ENCOUNTER — PHARMACY VISIT (OUTPATIENT)
Dept: PHARMACY | Facility: MEDICAL CENTER | Age: 75
End: 2025-07-03
Payer: COMMERCIAL

## 2025-07-23 ENCOUNTER — OFFICE VISIT (OUTPATIENT)
Dept: URGENT CARE | Facility: PHYSICIAN GROUP | Age: 75
End: 2025-07-23
Payer: MEDICARE

## 2025-07-23 VITALS
RESPIRATION RATE: 16 BRPM | DIASTOLIC BLOOD PRESSURE: 90 MMHG | SYSTOLIC BLOOD PRESSURE: 132 MMHG | BODY MASS INDEX: 34.4 KG/M2 | HEIGHT: 66 IN | OXYGEN SATURATION: 97 % | TEMPERATURE: 97 F | WEIGHT: 214.07 LBS | HEART RATE: 89 BPM

## 2025-07-23 DIAGNOSIS — M72.2 PLANTAR FASCIITIS, RIGHT: Primary | ICD-10-CM

## 2025-07-23 PROCEDURE — 3075F SYST BP GE 130 - 139MM HG: CPT | Performed by: STUDENT IN AN ORGANIZED HEALTH CARE EDUCATION/TRAINING PROGRAM

## 2025-07-23 PROCEDURE — 99213 OFFICE O/P EST LOW 20 MIN: CPT | Performed by: STUDENT IN AN ORGANIZED HEALTH CARE EDUCATION/TRAINING PROGRAM

## 2025-07-23 PROCEDURE — 3080F DIAST BP >= 90 MM HG: CPT | Performed by: STUDENT IN AN ORGANIZED HEALTH CARE EDUCATION/TRAINING PROGRAM

## 2025-07-23 ASSESSMENT — FIBROSIS 4 INDEX: FIB4 SCORE: 1.93

## 2025-07-23 NOTE — PROGRESS NOTES
Subjective:   Micheal Brothers Jr. is a 74 y.o. male who presents for Foot Problem (X2 weeks with right foot pain/pressure that isnt getting better or worsening.)      HPI:  74-year-old male presents to the urgent care for right-sided foot pain for the past 2 weeks.  Denies any trauma or injury.  No increased activity.  States his pain is present to the bottom of the foot and worse first thing in the morning and also worsens after periods of sitting.  States that the pain does improve with movement but never fully goes away.  Feels like his arch is very tight.  No change in his shoes.  He has never had similar symptoms in the past.  Pain does not radiate up the leg.  He has not noticed any redness or swelling.      Medications:    albuterol Aers  atorvastatin Tabs  diclofenac sodium Gel  DULoxetine Cpep  fluocinonide Crea  furosemide Tabs  hydrOXYzine HCl Tabs  ipratropium-albuterol  IRON COMPLEX PO  lisinopril Tabs  nystatin Crea  omeprazole  PreviDent 5000 Dry Mouth Gel  sildenafil citrate  SITagliptin Tabs  Stiolto Respimat Aers  Trelegy Ellipta Aepb  triamcinolone acetonide Crea  Vitamin D Caps    Allergies: Augmentin, Metformin, Zyban [bupropion], and Jardiance [empagliflozin]    Problem List: Micheal Brothers Jr. does not have any pertinent problems on file.    Surgical History:  Past Surgical History:   Procedure Laterality Date    APPENDECTOMY      ARTHROSCOPY, KNEE      CARPAL TUNNEL ENDOSCOPIC      bilateral    CARPAL TUNNEL RELEASE      SHOULDER DECOMPRESSION ARTHROSCOPIC      right    TONSILLECTOMY         Past Social Hx: Micheal Brothers Jr.  reports that he has been smoking cigarettes. He has a 26.5 pack-year smoking history. He has been exposed to tobacco smoke. He has never used smokeless tobacco. He reports that he does not currently use alcohol. He reports that he does not use drugs.     Past Family Hx:  Micheal Brothers Jr. family history includes Cancer in his  "father and mother; Stroke in his father.     Problem list, medications, and allergies reviewed by myself today in Epic.     Objective:     BP (!) 132/90   Pulse 89   Temp 36.1 °C (97 °F) (Temporal)   Resp 16   Ht 1.676 m (5' 6\")   Wt 97.1 kg (214 lb 1.1 oz)   SpO2 97%   BMI 34.55 kg/m²     Physical Exam  Vitals reviewed.   Cardiovascular:      Rate and Rhythm: Normal rate.      Pulses: Normal pulses.   Pulmonary:      Effort: Pulmonary effort is normal.   Feet:      Comments: Right foot: Tenderness to the arch of the foot.  No erythema or swelling.  Full range of motion of ankle and toes.  Able to bear weight but some discomfort with doing so.  No pain over the calcaneus.  Negative Sk[place]gs test.  Achilles tendon intact.        Assessment/Plan:     Diagnosis and associated orders:     1. Plantar fasciitis, right  diclofenac sodium (VOLTAREN) 1 % Gel         Comments/MDM:     Presentation physical exam findings are consistent with plantar fasciitis of the right foot.  Discussed home supportive care including Voltaren gel.  Discussed stretching exercises and range of motion.  Discussed orthotic inserts.  Advised to use a frozen water bottle for foot massage 3-5 times daily for 20 minutes at a time.  Stretching prior to activity.  Patient does not want referral to orthopedics for follow-up.         Differential diagnosis, natural history, supportive care, and indications for immediate follow-up discussed.    Advised the patient to follow-up with the primary care physician for recheck, reevaluation, and consideration of further management.    Please note that this dictation was created using voice recognition software. I have made a reasonable attempt to correct obvious errors, but I expect that there are errors of grammar and possibly content that I did not discover before finalizing the note.    Electronically signed by Rl Arteaga PA-C.      "